# Patient Record
Sex: FEMALE | Race: WHITE | NOT HISPANIC OR LATINO | Employment: OTHER | URBAN - METROPOLITAN AREA
[De-identification: names, ages, dates, MRNs, and addresses within clinical notes are randomized per-mention and may not be internally consistent; named-entity substitution may affect disease eponyms.]

---

## 2017-01-02 DIAGNOSIS — Z51.81 MEDICATION MONITORING ENCOUNTER: ICD-10-CM

## 2017-01-02 DIAGNOSIS — N30.00 ACUTE CYSTITIS WITHOUT HEMATURIA: ICD-10-CM

## 2017-01-02 LAB
ALBUMIN UR-MCNC: NEGATIVE MG/DL
ALT SERPL W P-5'-P-CCNC: 27 U/L (ref 0–50)
APPEARANCE UR: CLEAR
AST SERPL W P-5'-P-CCNC: 39 U/L (ref 0–45)
BACTERIA #/AREA URNS HPF: ABNORMAL /HPF
BASOPHILS # BLD AUTO: 0 10E9/L (ref 0–0.2)
BASOPHILS NFR BLD AUTO: 0.8 %
BILIRUB UR QL STRIP: NEGATIVE
COLOR UR AUTO: YELLOW
DIFFERENTIAL METHOD BLD: NORMAL
EOSINOPHIL # BLD AUTO: 0.1 10E9/L (ref 0–0.7)
EOSINOPHIL NFR BLD AUTO: 2.5 %
ERYTHROCYTE [DISTWIDTH] IN BLOOD BY AUTOMATED COUNT: 12.6 % (ref 10–15)
GLUCOSE UR STRIP-MCNC: NEGATIVE MG/DL
HCT VFR BLD AUTO: 37.4 % (ref 35–47)
HGB BLD-MCNC: 11.8 G/DL (ref 11.7–15.7)
HGB UR QL STRIP: NEGATIVE
KETONES UR STRIP-MCNC: NEGATIVE MG/DL
LEUKOCYTE ESTERASE UR QL STRIP: ABNORMAL
LYMPHOCYTES # BLD AUTO: 1.7 10E9/L (ref 0.8–5.3)
LYMPHOCYTES NFR BLD AUTO: 32.1 %
MCH RBC QN AUTO: 28.4 PG (ref 26.5–33)
MCHC RBC AUTO-ENTMCNC: 31.6 G/DL (ref 31.5–36.5)
MCV RBC AUTO: 90 FL (ref 78–100)
MONOCYTES # BLD AUTO: 0.6 10E9/L (ref 0–1.3)
MONOCYTES NFR BLD AUTO: 10.9 %
NEUTROPHILS # BLD AUTO: 2.9 10E9/L (ref 1.6–8.3)
NEUTROPHILS NFR BLD AUTO: 53.7 %
NITRATE UR QL: NEGATIVE
NON-SQ EPI CELLS #/AREA URNS LPF: ABNORMAL /LPF
PH UR STRIP: 8 PH (ref 5–7)
PLATELET # BLD AUTO: 322 10E9/L (ref 150–450)
RBC # BLD AUTO: 4.16 10E12/L (ref 3.8–5.2)
RBC #/AREA URNS AUTO: ABNORMAL /HPF (ref 0–2)
SP GR UR STRIP: 1.01 (ref 1–1.03)
URN SPEC COLLECT METH UR: ABNORMAL
UROBILINOGEN UR STRIP-ACNC: 0.2 EU/DL (ref 0.2–1)
WBC # BLD AUTO: 5.3 10E9/L (ref 4–11)
WBC #/AREA URNS AUTO: ABNORMAL /HPF (ref 0–2)

## 2017-01-02 PROCEDURE — 81001 URINALYSIS AUTO W/SCOPE: CPT | Performed by: FAMILY MEDICINE

## 2017-01-02 PROCEDURE — 84460 ALANINE AMINO (ALT) (SGPT): CPT | Performed by: DERMATOLOGY

## 2017-01-02 PROCEDURE — 84450 TRANSFERASE (AST) (SGOT): CPT | Performed by: DERMATOLOGY

## 2017-01-02 PROCEDURE — 87086 URINE CULTURE/COLONY COUNT: CPT | Performed by: FAMILY MEDICINE

## 2017-01-02 PROCEDURE — 36415 COLL VENOUS BLD VENIPUNCTURE: CPT | Performed by: DERMATOLOGY

## 2017-01-02 PROCEDURE — 85025 COMPLETE CBC W/AUTO DIFF WBC: CPT | Performed by: DERMATOLOGY

## 2017-01-02 NOTE — PROGRESS NOTES
Quick Note:    Your final test results are pending. Please check your chart again within 3 to 5 days. You will receive further instruction when your full test result panel is complete.      ______

## 2017-01-02 NOTE — Clinical Note
Patient:  Ludy Mckenzie  :   1940  MRN:     [id number]        Ms.Inez JESSICA Mckenzie  [address] [place] [address]        January 3, 2017    Dear ,      We are writing to inform you of your test results that show normal labs.     Thank you for taking the time to be seen in our dermatology clinic. If you have further questions or concerns, please contact the clinic(see phone number listed below).       Sincerely,     Whitley Manrique MD      Department of Dermatology   [facility]: Phone: [phone], Fax:[phone]   [facility]: Phone: [phone], Fax: [phone]     Resulted Orders   CBC with platelets differential   Result Value Ref Range    WBC 5.3 4.0 - 11.0 10e9/L    RBC Count 4.16 3.8 - 5.2 10e12/L    Hemoglobin 11.8 11.7 - 15.7 g/dL    Hematocrit 37.4 35.0 - 47.0 %    MCV 90 78 - 100 fl    MCH 28.4 26.5 - 33.0 pg    MCHC 31.6 31.5 - 36.5 g/dL    RDW 12.6 10.0 - 15.0 %    Platelet Count 322 150 - 450 10e9/L    Diff Method Automated Method     % Neutrophils 53.7 %    % Lymphocytes 32.1 %    % Monocytes 10.9 %    % Eosinophils 2.5 %    % Basophils 0.8 %    Absolute Neutrophil 2.9 1.6 - 8.3 10e9/L    Absolute Lymphocytes 1.7 0.8 - 5.3 10e9/L    Absolute Monocytes 0.6 0.0 - 1.3 10e9/L    Absolute Eosinophils 0.1 0.0 - 0.7 10e9/L    Absolute Basophils 0.0 0.0 - 0.2 10e9/L   ALT   Result Value Ref Range    ALT 27 0 - 50 U/L   AST   Result Value Ref Range    AST 39 0 - 45 U/L

## 2017-01-03 DIAGNOSIS — L12.0 BULLOUS PEMPHIGOID (H): Primary | ICD-10-CM

## 2017-01-03 DIAGNOSIS — H40.9 GLAUCOMA: Primary | ICD-10-CM

## 2017-01-03 LAB
BACTERIA SPEC CULT: NO GROWTH
MICRO REPORT STATUS: NORMAL
SPECIMEN SOURCE: NORMAL

## 2017-01-03 RX ORDER — MYCOPHENOLATE MOFETIL 500 MG/1
TABLET ORAL
Qty: 300 TABLET | Refills: 0 | Status: SHIPPED | OUTPATIENT
Start: 2017-01-03 | End: 2017-02-17

## 2017-01-03 RX ORDER — LATANOPROST 50 UG/ML
1 SOLUTION/ DROPS OPHTHALMIC AT BEDTIME
Qty: 3 BOTTLE | Refills: 3 | Status: SHIPPED | OUTPATIENT
Start: 2017-01-03 | End: 2017-11-08

## 2017-01-14 ENCOUNTER — TELEPHONE (OUTPATIENT)
Dept: DERMATOLOGY | Facility: CLINIC | Age: 77
End: 2017-01-14

## 2017-01-14 NOTE — TELEPHONE ENCOUNTER
[facility] Prior Authorization Team   Phone: [phone]  Fax: [phone]    PA Initiation    Medication: MYCOPHENOLATE 500MG  Insurance Company: [employer]  Fax Number:      Phone Number: [phone]  Pharmacy Filling the Rx: [id number] - [address]  Filling Pharmacy Phone: [phone]  Filling Pharmacy Fax: [phone]  Start Date: 1/14/2017

## 2017-01-17 NOTE — TELEPHONE ENCOUNTER
Patient has 2 days left of medication.  Can you please contact the insurance company again?  Nell Crawford RN

## 2017-01-17 NOTE — TELEPHONE ENCOUNTER
Prior Authorization Approval    Authorization Effective Date: 12/15/2016  Authorization Expiration Date: 1/14/2020  Medication: MYCOPHENOLATE 500MG - Approved  Approved Dose/Quantity:   Reference #:     Insurance Company:    Expected CoPay: $76.38     CoPay Card Available:      Foundation Assistance Needed:    Which Pharmacy is filling the prescription (Not needed for infusion/clinic administered): [id number] - [facility], [place] - [address] [facility].  Pharmacy Notified:  Yes  Patient Notified:   Yes    The patient currently has a deductible that needs to be met. Once this has been reached, her co-pay will be $12.00. She has been updated on this.

## 2017-02-17 ENCOUNTER — OFFICE VISIT (OUTPATIENT)
Dept: DERMATOLOGY | Facility: CLINIC | Age: 77
End: 2017-02-17
Payer: COMMERCIAL

## 2017-02-17 DIAGNOSIS — L82.1 SEBORRHEIC KERATOSIS: ICD-10-CM

## 2017-02-17 DIAGNOSIS — Z51.81 MEDICATION MONITORING ENCOUNTER: Primary | ICD-10-CM

## 2017-02-17 DIAGNOSIS — L12.0 BULLOUS PEMPHIGOID (H): ICD-10-CM

## 2017-02-17 DIAGNOSIS — T14.8XXA EXCORIATION: ICD-10-CM

## 2017-02-17 PROCEDURE — 99213 OFFICE O/P EST LOW 20 MIN: CPT | Performed by: DERMATOLOGY

## 2017-02-17 RX ORDER — MYCOPHENOLATE MOFETIL 500 MG/1
TABLET ORAL
Qty: 300 TABLET | Refills: 0 | Status: SHIPPED | OUTPATIENT
Start: 2017-02-17 | End: 2017-03-30

## 2017-02-17 NOTE — PROGRESS NOTES
[facility] Dermatology Note      Dermatology Problem List:  1. Bullous pemphigoid, began 2013 initially followed by Dr. Roberts  -Current Tx: Cellcept (initiated 10/23/2013 1500 BID 2014, decreased to 1000 BID 11/10/2016)  -Previous Tx: prednisone initiated 9/25/2013  -s/p dapsone initiated 10/2013 with improvement  -s/p prednisolone initiated 10/16/2013, re-initiated 2/10/2014  -s/p clobetasol cream and gel for mouth11/6/2013  -s/p mycophenolate 1500 BID 9/2014  -pemphigoid panel with negative BP antibodies 11/2015 initially positive 6/2015  -Negative DIF 9/2013  -s/p biopsy 9/2013 with negative DIF  -s/p biopsy 6/2013 with spongiotic dermatitis with EOS on right upper thigh and right mid thigh  2. Oral candida  -s/p nystatin   -s/p fluconazole    Encounter Date: Feb 17, 2017    CC:  Chief Complaint   Patient presents with     RECHECK     3 month follow up - Skin Check - Bullous Pemphigoid       History of Present Illness:  Ms. Ludy Mckenzie is a 76 year old female who presents as a follow-up for bullous pemphigoid and lesion on the right elbow. The patient was last seen 11/10/2016 when Cellcept was decreased to 1000mg BID and erythematous papule on the right elbow was identified. Today, the patient reports a bothersome lesion on the right upper arm. Also notes red lines on the back. She is taking 5 pills of Cellcept daily. If she takes less than that she starts to become pruritic. No recent illnesses. Has had flu shot.  The patient reports no other lesions of concern.     Past Medical History:   Patient Active Problem List   Diagnosis     Contact dermatitis and other eczema, due to unspecified cause     Colon polyp     Balding     Alcoholism (H)     Hyperlipidemia LDL goal <100     Cerebral infarction (H)     Anxiety     Thyroid nodule     Seasonal allergic rhinitis     Major depressive disorder, recurrent episode, mild (H)     Health Care Home     Bullous pemphigoid     Hypopotassemia      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)     Glaucoma     Peripheral edema     Chronic obstructive pulmonary disease, unspecified COPD type (H)     Chronic kidney disease (CKD) stage G1/A2, glomerular filtration rate (GFR) equal to or greater than 90 mL/min/1.73 square meter and albuminuria creatinine ratio between  mg/g     Loss of balance     Diabetic neuropathy with neurologic complication (H)     Pseudophakia of both eyes     Past Medical History   Diagnosis Date     Acute ischemic left TREMAYNE stroke (H) 2009     Astigmatism, unspecified      Bullous pemphigoid      COPD (chronic obstructive pulmonary disease) (H)      CVA (cerebral infarction) 8/09     Depressive disorder      Diabetes (H)      Family history of diabetes mellitus      Goiter 3/12/2007     HTN (hypertension) 8/25/2009     Hypocalcemia      Hypokalemia      Hyponatremia      Mixed hyperlipidemia      Personal history of other diseases of circulatory system      Pneumonia 11/16/2010     Primary open-angle glaucoma(365.11)      Thyroid nodule 3/23/2012     Unspecified cerebral artery occlusion with cerebral infarction      Unspecified cerebral artery occlusion with cerebral infarction 1998     Past Surgical History   Procedure Laterality Date     Bunionectomy  1960     JACQUELINE     Cataract iol, rt/lt  2010     right     Colonoscopy  2/22/2013     Procedure: COLONOSCOPY;  Colonoscopy ;  Surgeon: Hugo Minor MD;  Location:  GI     Biopsy         Social History:  The patient lives in [facility]    Family History:  Grandma had unknown skin cancer.    Medications:  Current Outpatient Prescriptions   Medication Sig Dispense Refill     latanoprost (XALATAN) 0.005 % ophthalmic solution Place 1 drop into both eyes At Bedtime 3 Bottle 3     mycophenolate (CELLCEPT - GENERIC EQUIVALENT) 500 MG tablet Take 3 tabs in the am and 2 tabs in the evening 300 tablet 0     fluticasone-salmeterol (ADVAIR) 500-50 MCG/DOSE  diskus inhaler Inhale 1 puff into the lungs 2 times daily 1 Inhaler 1     sertraline (ZOLOFT) 100 MG tablet Take 1.5 tablets (150 mg) by mouth daily 135 tablet 1     ipratropium - albuterol 0.5 mg/2.5 mg/3 mL (DUONEB) 0.5-2.5 (3) MG/3ML nebulization Take 1 vial (3 mLs) by nebulization every 6 hours as needed for shortness of breath / dyspnea 1 Box 3     losartan (COZAAR) 25 MG tablet Take 0.5 tablets (12.5 mg) by mouth daily 45 tablet 1     clobetasol (TEMOVATE) 0.05 % cream Apply topically 2 times daily To active blisters until resolved. 120 g 5     albuterol (ALBUTEROL) 108 (90 BASE) MCG/ACT inhaler Inhale 2 puffs into the lungs every 4 hours as needed for shortness of breath / dyspnea 1 Inhaler 1     potassium chloride SA (POTASSIUM CHLORIDE) 20 MEQ tablet Take 1 tablet (20 mEq) by mouth daily 90 tablet 4     tiotropium (SPIRIVA HANDIHALER) 18 MCG inhalation capsule Inhale 1 capsule (18 mcg) into the lungs daily 90 capsule 2     calcium 600 MG tablet Take 1 tablet by mouth 2 times daily 180 tablet 3     multivitamin, therapeutic with minerals (THERA-VIT-M) TABS Take 1 tablet by mouth daily       vitamin  B complex with vitamin C (VITAMIN  B COMPLEX) TABS Take 1 tablet by mouth daily       ACE NOT PRESCRIBED, INTENTIONAL, 1 each continuous prn. ACE Inhibitor not prescribed due to Symptomatic hypotension not due to excessive diuresis 0 each 0     aspirin 81 MG tablet Take 1 tablet by mouth daily.       cholecalciferol (VITAMIN D3) 1000 UNIT capsule Take 1 capsule by mouth daily.         Allergies   Allergen Reactions     Amoxicillin GI Disturbance     Augmentin Nausea and Vomiting     Iodine Itching and Swelling     Pt is ok with ct contrast dye (isovue 370)     Mercury Unknown     Penicillins Unknown       Review of Systems:  -Const: The patient is generally feeling well today.   -Skin: As above in HPI. No additional skin concerns.    Physical exam:  Vitals: There were no vitals taken for this visit.  GEN: This is  a well developed, well-nourished female in no acute distress, in a pleasant mood.    SKIN: Sun-exposed skin, which includes the head/face, neck, both arms, digits, and/or nails was examined. Including exam of the legs and buttocks.   -Lichenified papule on the right posterior arm at site of prior scar   -Linear excoriations on the back.   -There is a waxy stuck on tan to brown papule on the right elbow.  -Numerous hypopigmented scars on the upper back.   -No other lesions of concern on areas examined.     Impression/Plan:  1. Bullous pemphigoid, began 2013 initially followed by Dr. Roberts. Stable on Cellcept, with hypopigmented scarring.   Continue Cellcept to 1000 mg in the morning and 1000 mg at night. Plan to obtain CBC with diff, ALT/AST in 2 months.   Waist up skin exam completed 11/10/2016   2. Seborrheic keratosis, right elbow    Discussed benign nature, no further intervention required at this time.   3. Linear excoriation, back. 2/2 trauma    Discussed lesions are due to trauma/scratching and will resolve with time.  4. Prurigo nodule, right posterior arm    Instructed patient to avoid scratching/picking area. Discussed the lesion should improve with time.    Use vaseline    Recheck at follow up     Follow-up in 3 months, earlier for new or changing lesions.     Staff Involved:  Scribe/Staff    Scribe Disclosure:   I, Jackelyn Peres, am serving as a scribe to document services personally performed by Dr. Whitley Manrique, based on data collection and the provider's statements to me.     Provider Disclosure:   I agree with above History, Review of Systems, Physical exam and Plan. I have reviewed the content of the documentation and have edited it as needed. I have personally performed the services documented here and the documentation accurately represents those services and the decisions I have made.     Whitley Manrique MD    Department of Dermatology  [facility]  [facility]: Phone: [phone], Fax:[phone]  [facility]: Phone: [phone], Fax: [phone]

## 2017-02-17 NOTE — MR AVS SNAPSHOT
After Visit Summary   2/17/2017    Ludy Mckenzie    MRN: [id number]           Patient Information     Date Of Birth          1940        Visit Information        Provider Department      2/17/2017 11:00 AM Whitley Manrique MD [facility]        Today's Diagnoses     Medication monitoring encounter    -  1    Bullous pemphigoid        Seborrheic keratosis        Excoriation           Follow-ups after your visit        Your next 10 appointments already scheduled     Apr 11, 2017 10:00 AM CDT   New Visit with Arjun Valenzuela MD   [facility]    [address] [place] [id number]   [phone]            May 23, 2017 12:15 PM CDT   Return Visit with Whitley Manrique MD   [facility]    [address] [place] [address] [phone]              Future tests that were ordered for you today     Open Future Orders        Priority Expected Expires Ordered    CBC with platelets differential Routine 4/17/2017 5/17/2017 2/17/2017    ALT Routine 4/17/2017 5/17/2017 2/17/2017    AST Routine 4/17/2017 5/17/2017 2/17/2017            Who to contact     If you have questions or need follow up information about today's clinic visit or your schedule please contact [facility] directly at [phone].  Normal or non-critical lab and imaging results will be communicated to you by MyChart, letter or phone within 4 business days after the clinic has received the results. If you do not hear from us within 7 days, please contact the clinic through MyChart or phone. If you have a critical or abnormal lab result, we will notify you by phone as soon as possible.  Submit refill requests through [employer] or call your pharmacy and they will forward the refill request to us. Please allow 3 business days for your refill to be completed.          Additional Information About Your Visit         boldUnderline. llchart Information     [employer] gives you secure access to your electronic health record. If you see a primary care provider, you can also send messages to your care team and make appointments. If you have questions, please call your primary care clinic.  If you do not have a primary care provider, please call [phone] and they will assist you.      [employer] is an electronic gateway that provides easy, online access to your medical records. With [employer], you can request a clinic appointment, read your test results, renew a prescription or communicate with your care team.     To access your existing account, please contact your [facility] or call [phone] for assistance.        Care EveryWhere ID     This is your Care EveryWhere ID. This could be used by other organizations to access your [place] medical records  [id number]         Blood Pressure from Last 3 Encounters:   12/22/16 118/64   09/19/16 110/58   09/13/16 108/61    Weight from Last 3 Encounters:   12/22/16 135 lb (61.2 kg)   09/19/16 133 lb 6.4 oz (60.5 kg)   09/13/16 135 lb (61.2 kg)                 Where to get your medicines      Call your pharmacy to confirm that your medication is ready for pickup. It may take up to 24 hours for them to receive the prescription. If the prescription is not ready within 3 business days, please contact your clinic or your provider.     We will let you know when these medications are ready. If you don't hear back within 3 business days, please contact us.     mycophenolate 500 MG tablet          Primary Care Provider Office Phone # Fax #    Ce Cano [phone]       [facility] [address] [facility] [address]        Thank you!     Thank you for choosing [facility]  for your care. Our goal is always to provide you with excellent care. Hearing back from our patients is one way we can continue to improve our  services. Please take a few minutes to complete the written survey that you may receive in the mail after your visit with us. Thank you!             Your Updated Medication List - Protect others around you: Learn how to safely use, store and throw away your medicines at www.disposemymeds.org.          This list is accurate as of: 2/17/17 11:15 AM.  Always use your most recent med list.                   Brand Name Dispense Instructions for use    ACE NOT PRESCRIBED (INTENTIONAL)     0 each    1 each continuous prn. ACE Inhibitor not prescribed due to Symptomatic hypotension not due to excessive diuresis       albuterol 108 (90 BASE) MCG/ACT Inhaler    albuterol    1 Inhaler    Inhale 2 puffs into the lungs every 4 hours as needed for shortness of breath / dyspnea       aspirin 81 MG tablet      Take 1 tablet by mouth daily.       calcium 600 MG tablet     180 tablet    Take 1 tablet by mouth 2 times daily       cholecalciferol 1000 UNITS capsule    vitamin  -D     Take 1 capsule by mouth daily.       clobetasol 0.05 % cream    TEMOVATE    120 g    Apply topically 2 times daily To active blisters until resolved.       fluticasone-salmeterol 500-50 MCG/DOSE diskus inhaler    ADVAIR    1 Inhaler    Inhale 1 puff into the lungs 2 times daily       ipratropium - albuterol 0.5 mg/2.5 mg/3 mL 0.5-2.5 (3) MG/3ML neb solution    DUONEB    1 Box    Take 1 vial (3 mLs) by nebulization every 6 hours as needed for shortness of breath / dyspnea       latanoprost 0.005 % ophthalmic solution    XALATAN    3 Bottle    Place 1 drop into both eyes At Bedtime       losartan 25 MG tablet    COZAAR    45 tablet    Take 0.5 tablets (12.5 mg) by mouth daily       multivitamin, therapeutic with minerals Tabs tablet      Take 1 tablet by mouth daily       mycophenolate 500 MG tablet    CELLCEPT - GENERIC EQUIVALENT    300 tablet    Take 3 tabs in the am and 2 tabs in the evening       potassium chloride SA 20 MEQ CR tablet    potassium  chloride    90 tablet    Take 1 tablet (20 mEq) by mouth daily       sertraline 100 MG tablet    ZOLOFT    135 tablet    Take 1.5 tablets (150 mg) by mouth daily       tiotropium 18 MCG capsule    SPIRIVA HANDIHALER    90 capsule    Inhale 1 capsule (18 mcg) into the lungs daily       vitamin B complex with vitamin C Tabs tablet      Take 1 tablet by mouth daily

## 2017-02-17 NOTE — NURSING NOTE
Dermatology Rooming Note    Ludy Mckenzie's goals for this visit include:   Chief Complaint   Patient presents with     RECHECK     3 month follow up - Skin Check - Bullous Pemphigoid       Is a scribe okay for this visit: YES    Are records needed for this visit(If yes, obtain release of information): NO     Vitals: There were no vitals taken for this visit.    Referring Provider:  No referring provider defined for this encounter.    Sarahi Ames, CMA

## 2017-02-18 DIAGNOSIS — R80.9 MICROALBUMINURIA: ICD-10-CM

## 2017-02-20 NOTE — TELEPHONE ENCOUNTER
losartan (COZAAR) 25 MG tablet      Last Written Prescription Date: 7/5/2016  Last Fill Quantity: 45, # refills: 1  Last Office Visit with G, P or [facility] prescribing provider: 2/17/2017       Potassium   Date Value Ref Range Status   09/19/2016 4.2 3.4 - 5.3 mmol/L Final     Creatinine   Date Value Ref Range Status   09/19/2016 0.53 0.52 - 1.04 mg/dL Final     BP Readings from Last 3 Encounters:   12/22/16 118/64   09/19/16 110/58   09/13/16 108/61

## 2017-02-22 RX ORDER — LOSARTAN POTASSIUM 25 MG/1
TABLET ORAL
Qty: 45 TABLET | Refills: 1 | Status: SHIPPED | OUTPATIENT
Start: 2017-02-22 | End: 2018-01-14

## 2017-02-22 NOTE — TELEPHONE ENCOUNTER
Prescription approved per [facility] Refill Protocol.    Signed Prescriptions:                        Disp   Refills    losartan (COZAAR) 25 MG tablet             45 tab*1        Sig: TAKE 0.5 TABLETS (12.5 MG) BY MOUTH DAILY  Authorizing Provider: LLOYD WATERS  Ordering User: GREGORY VIZCARRA, RN, BSN

## 2017-03-13 DIAGNOSIS — J43.9 PULMONARY EMPHYSEMA, UNSPECIFIED EMPHYSEMA TYPE (H): ICD-10-CM

## 2017-03-14 NOTE — TELEPHONE ENCOUNTER
tiotropium (SPIRIVA HANDIHALER) 18 MCG inhalation capsule       Last Written Prescription Date: 2/22/16  Last Fill Quantity: 90, # refills: 2    Last Office Visit with LEIDY, MARTY or [facility] prescribing provider:  12/22/16   Future Office Visit:    Next 5 appointments (look out 90 days)     May 23, 2017 12:15 PM CDT   Return Visit with Whitley Manrique MD   [facility] ([facility])    [address] [place] [address] [phone]                   Date of Last Asthma Action Plan Letter:   There are no preventive care reminders to display for this patient.   Asthma Control Test:   ACT Total Scores 4/16/2013   ACT TOTAL SCORE 9   ASTHMA ER VISITS 1 = One   ASTHMA HOSPITALIZATIONS 0 = None       Date of Last Spirometry Test:   No results found for this or any previous visit.

## 2017-03-15 RX ORDER — TIOTROPIUM BROMIDE 18 UG/1
CAPSULE ORAL; RESPIRATORY (INHALATION)
Qty: 90 CAPSULE | Refills: 1 | Status: SHIPPED | OUTPATIENT
Start: 2017-03-15 | End: 2017-06-02

## 2017-03-26 DIAGNOSIS — J44.9 CHRONIC OBSTRUCTIVE PULMONARY DISEASE, UNSPECIFIED COPD TYPE (H): ICD-10-CM

## 2017-03-27 ENCOUNTER — TELEPHONE (OUTPATIENT)
Dept: DERMATOLOGY | Facility: CLINIC | Age: 77
End: 2017-03-27

## 2017-03-27 NOTE — TELEPHONE ENCOUNTER
[facility] Call Center    Phone Message    Name of Caller: Ludy    Phone Number: Home number on file [phone] (home)    Best time to return call: Any    May a detailed message be left on voicemail: yes    Relation to patient: Self    Reason for Call: Medication Question or concern regarding medication   Prescription Clarification: mycophenolate (CELLCEPT - GENERIC EQUIVALENT) 500 MG tablet [[id number]] (Order [id number])     Name of Medication: mycophenolate (CELLCEPT - GENERIC EQUIVALENT) 500 MG tablet [[id number]] (Order [id number])     Prescribing Provider: Dr. Manrique   Pharmacy: [facility]   What on the order needs clarification? Patient is confused and states that every time she calls the pharmacy they tell her she needs to call her Doctor and ask to have a new RX sent over to the pharmacy. She will be out of the medication by the end of the week.  Please advise.           Action Taken: Message routed to:  Adult Clinics: Dermatology p [id number]

## 2017-03-27 NOTE — TELEPHONE ENCOUNTER
Patient was given 300 tabs, which equals to 60 days worth of medication 3 in am 2 in pm, will route to dr. Manrique, appt 5/23 for 3 month follow up.

## 2017-03-27 NOTE — TELEPHONE ENCOUNTER
albuterol (ALBUTEROL) 108 (90 BASE) MCG/ACT inhaler       Last Written Prescription Date: 3/1/16  Last Fill Quantity: 1 inhaler, # refills: 1    Last Office Visit with LEIDY, MARTY or [facility] prescribing provider:  12/22/16   Future Office Visit:    Next 5 appointments (look out 90 days)     May 23, 2017 12:15 PM CDT   Return Visit with Whitley Manrique MD   [facility]    [address] [place] [address] [phone]                   Date of Last Asthma Action Plan Letter:   There are no preventive care reminders to display for this patient.   Asthma Control Test:   ACT Total Scores 4/16/2013   ACT TOTAL SCORE 9   ASTHMA ER VISITS 1 = One   ASTHMA HOSPITALIZATIONS 0 = None       Date of Last Spirometry Test:   No results found for this or any previous visit.

## 2017-03-27 NOTE — TELEPHONE ENCOUNTER
This CMA left message detailed message for pt to call clinic back @ [phone], and to get labs asap to get medication refill.     Amorrevgeny Herron CMA

## 2017-03-28 DIAGNOSIS — Z51.81 MEDICATION MONITORING ENCOUNTER: ICD-10-CM

## 2017-03-28 LAB
ALT SERPL W P-5'-P-CCNC: 35 U/L (ref 0–50)
AST SERPL W P-5'-P-CCNC: 92 U/L (ref 0–45)
BASOPHILS # BLD AUTO: 0 10E9/L (ref 0–0.2)
BASOPHILS NFR BLD AUTO: 0.4 %
DIFFERENTIAL METHOD BLD: NORMAL
EOSINOPHIL # BLD AUTO: 0.2 10E9/L (ref 0–0.7)
EOSINOPHIL NFR BLD AUTO: 3.2 %
ERYTHROCYTE [DISTWIDTH] IN BLOOD BY AUTOMATED COUNT: 12.9 % (ref 10–15)
HCT VFR BLD AUTO: 36.7 % (ref 35–47)
HGB BLD-MCNC: 12 G/DL (ref 11.7–15.7)
LYMPHOCYTES # BLD AUTO: 1.6 10E9/L (ref 0.8–5.3)
LYMPHOCYTES NFR BLD AUTO: 29.7 %
MCH RBC QN AUTO: 29.3 PG (ref 26.5–33)
MCHC RBC AUTO-ENTMCNC: 32.7 G/DL (ref 31.5–36.5)
MCV RBC AUTO: 90 FL (ref 78–100)
MONOCYTES # BLD AUTO: 0.6 10E9/L (ref 0–1.3)
MONOCYTES NFR BLD AUTO: 11.2 %
NEUTROPHILS # BLD AUTO: 3 10E9/L (ref 1.6–8.3)
NEUTROPHILS NFR BLD AUTO: 55.5 %
PLATELET # BLD AUTO: 301 10E9/L (ref 150–450)
RBC # BLD AUTO: 4.1 10E12/L (ref 3.8–5.2)
WBC # BLD AUTO: 5.4 10E9/L (ref 4–11)

## 2017-03-28 PROCEDURE — 36415 COLL VENOUS BLD VENIPUNCTURE: CPT | Performed by: DERMATOLOGY

## 2017-03-28 PROCEDURE — 84460 ALANINE AMINO (ALT) (SGPT): CPT | Performed by: DERMATOLOGY

## 2017-03-28 PROCEDURE — 84450 TRANSFERASE (AST) (SGOT): CPT | Performed by: DERMATOLOGY

## 2017-03-28 PROCEDURE — 85025 COMPLETE CBC W/AUTO DIFF WBC: CPT | Performed by: DERMATOLOGY

## 2017-03-29 DIAGNOSIS — L12.0 BULLOUS PEMPHIGOID (H): ICD-10-CM

## 2017-03-29 RX ORDER — ALBUTEROL SULFATE 90 UG/1
AEROSOL, METERED RESPIRATORY (INHALATION)
Qty: 8.5 INHALER | Refills: 1 | Status: SHIPPED | OUTPATIENT
Start: 2017-03-29 | End: 2018-07-30

## 2017-03-29 NOTE — TELEPHONE ENCOUNTER
Prescription approved per [facility] Refill Protocol.  Dmitriy Kaplan RN

## 2017-03-29 NOTE — TELEPHONE ENCOUNTER
Pt called, no answer.  Left message for pt to call the [facility] clinic back at [phone]....Jacquie Marie RN

## 2017-03-29 NOTE — TELEPHONE ENCOUNTER
[facility] Call Center    Phone Message    Name of Caller: Ludy    Phone Number: Home number on file [phone] (home)    Best time to return call: Any    May a detailed message be left on voicemail: yes    Relation to patient: Self    Reason for Call: Medication Refill Request    Has the patient contacted the pharmacy for the refill? Yes   Name of medication being requested: mycophenolate (CELLCEPT - GENERIC EQUIVALENT) 500 MG tablet [[id number]] (Order [id number])     Provider who prescribed the medication: Dr. Manrique  Pharmacy: [facility]  Date medication is needed: ASAP        Action Taken: Message routed to:  Adult Clinics: Dermatology p [id number]

## 2017-03-29 NOTE — TELEPHONE ENCOUNTER
Patient was told to get labs, which she got yesterday, Dr. fitzpatrick will review labs and then send medication/     Amorrae GOMEZ Herron

## 2017-03-29 NOTE — TELEPHONE ENCOUNTER
Reason for Call:  Other prescription    Detailed comments: [facility] pharmacy checking on the status of the refill request.     Phone Number Patient can be reached at: Other phone number:  Number above    Best Time: any    Can we leave a detailed message on this number? YES    Call taken on 3/29/2017 at 9:29 AM by Keli Bone

## 2017-03-30 RX ORDER — MULTIPLE VITAMINS W/ MINERALS TAB 9MG-400MCG
1 TAB ORAL DAILY
Qty: 30 EACH | Status: CANCELLED | OUTPATIENT
Start: 2017-03-30

## 2017-03-30 RX ORDER — MYCOPHENOLATE MOFETIL 500 MG/1
TABLET ORAL
Qty: 300 TABLET | Refills: 0 | Status: SHIPPED | OUTPATIENT
Start: 2017-03-30 | End: 2017-04-12

## 2017-03-30 NOTE — TELEPHONE ENCOUNTER
"  Pt called and states that she was on amoxicillin for one day for a cat bite and is not currently on any abx. RN asked pt if the cat bite site looked okay or if she was feeling ill. Pt states that the site looks fine and that she \"feels fine\". Medication ordered and sent to preferred pharmacy. Future lab orders placed. RN offered to schedule lab appt here but pt declined and states that she would like to go to the [facility] to have the labs done. Pt notified that future labs are placed and pt advised to call back with any future questions or concerns...Jacquie Marie RN        Notes Recorded by Whitley Manrique MD on 3/30/2017 at 12:25 PM  Nursing, okay to refill drug but please, reorder alt/ast check for ten days. We will see if it resolves and make sure it is not trending up.  ------    Notes Recorded by Nell Crawford RN on 3/30/2017 at 9:37 AM  Patient states that she has not been sick, doesn't drink alcohol and hasn't start any new meds.  She has taken Tylenol recently, but couldn't recall if it was around the time she had her labs drawn.  Nell Crawford RN    ------    Notes Recorded by Whitley Manrique MD on 3/29/2017 at 6:17 PM    Call patient. Liver tests are high. Is she using tylenol? Alcohol or has she been sick? Any new meds at home?  "

## 2017-03-30 NOTE — TELEPHONE ENCOUNTER
[facility] Call Center    Phone Message    Name of Caller: Ludy    Phone Number: Home number on file [phone] (home) or Cell number on file:    Telephone Information:   Mobile [phone]       Best time to return call: Any    May a detailed message be left on voicemail: yes    Relation to patient: Self    Reason for Call: Other: Patient is calling requesting to have Dr. Manrique's team call her back. Patient states she was prescribed amoxicillin on 03-26-17  and it made her sick and only took it that day. Patient is wondering if that is the reason some of her labs were elevated. Please advise.     Action Taken: Message routed to:  Adult Clinics: Dermatology p [phone]

## 2017-03-31 NOTE — TELEPHONE ENCOUNTER
Writer saw that prescription was sent to [facility] in [place] on 3/30/17. Called and spoke with patient to confirm that labs were done and that the prescription was sent to pharmacy. Patient said she will be picking up the prescription for mycophenolate (cellcept) today.    Emerald Conway LPN

## 2017-04-11 ENCOUNTER — OFFICE VISIT (OUTPATIENT)
Dept: OPHTHALMOLOGY | Facility: CLINIC | Age: 77
End: 2017-04-11
Payer: COMMERCIAL

## 2017-04-11 DIAGNOSIS — Z01.01 ENCOUNTER FOR EXAMINATION OF EYES AND VISION WITH ABNORMAL FINDINGS: Primary | ICD-10-CM

## 2017-04-11 DIAGNOSIS — L12.0 BULLOUS PEMPHIGOID (H): ICD-10-CM

## 2017-04-11 DIAGNOSIS — E11.69 TYPE 2 DIABETES MELLITUS WITH OTHER SPECIFIED COMPLICATION (H): ICD-10-CM

## 2017-04-11 DIAGNOSIS — H52.4 PRESBYOPIA: ICD-10-CM

## 2017-04-11 DIAGNOSIS — H52.13 MYOPIA, BILATERAL: ICD-10-CM

## 2017-04-11 DIAGNOSIS — Z96.1 PSEUDOPHAKIA OF BOTH EYES: ICD-10-CM

## 2017-04-11 DIAGNOSIS — H35.363 DRUSEN OF MACULA OF BOTH EYES: ICD-10-CM

## 2017-04-11 DIAGNOSIS — H43.813 POSTERIOR VITREOUS DETACHMENT OF BOTH EYES: ICD-10-CM

## 2017-04-11 DIAGNOSIS — H52.203 ASTIGMATISM, BILATERAL: ICD-10-CM

## 2017-04-11 DIAGNOSIS — H40.1130 PRIMARY OPEN ANGLE GLAUCOMA OF BOTH EYES, UNSPECIFIED GLAUCOMA STAGE: ICD-10-CM

## 2017-04-11 LAB
ALT SERPL W P-5'-P-CCNC: 23 U/L (ref 0–50)
AST SERPL W P-5'-P-CCNC: 31 U/L (ref 0–45)

## 2017-04-11 PROCEDURE — 84450 TRANSFERASE (AST) (SGOT): CPT | Performed by: DERMATOLOGY

## 2017-04-11 PROCEDURE — 36415 COLL VENOUS BLD VENIPUNCTURE: CPT | Performed by: DERMATOLOGY

## 2017-04-11 PROCEDURE — 92014 COMPRE OPH EXAM EST PT 1/>: CPT | Performed by: STUDENT IN AN ORGANIZED HEALTH CARE EDUCATION/TRAINING PROGRAM

## 2017-04-11 PROCEDURE — 84460 ALANINE AMINO (ALT) (SGPT): CPT | Performed by: DERMATOLOGY

## 2017-04-11 PROCEDURE — 92015 DETERMINE REFRACTIVE STATE: CPT | Performed by: STUDENT IN AN ORGANIZED HEALTH CARE EDUCATION/TRAINING PROGRAM

## 2017-04-11 ASSESSMENT — SLIT LAMP EXAM - LIDS
COMMENTS: 1+ MEIBOMIAN GLAND DYSFUNCTION
COMMENTS: 1+ MEIBOMIAN GLAND DYSFUNCTION

## 2017-04-11 ASSESSMENT — TONOMETRY
OD_IOP_MMHG: 11
IOP_METHOD: APPLANATION
OS_IOP_MMHG: 13

## 2017-04-11 ASSESSMENT — VISUAL ACUITY
OD_SC: 20/40
METHOD: SNELLEN - LINEAR
OD_SC+: -1
OS_SC: 20/25

## 2017-04-11 ASSESSMENT — REFRACTION_MANIFEST
OD_ADD: +3.00
OS_AXIS: 180
OD_CYLINDER: +1.25
OD_SPHERE: -1.00
OS_SPHERE: -0.50
OS_CYLINDER: +0.50
OD_AXIS: 180
OS_ADD: +3.00

## 2017-04-11 ASSESSMENT — CUP TO DISC RATIO
OS_RATIO: 0.5
OD_RATIO: 0.45

## 2017-04-11 ASSESSMENT — CONF VISUAL FIELD
OS_NORMAL: 1
OD_NORMAL: 1

## 2017-04-11 ASSESSMENT — EXTERNAL EXAM - LEFT EYE: OS_EXAM: NORMAL

## 2017-04-11 ASSESSMENT — EXTERNAL EXAM - RIGHT EYE: OD_EXAM: NORMAL

## 2017-04-11 NOTE — MR AVS SNAPSHOT
After Visit Summary   4/11/2017    Ludy Mckenzie    MRN: [id number]           Patient Information     Date Of Birth          1940        Visit Information        Provider Department      4/11/2017 10:00 AM Arjun Valenzuela MD [facility]        Today's Diagnoses     Encounter for examination of eyes and vision with abnormal findings    -  1    Presbyopia        Myopia, bilateral        Astigmatism, bilateral        Type 2 diabetes mellitus with other specified complication (H)        Primary open angle glaucoma of both eyes, unspecified glaucoma stage        Pseudophakia with Yag Caps ou         Posterior vitreous detachment of both eyes        Drusen of macula of both eyes          Care Instructions    Continue Latanoprost at bedtime both eyes   Glasses prescription given - optional   Could try +2.75 for reading and +1.25 for computer work.    Arjun Valenzuela MD  [phone]    Diabetes weakens the blood vessels all over the body, including the eyes. Damage to the blood vessels in the eyes can cause swelling or bleeding into part of the eye (called the retina). This is called diabetic retinopathy (KEVEN-tin--[facility]-thee). If not treated, this disease can cause vision loss or blindness.   Symptoms may include blurred or distorted vision, but many people have no symptoms. It's important to see your eye doctor regularly to check for problems.   Early treatment and good control can help protect your vision. Here are the things you can do to help prevent vision loss:      1. Keep your blood sugar levels under tight control.      2. Bring high blood pressure under control.      3. No smoking.      4. Have yearly dilated eye exams.          Follow-ups after your visit        Follow-up notes from your care team     Return in about 6 months (around 10/11/2017) for IOP check, HVF, OCT optic nerve.      Your next 10 appointments already scheduled     May 23, 2017 12:15 PM CDT   Return  Visit with Whitley Manrique MD   [facility]    [address] [address] [place] [address] [phone]              Who to contact     If you have questions or need follow up information about today's clinic visit or your schedule please contact [facility] directly at [phone].  Normal or non-critical lab and imaging results will be communicated to you by MyChart, letter or phone within 4 business days after the clinic has received the results. If you do not hear from us within 7 days, please contact the clinic through Berghart or phone. If you have a critical or abnormal lab result, we will notify you by phone as soon as possible.  Submit refill requests through Medikidz or call your pharmacy and they will forward the refill request to us. Please allow 3 business days for your refill to be completed.          Additional Information About Your Visit        Berghart Information     Medikidz gives you secure access to your electronic health record. If you see a primary care provider, you can also send messages to your care team and make appointments. If you have questions, please call your primary care clinic.  If you do not have a primary care provider, please call [phone] and they will assist you.        Care EveryWhere ID     This is your Care EveryWhere ID. This could be used by other organizations to access your [place] medical records  [id number]         Blood Pressure from Last 3 Encounters:   12/22/16 118/64   09/19/16 110/58   09/13/16 108/61    Weight from Last 3 Encounters:   12/22/16 61.2 kg (135 lb)   09/19/16 60.5 kg (133 lb 6.4 oz)   09/13/16 61.2 kg (135 lb)              We Performed the Following     EYE EXAM (SIMPLE-NONBILLABLE)     REFRACTIVE STATUS        Primary Care Provider Office Phone # Fax #    Ce Cano [phone]       [address] [address]         Thank you!     Thank you for choosing [facility]  for your care. Our goal is always to provide you with excellent care. Hearing back from our patients is one way we can continue to improve our services. Please take a few minutes to complete the written survey that you may receive in the mail after your visit with us. Thank you!             Your Updated Medication List - Protect others around you: Learn how to safely use, store and throw away your medicines at www.disposemymeds.org.          This list is accurate as of: 4/11/17 10:49 AM.  Always use your most recent med list.                   Brand Name Dispense Instructions for use    ACE NOT PRESCRIBED (INTENTIONAL)     0 each    1 each continuous prn. ACE Inhibitor not prescribed due to Symptomatic hypotension not due to excessive diuresis       albuterol 108 (90 BASE) MCG/ACT Inhaler   Generic drug:  albuterol     8.5 Inhaler    INHALE TWO PUFFS BY MOUTH EVERY FOUR HOURS AS NEEDED SHORTNESS OF BREATH       aspirin 81 MG tablet      Take 1 tablet by mouth daily.       calcium 600 MG tablet     180 tablet    Take 1 tablet by mouth 2 times daily       cholecalciferol 1000 UNITS capsule    vitamin  -D     Take 1 capsule by mouth daily.       clobetasol 0.05 % cream    TEMOVATE    120 g    Apply topically 2 times daily To active blisters until resolved.       fluticasone-salmeterol 500-50 MCG/DOSE diskus inhaler    ADVAIR    1 Inhaler    Inhale 1 puff into the lungs 2 times daily       ipratropium - albuterol 0.5 mg/2.5 mg/3 mL 0.5-2.5 (3) MG/3ML neb solution    DUONEB    1 Box    Take 1 vial (3 mLs) by nebulization every 6 hours as needed for shortness of breath / dyspnea       latanoprost 0.005 % ophthalmic solution    XALATAN    3 Bottle    Place 1 drop into both eyes At Bedtime       losartan 25 MG tablet    COZAAR    45 tablet    TAKE 0.5 TABLETS (12.5 MG) BY MOUTH DAILY       multivitamin, therapeutic with minerals Tabs tablet      Take 1 tablet  by mouth daily       mycophenolate 500 MG tablet    CELLCEPT - GENERIC EQUIVALENT    300 tablet    Take 3 tabs in the am and 2 tabs in the evening       potassium chloride SA 20 MEQ CR tablet    potassium chloride    90 tablet    Take 1 tablet (20 mEq) by mouth daily       sertraline 100 MG tablet    ZOLOFT    135 tablet    Take 1.5 tablets (150 mg) by mouth daily       SPIRIVA HANDIHALER 18 MCG capsule   Generic drug:  tiotropium     90 capsule    INHALE 1 CAPSULE INTO THE LUNGS DAILY.       vitamin B complex with vitamin C Tabs tablet      Take 1 tablet by mouth daily

## 2017-04-11 NOTE — PROGRESS NOTES
" Current Eye Medications:  Latanoprost both eyes every evening.  Last drops:  9pm.       Subjective:  Comprehensive Eye Exam.  Patient is here for a Diabetic Eye Exam.  Last A1C was:  6.3 last September.  She is wondering what power of reading glasses she should be wearing, and if they would be appropriate.   Distance vision is good, without correction, but she complains of a \"filmy appearance\" to her vision.  She has had computer glasses in the past, but they are destroyed.       Objective:  See Ophthalmology Exam.      Assessment:  Ludy Mckenzie is a 76 year old female who presents with:      Type 2 diabetes mellitus with other specified complication (H) Negative diabetic retinopathy     Primary open angle glaucoma of both eyes, unspecified glaucoma stage Intraocular pressure ok on latanoprost     Pseudophakia with Yag Caps ou       Posterior vitreous detachment of both eyes      Drusen of macula of both eyes        Plan:  Continue Latanoprost at bedtime both eyes   Glasses prescription given - optional   Could try +2.75 for reading and +1.25 for computer work.    Arjun Valenzuela MD  [phone]                 "

## 2017-04-11 NOTE — PATIENT INSTRUCTIONS
Continue Latanoprost at bedtime both eyes   Glasses prescription given - optional   Could try +2.75 for reading and +1.25 for computer work.    Arjun Valenzuela MD  [phone]    Diabetes weakens the blood vessels all over the body, including the eyes. Damage to the blood vessels in the eyes can cause swelling or bleeding into part of the eye (called the retina). This is called diabetic retinopathy (KEVEN-tin--[facility]-thee). If not treated, this disease can cause vision loss or blindness.   Symptoms may include blurred or distorted vision, but many people have no symptoms. It's important to see your eye doctor regularly to check for problems.   Early treatment and good control can help protect your vision. Here are the things you can do to help prevent vision loss:      1. Keep your blood sugar levels under tight control.      2. Bring high blood pressure under control.      3. No smoking.      4. Have yearly dilated eye exams.

## 2017-04-12 DIAGNOSIS — L12.0 BULLOUS PEMPHIGOID (H): ICD-10-CM

## 2017-04-12 RX ORDER — MYCOPHENOLATE MOFETIL 500 MG/1
TABLET ORAL
Qty: 300 TABLET | Refills: 1 | Status: SHIPPED | OUTPATIENT
Start: 2017-04-12 | End: 2017-05-23

## 2017-05-09 ENCOUNTER — OFFICE VISIT (OUTPATIENT)
Dept: FAMILY MEDICINE | Facility: CLINIC | Age: 77
End: 2017-05-09
Payer: COMMERCIAL

## 2017-05-09 VITALS
HEIGHT: 67 IN | TEMPERATURE: 97.4 F | HEART RATE: 80 BPM | OXYGEN SATURATION: 97 % | DIASTOLIC BLOOD PRESSURE: 64 MMHG | SYSTOLIC BLOOD PRESSURE: 110 MMHG | WEIGHT: 134 LBS | BODY MASS INDEX: 21.03 KG/M2

## 2017-05-09 DIAGNOSIS — Z13.89 SCREENING FOR DIABETIC PERIPHERAL NEUROPATHY: Primary | ICD-10-CM

## 2017-05-09 DIAGNOSIS — M62.830 LUMBAR PARASPINAL MUSCLE SPASM: ICD-10-CM

## 2017-05-09 PROCEDURE — 99207 C FOOT EXAM  NO CHARGE: CPT | Performed by: INTERNAL MEDICINE

## 2017-05-09 PROCEDURE — 99213 OFFICE O/P EST LOW 20 MIN: CPT | Performed by: INTERNAL MEDICINE

## 2017-05-09 RX ORDER — CYCLOBENZAPRINE HCL 5 MG
5 TABLET ORAL 3 TIMES DAILY PRN
Qty: 20 TABLET | Refills: 0 | Status: SHIPPED | OUTPATIENT
Start: 2017-05-09 | End: 2017-06-02

## 2017-05-09 ASSESSMENT — PAIN SCALES - GENERAL: PAINLEVEL: MILD PAIN (2)

## 2017-05-09 NOTE — PATIENT INSTRUCTIONS
- Follow up if you feel like you are not getting better.    [facility]    If you have any questions regarding to your visit please contact your care team:     Team Pink:   Clinic Hours Telephone Number   Internal Medicine:  Dr. Ce Stafford, NP       7am-7pm  Monday - Thursday   7am-5pm  Fridays  [phone]  (Appointment scheduling available 24/7)    Questions about your visit?  Team Line  [phone]   Urgent Care - [place] - 11am-9pm Monday-Friday Saturday-Sunday- 9am-5pm   [place] - 5pm-9pm Monday-Friday Saturday-Sunday- 9am-5pm  [phone] - [place]   [phone] - [place]       What options do I have for visits at the clinic other than the traditional office visit?  To expand how we care for you, many of our providers are utilizing electronic visits (e-visits) and telephone visits, when medically appropriate, for interactions with their patients rather than a visit in the clinic.   We also offer nurse visits for many medical concerns. Just like any other service, we will bill your insurance company for this type of visit based on time spent on the phone with your provider. Not all insurance companies cover these visits. Please check with your medical insurance if this type of visit is covered. You will be responsible for any charges that are not paid by your insurance.      E-visits via ProcessUnity:  generally incur a $35.00 fee.  Telephone visits:  Time spent on the phone: *charged based on time that is spent on the phone in increments of 10 minutes. Estimated cost:   5-10 mins $30.00   11-20 mins. $59.00   21-30 mins. $85.00   Use Medesent (secure email communication and access to your chart) to send your primary care provider a message or make an appointment. Ask someone on your Team how to sign up for ProcessUnity.    For a Price Quote for your services, please call our Consumer Price Line at [phone].    As always,  Thank you for trusting us with your health care needs!    Discharged By:  KAITLYN MARCOS

## 2017-05-09 NOTE — MR AVS SNAPSHOT
After Visit Summary   5/9/2017    Ludy Mckenzie    MRN: [id number]           Patient Information     Date Of Birth          1940        Visit Information        Provider Department      5/9/2017 10:50 AM Dereje Gardiner MD [facility]        Today's Diagnoses     Screening for diabetic peripheral neuropathy    -  1    Lumbar paraspinal muscle spasm          Care Instructions    - Follow up if you feel like you are not getting better.    [facility]    If you have any questions regarding to your visit please contact your care team:     Team Pink:   Clinic Hours Telephone Number   Internal Medicine:  Dr. Ce Stafford NP       7am-7pm  Monday - Thursday   7am-5pm  Fridays  [phone]  (Appointment scheduling available 24/7)    Questions about your visit?  Team Line  [phone]   Urgent Care - [place] - 11am-9pm Monday-Friday Saturday-Sunday- 9am-5pm   [place] - 5pm-9pm Monday-Friday Saturday-Sunday- 9am-5pm  [phone] - [place]   [phone] - [place]       What options do I have for visits at the clinic other than the traditional office visit?  To expand how we care for you, many of our providers are utilizing electronic visits (e-visits) and telephone visits, when medically appropriate, for interactions with their patients rather than a visit in the clinic.   We also offer nurse visits for many medical concerns. Just like any other service, we will bill your insurance company for this type of visit based on time spent on the phone with your provider. Not all insurance companies cover these visits. Please check with your medical insurance if this type of visit is covered. You will be responsible for any charges that are not paid by your insurance.      E-visits via Boll & Branch:  generally incur a $35.00 fee.  Telephone visits:  Time spent on the phone: *charged based on time that is spent on  the phone in increments of 10 minutes. Estimated cost:   5-10 mins $30.00   11-20 mins. $59.00   21-30 mins. $85.00   Use Jareehart (secure email communication and access to your chart) to send your primary care provider a message or make an appointment. Ask someone on your Team how to sign up for [facility].    For a Price Quote for your services, please call our Entertainment Magpie Line at [phone].    As always, Thank you for trusting us with your health care needs!    Discharged By:  KAITLYN MARCOS          Follow-ups after your visit        Your next 10 appointments already scheduled     May 23, 2017 12:15 PM CDT   [facility] with [facility]ley [facility] MD   [facility]    [address] [place] [address] [phone]            Jun 02, 2017  9:00 AM CDT   Office Visit with Ce Cano MD   [facility] ([address] [address] [phone]           Bring a current list of meds and any records pertaining to this visit.  For Physicals, please bring immunization records and any forms needing to be filled out.  Please arrive 10 minutes early to complete paperwork.              Who to contact     If you have questions or need follow up information about today's clinic visit or your schedule please contact [facility] directly at [phone].  Normal or non-critical lab and imaging results will be communicated to you by MyChart, letter or phone within 4 business days after the clinic has received the results. If you do not hear from us within 7 days, please contact the clinic through Jareehart or phone. If you have a critical or abnormal lab result, we will notify you by phone as soon as possible.  Submit refill requests through [facility] or call your pharmacy and they will forward the refill request to us. Please allow 3 business days for your refill to be completed.          Additional  "Information About Your Visit        Truvisohart Information     Granicus gives you secure access to your electronic health record. If you see a primary care provider, you can also send messages to your care team and make appointments. If you have questions, please call your primary care clinic.  If you do not have a primary care provider, please call [phone] and they will assist you.        Care EveryWhere ID     This is your Care EveryWhere ID. This could be used by other organizations to access your [place] medical records  [id number]        Your Vitals Were     Pulse Temperature Height Pulse Oximetry BMI (Body Mass Index)       80 97.4  F (36.3  C) (Oral) 5' 6.5\" (1.689 m) 97% 21.3 kg/m2        Blood Pressure from Last 3 Encounters:   05/09/17 110/64   12/22/16 118/64   09/19/16 110/58    Weight from Last 3 Encounters:   05/09/17 134 lb (60.8 kg)   12/22/16 135 lb (61.2 kg)   09/19/16 133 lb 6.4 oz (60.5 kg)              We Performed the Following     FOOT EXAM  NO CHARGE [83143.114]          Today's Medication Changes          These changes are accurate as of: 5/9/17 11:27 AM.  If you have any questions, ask your nurse or doctor.               Start taking these medicines.        Dose/Directions    cyclobenzaprine 5 MG tablet   Commonly known as:  FLEXERIL   Used for:  Lumbar paraspinal muscle spasm   Started by:  Dereje Gardiner MD        Dose:  5 mg   Take 1 tablet (5 mg) by mouth 3 times daily as needed for muscle spasms   Quantity:  20 tablet   Refills:  0            Where to get your medicines      Call your pharmacy to confirm that your medication is ready for pickup. It may take up to 24 hours for them to receive the prescription. If the prescription is not ready within 3 business days, please contact your clinic or your provider.     We will let you know when these medications are ready. If you don't hear back within 3 business days, please contact us.     cyclobenzaprine 5 MG tablet                " Primary Care Provider Office Phone # Fax #    Ce Cano [phone]       [address]  [place] [address]        Thank you!     Thank you for choosing [facility]  for your care. Our goal is always to provide you with excellent care. Hearing back from our patients is one way we can continue to improve our services. Please take a few minutes to complete the written survey that you may receive in the mail after your visit with us. Thank you!             Your Updated Medication List - Protect others around you: Learn how to safely use, store and throw away your medicines at www.disposemymeds.org.          This list is accurate as of: 5/9/17 11:27 AM.  Always use your most recent med list.                   Brand Name Dispense Instructions for use    ACE NOT PRESCRIBED (INTENTIONAL)     0 each    1 each continuous prn. ACE Inhibitor not prescribed due to Symptomatic hypotension not due to excessive diuresis       albuterol 108 (90 BASE) MCG/ACT Inhaler   Generic drug:  albuterol     8.5 Inhaler    INHALE TWO PUFFS BY MOUTH EVERY FOUR HOURS AS NEEDED SHORTNESS OF BREATH       aspirin 81 MG tablet      Take 1 tablet by mouth daily.       calcium 600 MG tablet     180 tablet    Take 1 tablet by mouth 2 times daily       cholecalciferol 1000 UNITS capsule    vitamin  -D     Take 1 capsule by mouth daily.       clobetasol 0.05 % cream    TEMOVATE    120 g    Apply topically 2 times daily To active blisters until resolved.       cyclobenzaprine 5 MG tablet    FLEXERIL    20 tablet    Take 1 tablet (5 mg) by mouth 3 times daily as needed for muscle spasms       fluticasone-salmeterol 500-50 MCG/DOSE diskus inhaler    ADVAIR    1 Inhaler    Inhale 1 puff into the lungs 2 times daily       ipratropium - albuterol 0.5 mg/2.5 mg/3 mL 0.5-2.5 (3) MG/3ML neb solution    DUONEB    1 Box    Take 1 vial (3 mLs) by nebulization every 6 hours as needed for shortness of  breath / dyspnea       latanoprost 0.005 % ophthalmic solution    XALATAN    3 Bottle    Place 1 drop into both eyes At Bedtime       losartan 25 MG tablet    COZAAR    45 tablet    TAKE 0.5 TABLETS (12.5 MG) BY MOUTH DAILY       multivitamin, therapeutic with minerals Tabs tablet      Take 1 tablet by mouth daily       mycophenolate 500 MG tablet    CELLCEPT - GENERIC EQUIVALENT    300 tablet    Take 3 tabs in the am and 2 tabs in the evening       potassium chloride SA 20 MEQ CR tablet    potassium chloride    90 tablet    Take 1 tablet (20 mEq) by mouth daily       sertraline 100 MG tablet    ZOLOFT    135 tablet    Take 1.5 tablets (150 mg) by mouth daily       SPIRIVA HANDIHALER 18 MCG capsule   Generic drug:  tiotropium     90 capsule    INHALE 1 CAPSULE INTO THE LUNGS DAILY.       vitamin B complex with vitamin C Tabs tablet      Take 1 tablet by mouth daily

## 2017-05-09 NOTE — PROGRESS NOTES
SUBJECTIVE:                                                    Ludy Mckenzie is a 76 year old female who presents to clinic today for the following health issues:       Lumbar paraspinal muscle spasm  Screening for diabetic peripheral neuropathy     Concern - Sciatic Right sided pain     Onset: About 1 week ago after attending an exercise class    Description:   Pain in right hip and radiates down leg    Intensity: mild, moderate    Progression of Symptoms:  intermittent    Accompanying Signs & Symptoms:  Radiation of pain down leg       Previous history of similar problem:   none    Precipitating factors:   Worsened by: Standing, walking    Alleviating factors:  Improved by: rest       Therapies Tried and outcome: Ibuprofen    -- Patient states she has leg pain when standing or moving. She notes she has had sciatica before but only a few times. She describes it as radiating down her right leg to her knee. Symptoms does not wake her up in the night. Patient notes she has had to stop and rest because pain is too severe. She began taking Ibuprofen a few days ago. She believes the exercise class she attended caused her symptoms. Patient states she has decreased sensation in toes bilaterally and tingling in her right leg. Patient denies back pain.    Problem list and histories reviewed & adjusted, as indicated.  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)     Glaucoma     Peripheral edema     Chronic obstructive pulmonary disease, unspecified COPD type  (H)     Chronic kidney disease (CKD) stage G1/A2, glomerular filtration rate (GFR) equal to or greater than 90 mL/min/1.73 square meter and albuminuria creatinine ratio between  mg/g     Loss of balance     Diabetic neuropathy with neurologic complication (H)     Pseudophakia of both eyes     Past Surgical History:   Procedure Laterality Date     BIOPSY       BUNIONECTOMY  1960    JACQUELINE     CATARACT IOL, RT/LT  2010    right     COLONOSCOPY  2/22/2013    Procedure: COLONOSCOPY;  Colonoscopy ;  Surgeon: Hugo Minor MD;  Location:  GI       Social History   Substance Use Topics     Smoking status: Former Smoker     Packs/day: 1.50     Years: 50.00     Types: Cigarettes     Quit date: 7/22/2009     Smokeless tobacco: Never Used     Alcohol use 2.4 oz/week     4 Standard drinks or equivalent per week      Comment: beer 1-2 2 times a week     Family History   Problem Relation Age of Onset     CANCER Maternal Grandfather      bone     DIABETES Mother 50     dx age 50, hip fracture     C.A.D. Father      pacemaker         Current Outpatient Prescriptions   Medication Sig Dispense Refill     cyclobenzaprine (FLEXERIL) 5 MG tablet Take 1 tablet (5 mg) by mouth 3 times daily as needed for muscle spasms 20 tablet 0     mycophenolate (CELLCEPT - GENERIC EQUIVALENT) 500 MG tablet Take 3 tabs in the am and 2 tabs in the evening 300 tablet 1     ALBUTEROL 108 (90 BASE) MCG/ACT inhaler INHALE TWO PUFFS BY MOUTH EVERY FOUR HOURS AS NEEDED SHORTNESS OF BREATH 8.5 Inhaler 1     SPIRIVA HANDIHALER 18 MCG capsule INHALE 1 CAPSULE INTO THE LUNGS DAILY. 90 capsule 1     losartan (COZAAR) 25 MG tablet TAKE 0.5 TABLETS (12.5 MG) BY MOUTH DAILY 45 tablet 1     latanoprost (XALATAN) 0.005 % ophthalmic solution Place 1 drop into both eyes At Bedtime 3 Bottle 3     fluticasone-salmeterol (ADVAIR) 500-50 MCG/DOSE diskus inhaler Inhale 1 puff into the lungs 2 times daily 1 Inhaler 1     sertraline (ZOLOFT) 100 MG tablet Take 1.5  "tablets (150 mg) by mouth daily 135 tablet 1     ipratropium - albuterol 0.5 mg/2.5 mg/3 mL (DUONEB) 0.5-2.5 (3) MG/3ML nebulization Take 1 vial (3 mLs) by nebulization every 6 hours as needed for shortness of breath / dyspnea 1 Box 3     clobetasol (TEMOVATE) 0.05 % cream Apply topically 2 times daily To active blisters until resolved. 120 g 5     potassium chloride SA (POTASSIUM CHLORIDE) 20 MEQ tablet Take 1 tablet (20 mEq) by mouth daily 90 tablet 4     calcium 600 MG tablet Take 1 tablet by mouth 2 times daily 180 tablet 3     multivitamin, therapeutic with minerals (THERA-VIT-M) TABS Take 1 tablet by mouth daily       vitamin  B complex with vitamin C (VITAMIN  B COMPLEX) TABS Take 1 tablet by mouth daily       ACE NOT PRESCRIBED, INTENTIONAL, 1 each continuous prn. ACE Inhibitor not prescribed due to Symptomatic hypotension not due to excessive diuresis 0 each 0     aspirin 81 MG tablet Take 1 tablet by mouth daily.       cholecalciferol (VITAMIN D3) 1000 UNIT capsule Take 1 capsule by mouth daily.       Labs reviewed in EPIC    Reviewed and updated as needed this visit by clinical staff  Tobacco  Allergies  Meds  Med Hx  Surg Hx  Fam Hx  Soc Hx      Reviewed and updated as needed this visit by Provider         ROS:  Constitutional, HEENT, cardiovascular, pulmonary, GI, , musculoskeletal, neuro, skin, endocrine and psych systems are negative, except as otherwise noted.    This document serves as a record of the services and decisions personally performed and made by Dereje Gardiner MD. It was created on their behalf by Ross Lincoln, a trained medical scribe. The creation of this document is based the provider's statements to the medical scribe.  Ross Lincoln May 9, 2017 11:09 AM    OBJECTIVE:                                                    /64  Pulse 80  Temp 97.4  F (36.3  C) (Oral)  Ht 1.689 m (5' 6.5\")  Wt 60.8 kg (134 lb)  SpO2 97%  BMI 21.3 kg/m2  Body mass index is 21.3 " kg/(m^2).  GENERAL: healthy, alert and no distress  EYES: Eyes grossly normal to inspection, PERRL and conjunctivae and sclerae normal  MS: no gross musculoskeletal defects noted, no edema, negative straight leg raising, tenderness in right sided paraspinal muscles with straight leg raising, no tenderness over trochanteric bursa, normal right hip ROM  SKIN: no suspicious lesions or rashes to visible skin  NEURO: mentation intact and speech normal  PSYCH: mentation appears normal, affect normal/bright     ASSESSMENT/PLAN:                                                      (Z13.89) Screening for diabetic peripheral neuropathy  (primary encounter diagnosis)  Comment: slightly decreased subjective sensation but normal anatomic exam  Plan: FOOT EXAM  NO CHARGE [53808.114]            (M62.830) Lumbar paraspinal muscle spasm  Comment: there are no features of her pain to suggest actual sciatica and I think this is tenderness to palpation with the lumbar paraspinous muscles and most consistent with muscle spasms  Plan: cyclobenzaprine (FLEXERIL) 5 MG tablet        And supportive measures reviewed , follow up with primary care physician if still troubled down the road past 1-2 weeks      Patient Instructions     - Follow up if you feel like you are not getting better.    [facility]    If you have any questions regarding to your visit please contact your care team:     Team Pink:   Clinic Hours Telephone Number   Internal Medicine:  Dr. Ce Stafford, NP       7am-7pm  Monday - Thursday   7am-5pm  Fridays  [phone]  (Appointment scheduling available 24/7)    Questions about your visit?  Team Line  [phone]   Urgent [facility] - [place] [facility] [place] [facility] - 11am-9pm Monday-Friday Saturday-Sunday- 9am-5pm   [place] - 5pm-9pm Monday-Friday Saturday-Sunday- 9am-5pm  [phone] - [place]  [phone] - [place]       What options do I have for visits at  the clinic other than the traditional office visit?  To expand how we care for you, many of our providers are utilizing electronic visits (e-visits) and telephone visits, when medically appropriate, for interactions with their patients rather than a visit in the clinic.   We also offer nurse visits for many medical concerns. Just like any other service, we will bill your insurance company for this type of visit based on time spent on the phone with your provider. Not all insurance companies cover these visits. Please check with your medical insurance if this type of visit is covered. You will be responsible for any charges that are not paid by your insurance.      E-visits via V[employer]t:  generally incur a $35.00 fee.  Telephone visits:  Time spent on the phone: *charged based on time that is spent on the phone in increments of 10 minutes. Estimated cost:   5-10 mins $30.00   11-20 mins. $59.00   21-30 mins. $85.00   Use Vobilet (secure email communication and access to your chart) to send your primary care provider a message or make an appointment. Ask someone on your Team how to sign up for [employer].    For a Price Quote for your services, please call our Consumer Price Line at [phone].    As always, Thank you for trusting us with your health care needs!    Discharged By:  KAITLYN MARCOS      The information in this document, created by the medical scribe for me, accurately reflects the services I personally performed and the decisions made by me. I have reviewed and approved this document for accuracy.   MD Dereje Mulligan MD  [facility]

## 2017-05-09 NOTE — NURSING NOTE
"Chief Complaint   Patient presents with     Pain     Right Sciatic pain for about 1 week.  Started after going to an exercise/Therapy class       Initial /64  Pulse 80  Temp 97.4  F (36.3  C) (Oral)  Ht 5' 6.5\" (1.689 m)  Wt 134 lb (60.8 kg)  SpO2 97%  BMI 21.3 kg/m2 Estimated body mass index is 21.3 kg/(m^2) as calculated from the following:    Height as of this encounter: 5' 6.5\" (1.689 m).    Weight as of this encounter: 134 lb (60.8 kg).  Medication Reconciliation: complete   Tawnya Boyd MA    "

## 2017-05-23 ENCOUNTER — OFFICE VISIT (OUTPATIENT)
Dept: DERMATOLOGY | Facility: CLINIC | Age: 77
End: 2017-05-23
Payer: COMMERCIAL

## 2017-05-23 DIAGNOSIS — L12.0 BULLOUS PEMPHIGOID (H): ICD-10-CM

## 2017-05-23 DIAGNOSIS — Z51.81 MEDICATION MONITORING ENCOUNTER: Primary | ICD-10-CM

## 2017-05-23 PROCEDURE — 99213 OFFICE O/P EST LOW 20 MIN: CPT | Performed by: DERMATOLOGY

## 2017-05-23 RX ORDER — MYCOPHENOLATE MOFETIL 500 MG/1
TABLET ORAL
Qty: 300 TABLET | Refills: 1 | Status: SHIPPED | OUTPATIENT
Start: 2017-05-23 | End: 2017-07-24

## 2017-05-23 RX ORDER — MYCOPHENOLATE MOFETIL 500 MG/1
TABLET ORAL
Qty: 300 TABLET | Refills: 1 | Status: SHIPPED | OUTPATIENT
Start: 2017-05-23 | End: 2017-06-02

## 2017-05-23 ASSESSMENT — PAIN SCALES - GENERAL: PAINLEVEL: NO PAIN (0)

## 2017-05-23 NOTE — NURSING NOTE
Dermatology Rooming Note    Ludy Mckenzie's goals for this visit include:   Chief Complaint   Patient presents with     Derm Problem     Bullous pemphigoid follow up.  Patient states she is doing well and has no concerns today.       Is a scribe okay for this visit:YES    Are records needed for this visit(If yes, obtain release of information): No     Vitals: There were no vitals taken for this visit.    Referring Provider:  No referring provider defined for this encounter.    Nell Crawford RN

## 2017-05-23 NOTE — PROGRESS NOTES
[facility] Dermatology Note      Dermatology Problem List:  1. Bullous pemphigoid, began 2013 initially followed by Dr. Roberts  -Current Tx: Cellcept (initiated 10/23/2013 1500 BID 2014, decreased to 1000 BID 11/10/2016)  -Previous Tx: prednisone initiated 9/25/2013  -s/p dapsone initiated 10/2013 with improvement  -s/p prednisolone initiated 10/16/2013, re-initiated 2/10/2014  -s/p clobetasol cream and gel for mouth11/6/2013  -s/p mycophenolate 1500 BID 9/2014  -pemphigoid panel with negative BP antibodies 11/2015 initially positive 6/2015  -Negative DIF 9/2013  -s/p biopsy 9/2013 with negative DIF  -s/p biopsy 6/2013 with spongiotic dermatitis with EOS on right upper thigh and right mid thigh  2. Oral candida  -s/p nystatin   -s/p fluconazole    Encounter Date: May 23, 2017    CC:  Chief Complaint   Patient presents with     Derm Problem     Bullous pemphigoid follow up.  Patient states she is doing well and has no concerns today.       History of Present Illness:  Ms. Ludy Mckenzie is a 76 year old female who presents as a follow-up for bullous pemphigoid. She was last seen 02/17/2017 when she was continued on Cellcept 1g in the morning and 1g at night. She has had her flu vaccine this year. She has not been ill since her last visit with Dermatology. No oral sores. No difficulty eating.     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)     Glaucoma     Peripheral edema     Chronic obstructive pulmonary disease, unspecified COPD type (H)      Chronic kidney disease (CKD) stage G1/A2, glomerular filtration rate (GFR) equal to or greater than 90 mL/min/1.73 square meter and albuminuria creatinine ratio between  mg/g     Loss of balance     Diabetic neuropathy with neurologic complication (H)     Pseudophakia of both eyes     Past Medical History:   Diagnosis Date     Acute ischemic left TREMAYNE stroke (H) 2009     Astigmatism, unspecified      Bullous pemphigoid      COPD (chronic obstructive pulmonary disease) (H)      CVA (cerebral infarction) 8/09     Depressive disorder      Diabetes (H)      Family history of diabetes mellitus      Goiter 3/12/2007     HTN (hypertension) 8/25/2009     Hypocalcemia      Hypokalemia      Hyponatremia      Mixed hyperlipidemia      Personal history of other diseases of circulatory system      Pneumonia 11/16/2010     Primary open-angle glaucoma(365.11)      Thyroid nodule 3/23/2012     Unspecified cerebral artery occlusion with cerebral infarction      Unspecified cerebral artery occlusion with cerebral infarction 1998     Past Surgical History:   Procedure Laterality Date     BIOPSY       BUNIONECTOMY  1960    JACQUELINE     CATARACT IOL, RT/LT  2010    right     COLONOSCOPY  2/22/2013    Procedure: COLONOSCOPY;  Colonoscopy ;  Surgeon: Hugo Minor MD;  Location:  GI       Social History:  The patient lives in [facility]    Family History:  Grandma had unknown skin cancer.    Medications:  Current Outpatient Prescriptions   Medication Sig Dispense Refill     Cyanocobalamin (VITAMIN B 12 PO) Take by mouth daily       cyclobenzaprine (FLEXERIL) 5 MG tablet Take 1 tablet (5 mg) by mouth 3 times daily as needed for muscle spasms 20 tablet 0     mycophenolate (CELLCEPT - GENERIC EQUIVALENT) 500 MG tablet Take 3 tabs in the am and 2 tabs in the evening 300 tablet 1     ALBUTEROL 108 (90 BASE) MCG/ACT inhaler INHALE TWO PUFFS BY MOUTH EVERY FOUR HOURS AS NEEDED SHORTNESS OF BREATH 8.5 Inhaler 1     SPIRIVA HANDIHALER  18 MCG capsule INHALE 1 CAPSULE INTO THE LUNGS DAILY. 90 capsule 1     losartan (COZAAR) 25 MG tablet TAKE 0.5 TABLETS (12.5 MG) BY MOUTH DAILY 45 tablet 1     latanoprost (XALATAN) 0.005 % ophthalmic solution Place 1 drop into both eyes At Bedtime 3 Bottle 3     fluticasone-salmeterol (ADVAIR) 500-50 MCG/DOSE diskus inhaler Inhale 1 puff into the lungs 2 times daily 1 Inhaler 1     sertraline (ZOLOFT) 100 MG tablet Take 1.5 tablets (150 mg) by mouth daily 135 tablet 1     calcium 600 MG tablet Take 1 tablet by mouth 2 times daily 180 tablet 3     multivitamin, therapeutic with minerals (THERA-VIT-M) TABS Take 1 tablet by mouth daily       vitamin  B complex with vitamin C (VITAMIN  B COMPLEX) TABS Take 1 tablet by mouth daily       aspirin 81 MG tablet Take 1 tablet by mouth daily.       cholecalciferol (VITAMIN D3) 1000 UNIT capsule Take 1 capsule by mouth daily.       ipratropium - albuterol 0.5 mg/2.5 mg/3 mL (DUONEB) 0.5-2.5 (3) MG/3ML nebulization Take 1 vial (3 mLs) by nebulization every 6 hours as needed for shortness of breath / dyspnea (Patient not taking: Reported on 5/23/2017) 1 Box 3     clobetasol (TEMOVATE) 0.05 % cream Apply topically 2 times daily To active blisters until resolved. (Patient not taking: Reported on 5/23/2017) 120 g 5     potassium chloride SA (POTASSIUM CHLORIDE) 20 MEQ tablet Take 1 tablet (20 mEq) by mouth daily (Patient not taking: Reported on 5/23/2017) 90 tablet 4     ACE NOT PRESCRIBED, INTENTIONAL, 1 each continuous prn. ACE Inhibitor not prescribed due to Symptomatic hypotension not due to excessive diuresis 0 each 0       Allergies   Allergen Reactions     Amoxicillin GI Disturbance     Augmentin Nausea and Vomiting     Iodine Itching and Swelling     Pt is ok with ct contrast dye (isovue 370)     Mercury Unknown     Penicillins Unknown       Review of Systems:  -Const: The patient is generally feeling well today. She has not been ill or had recent hospia  -Skin: As above in  HPI. No additional skin concerns.      Physical exam:  Vitals: There were no vitals taken for this visit.  GEN: This is a well developed, well-nourished female in no acute distress, in a pleasant mood.    SKIN: Acne exam, which includes the face, neck, upper central chest, and upper central back was performed.  -Right posterior arm: resolved.  -Oral exam is normal  -No remarkable skin findings to examined skin.  -No other lesions of concern on areas examined.     Pertinent labs:    Component      Latest Ref Rng & Units 3/28/2017 4/11/2017   WBC      4.0 - 11.0 10e9/L 5.4    RBC Count      3.8 - 5.2 10e12/L 4.10    Hemoglobin      11.7 - 15.7 g/dL 12.0    Hematocrit      35.0 - 47.0 % 36.7    MCV      78 - 100 fl 90    MCH      26.5 - 33.0 pg 29.3    MCHC      31.5 - 36.5 g/dL 32.7    RDW      10.0 - 15.0 % 12.9    Platelet Count      150 - 450 10e9/L 301    Diff Method       Automated Method    % Neutrophils      % 55.5    % Lymphocytes      % 29.7    % Monocytes      % 11.2    % Eosinophils      % 3.2    % Basophils      % 0.4    Absolute Neutrophil      1.6 - 8.3 10e9/L 3.0    Absolute Lymphocytes      0.8 - 5.3 10e9/L 1.6    Absolute Monocytes      0.0 - 1.3 10e9/L 0.6    Absolute Eosinophils      0.0 - 0.7 10e9/L 0.2    Absolute Basophils      0.0 - 0.2 10e9/L 0.0    ALT      0 - 50 U/L 35 23   AST      0 - 45 U/L 92 (H) 31       Impression/Plan:  1. Bullous pemphigoid, began 2013 initially followed by Dr. Roberts. Stable on Cellcept, with hypopigmented scarring. Labs last drawn in April.  Continue Cellcept to 1000 mg in the morning and 1000 mg at night.  Draw for CBC with diff and AST/ALT in July.   Continue Q3mo labs. Skin exams may be postponed for every six months (patient requested).  OK to refill Cellcept 1000 mg in the morning and 1000 mg in the evening by phone if labs are within normal limits.Her future orders are in  Immunocompromised status discussed. Monitor for measles.       Follow-up in 6 months  in clinic. Draw labs in 3 months.    Staff Involved:  Scribe/Staff    Scribe Disclosure:   I, Price Cruz, am serving as a scribe to document services personally performed by Dr. Whitley Manrique, based on data collection and the provider's statements to me.     Provider Disclosure:   The documentation recorded by the scribe accurately reflects the services I personally performed and the decisions made by me.    Whitley Manrique MD    Department of Dermatology  [facility]: Phone: [phone], Fax:[phone]  [facility]: Phone: [phone], Fax: [phone]

## 2017-05-23 NOTE — LETTER
5/23/2017       RE: Ludy Mckenzie  [address]  [facility] [address]     Dear Colleague,    Thank you for referring your patient, Ludy Mckenzie, to the [facility]. Please see a copy of my visit note below.    [facility] Dermatology Note      Dermatology Problem List:  1. Bullous pemphigoid, began 2013 initially followed by Dr. Roberts  -Current Tx: Cellcept (initiated 10/23/2013 1500 BID 2014, decreased to 1000 BID 11/10/2016)  -Previous Tx: prednisone initiated 9/25/2013  -s/p dapsone initiated 10/2013 with improvement  -s/p prednisolone initiated 10/16/2013, re-initiated 2/10/2014  -s/p clobetasol cream and gel for mouth11/6/2013  -s/p mycophenolate 1500 BID 9/2014  -pemphigoid panel with negative BP antibodies 11/2015 initially positive 6/2015  -Negative DIF 9/2013  -s/p biopsy 9/2013 with negative DIF  -s/p biopsy 6/2013 with spongiotic dermatitis with EOS on right upper thigh and right mid thigh  2. Oral candida  -s/p nystatin   -s/p fluconazole    Encounter Date: May 23, 2017    CC:  Chief Complaint   Patient presents with     Derm Problem     Bullous pemphigoid follow up.  Patient states she is doing well and has no concerns today.       History of Present Illness:  Ms. Ludy Mckenzie is a 76 year old female who presents as a follow-up for bullous pemphigoid. She was last seen 02/17/2017 when she was continued on Cellcept 1g in the morning and 1g at night. She has had her flu vaccine this year. She has not been ill since her last visit with Dermatology. No oral sores. No difficulty eating.     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)     Glaucoma     Peripheral edema     Chronic obstructive pulmonary disease, unspecified COPD type (H)     Chronic kidney disease (CKD) stage G1/A2, glomerular filtration rate (GFR) equal to or greater than 90 mL/min/1.73 square meter and albuminuria creatinine ratio between  mg/g     Loss of balance     Diabetic neuropathy with neurologic complication (H)     Pseudophakia of both eyes     Past Medical History:   Diagnosis Date     Acute ischemic left TREMAYNE stroke (H) 2009     Astigmatism, unspecified      Bullous pemphigoid      COPD (chronic obstructive pulmonary disease) (H)      CVA (cerebral infarction) 8/09     Depressive disorder      Diabetes (H)      Family history of diabetes mellitus      Goiter 3/12/2007     HTN (hypertension) 8/25/2009     Hypocalcemia      Hypokalemia      Hyponatremia      Mixed hyperlipidemia      Personal history of other diseases of circulatory system      Pneumonia 11/16/2010     Primary open-angle glaucoma(365.11)      Thyroid nodule 3/23/2012     Unspecified cerebral artery occlusion with cerebral infarction      Unspecified cerebral artery occlusion with cerebral infarction 1998     Past Surgical History:   Procedure Laterality Date     BIOPSY       BUNIONECTOMY  1960    JACQUELINE     CATARACT IOL, RT/LT  2010    right     COLONOSCOPY  2/22/2013    Procedure: COLONOSCOPY;  Colonoscopy ;  Surgeon: Hugo Minor MD;  Location:  GI       Social History:  The patient lives in [facility]    Family History:  Grandma had unknown skin cancer.    Medications:  Current Outpatient Prescriptions   Medication Sig Dispense Refill     Cyanocobalamin (VITAMIN B 12 PO) Take by mouth daily       cyclobenzaprine (FLEXERIL) 5 MG tablet Take 1 tablet (5 mg) by mouth 3 times daily as needed for muscle  spasms 20 tablet 0     mycophenolate (CELLCEPT - GENERIC EQUIVALENT) 500 MG tablet Take 3 tabs in the am and 2 tabs in the evening 300 tablet 1     ALBUTEROL 108 (90 BASE) MCG/ACT inhaler INHALE TWO PUFFS BY MOUTH EVERY FOUR HOURS AS NEEDED SHORTNESS OF BREATH 8.5 Inhaler 1     SPIRIVA HANDIHALER 18 MCG capsule INHALE 1 CAPSULE INTO THE LUNGS DAILY. 90 capsule 1     losartan (COZAAR) 25 MG tablet TAKE 0.5 TABLETS (12.5 MG) BY MOUTH DAILY 45 tablet 1     latanoprost (XALATAN) 0.005 % ophthalmic solution Place 1 drop into both eyes At Bedtime 3 Bottle 3     fluticasone-salmeterol (ADVAIR) 500-50 MCG/DOSE diskus inhaler Inhale 1 puff into the lungs 2 times daily 1 Inhaler 1     sertraline (ZOLOFT) 100 MG tablet Take 1.5 tablets (150 mg) by mouth daily 135 tablet 1     calcium 600 MG tablet Take 1 tablet by mouth 2 times daily 180 tablet 3     multivitamin, therapeutic with minerals (THERA-VIT-M) TABS Take 1 tablet by mouth daily       vitamin  B complex with vitamin C (VITAMIN  B COMPLEX) TABS Take 1 tablet by mouth daily       aspirin 81 MG tablet Take 1 tablet by mouth daily.       cholecalciferol (VITAMIN D3) 1000 UNIT capsule Take 1 capsule by mouth daily.       ipratropium - albuterol 0.5 mg/2.5 mg/3 mL (DUONEB) 0.5-2.5 (3) MG/3ML nebulization Take 1 vial (3 mLs) by nebulization every 6 hours as needed for shortness of breath / dyspnea (Patient not taking: Reported on 5/23/2017) 1 Box 3     clobetasol (TEMOVATE) 0.05 % cream Apply topically 2 times daily To active blisters until resolved. (Patient not taking: Reported on 5/23/2017) 120 g 5     potassium chloride SA (POTASSIUM CHLORIDE) 20 MEQ tablet Take 1 tablet (20 mEq) by mouth daily (Patient not taking: Reported on 5/23/2017) 90 tablet 4     ACE NOT PRESCRIBED, INTENTIONAL, 1 each continuous prn. ACE Inhibitor not prescribed due to Symptomatic hypotension not due to excessive diuresis 0 each 0       Allergies   Allergen Reactions     Amoxicillin GI Disturbance      Augmentin Nausea and Vomiting     Iodine Itching and Swelling     Pt is ok with ct contrast dye (isovue 370)     Mercury Unknown     Penicillins Unknown       Review of Systems:  -Const: The patient is generally feeling well today. She has not been ill or had recent hospia  -Skin: As above in HPI. No additional skin concerns.      Physical exam:  Vitals: There were no vitals taken for this visit.  GEN: This is a well developed, well-nourished female in no acute distress, in a pleasant mood.    SKIN: Acne exam, which includes the face, neck, upper central chest, and upper central back was performed.  -Right posterior arm: resolved.  -Oral exam is normal  -No remarkable skin findings to examined skin.  -No other lesions of concern on areas examined.     Pertinent labs:    Component      Latest Ref Rng & Units 3/28/2017 4/11/2017   WBC      4.0 - 11.0 10e9/L 5.4    RBC Count      3.8 - 5.2 10e12/L 4.10    Hemoglobin      11.7 - 15.7 g/dL 12.0    Hematocrit      35.0 - 47.0 % 36.7    MCV      78 - 100 fl 90    MCH      26.5 - 33.0 pg 29.3    MCHC      31.5 - 36.5 g/dL 32.7    RDW      10.0 - 15.0 % 12.9    Platelet Count      150 - 450 10e9/L 301    Diff Method       Automated Method    % Neutrophils      % 55.5    % Lymphocytes      % 29.7    % Monocytes      % 11.2    % Eosinophils      % 3.2    % Basophils      % 0.4    Absolute Neutrophil      1.6 - 8.3 10e9/L 3.0    Absolute Lymphocytes      0.8 - 5.3 10e9/L 1.6    Absolute Monocytes      0.0 - 1.3 10e9/L 0.6    Absolute Eosinophils      0.0 - 0.7 10e9/L 0.2    Absolute Basophils      0.0 - 0.2 10e9/L 0.0    ALT      0 - 50 U/L 35 23   AST      0 - 45 U/L 92 (H) 31       Impression/Plan:  1. Bullous pemphigoid, began 2013 initially followed by Dr. Roberts. Stable on Cellcept, with hypopigmented scarring. Labs last drawn in April.  Continue Cellcept to 1000 mg in the morning and 1000 mg at night.  Draw for CBC with diff and AST/ALT in July.   Continue Q3mo  labs. Skin exams may be postponed for every six months (patient requested).  OK to refill Cellcept 1000 mg in the morning and 1000 mg in the evening by phone if labs are within normal limits.Her future orders are in  Immunocompromised status discussed. Monitor for measles.       Follow-up in 6 months in clinic. Draw labs in 3 months.    Staff Involved:  Scribe/Staff    Scribe Disclosure:   I, Price Cruz, am serving as a scribe to document services personally performed by Dr. Whitley Manrique, based on data collection and the provider's statements to me.     Provider Disclosure:   The documentation recorded by the scribe accurately reflects the services I personally performed and the decisions made by me.    Whitley Manrique MD    Department of Dermatology  [facility]: Phone: [phone], Fax:[phone]  [facility]: Phone: [phone], Fax: [phone]        Again, thank you for allowing me to participate in the care of your patient.      Sincerely,    Whitley Manrique MD

## 2017-06-02 ENCOUNTER — OFFICE VISIT (OUTPATIENT)
Dept: FAMILY MEDICINE | Facility: CLINIC | Age: 77
End: 2017-06-02
Payer: COMMERCIAL

## 2017-06-02 VITALS
WEIGHT: 133 LBS | HEART RATE: 78 BPM | TEMPERATURE: 96.9 F | OXYGEN SATURATION: 97 % | DIASTOLIC BLOOD PRESSURE: 56 MMHG | BODY MASS INDEX: 21.15 KG/M2 | SYSTOLIC BLOOD PRESSURE: 106 MMHG

## 2017-06-02 DIAGNOSIS — J43.1 PANLOBULAR EMPHYSEMA (H): ICD-10-CM

## 2017-06-02 DIAGNOSIS — H40.1130 PRIMARY OPEN ANGLE GLAUCOMA OF BOTH EYES, UNSPECIFIED GLAUCOMA STAGE: ICD-10-CM

## 2017-06-02 DIAGNOSIS — E04.1 THYROID NODULE: ICD-10-CM

## 2017-06-02 DIAGNOSIS — E11.49 DIABETIC NEUROPATHY WITH NEUROLOGIC COMPLICATION (H): ICD-10-CM

## 2017-06-02 DIAGNOSIS — E11.49 TYPE 2 DIABETES MELLITUS WITH OTHER DIABETIC NEUROLOGICAL COMPLICATION (H): Primary | ICD-10-CM

## 2017-06-02 DIAGNOSIS — N39.41 URGE INCONTINENCE OF URINE: ICD-10-CM

## 2017-06-02 DIAGNOSIS — M54.31 RIGHT SIDED SCIATICA: ICD-10-CM

## 2017-06-02 DIAGNOSIS — H61.23 BILATERAL IMPACTED CERUMEN: ICD-10-CM

## 2017-06-02 DIAGNOSIS — M81.0 AGE-RELATED OSTEOPOROSIS WITHOUT CURRENT PATHOLOGICAL FRACTURE: ICD-10-CM

## 2017-06-02 DIAGNOSIS — E11.40 DIABETIC NEUROPATHY WITH NEUROLOGIC COMPLICATION (H): ICD-10-CM

## 2017-06-02 DIAGNOSIS — E78.2 MIXED HYPERLIPIDEMIA: ICD-10-CM

## 2017-06-02 DIAGNOSIS — I63.9 CEREBRAL INFARCTION, UNSPECIFIED MECHANISM (H): ICD-10-CM

## 2017-06-02 DIAGNOSIS — R26.89 LOSS OF BALANCE: ICD-10-CM

## 2017-06-02 DIAGNOSIS — N18.1 CHRONIC KIDNEY DISEASE (CKD) STAGE G1/A2, GLOMERULAR FILTRATION RATE (GFR) EQUAL TO OR GREATER THAN 90 ML/MIN/1.73 SQUARE METER AND ALBUMINURIA CREATININE RATIO BETWEEN 30-299 MG/G: ICD-10-CM

## 2017-06-02 DIAGNOSIS — E87.6 HYPOPOTASSEMIA: ICD-10-CM

## 2017-06-02 DIAGNOSIS — D84.9 IMMUNOSUPPRESSION (H): ICD-10-CM

## 2017-06-02 DIAGNOSIS — F33.0 MAJOR DEPRESSIVE DISORDER, RECURRENT EPISODE, MILD (H): ICD-10-CM

## 2017-06-02 DIAGNOSIS — L12.0 BULLOUS PEMPHIGOID (H): ICD-10-CM

## 2017-06-02 DIAGNOSIS — R91.8 PULMONARY NODULES: ICD-10-CM

## 2017-06-02 DIAGNOSIS — R25.2 CRAMP OF LIMB: ICD-10-CM

## 2017-06-02 PROBLEM — J43.9 PULMONARY EMPHYSEMA, UNSPECIFIED EMPHYSEMA TYPE (H): Status: ACTIVE | Noted: 2017-06-02

## 2017-06-02 LAB
ALBUMIN SERPL-MCNC: 4 G/DL (ref 3.4–5)
ALP SERPL-CCNC: 75 U/L (ref 40–150)
ALT SERPL W P-5'-P-CCNC: 28 U/L (ref 0–50)
ANION GAP SERPL CALCULATED.3IONS-SCNC: 10 MMOL/L (ref 3–14)
AST SERPL W P-5'-P-CCNC: 41 U/L (ref 0–45)
BILIRUB SERPL-MCNC: 0.5 MG/DL (ref 0.2–1.3)
BUN SERPL-MCNC: 11 MG/DL (ref 7–30)
CALCIUM SERPL-MCNC: 8.9 MG/DL (ref 8.5–10.1)
CHLORIDE SERPL-SCNC: 104 MMOL/L (ref 94–109)
CHOLEST SERPL-MCNC: 273 MG/DL
CO2 SERPL-SCNC: 23 MMOL/L (ref 20–32)
CREAT SERPL-MCNC: 0.57 MG/DL (ref 0.52–1.04)
CREAT UR-MCNC: 90 MG/DL
DEPRECATED CALCIDIOL+CALCIFEROL SERPL-MC: 46 UG/L (ref 20–75)
FEF 25/75: NORMAL
FEV-1: NORMAL
FEV1/FVC: NORMAL
FVC: NORMAL
GFR SERPL CREATININE-BSD FRML MDRD: ABNORMAL ML/MIN/1.7M2
GLUCOSE SERPL-MCNC: 132 MG/DL (ref 70–99)
HBA1C MFR BLD: 6.4 % (ref 4.3–6)
HDLC SERPL-MCNC: 40 MG/DL
LDLC SERPL CALC-MCNC: 195 MG/DL
MAGNESIUM SERPL-MCNC: 2.4 MG/DL (ref 1.6–2.3)
MICROALBUMIN UR-MCNC: 58 MG/L
MICROALBUMIN/CREAT UR: 64.4 MG/G CR (ref 0–25)
NONHDLC SERPL-MCNC: 233 MG/DL
POTASSIUM SERPL-SCNC: 4.2 MMOL/L (ref 3.4–5.3)
PROT SERPL-MCNC: 7.3 G/DL (ref 6.8–8.8)
SODIUM SERPL-SCNC: 137 MMOL/L (ref 133–144)
TRIGL SERPL-MCNC: 191 MG/DL
TSH SERPL DL<=0.005 MIU/L-ACNC: 3.52 MU/L (ref 0.4–4)

## 2017-06-02 PROCEDURE — 82043 UR ALBUMIN QUANTITATIVE: CPT | Performed by: INTERNAL MEDICINE

## 2017-06-02 PROCEDURE — 80061 LIPID PANEL: CPT | Performed by: INTERNAL MEDICINE

## 2017-06-02 PROCEDURE — 36415 COLL VENOUS BLD VENIPUNCTURE: CPT | Performed by: INTERNAL MEDICINE

## 2017-06-02 PROCEDURE — 99214 OFFICE O/P EST MOD 30 MIN: CPT | Mod: 25 | Performed by: INTERNAL MEDICINE

## 2017-06-02 PROCEDURE — 83036 HEMOGLOBIN GLYCOSYLATED A1C: CPT | Performed by: INTERNAL MEDICINE

## 2017-06-02 PROCEDURE — 84443 ASSAY THYROID STIM HORMONE: CPT | Performed by: INTERNAL MEDICINE

## 2017-06-02 PROCEDURE — 80053 COMPREHEN METABOLIC PANEL: CPT | Performed by: INTERNAL MEDICINE

## 2017-06-02 PROCEDURE — 82306 VITAMIN D 25 HYDROXY: CPT | Performed by: INTERNAL MEDICINE

## 2017-06-02 PROCEDURE — 94010 BREATHING CAPACITY TEST: CPT | Performed by: INTERNAL MEDICINE

## 2017-06-02 PROCEDURE — 83735 ASSAY OF MAGNESIUM: CPT | Performed by: INTERNAL MEDICINE

## 2017-06-02 RX ORDER — TIOTROPIUM BROMIDE 18 UG/1
CAPSULE ORAL; RESPIRATORY (INHALATION)
Qty: 90 CAPSULE | Refills: 11 | Status: SHIPPED | OUTPATIENT
Start: 2017-06-02 | End: 2017-10-24

## 2017-06-02 RX ORDER — POTASSIUM CHLORIDE 1500 MG/1
20 TABLET, EXTENDED RELEASE ORAL DAILY
Qty: 90 TABLET | Refills: 4 | Status: SHIPPED | OUTPATIENT
Start: 2017-06-02 | End: 2018-07-30

## 2017-06-02 RX ORDER — SERTRALINE HYDROCHLORIDE 100 MG/1
150 TABLET, FILM COATED ORAL DAILY
Qty: 135 TABLET | Refills: 3 | Status: SHIPPED | OUTPATIENT
Start: 2017-06-02 | End: 2018-07-30

## 2017-06-02 ASSESSMENT — ANXIETY QUESTIONNAIRES
6. BECOMING EASILY ANNOYED OR IRRITABLE: NOT AT ALL
3. WORRYING TOO MUCH ABOUT DIFFERENT THINGS: NOT AT ALL
GAD7 TOTAL SCORE: 0
IF YOU CHECKED OFF ANY PROBLEMS ON THIS QUESTIONNAIRE, HOW DIFFICULT HAVE THESE PROBLEMS MADE IT FOR YOU TO DO YOUR WORK, TAKE CARE OF THINGS AT HOME, OR GET ALONG WITH OTHER PEOPLE: NOT DIFFICULT AT ALL
5. BEING SO RESTLESS THAT IT IS HARD TO SIT STILL: NOT AT ALL
1. FEELING NERVOUS, ANXIOUS, OR ON EDGE: NOT AT ALL
7. FEELING AFRAID AS IF SOMETHING AWFUL MIGHT HAPPEN: NOT AT ALL
2. NOT BEING ABLE TO STOP OR CONTROL WORRYING: NOT AT ALL

## 2017-06-02 ASSESSMENT — PAIN SCALES - GENERAL: PAINLEVEL: NO PAIN (0)

## 2017-06-02 ASSESSMENT — PATIENT HEALTH QUESTIONNAIRE - PHQ9: 5. POOR APPETITE OR OVEREATING: NOT AT ALL

## 2017-06-02 NOTE — PROGRESS NOTES
Your lung function has not declined since your last test.  This is good news.  You do still have moderate emphysema, however.   Dr. Cano

## 2017-06-02 NOTE — NURSING NOTE
"Chief Complaint   Patient presents with     Diabetes     follow-up. Requesting cholesterol to be checked. Patient is fasting.       Initial /56 (BP Location: Left arm, Cuff Size: Adult Regular)  Pulse 78  Temp 96.9  F (36.1  C) (Oral)  Wt 133 lb (60.3 kg)  SpO2 97%  Breastfeeding? No  BMI 21.15 kg/m2 Estimated body mass index is 21.15 kg/(m^2) as calculated from the following:    Height as of 5/9/17: 5' 6.5\" (1.689 m).    Weight as of this encounter: 133 lb (60.3 kg).  Medication Reconciliation: complete       Rukhsana Cobian CMA      "

## 2017-06-02 NOTE — PATIENT INSTRUCTIONS
- Start wearing shoes around your house.   - Avoid products containing caffeine.   - Schedule thyroid ultrasound. Please call [facility] with any questions or concerns at [phone].   - Schedule chest CT scan.   - Consider physical therapy for sciatica.  - follow up in 6 months.      [facility]    If you have any questions regarding to your visit please contact your care team:     Team Pink:   Clinic Hours Telephone Number   Internal Medicine:  Dr. Ce Stafford NP       7am-7pm  Monday - Thursday   7am-5pm  Fridays  [phone]  (Appointment scheduling available 24/7)    Questions about your visit?  Team Line  [phone]   Urgent Care - [place] [facility] [place] [facility] - 11am-9pm Monday-Friday Saturday-Sunday- 9am-5pm   [place] - 5pm-9pm Monday-Friday Saturday-Sunday- 9am-5pm  [phone] - [place]   [phone] - [place]       What options do I have for visits at the clinic other than the traditional office visit?  To expand how we care for you, many of our providers are utilizing electronic visits (e-visits) and telephone visits, when medically appropriate, for interactions with their patients rather than a visit in the clinic.   We also offer nurse visits for many medical concerns. Just like any other service, we will bill your insurance company for this type of visit based on time spent on the phone with your provider. Not all insurance companies cover these visits. Please check with your medical insurance if this type of visit is covered. You will be responsible for any charges that are not paid by your insurance.      E-visits via go2 media:  generally incur a $35.00 fee.  Telephone visits:  Time spent on the phone: *charged based on time that is spent on the phone in increments of 10 minutes. Estimated cost:   5-10 mins $30.00   11-20 mins. $59.00   21-30 mins. $85.00   Use go2 media (secure email communication and access to your chart)  to send your primary care provider a message or make an appointment. Ask someone on your Team how to sign up for [employer].    For a Price Quote for your services, please call our Consumer Price Line at [phone].    As always, Thank you for trusting us with your health care needs!    Rukhsana Cobian CMA

## 2017-06-02 NOTE — LETTER
[facility]  [address] [place] [address]    June 5, 2017    Ludy Mckenzie  [address]  [facility] [place] [address]          Dear Ludy,  Your lung function has not declined since your last test.  This is good news.  You do still have moderate emphysema, however.  Enclosed is a copy of your results.         If you have any questions or concerns, please call myself or my nurse at [phone].      Sincerely,        Ce Cano MD/pb

## 2017-06-02 NOTE — PROGRESS NOTES
INTERNAL MEDICINE   SUBJECTIVE:                                                    Ludy Mckenzie is a 76 year old female who presents to clinic today for the following health issues:      Diabetes Follow-up      Patient is checking blood sugars: not at all    Diabetic concerns: None     Symptoms of hypoglycemia (low blood sugar): none, has had some of these symptoms but does not relate them to low blood sugars     Paresthesias (numbness or burning in feet) or sores: No, but gets bad cramping     Date of last diabetic eye exam: x2 months       Amount of exercise or physical activity: 2-3 days/week for an average of 30-45 minutes    Problems taking medications regularly: No    Medication side effects: Cellcept - jitters, uncoordinated    Diet: regular (no restrictions)  Her Hemoglobin A1C today in clinic is 6.4, which is slightly elevated from her last level of 6.3 taken on 9/19/2016.   Wt Readings from Last 5 Encounters:   06/02/17 60.3 kg (133 lb)   05/09/17 60.8 kg (134 lb)   12/22/16 61.2 kg (135 lb)   09/19/16 60.5 kg (133 lb 6.4 oz)   09/13/16 61.2 kg (135 lb)     Cramping - She has been getting Keith Horses at night in her ankles when she goes to bed. She explains that she finds it easier to walk barefoot given her balance issues. She also met with Dr. Gardiner for pain from sciatica on the right. At the time of this visit on 5/9/2017, she was started on Flexeril 5 MG. This has not helped with her symptoms. The sciatica does not radiate past her right knee. She denies any back pain.     Urinary Frequency - She also discussed urinary frequency with Dr. Gardiner. She reports that she will need to go to the bathroom, and will sometimes leak or eliminate while she is getting to the bathroom. When she does go to the bathroom, only a small amount will be eliminated.     Additional Notes - Her Cologuard results were normal. There is a physical therapist at her living facility that works with residents regularly. She  confirms that the Advair has been working well for her. She would like a meter that can check her breathing so that she can be aware of how well she is breathing when she is having SOB. She has been taking potassium a couple times a week, as there are some days that she might forget to take it. She estimates that she takes it less than 5 times a week.       Chief Complaint   Patient presents with     Diabetes     follow-up. Requesting cholesterol to be checked. Patient is fasting.       Problem list and histories reviewed & adjusted, as indicated.  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)     Glaucoma     Peripheral edema     Chronic obstructive pulmonary disease, unspecified COPD type (H)     Chronic kidney disease (CKD) stage G1/A2, glomerular filtration rate (GFR) equal to or greater than 90 mL/min/1.73 square meter and albuminuria creatinine ratio between  mg/g     Loss of balance     Diabetic neuropathy with neurologic complication (H)     Pseudophakia of both eyes     Past Surgical History:   Procedure Laterality Date     BIOPSY       BUNIONECTOMY  1960    JACQUELINE     CATARACT IOL, RT/LT  2010    right     COLONOSCOPY  2/22/2013    Procedure: COLONOSCOPY;  Colonoscopy ;  Surgeon: Hugo Minor MD;  Location:  GI       Social History   Substance Use Topics     Smoking status: Former Smoker     Packs/day: 1.50     Years: 50.00     Types: Cigarettes     Quit date: 7/22/2009     Smokeless tobacco: Never Used     Alcohol use 2.4 oz/week     4  Standard drinks or equivalent per week      Comment: beer 1-2 2 times a week     Family History   Problem Relation Age of Onset     CANCER Maternal Grandfather      bone     DIABETES Mother 50     dx age 50, hip fracture     C.A.D. Father      pacemaker         Labs reviewed in EPIC    Reviewed and updated as needed this visit by clinical staff  Tobacco  Allergies       Reviewed and updated as needed this visit by Provider       ROS:  C: NEGATIVE for fever, chills, change in weight  R: NEGATIVE for significant cough or SOB  GI: NEGATIVE for nausea, abdominal pain, heartburn, or change in bowel habits  : NEGATIVE for dysuria, or hematuria. POSITIVE for frequency.   M: NEGATIVE for significant arthralgias or myalgia. POSITIVE for nighttime muscle cramps.    N: NEGATIVE for weakness, dizziness or paresthesias  All other systems reviewed and were negative.      This document serves as a record of the services and decisions personally performed and made by Ce Cano MD. It was created on his/her behalf by Lakeshia Cruz, a trained medical scribe. The creation of this document is based the provider's statements to the medical scribe.    Scribe Lakeshia Cruz 9:08 AM, June 2, 2017    OBJECTIVE:                                                    /56 (BP Location: Left arm, Cuff Size: Adult Regular)  Pulse 78  Temp 96.9  F (36.1  C) (Oral)  Wt 60.3 kg (133 lb)  SpO2 97%  Breastfeeding? No  BMI 21.15 kg/m2  Body mass index is 21.15 kg/(m^2).  GENERAL: healthy, alert and no distress  HENT: ear canals and TM's normal, nose and mouth without ulcers or lesions. Cerumen impaction bilaterally.   NECK: no adenopathy, no asymmetry, masses, or scars and thyroid normal to palpation  RESP: lungs clear to auscultation - no rales, rhonchi or wheezes.  Poor air movement.   CV: regular rate and rhythm, normal S1 S2, no S3 or S4, no murmur, click or rub, no peripheral edema and peripheral pulses strong  ABDOMEN: soft,  nontender, no hepatosplenomegaly, no masses and bowel sounds normal  MS: no gross musculoskeletal defects noted, no edema  PSYCH: mentation appears normal, affect normal/bright    Diagnostic Test Results:  Results for orders placed or performed in visit on 06/02/17 (from the past 24 hour(s))   Hemoglobin A1c   Result Value Ref Range    Hemoglobin A1C 6.4 (H) 4.3 - 6.0 %        ASSESSMENT/PLAN:                                                    I spent 31 minutes of time with the patient and >50% of it was in education and counseling regarding preventive healthcare.     1. 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 Cramp of limb   She is not interested in physical therapy referral at this time, preferring to meet with PT at her residence. I don't think group setting physical therapy will work for her however.    - Magnesium  - Comprehensive metabolic panel    3. Right sided sciatica   See above    4. Urge incontinence of urine   Patient is not interested in medications.     5. Chronic obstructive pulmonary disease, unspecified COPD type (H)   The current medical regimen is effective; continue present plan and medications  Spirometry was done and reviewed by myself.  No change in FEV1  - fluticasone-salmeterol (ADVAIR) 500-50 MCG/DOSE diskus inhaler; Inhale 1 puff into the lungs 2 times daily  Dispense: 1 Inhaler; Refill: 11  - Spirometry, Breathing Capacity: Normal Order, Clinic Performed    6. Hypopotassemia   The current medical regimen is effective; continue present plan and medications    - Comprehensive metabolic panel  - potassium chloride SA (POTASSIUM CHLORIDE) 20 MEQ CR tablet; Take 1 tablet (20 mEq) by mouth daily  Dispense: 90 tablet; Refill: 4    7. Pulmonary emphysema, unspecified emphysema type (H)   The current  medical regimen is effective; continue present plan and medications    - tiotropium (SPIRIVA HANDIHALER) 18 MCG capsule; INHALE 1 CAPSULE INTO THE LUNGS DAILY.  Dispense: 90 capsule; Refill: 11    8. Major depressive disorder, recurrent episode, mild   The current medical regimen is effective; continue present plan and medications    - sertraline (ZOLOFT) 100 MG tablet; Take 1.5 tablets (150 mg) by mouth daily  Dispense: 135 tablet; Refill: 3    9. Diabetic neuropathy with neurologic complication (H)  The current medical regimen is effective;  continue present plan and medications.     10. Chronic kidney disease (CKD) stage G1/A2, glomerular filtration rate (GFR) equal to or greater than 90 mL/min/1.73 square meter and albuminuria creatinine ratio between  mg/g  The current medical regimen is effective;  continue present plan and medications.     11. Cerebral infarction, unspecified mechanism (H)  Risk factor reduction completed     12. Immunosuppression (H)  Cellcept.      13. Bullous pemphigoid  Per derm     14. Primary open angle glaucoma of both eyes, unspecified glaucoma stage  Follows with ophtho    15. Loss of balance  No falls     16. Thyroid nodule   No growth after 4 years. Checking one last time via US.   - TSH with free T4 reflex  - US Thyroid; Future    17. Pulmonary nodules    - CT Chest w/o Contrast; Future    18. Bilateral impacted cerumen   Performed today in clinic   - REMOVE IMPACTED CERUMEN    19. Age-related osteoporosis without current pathological fracture   She does not want to have any treatment, so we will not check a bone density.   - Vitamin D Deficiency      Patient Instructions   - Start wearing shoes around your house.   - Avoid products containing caffeine.   - Schedule thyroid ultrasound. Please call [facility] with any questions or concerns at [phone].   - Schedule chest CT scan.   - Consider physical therapy for sciatica.  - follow up in 6 months.      The  information in this document, created by the medical scribe for me, accurately reflects the services I personally performed and the decisions made by me. I have reviewed and approved this document for accuracy prior to leaving the patient care area.  Ce Cano MD  9:09 AM, 06/02/17    Ce Cano MD  [facility]    Start: 9:20 AM   End: 9:51 AM

## 2017-06-02 NOTE — MR AVS SNAPSHOT
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)    -  1    Cramp of limb        Right sided sciatica        Urge incontinence of urine        Chronic obstructive pulmonary disease, unspecified COPD type (H)        Hypopotassemia        Pulmonary emphysema, unspecified emphysema type (H)        Major depressive disorder, recurrent episode, mild        Diabetic neuropathy with neurologic complication (H)        Chronic kidney disease (CKD) stage G1/A2, glomerular filtration rate (GFR) equal to or greater than 90 mL/min/1.73 square meter and albuminuria creatinine ratio between  mg/g        Cerebral infarction, unspecified mechanism (H)        Immunosuppression (H)        Bullous pemphigoid        Primary open angle glaucoma of both eyes, unspecified glaucoma stage        Loss of balance        Thyroid nodule        Pulmonary nodules        Bilateral impacted cerumen        Age-related osteoporosis without current pathological fracture          Care Instructions    - Start wearing shoes around your house.   - Avoid products containing caffeine.   - Schedule thyroid ultrasound. Please call [facility] with any questions or concerns at [phone].   - Schedule chest CT scan.   - Consider physical therapy for sciatica.  - follow up in 6 months.      [facility]    If you have any questions regarding to your visit please contact your care team:     Team Pink:   Clinic Hours Telephone Number   Internal Medicine:  Dr. Ce Stafford NP       7am-7pm  Monday - Thursday   7am-5pm  Fridays  [phone]  (Appointment scheduling available  24/7)    Questions about your visit?  Team Line  [phone]   Urgent Care - [place] and [place] - 11am-9pm Monday-Friday Saturday-Sunday- 9am-5pm   [place] - 5pm-9pm Monday-Friday Saturday-Sunday- 9am-5pm  [phone] - [place]   [phone] - [place]       What options do I have for visits at the clinic other than the traditional office visit?  To expand how we care for you, many of our providers are utilizing electronic visits (e-visits) and telephone visits, when medically appropriate, for interactions with their patients rather than a visit in the clinic.   We also offer nurse visits for many medical concerns. Just like any other service, we will bill your insurance company for this type of visit based on time spent on the phone with your provider. Not all insurance companies cover these visits. Please check with your medical insurance if this type of visit is covered. You will be responsible for any charges that are not paid by your insurance.      E-visits via Instagarage:  generally incur a $35.00 fee.  Telephone visits:  Time spent on the phone: *charged based on time that is spent on the phone in increments of 10 minutes. Estimated cost:   5-10 mins $30.00   11-20 mins. $59.00   21-30 mins. $85.00   Use ShopPadt (secure email communication and access to your chart) to send your primary care provider a message or make an appointment. Ask someone on your Team how to sign up for Instagarage.    For a Price Quote for your services, please call our Consumer Price Line at [phone].    As always, Thank you for trusting us with your health care needs!    Rukhsana Cobian, CMA            Follow-ups after your visit        Future tests that were ordered for you today     Open Future Orders        Priority Expected Expires Ordered    US Thyroid Routine  6/2/2018 6/2/2017    CT Chest w/o Contrast Routine  6/2/2018 6/2/2017            Who to contact     If you have questions or need follow up  information about today's clinic visit or your schedule please contact [facility] directly at [phone].  Normal or non-critical lab and imaging results will be communicated to you by Host Committeehart, letter or phone within 4 business days after the clinic has received the results. If you do not hear from us within 7 days, please contact the clinic through Host Committeehart or phone. If you have a critical or abnormal lab result, we will notify you by phone as soon as possible.  Submit refill requests through Codemedia or call your pharmacy and they will forward the refill request to us. Please allow 3 business days for your refill to be completed.          Additional Information About Your Visit        Host CommitteeharOne [place] Information     Codemedia gives you secure access to your electronic health record. If you see a primary care provider, you can also send messages to your care team and make appointments. If you have questions, please call your primary care clinic.  If you do not have a primary care provider, please call [phone] and they will assist you.        Care EveryWhere ID     This is your Care EveryWhere ID. This could be used by other organizations to access your [facility] medical records  [id number]        Your Vitals Were     Pulse Temperature Pulse Oximetry Breastfeeding? BMI (Body Mass Index)       78 96.9  F (36.1  C) (Oral) 97% No 21.15 kg/m2        Blood Pressure from Last 3 Encounters:   06/02/17 106/56   05/09/17 110/64   12/22/16 118/64    Weight from Last 3 Encounters:   06/02/17 133 lb (60.3 kg)   05/09/17 134 lb (60.8 kg)   12/22/16 135 lb (61.2 kg)              We Performed the Following     Albumin Random Urine Quantitative     Comprehensive metabolic panel     Hemoglobin A1c     Lipid panel reflex to direct LDL     Magnesium     REMOVE IMPACTED CERUMEN     Spirometry, Breathing Capacity: Normal Order, Clinic Performed     TSH with free T4 reflex     Vitamin D Deficiency          Today's Medication  Changes          These changes are accurate as of: 6/2/17  9:53 AM.  If you have any questions, ask your nurse or doctor.               These medicines have changed or have updated prescriptions.        Dose/Directions    tiotropium 18 MCG capsule   Commonly known as:  SPIRIVA HANDIHALER   This may have changed:  See the new instructions.   Used for:  Pulmonary emphysema, unspecified emphysema type (H)   Changed by:  Ce Cano MD        INHALE 1 CAPSULE INTO THE LUNGS DAILY.   Quantity:  90 capsule   Refills:  11         Stop taking these medicines if you haven't already. Please contact your care team if you have questions.     ACE NOT PRESCRIBED (INTENTIONAL)   Stopped by:  Ce Cano MD                Where to get your medicines      These medications were sent to [id number] - [place], [place] - [address]  [address] [address]     Phone:  [phone]     fluticasone-salmeterol 500-50 MCG/DOSE diskus inhaler    potassium chloride SA 20 MEQ CR tablet    sertraline 100 MG tablet    tiotropium 18 MCG capsule                Primary Care Provider Office Phone # Fax #    Ce Cano [phone]       [address] [address]        Thank you!     Thank you for choosing [facility]  for your care. Our goal is always to provide you with excellent care. Hearing back from our patients is one way we can continue to improve our services. Please take a few minutes to complete the written survey that you may receive in the mail after your visit with us. Thank you!             Your Updated Medication List - Protect others around you: Learn how to safely use, store and throw away your medicines at www.disposemymeds.org.          This list is accurate as of: 6/2/17  9:53 AM.  Always use your most recent med list.                   Brand Name Dispense Instructions for use    albuterol 108 (90  BASE) MCG/ACT Inhaler   Generic drug:  albuterol     8.5 Inhaler    INHALE TWO PUFFS BY MOUTH EVERY FOUR HOURS AS NEEDED SHORTNESS OF BREATH       aspirin 81 MG tablet      Take 1 tablet by mouth daily.       calcium 600 MG tablet     180 tablet    Take 1 tablet by mouth 2 times daily       cholecalciferol 1000 UNITS capsule    vitamin  -D     Take 1 capsule by mouth daily.       clobetasol 0.05 % cream    TEMOVATE    120 g    Apply topically 2 times daily To active blisters until resolved.       fluticasone-salmeterol 500-50 MCG/DOSE diskus inhaler    ADVAIR    1 Inhaler    Inhale 1 puff into the lungs 2 times daily       ipratropium - albuterol 0.5 mg/2.5 mg/3 mL 0.5-2.5 (3) MG/3ML neb solution    DUONEB    1 Box    Take 1 vial (3 mLs) by nebulization every 6 hours as needed for shortness of breath / dyspnea       latanoprost 0.005 % ophthalmic solution    XALATAN    3 Bottle    Place 1 drop into both eyes At Bedtime       losartan 25 MG tablet    COZAAR    45 tablet    TAKE 0.5 TABLETS (12.5 MG) BY MOUTH DAILY       multivitamin, therapeutic with minerals Tabs tablet      Take 1 tablet by mouth daily       mycophenolate 500 MG tablet    CELLCEPT - GENERIC EQUIVALENT    300 tablet    Take 3 tabs in the am and 2 tabs in the evening       potassium chloride SA 20 MEQ CR tablet    potassium chloride    90 tablet    Take 1 tablet (20 mEq) by mouth daily       sertraline 100 MG tablet    ZOLOFT    135 tablet    Take 1.5 tablets (150 mg) by mouth daily       tiotropium 18 MCG capsule    SPIRIVA HANDIHALER    90 capsule    INHALE 1 CAPSULE INTO THE LUNGS DAILY.       VITAMIN B 12 PO      Take by mouth daily       vitamin B complex with vitamin C Tabs tablet      Take 1 tablet by mouth daily

## 2017-06-03 ASSESSMENT — PATIENT HEALTH QUESTIONNAIRE - PHQ9: SUM OF ALL RESPONSES TO PHQ QUESTIONS 1-9: 0

## 2017-06-03 ASSESSMENT — ANXIETY QUESTIONNAIRES: GAD7 TOTAL SCORE: 0

## 2017-06-05 RX ORDER — ROSUVASTATIN CALCIUM 5 MG/1
5 TABLET, COATED ORAL DAILY
Qty: 90 TABLET | Refills: 1 | Status: SHIPPED | OUTPATIENT
Start: 2017-06-05 | End: 2017-12-11

## 2017-06-05 NOTE — PROGRESS NOTES
Protein is in the urine again which is not uncommon for you.  It is mild and doesn't need treated at this time.  Normal thyroid. Normal electrolytes. Normal kidney function. Normal liver blood test.     Cholesterol is elevated and medication is indicated.  On her lab from 9/19/16, I had recommended treatment but it isn't clear what the patient had decided.  Team RN to help find documentation from this lab result.  If not available, I would like her to start rosuvastatin 5 mg daily.  3 month supply with 1 refill. Fasting cholesterol, AST, ALT in 6-8 weeks at the lab.

## 2017-06-27 ENCOUNTER — RADIANT APPOINTMENT (OUTPATIENT)
Dept: ULTRASOUND IMAGING | Facility: CLINIC | Age: 77
End: 2017-06-27
Attending: INTERNAL MEDICINE
Payer: COMMERCIAL

## 2017-06-27 ENCOUNTER — RADIANT APPOINTMENT (OUTPATIENT)
Dept: CT IMAGING | Facility: CLINIC | Age: 77
End: 2017-06-27
Attending: INTERNAL MEDICINE
Payer: COMMERCIAL

## 2017-06-27 DIAGNOSIS — E04.1 THYROID NODULE: ICD-10-CM

## 2017-06-27 DIAGNOSIS — R91.8 PULMONARY NODULES: ICD-10-CM

## 2017-06-27 PROCEDURE — 71250 CT THORAX DX C-: CPT | Mod: TC

## 2017-06-27 PROCEDURE — 76536 US EXAM OF HEAD AND NECK: CPT

## 2017-06-27 NOTE — PROGRESS NOTES
Please use the radiologist recommendations and lung nodule protocol to follow up on the lung nodule finding in this imaging study.     The patient has emphysema.     I understand that if a patient is low risk and follow up is not recommended, the patient will receive communication via letter/MyChart.     Thank you!  Ce Cano

## 2017-07-03 ENCOUNTER — TELEPHONE (OUTPATIENT)
Dept: FAMILY MEDICINE | Facility: CLINIC | Age: 77
End: 2017-07-03

## 2017-07-03 DIAGNOSIS — J44.9 CHRONIC OBSTRUCTIVE PULMONARY DISEASE, UNSPECIFIED COPD TYPE (H): Primary | ICD-10-CM

## 2017-07-03 NOTE — TELEPHONE ENCOUNTER
Per Dr. Jerome gray to print prescription and mail to patient. Notified Ludy of prescription and she would like it mailed to her - home address verified. Also advised that sending prescriptions to [facility] to fill is illegal. Patient verbalized understanding and has no further questions or concerns.      Mary Ozuna RN - BC

## 2017-07-03 NOTE — TELEPHONE ENCOUNTER
Reason for Call:  Other prescription    Detailed comments:  Patient calling. She needs a new rx to mail to joão for her advair 250/50. Please mail to her.     Phone Number Patient can be reached at: Home number on file [phone] (home)    Best Time:  Any     Can we leave a detailed message on this number? YES    Call taken on 7/3/2017 at 11:38 AM by Keli Bone

## 2017-07-03 NOTE — TELEPHONE ENCOUNTER
fluticasone-salmeterol (ADVAIR) 250-50 MCG/DOSE diskus inhaler       Last Written Prescription Date: ? Discontinued?  Last Fill Quantity: ?, # refills: ?    Last Office Visit with G, P or [facility] prescribing provider:  6-2-17  Future Office Visit:       Date of Last Asthma Action Plan Letter:   There are no preventive care reminders to display for this patient.   Asthma Control Test:   ACT Total Scores 4/16/2013   ACT TOTAL SCORE 9   ASTHMA ER VISITS 1 = One   ASTHMA HOSPITALIZATIONS 0 = None       Date of Last Spirometry Test:   No results found for this or any previous visit.

## 2017-07-10 ENCOUNTER — OFFICE VISIT (OUTPATIENT)
Dept: FAMILY MEDICINE | Facility: CLINIC | Age: 77
End: 2017-07-10
Payer: COMMERCIAL

## 2017-07-10 VITALS
SYSTOLIC BLOOD PRESSURE: 120 MMHG | DIASTOLIC BLOOD PRESSURE: 62 MMHG | HEART RATE: 95 BPM | OXYGEN SATURATION: 95 % | WEIGHT: 129 LBS | TEMPERATURE: 97 F | BODY MASS INDEX: 20.51 KG/M2

## 2017-07-10 DIAGNOSIS — J44.9 CHRONIC OBSTRUCTIVE PULMONARY DISEASE, UNSPECIFIED COPD TYPE (H): ICD-10-CM

## 2017-07-10 DIAGNOSIS — J18.9 PNEUMONIA OF LEFT UPPER LOBE DUE TO INFECTIOUS ORGANISM: Primary | ICD-10-CM

## 2017-07-10 DIAGNOSIS — R11.2 NAUSEA AND VOMITING, INTRACTABILITY OF VOMITING NOT SPECIFIED, UNSPECIFIED VOMITING TYPE: ICD-10-CM

## 2017-07-10 DIAGNOSIS — E87.6 HYPOKALEMIA: ICD-10-CM

## 2017-07-10 PROCEDURE — 99214 OFFICE O/P EST MOD 30 MIN: CPT | Performed by: INTERNAL MEDICINE

## 2017-07-10 PROCEDURE — 80048 BASIC METABOLIC PNL TOTAL CA: CPT | Performed by: INTERNAL MEDICINE

## 2017-07-10 PROCEDURE — 36415 COLL VENOUS BLD VENIPUNCTURE: CPT | Performed by: INTERNAL MEDICINE

## 2017-07-10 RX ORDER — DOXYCYCLINE HYCLATE 100 MG
TABLET ORAL
Refills: 0 | COMMUNITY
Start: 2017-07-07 | End: 2017-08-30

## 2017-07-10 RX ORDER — AZITHROMYCIN 250 MG/1
TABLET, FILM COATED ORAL
Qty: 6 TABLET | Refills: 0 | Status: SHIPPED | OUTPATIENT
Start: 2017-07-10 | End: 2017-07-14

## 2017-07-10 RX ORDER — ONDANSETRON 4 MG/1
4-8 TABLET, FILM COATED ORAL EVERY 8 HOURS PRN
Qty: 30 TABLET | Refills: 1 | Status: SHIPPED | OUTPATIENT
Start: 2017-07-10 | End: 2018-07-16

## 2017-07-10 NOTE — NURSING NOTE
"Chief Complaint   Patient presents with     Hospital F/U       Initial /62  Pulse 95  Temp 97  F (36.1  C) (Oral)  Wt 129 lb (58.5 kg)  SpO2 95%  BMI 20.51 kg/m2 Estimated body mass index is 20.51 kg/(m^2) as calculated from the following:    Height as of 5/9/17: 5' 6.5\" (1.689 m).    Weight as of this encounter: 129 lb (58.5 kg).  Medication Reconciliation: complete     Amirah Suarez MA       "

## 2017-07-10 NOTE — PATIENT INSTRUCTIONS
Start azithromycin two tablets today, then one tablet daily for the next four days.    You can take Zofran as needed for nausea.     I will check your sodium and potassium levels today.    Follow up with me on Friday at 10:45 AM.     Call [facility] (or go online) and ask them if there are any inhalers cheaper than Advair. Let me know, and if there's an equivalent I will send the prescription.     [facility]    If you have any questions regarding to your visit please contact your care team:     Team Pink:   Clinic Hours Telephone Number   Internal Medicine:  Dr. Ce Stafford, NP       7am-7pm  Monday - Thursday   7am-5pm  Fridays  [phone]  (Appointment scheduling available 24/7)    Questions about your visit?  Team Line  [phone]   Urgent Care - [place] and [address] [facility] - 11am-9pm Monday-Friday Saturday-Sunday- 9am-5pm   [place] - 5pm-9pm Monday-Friday Saturday-Sunday- 9am-5pm  [phone] - [place]   [phone] - [place]       What options do I have for visits at the clinic other than the traditional office visit?  To expand how we care for you, many of our providers are utilizing electronic visits (e-visits) and telephone visits, when medically appropriate, for interactions with their patients rather than a visit in the clinic.   We also offer nurse visits for many medical concerns. Just like any other service, we will bill your insurance company for this type of visit based on time spent on the phone with your provider. Not all insurance companies cover these visits. Please check with your medical insurance if this type of visit is covered. You will be responsible for any charges that are not paid by your insurance.      E-visits via YOUnite:  generally incur a $35.00 fee.  Telephone visits:  Time spent on the phone: *charged based on time that is spent on the phone in increments of 10 minutes. Estimated cost:   5-10 mins $30.00    11-20 mins. $59.00   21-30 mins. $85.00   Use Kixerhart (secure email communication and access to your chart) to send your primary care provider a message or make an appointment. Ask someone on your Team how to sign up for Pets are family toot.    For a Price Quote for your services, please call our Consumer Price Line at [phone].    As always, Thank you for trusting us with your health care needs!    Discharge by KAITLYN PERRY

## 2017-07-10 NOTE — PROGRESS NOTES
"INTERNAL MEDICINE  SUBJECTIVE:                                                    Ludy Mckenzie is a 76 year old female who presents to clinic today with her sister for the following health issues:    ED/ Followup:    Facility:  [facility]  Date of visit: 7/6/17  Reason for visit: chest pain- pneumonia   Current Status: Still has a little bit of chest pain but better than it was       ED chest xray on 7/6/17:   \"FINDINGS: 2 views the chest reveal patchy increased density in the left upper lung. There is also subtle volume loss and while some of this increased density may reflect chronic scarring, a superimposed infiltrate or even mass lesion are certainly possible. There is mild linear scarring in the right upper lung. No pleural effusions are appreciated. Heart size is normal. The upper lobe pulmonary veins are within normal limits. No rib fractures are seen.   IMPRESSION:  1. Left upper lobe infiltrate with associated volume loss suggesting acute pneumonia or atypical infection in an upper lobe bulla (aspergillosis). Further evaluation recommended with CT.\"      Patient's potassium level got really low, she thought she had the flu, and stayed in bed for 3 days. She'd get up and go back to bed again, unable to do anything. She went to  and was sent to [facility]. Patient was given liquid potassium in the ED. She was started on doxycycline and has only been able to keep down six tablets since starting it, throwing up the rest. She was eating so she could take the doxycycline but then would just throw up. Patient still has decreased appetite.  Patient wasn't coughing, denies new shortness of breath. Last time she had pneumonia she didn't know, just went in because her sodium was low. She has some pain at her left breast. She woke up this morning drenched from night sweats. Patient feels weak.    Denies alcohol us at the time of lab draw.   Patient had chest CT done 6/27/17 with no evidence of pneumonia. "   Advair is very expensive for her and she's curious if there are other options.      Wt Readings from Last 5 Encounters:   07/10/17 58.5 kg (129 lb)   06/02/17 60.3 kg (133 lb)   05/09/17 60.8 kg (134 lb)   12/22/16 61.2 kg (135 lb)   09/19/16 60.5 kg (133 lb 6.4 oz)     Problem list and histories reviewed & adjusted, as indicated.  Additional history: as documented    Labs reviewed in EPIC  Reviewed and updated as needed this visit by clinical staff  Reviewed and updated as needed this visit by Provider    ROS:  C: NEGATIVE for fever, chills, change in weight  E/M: NEGATIVE for ear, mouth and throat problems  R: NEGATIVE for significant cough or SOB  CV: NEGATIVE for chest pain, palpitations or peripheral edema  GI: NEGATIVE for nausea, abominal pain, heartburn, or change in bowel habits  N: NEGATIVE for weakness, dizziness or paresthesias  P: NEGATIVE for changes in mood or affect    This document serves as a record of the services and decisions personally performed and made by Ce Cano MD. It was created on his/her behalf by Divya Trujillo, trained medical scribe. The creation of this document is based the provider's statements to the medical scribes.    Scribmariel Trujillo 5:07 PM, July 10, 2017  OBJECTIVE:   /62  Pulse 95  Temp 97  F (36.1  C) (Oral)  Wt 58.5 kg (129 lb)  SpO2 95%  BMI 20.51 kg/m2  Body mass index is 20.51 kg/(m^2).  GENERAL:alert and no distress, appears ill  NECK: no adenopathy, no asymmetry, masses, or scars and thyroid normal to palpation  RESP: lungs clear to auscultation - no rales, rhonchi or wheezes  CV: regular rate and rhythm, normal S1 S2, no S3 or S4, no murmur, click or rub, no peripheral edema and peripheral pulses strong  ABDOMEN: soft, nontender, no hepatosplenomegaly, no masses and bowel sounds normal  NEURO: Normal strength and tone, mentation intact and speech normal  PSYCH: mentation appears normal, affect normal/bright    Diagnostic Test Results:  No  results found for this or any previous visit (from the past 24 hour(s)).   ASSESSMENT/PLAN:   1. Pneumonia of left upper lobe due to infectious organism (H)  Stable. Pt did not tolerate doxycyline causing vomiting even when taken with food. She will start azithromycin. Prescribed Zofran as needed for nausea. Will check sodium and potassium levels today. Follow up with me Friday in clinic.  Recent CT less than 2 weeks ago was relatively stable - new nodule but no pneumonia.  May need to repeat chest xray at follow up.   - azithromycin (ZITHROMAX) 250 MG tablet; Two tablets first day, then one tablet daily for four days.  Dispense: 6 tablet; Refill: 0    2. Hypokalemia  Stable.  - Basic metabolic panel    3. Nausea and vomiting, intractability of vomiting not specified, unspecified vomiting type  See above.  - ondansetron (ZOFRAN) 4 MG tablet; Take 1-2 tablets (4-8 mg) by mouth every 8 hours as needed for nausea  Dispense: 30 tablet; Refill: 1    COPD - having difficulty affording inhalers.  Per patient instructions.       Patient Instructions   Start azithromycin two tablets today, then one tablet daily for the next four days.    You can take Zofran as needed for nausea.     I will check your sodium and potassium levels today.    Follow up with me on Friday at 10:45 AM.     Call [facility] (or go online) and ask them if there are any inhalers cheaper than Advair. Let me know, and if there's an equivalent I will send the prescription.       I spent 22 minutes of time with the patient and >50% of it was in education and counseling regarding pneumonia follow up.    The information in this document, created by the medical scribe for me, accurately reflects the services I personally performed and the decisions made by me. I have reviewed and approved this document for accuracy prior to leaving the patient care area.  Ce Cano MD  5:07 PM, 07/10/17    eC Cano MD  [facility]    Start 5:07 PM  End 5:29  PM

## 2017-07-10 NOTE — MR AVS SNAPSHOT
After Visit Summary   7/10/2017    Ludy Mckenzie    MRN: [id number]           Patient Information     Date Of Birth          1940        Visit Information        Provider Department      7/10/2017 5:00 PM Ce Cano MD [facility]        Today's Diagnoses     Pneumonia of left upper lobe due to infectious organism (H)    -  1    Hypokalemia        Nausea and vomiting, intractability of vomiting not specified, unspecified vomiting type          Care Instructions    Start azithromycin two tablets today, then one tablet daily for the next four days.    You can take Zofran as needed for nausea.     I will check your sodium and potassium levels today.    Follow up with me on Friday at 10:45 AM.     Call [facility] (or go online) and ask them if there are any inhalers cheaper than Advair. Let me know, and if there's an equivalent I will send the prescription.     [facility]    If you have any questions regarding to your visit please contact your care team:     Team Pink:   Clinic Hours Telephone Number   Internal Medicine:  Dr. Ce Stafford NP       7am-7pm  Monday - Thursday   7am-5pm  Fridays  [phone]  (Appointment scheduling available 24/7)    Questions about your visit?  Team Line  [phone]   Urgent Care - [place] - 11am-9pm Monday-Friday Saturday-Sunday- 9am-5pm   [place] - 5pm-9pm Monday-Friday Saturday-Sunday- 9am-5pm  [phone] - [place]   [phone] - [place]       What options do I have for visits at the clinic other than the traditional office visit?  To expand how we care for you, many of our providers are utilizing electronic visits (e-visits) and telephone visits, when medically appropriate, for interactions with their patients rather than a visit in the clinic.   We also offer nurse visits for many medical concerns. Just like any other service, we will bill your  insurance company for this type of visit based on time spent on the phone with your provider. Not all insurance companies cover these visits. Please check with your medical insurance if this type of visit is covered. You will be responsible for any charges that are not paid by your insurance.      E-visits via ooma:  generally incur a $35.00 fee.  Telephone visits:  Time spent on the phone: *charged based on time that is spent on the phone in increments of 10 minutes. Estimated cost:   5-10 mins $30.00   11-20 mins. $59.00   21-30 mins. $85.00   Use ooma (secure email communication and access to your chart) to send your primary care provider a message or make an appointment. Ask someone on your Team how to sign up for ooma.    For a Price Quote for your services, please call our [employer] Price Line at [phone].    As always, Thank you for trusting us with your health care needs!    Discharge by KAITLYN PERRY             Follow-ups after your visit        Your next 10 appointments already scheduled     Jul 21, 2017  9:00 AM CDT   LAB with [facility]   [facility] ([place] [facility])    [address] [place] [address]  [place] [address] [phone]           Patient must bring picture ID.  Patient should be prepared to give a urine specimen  Please do not eat 10-12 hours before your appointment if you are coming in fasting for labs on lipids, cholesterol, or glucose (sugar).  Pregnant women should follow their Care Team instructions. Water with medications is okay. Do not drink coffee or other fluids.   If you have concerns about taking  your medications, please ask at office or if scheduling via ooma, send a message by clicking on Secure Messaging, Message Your Care Team.              Who to contact     If you have questions or need follow up information about today's clinic visit or your schedule please contact [facility] directly at [phone].  Normal or non-critical lab and  imaging results will be communicated to you by CARDFREEhart, letter or phone within 4 business days after the clinic has received the results. If you do not hear from us within 7 days, please contact the clinic through Santech or phone. If you have a critical or abnormal lab result, we will notify you by phone as soon as possible.  Submit refill requests through Santech or call your pharmacy and they will forward the refill request to us. Please allow 3 business days for your refill to be completed.          Additional Information About Your Visit        Santech Information     Santech gives you secure access to your electronic health record. If you see a primary care provider, you can also send messages to your care team and make appointments. If you have questions, please call your primary care clinic.  If you do not have a primary care provider, please call [phone] and they will assist you.        Care EveryWhere ID     This is your Care EveryWhere ID. This could be used by other organizations to access your [facility] medical records  [id number]        Your Vitals Were     Pulse Temperature Pulse Oximetry BMI (Body Mass Index)          95 97  F (36.1  C) (Oral) 95% 20.51 kg/m2         Blood Pressure from Last 3 Encounters:   07/10/17 120/62   06/02/17 106/56   05/09/17 110/64    Weight from Last 3 Encounters:   07/10/17 129 lb (58.5 kg)   06/02/17 133 lb (60.3 kg)   05/09/17 134 lb (60.8 kg)              We Performed the Following     Basic metabolic panel          Today's Medication Changes          These changes are accurate as of: 7/10/17  5:30 PM.  If you have any questions, ask your nurse or doctor.               Start taking these medicines.        Dose/Directions    azithromycin 250 MG tablet   Commonly known as:  ZITHROMAX   Used for:  Pneumonia of left upper lobe due to infectious organism (H)   Started by:  Ce Cano MD        Two tablets first day, then one tablet daily for four days.    Quantity:  6 tablet   Refills:  0       ondansetron 4 MG tablet   Commonly known as:  ZOFRAN   Used for:  Nausea and vomiting, intractability of vomiting not specified, unspecified vomiting type   Started by:  Ce Cano MD        Dose:  4-8 mg   Take 1-2 tablets (4-8 mg) by mouth every 8 hours as needed for nausea   Quantity:  30 tablet   Refills:  1            Where to get your medicines      These medications were sent to [place] [address] [facility] [place] [facility], [place] - [address]  [address] [place] [facility] [place] [address]     Phone:  [phone]     azithromycin 250 MG tablet    ondansetron 4 MG tablet                Primary Care Provider Office Phone # Fax #    Ce Cano [phone]       [address] [address]        Equal Access to Services     [facility]: Hadii aad ku hadasho Soomaali, waaxda luqadaha, qaybta kaalmada adeegyada, waxay idiin hayaan fer kharash keira . So [facility] [phone].    ATENCIÓN: Si habla español, tiene a aponte disposición servicios gratuitos de asistencia lingüística. Llame al [phone].    We comply with applicable federal civil rights laws and [place] laws. We do not discriminate on the basis of race, color, national origin, age, disability sex, sexual orientation or gender identity.            Thank you!     Thank you for choosing [facility]  for your care. Our goal is always to provide you with excellent care. Hearing back from our patients is one way we can continue to improve our services. Please take a few minutes to complete the written survey that you may receive in the mail after your visit with us. Thank you!             Your Updated Medication List - Protect others around you: Learn how to safely use, store and throw away your medicines at www.disposemymeds.org.          This list is accurate as of: 7/10/17  5:30 PM.  Always use your most recent med list.                    Brand Name Dispense Instructions for use Diagnosis    albuterol 108 (90 BASE) MCG/ACT Inhaler   Generic drug:  albuterol     8.5 Inhaler    INHALE TWO PUFFS BY MOUTH EVERY FOUR HOURS AS NEEDED SHORTNESS OF BREATH    Chronic obstructive pulmonary disease, unspecified COPD type (H)       aspirin 81 MG tablet      Take 1 tablet by mouth daily.        azithromycin 250 MG tablet    ZITHROMAX    6 tablet    Two tablets first day, then one tablet daily for four days.    Pneumonia of left upper lobe due to infectious organism (H)       calcium 600 MG tablet     180 tablet    Take 1 tablet by mouth 2 times daily        cholecalciferol 1000 UNITS capsule    vitamin  -D     Take 1 capsule by mouth daily.        clobetasol 0.05 % cream    TEMOVATE    120 g    Apply topically 2 times daily To active blisters until resolved.    Bullous pemphigoid, Encounter for long-term (current) use of high-risk medication       doxycycline 100 MG tablet    VIBRA-TABS     TAKE 2 TABLETS BY MOUTH TWICE DAILY ON DAYS 1-3, THEN 2 TABLETS ONCE DAILY ON DAYS 4-10        fluticasone-salmeterol 500-50 MCG/DOSE diskus inhaler    ADVAIR    3 Inhaler    Inhale 1 puff into the lungs 2 times daily    Chronic obstructive pulmonary disease, unspecified COPD type (H)       ipratropium - albuterol 0.5 mg/2.5 mg/3 mL 0.5-2.5 (3) MG/3ML neb solution    DUONEB    1 Box    Take 1 vial (3 mLs) by nebulization every 6 hours as needed for shortness of breath / dyspnea    Panlobular emphysema (H)       latanoprost 0.005 % ophthalmic solution    XALATAN    3 Bottle    Place 1 drop into both eyes At Bedtime    Glaucoma       losartan 25 MG tablet    COZAAR    45 tablet    TAKE 0.5 TABLETS (12.5 MG) BY MOUTH DAILY    Microalbuminuria       multivitamin, therapeutic with minerals Tabs tablet      Take 1 tablet by mouth daily        mycophenolate 500 MG tablet    CELLCEPT - GENERIC EQUIVALENT    300 tablet    Take 3 tabs in the am and 2 tabs in the evening     Bullous pemphigoid       ondansetron 4 MG tablet    ZOFRAN    30 tablet    Take 1-2 tablets (4-8 mg) by mouth every 8 hours as needed for nausea    Nausea and vomiting, intractability of vomiting not specified, unspecified vomiting type       potassium chloride SA 20 MEQ CR tablet    potassium chloride    90 tablet    Take 1 tablet (20 mEq) by mouth daily    Hypopotassemia       rosuvastatin 5 MG tablet    CRESTOR    90 tablet    Take 1 tablet (5 mg) by mouth daily    Mixed hyperlipidemia       sertraline 100 MG tablet    ZOLOFT    135 tablet    Take 1.5 tablets (150 mg) by mouth daily    Major depressive disorder, recurrent episode, mild (H)       tiotropium 18 MCG capsule    SPIRIVA HANDIHALER    90 capsule    INHALE 1 CAPSULE INTO THE LUNGS DAILY.    Panlobular emphysema (H)       VITAMIN B 12 PO      Take by mouth daily        vitamin B complex with vitamin C Tabs tablet      Take 1 tablet by mouth daily

## 2017-07-11 LAB
ANION GAP SERPL CALCULATED.3IONS-SCNC: 9 MMOL/L (ref 3–14)
BUN SERPL-MCNC: 14 MG/DL (ref 7–30)
CALCIUM SERPL-MCNC: 8.7 MG/DL (ref 8.5–10.1)
CHLORIDE SERPL-SCNC: 101 MMOL/L (ref 94–109)
CO2 SERPL-SCNC: 24 MMOL/L (ref 20–32)
CREAT SERPL-MCNC: 0.5 MG/DL (ref 0.52–1.04)
GFR SERPL CREATININE-BSD FRML MDRD: ABNORMAL ML/MIN/1.7M2
GLUCOSE SERPL-MCNC: 116 MG/DL (ref 70–99)
POTASSIUM SERPL-SCNC: 2.8 MMOL/L (ref 3.4–5.3)
SODIUM SERPL-SCNC: 134 MMOL/L (ref 133–144)

## 2017-07-13 DIAGNOSIS — E87.6 HYPOKALEMIA: ICD-10-CM

## 2017-07-13 DIAGNOSIS — E78.2 MIXED HYPERLIPIDEMIA: ICD-10-CM

## 2017-07-13 LAB
ALT SERPL W P-5'-P-CCNC: 37 U/L (ref 0–50)
AST SERPL W P-5'-P-CCNC: 43 U/L (ref 0–45)
CHOLEST SERPL-MCNC: 90 MG/DL
CORTIS SERPL-MCNC: 19.6 UG/DL (ref 4–22)
HDLC SERPL-MCNC: 26 MG/DL
LDLC SERPL CALC-MCNC: 47 MG/DL
MAGNESIUM SERPL-MCNC: 1.9 MG/DL (ref 1.6–2.3)
NONHDLC SERPL-MCNC: 64 MG/DL
POTASSIUM SERPL-SCNC: 4.2 MMOL/L (ref 3.4–5.3)
TRIGL SERPL-MCNC: 87 MG/DL

## 2017-07-13 PROCEDURE — 83735 ASSAY OF MAGNESIUM: CPT | Performed by: INTERNAL MEDICINE

## 2017-07-13 PROCEDURE — 84460 ALANINE AMINO (ALT) (SGPT): CPT | Performed by: INTERNAL MEDICINE

## 2017-07-13 PROCEDURE — 84132 ASSAY OF SERUM POTASSIUM: CPT | Performed by: INTERNAL MEDICINE

## 2017-07-13 PROCEDURE — 82533 TOTAL CORTISOL: CPT | Performed by: INTERNAL MEDICINE

## 2017-07-13 PROCEDURE — 84450 TRANSFERASE (AST) (SGOT): CPT | Performed by: INTERNAL MEDICINE

## 2017-07-13 PROCEDURE — 36415 COLL VENOUS BLD VENIPUNCTURE: CPT | Performed by: INTERNAL MEDICINE

## 2017-07-13 PROCEDURE — 80061 LIPID PANEL: CPT | Performed by: INTERNAL MEDICINE

## 2017-07-14 ENCOUNTER — OFFICE VISIT (OUTPATIENT)
Dept: FAMILY MEDICINE | Facility: CLINIC | Age: 77
End: 2017-07-14
Payer: COMMERCIAL

## 2017-07-14 VITALS
WEIGHT: 127.2 LBS | BODY MASS INDEX: 20.22 KG/M2 | SYSTOLIC BLOOD PRESSURE: 98 MMHG | OXYGEN SATURATION: 94 % | HEART RATE: 97 BPM | DIASTOLIC BLOOD PRESSURE: 50 MMHG | TEMPERATURE: 97.8 F

## 2017-07-14 DIAGNOSIS — J18.9 PNEUMONIA OF LEFT UPPER LOBE DUE TO INFECTIOUS ORGANISM: ICD-10-CM

## 2017-07-14 DIAGNOSIS — E87.6 HYPOKALEMIA: Primary | ICD-10-CM

## 2017-07-14 DIAGNOSIS — J44.9 CHRONIC OBSTRUCTIVE PULMONARY DISEASE, UNSPECIFIED COPD TYPE (H): ICD-10-CM

## 2017-07-14 PROCEDURE — 99213 OFFICE O/P EST LOW 20 MIN: CPT | Performed by: INTERNAL MEDICINE

## 2017-07-14 ASSESSMENT — PAIN SCALES - GENERAL: PAINLEVEL: NO PAIN (0)

## 2017-07-14 NOTE — PROGRESS NOTES
Good cholesterol. Normal liver blood test. Normal magnesium. Normal potassium. Normal cortisol level.

## 2017-07-14 NOTE — NURSING NOTE
"Chief Complaint   Patient presents with     RECHECK     from 7/10 for pneumonia       Initial BP 98/50 (BP Location: Left arm, Cuff Size: Adult Regular)  Pulse 97  Temp 97.8  F (36.6  C) (Oral)  Wt 127 lb 3.2 oz (57.7 kg)  SpO2 94%  Breastfeeding? No  BMI 20.22 kg/m2 Estimated body mass index is 20.22 kg/(m^2) as calculated from the following:    Height as of 5/9/17: 5' 6.5\" (1.689 m).    Weight as of this encounter: 127 lb 3.2 oz (57.7 kg).  Medication Reconciliation: complete       Rukhsana Cobian CMA      "

## 2017-07-14 NOTE — PROGRESS NOTES
INTERNAL MEDICINE   SUBJECTIVE:                                                    Ludy Mckenzie is a 76 year old female who presents to clinic today for the following health issues:  Follow Up from 7/10/17       Patient Instructions From Visit on 7/10/17   Start azithromycin two tablets today, then one tablet daily for the next four days.  You can take Zofran as needed for nausea.   I will check your sodium and potassium levels today.  Follow up with me on Friday at 10:45 AM.   Call [facility] (or go online) and ask them if there are any inhalers cheaper than Advair. Let me know, and if there's an equivalent I will send the prescription.     Potassium - She had a low potassium in the hospital and was supposed to be taking it, but wasn't, I called her and informed her that she needed to resume taking it. Her potassium level has returned to normal. She denies having had any alcohol. Her balance has been unchanged. She confirms that when she is at home, she has things to hold onto for support and is very careful.  She has resumed taking her daily potassium supplements.     Pneumonia F/U - She has been having intermittent SOB since last visit. She has not been coughing. When she uses her inhaler, she will cough right away afterwards, not getting the full dose of the medication. This is with Advair, Spiriva, and her rescue Albuterol inhaler.     Problem list and histories reviewed & adjusted, as indicated.  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)     Glaucoma     Chronic obstructive pulmonary disease, unspecified COPD type (H)      Chronic kidney disease (CKD) stage G1/A2, glomerular filtration rate (GFR) equal to or greater than 90 mL/min/1.73 square meter and albuminuria creatinine ratio between  mg/g     Loss of balance     Diabetic neuropathy with neurologic complication (H)     Pseudophakia of both eyes     Urge incontinence of urine     Immunosuppression (H)     Cramp of limb     Right sided sciatica     Pulmonary emphysema, unspecified emphysema type (H)     Age-related osteoporosis without current pathological fracture     Pulmonary nodules     Past Surgical History:   Procedure Laterality Date     BIOPSY       BUNIONECTOMY  1960    JACQUELINE     CATARACT IOL, RT/LT  2010    right     COLONOSCOPY  2/22/2013    Procedure: COLONOSCOPY;  Colonoscopy ;  Surgeon: Hugo Minor MD;  Location:  GI       Social History   Substance Use Topics     Smoking status: Former Smoker     Packs/day: 1.50     Years: 50.00     Types: Cigarettes     Quit date: 7/22/2009     Smokeless tobacco: Never Used     Alcohol use 2.4 oz/week     4 Standard drinks or equivalent per week      Comment: beer 1-2 2 times a week     Family History   Problem Relation Age of Onset     CANCER Maternal Grandfather      bone     DIABETES Mother 50     dx age 50, hip fracture     C.A.D. Father      pacemaker         Labs reviewed in EPIC    Reviewed and updated as needed this visit by clinical staff  Tobacco  Allergies  Meds  Med Hx  Surg Hx  Fam Hx  Soc Hx      Reviewed and updated as needed this visit by Provider       ROS:  C: NEGATIVE for fever, chills, change in weight  R: NEGATIVE for significant cough. POSITIVE for intermittent SOB.   GI: NEGATIVE for nausea, abdominal pain, heartburn, or change in bowel habits  N: NEGATIVE for weakness, dizziness or paresthesias  All other systems reviewed and were negative.     This document serves as a record of the services and decisions personally performed and made by Ce Cano MD. It was created on his/her behalf by  Lakeshia Cruz, a trained medical scribe. The creation of this document is based the provider's statements to the medical scribe.    Kasey Sandersissa Anthony 11:01 AM, July 14, 2017    OBJECTIVE:   BP 98/50 (BP Location: Left arm, Cuff Size: Adult Regular)  Pulse 97  Temp 97.8  F (36.6  C) (Oral)  Wt 57.7 kg (127 lb 3.2 oz)  SpO2 94%  Breastfeeding? No  BMI 20.22 kg/m2  Body mass index is 20.22 kg/(m^2).  GENERAL: healthy, alert and no distress  RESP: lungs clear to auscultation - no rales, rhonchi or wheezes, poor air movement throughout  CV: regular rate and rhythm, normal S1 S2, no S3 or S4, no murmur, click or rub, no peripheral edema and peripheral pulses strong  MS: no gross musculoskeletal defects noted, no edema  PSYCH: mentation appears normal, affect normal/bright    Diagnostic Test Results:  No results found for this or any previous visit (from the past 24 hour(s)).  Results for orders placed or performed in visit on 07/13/17   Lipid panel reflex to direct LDL   Result Value Ref Range    Cholesterol 90 <200 mg/dL    Triglycerides 87 <150 mg/dL    HDL Cholesterol 26 (L) >49 mg/dL    LDL Cholesterol Calculated 47 <100 mg/dL    Non HDL Cholesterol 64 <130 mg/dL   AST   Result Value Ref Range    AST 43 0 - 45 U/L   **ALT FUTURE anytime   Result Value Ref Range    ALT 37 0 - 50 U/L   Cortisol   Result Value Ref Range    Cortisol Serum 19.6 4 - 22 ug/dL   Magnesium   Result Value Ref Range    Magnesium 1.9 1.6 - 2.3 mg/dL   Potassium   Result Value Ref Range    Potassium 4.2 3.4 - 5.3 mmol/L       ASSESSMENT/PLAN:   I spent 12 minutes of time with the patient and >50% of it was in education and counseling regarding potassium and preventive healthcare    1. Hypokalemia  Resolved now that she is taking her supplements   - **Potassium FUTURE anytime; Future    2. Pneumonia of left upper lobe due to infectious organism (H)   Follow up chest XR in 5 weeks. Patient will notify me if breathing gets worse. She  is improved and completed antibiotic course.      3. Chronic obstructive pulmonary disease, unspecified COPD type (H)   See above. Some coughing after inhalers so could consider changing to a different med type.          Patient Instructions   - Follow up chest xray in 5 weeks.  You don't need an appointment for this.  - Get your potassium drawn when you get Dr. Manrique's labs drawn.  - Notify me if your breathing gets worse.   - Call to find out if other inhalers are covered (other than spiriva, advair).  Maybe a different type of inhaler would make you cough less.      The information in this document, created by the medical scribe for me, accurately reflects the services I personally performed and the decisions made by me. I have reviewed and approved this document for accuracy prior to leaving the patient care area.  Ce Cano MD  11:02 AM, 07/14/17    Ce Cano MD  [facility]     Start: 10:59 AM  End: 11:11 AM

## 2017-07-14 NOTE — MR AVS SNAPSHOT
After Visit Summary   7/14/2017    Ludy Mckenzie    MRN: [id number]           Patient Information     Date Of Birth          1940        Visit Information        Provider Department      7/14/2017 10:45 AM Ce Cano MD [facility]        Today's Diagnoses     Hypokalemia    -  1    Pneumonia of left upper lobe due to infectious organism (H)        Chronic obstructive pulmonary disease, unspecified COPD type (H)          Care Instructions    - Follow up chest xray in 5 weeks.  You don't need an appointment for this.  - Get your potassium drawn when you get Dr. Manrique's labs drawn.  - Notify me if your breathing gets worse.   - Call to find out if other inhalers are covered (other than spiriva, advair).  Maybe a different type of inhaler would make you cough less.      [facility]    If you have any questions regarding to your visit please contact your care team:     Team Pink:   Clinic Hours Telephone Number   Internal Medicine:  Dr. Ce Stafford NP       7am-7pm  Monday - Thursday   7am-5pm  Fridays  [phone]  (Appointment scheduling available 24/7)    Questions about your visit?  Team Line  [phone]   Urgent Care - [place] - 11am-9pm Monday-Friday Saturday-Sunday- 9am-5pm   [place] - 5pm-9pm Monday-Friday Saturday-Sunday- 9am-5pm  [phone] - [place]   [phone] - [place]       What options do I have for visits at the clinic other than the traditional office visit?  To expand how we care for you, many of our providers are utilizing electronic visits (e-visits) and telephone visits, when medically appropriate, for interactions with their patients rather than a visit in the clinic.   We also offer nurse visits for many medical concerns. Just like any other service, we will bill your insurance company for this type of visit based on time spent on the phone with your provider.  Not all insurance companies cover these visits. Please check with your medical insurance if this type of visit is covered. You will be responsible for any charges that are not paid by your insurance.      E-visits via [employer]:  generally incur a $35.00 fee.  Telephone visits:  Time spent on the phone: *charged based on time that is spent on the phone in increments of 10 minutes. Estimated cost:   5-10 mins $30.00   11-20 mins. $59.00   21-30 mins. $85.00   Use Lyst (secure email communication and access to your chart) to send your primary care provider a message or make an appointment. Ask someone on your Team how to sign up for Lyst.    For a Price Quote for your services, please call our [facility] Price Line at [phone].    As always, Thank you for trusting us with your health care needs!    Rukhsana Cobian CMA            Follow-ups after your visit        Your next 10 appointments already scheduled     Jul 21, 2017  9:00 AM CDT   LAB with [facility]   [facility] ([address] [address]   [phone]           Patient must bring picture ID.  Patient should be prepared to give a urine specimen  Please do not eat 10-12 hours before your appointment if you are coming in fasting for labs on lipids, cholesterol, or glucose (sugar).  Pregnant women should follow their Care Team instructions. Water with medications is okay. Do not drink coffee or other fluids.   If you have concerns about taking  your medications, please ask at office or if scheduling via Lyst, send a message by clicking on Secure Messaging, Message Your Care Team.              Future tests that were ordered for you today     Open Future Orders        Priority Expected Expires Ordered    **Potassium FUTURE anytime Routine 7/14/2017 7/14/2018 7/14/2017            Who to contact     If you have questions or need follow up information about today's clinic visit or your schedule please  contact [facility] directly at [phone].  Normal or non-critical lab and imaging results will be communicated to you by MyChart, letter or phone within 4 business days after the clinic has received the results. If you do not hear from us within 7 days, please contact the clinic through [email or web address] or phone. If you have a critical or abnormal lab result, we will notify you by phone as soon as possible.  Submit refill requests through [facility] or call your pharmacy and they will forward the refill request to us. Please allow 3 business days for your refill to be completed.          Additional Information About Your Visit        [email or web address] Information     [facility] gives you secure access to your electronic health record. If you see a primary care provider, you can also send messages to your care team and make appointments. If you have questions, please call your primary care clinic.  If you do not have a primary care provider, please call [phone] and they will assist you.        Care EveryWhere ID     This is your Care EveryWhere ID. This could be used by other organizations to access your [place] medical records  [id number]        Your Vitals Were     Pulse Temperature Pulse Oximetry Breastfeeding? BMI (Body Mass Index)       97 97.8  F (36.6  C) (Oral) 94% No 20.22 kg/m2        Blood Pressure from Last 3 Encounters:   07/14/17 98/50   07/10/17 120/62   06/02/17 106/56    Weight from Last 3 Encounters:   07/14/17 127 lb 3.2 oz (57.7 kg)   07/10/17 129 lb (58.5 kg)   06/02/17 133 lb (60.3 kg)                 Today's Medication Changes          These changes are accurate as of: 7/14/17 11:12 AM.  If you have any questions, ask your nurse or doctor.               Stop taking these medicines if you haven't already. Please contact your care team if you have questions.     azithromycin 250 MG tablet   Commonly known as:  ZITHROMAX   Stopped by:  Ce Cano MD                    Primary Care Provider  Office Phone # Fax #    Ce Belinda Cano [phone]       [address] [address]        Equal Access to Services     IMELDA CASTILLO : Hadii ebonie ku hadkekeo Sojeremieali, waaxda luqadaha, qaybta kaalmada adeegyada, mateo dotyn fer trammell laGeorgekang kate. So [facility] [phone].    ATENCIÓN: Si habla español, tiene a aponte disposición servicios gratuitos de asistencia lingüística. Llame al [phone].    We comply with applicable federal civil rights laws and [place] laws. We do not discriminate on the basis of race, color, national origin, age, disability sex, sexual orientation or gender identity.            Thank you!     Thank you for choosing [facility]  for your care. Our goal is always to provide you with excellent care. Hearing back from our patients is one way we can continue to improve our services. Please take a few minutes to complete the written survey that you may receive in the mail after your visit with us. Thank you!             Your Updated Medication List - Protect others around you: Learn how to safely use, store and throw away your medicines at www.disposemymeds.org.          This list is accurate as of: 7/14/17 11:12 AM.  Always use your most recent med list.                   Brand Name Dispense Instructions for use Diagnosis    albuterol 108 (90 BASE) MCG/ACT Inhaler   Generic drug:  albuterol     8.5 Inhaler    INHALE TWO PUFFS BY MOUTH EVERY FOUR HOURS AS NEEDED SHORTNESS OF BREATH    Chronic obstructive pulmonary disease, unspecified COPD type (H)       aspirin 81 MG tablet      Take 1 tablet by mouth daily.        calcium 600 MG tablet     180 tablet    Take 1 tablet by mouth 2 times daily        cholecalciferol 1000 UNITS capsule    vitamin  -D     Take 1 capsule by mouth daily.        clobetasol 0.05 % cream    TEMOVATE    120 g    Apply topically 2 times daily To active blisters until resolved.    Bullous pemphigoid,  Encounter for long-term (current) use of high-risk medication       doxycycline 100 MG tablet    VIBRA-TABS     TAKE 2 TABLETS BY MOUTH TWICE DAILY ON DAYS 1-3, THEN 2 TABLETS ONCE DAILY ON DAYS 4-10        fluticasone-salmeterol 500-50 MCG/DOSE diskus inhaler    ADVAIR    3 Inhaler    Inhale 1 puff into the lungs 2 times daily    Chronic obstructive pulmonary disease, unspecified COPD type (H)       ipratropium - albuterol 0.5 mg/2.5 mg/3 mL 0.5-2.5 (3) MG/3ML neb solution    DUONEB    1 Box    Take 1 vial (3 mLs) by nebulization every 6 hours as needed for shortness of breath / dyspnea    Panlobular emphysema (H)       latanoprost 0.005 % ophthalmic solution    XALATAN    3 Bottle    Place 1 drop into both eyes At Bedtime    Glaucoma       losartan 25 MG tablet    COZAAR    45 tablet    TAKE 0.5 TABLETS (12.5 MG) BY MOUTH DAILY    Microalbuminuria       multivitamin, therapeutic with minerals Tabs tablet      Take 1 tablet by mouth daily        mycophenolate 500 MG tablet    CELLCEPT - GENERIC EQUIVALENT    300 tablet    Take 3 tabs in the am and 2 tabs in the evening    Bullous pemphigoid       ondansetron 4 MG tablet    ZOFRAN    30 tablet    Take 1-2 tablets (4-8 mg) by mouth every 8 hours as needed for nausea    Nausea and vomiting, intractability of vomiting not specified, unspecified vomiting type       potassium chloride SA 20 MEQ CR tablet    potassium chloride    90 tablet    Take 1 tablet (20 mEq) by mouth daily    Hypopotassemia       rosuvastatin 5 MG tablet    CRESTOR    90 tablet    Take 1 tablet (5 mg) by mouth daily    Mixed hyperlipidemia       sertraline 100 MG tablet    ZOLOFT    135 tablet    Take 1.5 tablets (150 mg) by mouth daily    Major depressive disorder, recurrent episode, mild (H)       tiotropium 18 MCG capsule    SPIRIVA HANDIHALER    90 capsule    INHALE 1 CAPSULE INTO THE LUNGS DAILY.    Panlobular emphysema (H)       VITAMIN B 12 PO      Take by mouth daily        vitamin B  complex with vitamin C Tabs tablet      Take 1 tablet by mouth daily

## 2017-07-17 ENCOUNTER — TELEPHONE (OUTPATIENT)
Dept: FAMILY MEDICINE | Facility: CLINIC | Age: 77
End: 2017-07-17

## 2017-07-17 NOTE — TELEPHONE ENCOUNTER
[facility] Incidental Finding result notification:     Exam date: 06/27/2017  Exam: LDCT scan   Radiologist recommendations: Recommendations for one or multiple incidental lung nodules < 6mm:   High risk patients- Optional follow-up CT at 12 months; if unchanged, no further follow-up.  Ordering Provider: Ce Samano Magaly did not receive the remaining radiology results from her provider.   RN communicated the lung nodule finding to the patient (Yes/No):  Yes - discussed the 6 mm has been stable for 2 years per result, and that there was 4 mm nodule that will be what they want to follow up in 12 mos for.   The patient had the following questions: The pt received a letter and had questions with regards to what the letter meant.    Miscellaneous information:  Discussed with pt that her follow up is in about 12 months as recommended and that she will get a call from [facility] regarding this follow up and to discuss scheduling as the we get closer to the 12 month due date.     Christie Jara RN  [facility] RN  Lung Nodule and ED Lab Result F/u RN  Ph# [phone]

## 2017-07-21 DIAGNOSIS — Z51.81 MEDICATION MONITORING ENCOUNTER: ICD-10-CM

## 2017-07-21 DIAGNOSIS — E87.6 HYPOKALEMIA: ICD-10-CM

## 2017-07-21 LAB
ALT SERPL W P-5'-P-CCNC: 24 U/L (ref 0–50)
AST SERPL W P-5'-P-CCNC: 36 U/L (ref 0–45)
BASOPHILS # BLD AUTO: 0.1 10E9/L (ref 0–0.2)
BASOPHILS NFR BLD AUTO: 1.5 %
DIFFERENTIAL METHOD BLD: ABNORMAL
EOSINOPHIL # BLD AUTO: 0.3 10E9/L (ref 0–0.7)
EOSINOPHIL NFR BLD AUTO: 5.6 %
ERYTHROCYTE [DISTWIDTH] IN BLOOD BY AUTOMATED COUNT: 12.9 % (ref 10–15)
HCT VFR BLD AUTO: 34.9 % (ref 35–47)
HGB BLD-MCNC: 11.2 G/DL (ref 11.7–15.7)
LYMPHOCYTES # BLD AUTO: 1.9 10E9/L (ref 0.8–5.3)
LYMPHOCYTES NFR BLD AUTO: 35.6 %
MCH RBC QN AUTO: 28.5 PG (ref 26.5–33)
MCHC RBC AUTO-ENTMCNC: 32.1 G/DL (ref 31.5–36.5)
MCV RBC AUTO: 89 FL (ref 78–100)
MONOCYTES # BLD AUTO: 0.5 10E9/L (ref 0–1.3)
MONOCYTES NFR BLD AUTO: 9.7 %
NEUTROPHILS # BLD AUTO: 2.6 10E9/L (ref 1.6–8.3)
NEUTROPHILS NFR BLD AUTO: 47.6 %
PLATELET # BLD AUTO: 504 10E9/L (ref 150–450)
POTASSIUM SERPL-SCNC: 4 MMOL/L (ref 3.4–5.3)
RBC # BLD AUTO: 3.93 10E12/L (ref 3.8–5.2)
WBC # BLD AUTO: 5.4 10E9/L (ref 4–11)

## 2017-07-21 PROCEDURE — 85025 COMPLETE CBC W/AUTO DIFF WBC: CPT | Performed by: DERMATOLOGY

## 2017-07-21 PROCEDURE — 84132 ASSAY OF SERUM POTASSIUM: CPT | Performed by: INTERNAL MEDICINE

## 2017-07-21 PROCEDURE — 84460 ALANINE AMINO (ALT) (SGPT): CPT | Performed by: DERMATOLOGY

## 2017-07-21 PROCEDURE — 36415 COLL VENOUS BLD VENIPUNCTURE: CPT | Performed by: DERMATOLOGY

## 2017-07-21 PROCEDURE — 84450 TRANSFERASE (AST) (SGOT): CPT | Performed by: DERMATOLOGY

## 2017-07-21 NOTE — PROGRESS NOTES
Please inform patient of lab results:    1) AST and ALT: liver labs normal.    2) blood count: wnl. Drop is blood count is insignificant.    Ok to refill Cellcept. Please also reorder cbc with diff, ast, and alt in 4 month and appt with Dr. Manrique or any available staff in 4 months.    Austin Garsia MD, MS    Department of Dermatology  [facility] [place] [facility] [place]: Phone: [phone], Fax:[phone]  [facility]: Phone: [phone], Fax: [phone]

## 2017-07-24 DIAGNOSIS — L12.0 BULLOUS PEMPHIGOID (H): Primary | ICD-10-CM

## 2017-07-24 RX ORDER — MYCOPHENOLATE MOFETIL 500 MG/1
TABLET ORAL
Qty: 300 TABLET | Refills: 1 | Status: SHIPPED | OUTPATIENT
Start: 2017-07-24 | End: 2017-08-22

## 2017-08-09 ENCOUNTER — RADIANT APPOINTMENT (OUTPATIENT)
Dept: GENERAL RADIOLOGY | Facility: CLINIC | Age: 77
End: 2017-08-09
Attending: INTERNAL MEDICINE
Payer: COMMERCIAL

## 2017-08-09 DIAGNOSIS — L12.0 BULLOUS PEMPHIGOID (H): ICD-10-CM

## 2017-08-09 DIAGNOSIS — J44.9 CHRONIC OBSTRUCTIVE PULMONARY DISEASE, UNSPECIFIED COPD TYPE (H): ICD-10-CM

## 2017-08-09 DIAGNOSIS — R91.8 LEFT PULMONARY INFILTRATE ON CXR: Primary | ICD-10-CM

## 2017-08-09 DIAGNOSIS — J44.9 CHRONIC OBSTRUCTIVE PULMONARY DISEASE, UNSPECIFIED COPD TYPE (H): Primary | ICD-10-CM

## 2017-08-09 LAB
ALT SERPL W P-5'-P-CCNC: 24 U/L (ref 0–50)
AST SERPL W P-5'-P-CCNC: 33 U/L (ref 0–45)
BASOPHILS # BLD AUTO: 0 10E9/L (ref 0–0.2)
BASOPHILS NFR BLD AUTO: 0.6 %
DIFFERENTIAL METHOD BLD: NORMAL
EOSINOPHIL # BLD AUTO: 0.3 10E9/L (ref 0–0.7)
EOSINOPHIL NFR BLD AUTO: 4.7 %
ERYTHROCYTE [DISTWIDTH] IN BLOOD BY AUTOMATED COUNT: 13.6 % (ref 10–15)
HCT VFR BLD AUTO: 37.5 % (ref 35–47)
HGB BLD-MCNC: 12.1 G/DL (ref 11.7–15.7)
LYMPHOCYTES # BLD AUTO: 2.1 10E9/L (ref 0.8–5.3)
LYMPHOCYTES NFR BLD AUTO: 32 %
MCH RBC QN AUTO: 28.9 PG (ref 26.5–33)
MCHC RBC AUTO-ENTMCNC: 32.3 G/DL (ref 31.5–36.5)
MCV RBC AUTO: 90 FL (ref 78–100)
MONOCYTES # BLD AUTO: 0.7 10E9/L (ref 0–1.3)
MONOCYTES NFR BLD AUTO: 10.1 %
NEUTROPHILS # BLD AUTO: 3.4 10E9/L (ref 1.6–8.3)
NEUTROPHILS NFR BLD AUTO: 52.6 %
PLATELET # BLD AUTO: 237 10E9/L (ref 150–450)
RBC # BLD AUTO: 4.18 10E12/L (ref 3.8–5.2)
WBC # BLD AUTO: 6.5 10E9/L (ref 4–11)

## 2017-08-09 PROCEDURE — 71020 XR CHEST 2 VW: CPT

## 2017-08-09 PROCEDURE — 85025 COMPLETE CBC W/AUTO DIFF WBC: CPT | Performed by: DERMATOLOGY

## 2017-08-09 PROCEDURE — 84460 ALANINE AMINO (ALT) (SGPT): CPT | Performed by: DERMATOLOGY

## 2017-08-09 PROCEDURE — 36415 COLL VENOUS BLD VENIPUNCTURE: CPT | Performed by: DERMATOLOGY

## 2017-08-09 PROCEDURE — 84450 TRANSFERASE (AST) (SGOT): CPT | Performed by: DERMATOLOGY

## 2017-08-09 NOTE — PROGRESS NOTES
The area in the left upper lobe seems to be worsening.  I would like for her to follow up with the lung nodule clinic.  Indication - left upper lobe infiltrate.

## 2017-08-10 NOTE — PROGRESS NOTES
Patient had labs drawn but did not draw BP antigen lab.    Reported that CBC and ALT/AST wnl.    Called patient and apologized for the mix up.    Will draw BP labs. Pt aware and will get that done.    She has no oral lesions or skin lesions. She's been off cellcept for weeks now. She has clobetasol but she hasn't had to use it.    Austin Garsia MD, MS    Department of Dermatology  [facility]: Phone: [phone], Fax:[phone]  [facility]: Phone: [phone], Fax: [phone]

## 2017-08-14 ENCOUNTER — PRE VISIT (OUTPATIENT)
Dept: PULMONOLOGY | Facility: CLINIC | Age: 77
End: 2017-08-14

## 2017-08-14 NOTE — TELEPHONE ENCOUNTER
1.  Date/reason for appt: 17 - left upper lobe infiltrate  2.  Referring provider: Dr Ce Cano    3.  Call to patient (Yes / No - short description): No - scheduled with Tanesha from clinic  4.  Previous care at / records requested from: [facility] - records in [facility]  5.  Recent Imagin17 chest XR, 17 CT chest - in Epic

## 2017-08-17 DIAGNOSIS — L12.0 BULLOUS PEMPHIGOID (H): ICD-10-CM

## 2017-08-17 PROCEDURE — 36415 COLL VENOUS BLD VENIPUNCTURE: CPT | Performed by: DERMATOLOGY

## 2017-08-17 PROCEDURE — 99000 SPECIMEN HANDLING OFFICE-LAB: CPT | Performed by: DERMATOLOGY

## 2017-08-17 PROCEDURE — 83516 IMMUNOASSAY NONANTIBODY: CPT | Mod: 59 | Performed by: DERMATOLOGY

## 2017-08-22 ENCOUNTER — TELEPHONE (OUTPATIENT)
Dept: DERMATOLOGY | Facility: CLINIC | Age: 77
End: 2017-08-22

## 2017-08-22 DIAGNOSIS — Z53.9 ERRONEOUS ENCOUNTER--DISREGARD: Primary | ICD-10-CM

## 2017-08-22 LAB
BMZ BP230 AB SERPL-ACNC: NORMAL
EER BULLOUS PEMPHIGOID ANTIGEN: NORMAL

## 2017-08-30 ENCOUNTER — OFFICE VISIT (OUTPATIENT)
Dept: PULMONOLOGY | Facility: CLINIC | Age: 77
End: 2017-08-30
Attending: INTERNAL MEDICINE
Payer: COMMERCIAL

## 2017-08-30 VITALS
RESPIRATION RATE: 18 BRPM | HEART RATE: 77 BPM | BODY MASS INDEX: 20.68 KG/M2 | DIASTOLIC BLOOD PRESSURE: 68 MMHG | SYSTOLIC BLOOD PRESSURE: 115 MMHG | WEIGHT: 130.07 LBS | OXYGEN SATURATION: 95 % | TEMPERATURE: 97.2 F

## 2017-08-30 DIAGNOSIS — R91.8 PULMONARY NODULES: Primary | ICD-10-CM

## 2017-08-30 PROCEDURE — 99213 OFFICE O/P EST LOW 20 MIN: CPT | Mod: ZF

## 2017-08-30 ASSESSMENT — PAIN SCALES - GENERAL: PAINLEVEL: NO PAIN (0)
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
## 2017-08-30 NOTE — NURSING NOTE
"Oncology Rooming Note    August 30, 2017 3:36 PM   Ludy Mckenzie is a 76 year old female who presents for:    Chief Complaint   Patient presents with     Oncology Clinic Visit     Left Pulmonary Infilutrate, scan results      Initial Vitals: /68  Pulse 77  Temp 97.2  F (36.2  C) (Oral)  Resp 18  Wt 59 kg (130 lb 1.1 oz)  SpO2 95%  BMI 20.68 kg/m2 Estimated body mass index is 20.68 kg/(m^2) as calculated from the following:    Height as of 5/9/17: 1.689 m (5' 6.5\").    Weight as of this encounter: 59 kg (130 lb 1.1 oz). Body surface area is 1.66 meters squared.  No Pain (0) Comment: Data Unavailable   No LMP recorded. Patient is postmenopausal.  Allergies reviewed: Yes  Medications reviewed: Yes    Medications: Medication refills not needed today.  Pharmacy name entered into [email or web address]:    [facility]  WRITTEN PRESCRIPTION REQUESTED  [facility] [address] [place] [facility] [address] [place] [address].    Clinical concerns: results  Dincer was NOT notified.    8 minutes for nursing intake (face to face time)     Candice Khan MA              "

## 2017-08-30 NOTE — MR AVS SNAPSHOT
After Visit Summary   8/30/2017    Ludy Mckenzie    MRN: [id number]           Patient Information     Date Of Birth          1940        Visit Information        Provider Department      8/30/2017 3:40 PM Cecilio Alexander MD [facility]        Today's Diagnoses     Pulmonary nodules    -  1       Follow-ups after your visit        Your next 10 appointments already scheduled     Nov 30, 2017  8:50 AM CST   LAB with LAB FIRST FLOOR [facility] ([facility])    [address] [place] [id number] [phone]           Patient must bring picture ID. Patient should be prepared to give a urine specimen  Please do not eat 10-12 hours before your appointment if you are coming in fasting for labs on lipids, cholesterol, or glucose (sugar). Pregnant women should follow their Care Team instructions. Water with medications is okay. Do not drink coffee or other fluids. If you have concerns about taking  your medications, please ask at office or if scheduling via [employer], send a message by clicking on Secure Messaging, Message Your Care Team.            Nov 30, 2017  9:15 AM CST   Return Visit with Whitley Manrique MD   [facility] ([facility])    [address] [address] [place] [id number] [phone]              Who to contact     If you have questions or need follow up information about today's clinic visit or your schedule please contact [facility] directly at [phone].  Normal or non-critical lab and imaging results will be communicated to you by MyChart, letter or phone within 4 business days after the clinic has received the results. If you do not hear from us within 7 days, please contact the clinic through MyChart or phone. If you have a critical or abnormal lab result, we will notify you by phone as soon as possible.  Submit refill requests through [employer] or  call your pharmacy and they will forward the refill request to us. Please allow 3 business days for your refill to be completed.          Additional Information About Your Visit        [employer] Information     [employer] gives you secure access to your electronic health record. If you see a primary care provider, you can also send messages to your care team and make appointments. If you have questions, please call your primary care clinic.  If you do not have a primary care provider, please call [phone] and they will assist you.        Care EveryWhere ID     This is your Care EveryWhere ID. This could be used by other organizations to access your [place] medical records  [id number]        Your Vitals Were     Pulse Temperature Respirations Pulse Oximetry BMI (Body Mass Index)       77 97.2  F (36.2  C) (Oral) 18 95% 20.68 kg/m2        Blood Pressure from Last 3 Encounters:   08/30/17 115/68   07/14/17 98/50   07/10/17 120/62    Weight from Last 3 Encounters:   08/30/17 59 kg (130 lb 1.1 oz)   07/14/17 57.7 kg (127 lb 3.2 oz)   07/10/17 58.5 kg (129 lb)              Today, you had the following     No orders found for display         Today's Medication Changes          These changes are accurate as of: 8/30/17 11:59 PM.  If you have any questions, ask your nurse or doctor.               Stop taking these medicines if you haven't already. Please contact your care team if you have questions.     clobetasol 0.05 % cream   Commonly known as:  TEMOVATE   Stopped by:  Cecilio Alexander MD           doxycycline 100 MG tablet   Commonly known as:  VIBRA-TABS   Stopped by:  Cecilio Alexander MD                    Primary Care Provider Office Phone # Fax #    Ce Belinda Cano [phone] [address] [facility]  [place] [address]        Equal Access to Services     IMELDA CASTILLO AH: Sharron whitakero Soomaali, waaxda luqadaha, qaybta kaalmada gael, mateo crockett  ah. So [facility] [phone].    ATENCIÓN: Si gail conklin, tiene a aponte disposición servicios gratuitos de asistencia lingüística. Melissa godwin [phone].    We comply with applicable federal civil rights laws and [place] laws. We do not discriminate on the basis of race, color, national origin, age, disability sex, sexual orientation or gender identity.            Thank you!     Thank you for choosing [facility]  for your care. Our goal is always to provide you with excellent care. Hearing back from our patients is one way we can continue to improve our services. Please take a few minutes to complete the written survey that you may receive in the mail after your visit with us. Thank you!             Your Updated Medication List - Protect others around you: Learn how to safely use, store and throw away your medicines at www.disposemymeds.org.          This list is accurate as of: 8/30/17 11:59 PM.  Always use your most recent med list.                   Brand Name Dispense Instructions for use Diagnosis    aspirin 81 MG tablet      Take 1 tablet by mouth daily.        calcium 600 MG tablet     180 tablet    Take 1 tablet by mouth 2 times daily        cholecalciferol 1000 UNITS capsule    vitamin  -D     Take 1 capsule by mouth daily.        fluticasone-salmeterol 500-50 MCG/DOSE diskus inhaler    ADVAIR    3 Inhaler    Inhale 1 puff into the lungs 2 times daily    Chronic obstructive pulmonary disease, unspecified COPD type (H)       ipratropium - albuterol 0.5 mg/2.5 mg/3 mL 0.5-2.5 (3) MG/3ML neb solution    DUONEB    1 Box    Take 1 vial (3 mLs) by nebulization every 6 hours as needed for shortness of breath / dyspnea    Panlobular emphysema (H)       latanoprost 0.005 % ophthalmic solution    XALATAN    3 Bottle    Place 1 drop into both eyes At Bedtime    Glaucoma       losartan 25 MG tablet    COZAAR    45 tablet    TAKE 0.5 TABLETS (12.5 MG) BY MOUTH DAILY    Microalbuminuria        multivitamin, therapeutic with minerals Tabs tablet      Take 1 tablet by mouth daily        ondansetron 4 MG tablet    ZOFRAN    30 tablet    Take 1-2 tablets (4-8 mg) by mouth every 8 hours as needed for nausea    Nausea and vomiting, intractability of vomiting not specified, unspecified vomiting type       potassium chloride SA 20 MEQ CR tablet    potassium chloride    90 tablet    Take 1 tablet (20 mEq) by mouth daily    Hypopotassemia       PROAIR  (90 BASE) MCG/ACT Inhaler   Generic drug:  albuterol     8.5 Inhaler    INHALE TWO PUFFS BY MOUTH EVERY FOUR HOURS AS NEEDED SHORTNESS OF BREATH    Chronic obstructive pulmonary disease, unspecified COPD type (H)       rosuvastatin 5 MG tablet    CRESTOR    90 tablet    Take 1 tablet (5 mg) by mouth daily    Mixed hyperlipidemia       sertraline 100 MG tablet    ZOLOFT    135 tablet    Take 1.5 tablets (150 mg) by mouth daily    Major depressive disorder, recurrent episode, mild (H)       tiotropium 18 MCG capsule    SPIRIVA HANDIHALER    90 capsule    INHALE 1 CAPSULE INTO THE LUNGS DAILY.    Panlobular emphysema (H)       VITAMIN B 12 PO      Take by mouth daily        vitamin B complex with vitamin C Tabs tablet      Take 1 tablet by mouth daily
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
## 2017-08-31 ENCOUNTER — MYC MEDICAL ADVICE (OUTPATIENT)
Dept: INTERNAL MEDICINE | Facility: CLINIC | Age: 77
End: 2017-08-31

## 2017-08-31 NOTE — TELEPHONE ENCOUNTER
Per patient, when she was told to f/u with Lung Nodule Clinic/Pulmonary to address results and discuss POC, she was concerned that she had lung cancer.  But after her appointment with Dr. Alexander, she was just told to have a recheck in 6 months.  Explained to patient that a Dx of cancer was NOT made. Dr. Cano felt the area noted on her lung in the Xray got worse and just needed help from pulmonary to review the results further and recommend any f/u.  Patient verbalized understanding.  She had no further questions    Dmitriy Kaplan RN

## 2017-10-04 ENCOUNTER — ALLIED HEALTH/NURSE VISIT (OUTPATIENT)
Dept: NURSING | Facility: CLINIC | Age: 77
End: 2017-10-04
Payer: COMMERCIAL

## 2017-10-04 ENCOUNTER — RADIANT APPOINTMENT (OUTPATIENT)
Dept: MAMMOGRAPHY | Facility: CLINIC | Age: 77
End: 2017-10-04
Payer: COMMERCIAL

## 2017-10-04 DIAGNOSIS — Z12.31 VISIT FOR SCREENING MAMMOGRAM: ICD-10-CM

## 2017-10-04 DIAGNOSIS — Z23 NEED FOR PROPHYLACTIC VACCINATION AND INOCULATION AGAINST INFLUENZA: Primary | ICD-10-CM

## 2017-10-04 PROCEDURE — 90662 IIV NO PRSV INCREASED AG IM: CPT

## 2017-10-04 PROCEDURE — 99207 ZZC NO CHARGE NURSE ONLY: CPT

## 2017-10-04 PROCEDURE — G0008 ADMIN INFLUENZA VIRUS VAC: HCPCS

## 2017-10-04 PROCEDURE — G0202 SCR MAMMO BI INCL CAD: HCPCS | Mod: TC

## 2017-10-04 NOTE — NURSING NOTE
Prior to injection verified patient identity using patient's name and date of birth.  Rhoda IVEY CMA ([facility])

## 2017-10-04 NOTE — MR AVS SNAPSHOT
After Visit Summary   10/4/2017    Ludy Mckenzie    MRN: [id number]           Patient Information     Date Of Birth          1940        Visit Information        Provider Department      10/4/2017 1:45 PM [facility]        Today's Diagnoses     Need for prophylactic vaccination and inoculation against influenza    -  1       Follow-ups after your visit        Your next 10 appointments already scheduled     Nov 30, 2017  8:50 AM CST   LAB with LAB FIRST FLOOR [facility] ([facility])    [address] [place] [address] [phone]           Patient must bring picture ID. Patient should be prepared to give a urine specimen  Please do not eat 10-12 hours before your appointment if you are coming in fasting for labs on lipids, cholesterol, or glucose (sugar). Pregnant women should follow their Care Team instructions. Water with medications is okay. Do not drink coffee or other fluids. If you have concerns about taking  your medications, please ask at office or if scheduling via [employer], send a message by clicking on Secure Messaging, Message Your Care Team.            Nov 30, 2017  9:15 AM CST   Return Visit with Whitley Manrique MD   [facility])    [address] [place] [address] [phone]              Who to contact     If you have questions or need follow up information about today's clinic visit or your schedule please contact [facility] directly at [phone].  Normal or non-critical lab and imaging results will be communicated to you by MyChart, letter or phone within 4 business days after the clinic has received the results. If you do not hear from us within 7 days, please contact the clinic through MyChart or phone. If you have a critical or abnormal lab result, we will notify you by phone as soon as possible.  Submit refill  requests through [employer] or call your pharmacy and they will forward the refill request to us. Please allow 3 business days for your refill to be completed.          Additional Information About Your Visit        [employer] Information     [employer] gives you secure access to your electronic health record. If you see a primary care provider, you can also send messages to your care team and make appointments. If you have questions, please call your primary care clinic.  If you do not have a primary care provider, please call [phone] and they will assist you.        Care EveryWhere ID     This is your Care EveryWhere ID. This could be used by other organizations to access your [place] medical records  [id number]         Blood Pressure from Last 3 Encounters:   08/30/17 115/68   07/14/17 98/50   07/10/17 120/62    Weight from Last 3 Encounters:   08/30/17 130 lb 1.1 oz (59 kg)   07/14/17 127 lb 3.2 oz (57.7 kg)   07/10/17 129 lb (58.5 kg)              We Performed the Following     ADMIN INFLUENZA (For MEDICARE Patients ONLY) []     FLU VACCINE, INCREASED ANTIGEN, PRESV FREE, AGE 65+ [41125]        Primary Care Provider Office Phone # Fax #    Ce Cano [phone] [address] [facility] [address]        Equal Access to Services     [facility] Center[facility] : Hadii aad ku hadasho Soomaali, waaxda luqadaha, qaybta kaalmada adeegyada, mateo kate. So [facility] [phone].    ATENCIÓN: Si habla español, tiene a aponte disposición servicios gratuitos de asistencia lingüística. Llame al [phone].    We comply with applicable federal civil rights laws and [place] laws. We do not discriminate on the basis of race, color, national origin, age, disability, sex, sexual orientation, or gender identity.            Thank you!     Thank you for choosing [facility]  for your care. Our goal is always to provide you with excellent care. Hearing back from our  patients is one way we can continue to improve our services. Please take a few minutes to complete the written survey that you may receive in the mail after your visit with us. Thank you!             Your Updated Medication List - Protect others around you: Learn how to safely use, store and throw away your medicines at www.disposemymeds.org.          This list is accurate as of: 10/4/17  2:01 PM.  Always use your most recent med list.                   Brand Name Dispense Instructions for use Diagnosis    aspirin 81 MG tablet      Take 1 tablet by mouth daily.        calcium 600 MG tablet     180 tablet    Take 1 tablet by mouth 2 times daily        cholecalciferol 1000 UNITS capsule    vitamin  -D     Take 1 capsule by mouth daily.        fluticasone-salmeterol 500-50 MCG/DOSE diskus inhaler    ADVAIR    3 Inhaler    Inhale 1 puff into the lungs 2 times daily    Chronic obstructive pulmonary disease, unspecified COPD type (H)       ipratropium - albuterol 0.5 mg/2.5 mg/3 mL 0.5-2.5 (3) MG/3ML neb solution    DUONEB    1 Box    Take 1 vial (3 mLs) by nebulization every 6 hours as needed for shortness of breath / dyspnea    Panlobular emphysema (H)       latanoprost 0.005 % ophthalmic solution    XALATAN    3 Bottle    Place 1 drop into both eyes At Bedtime    Glaucoma       losartan 25 MG tablet    COZAAR    45 tablet    TAKE 0.5 TABLETS (12.5 MG) BY MOUTH DAILY    Microalbuminuria       multivitamin, therapeutic with minerals Tabs tablet      Take 1 tablet by mouth daily        ondansetron 4 MG tablet    ZOFRAN    30 tablet    Take 1-2 tablets (4-8 mg) by mouth every 8 hours as needed for nausea    Nausea and vomiting, intractability of vomiting not specified, unspecified vomiting type       potassium chloride SA 20 MEQ CR tablet    KLOR-CON    90 tablet    Take 1 tablet (20 mEq) by mouth daily    Hypopotassemia       PROAIR  (90 BASE) MCG/ACT Inhaler   Generic drug:  albuterol     8.5 Inhaler    INHALE TWO  PUFFS BY MOUTH EVERY FOUR HOURS AS NEEDED SHORTNESS OF BREATH    Chronic obstructive pulmonary disease, unspecified COPD type (H)       rosuvastatin 5 MG tablet    CRESTOR    90 tablet    Take 1 tablet (5 mg) by mouth daily    Mixed hyperlipidemia       sertraline 100 MG tablet    ZOLOFT    135 tablet    Take 1.5 tablets (150 mg) by mouth daily    Major depressive disorder, recurrent episode, mild (H)       tiotropium 18 MCG capsule    SPIRIVA HANDIHALER    90 capsule    INHALE 1 CAPSULE INTO THE LUNGS DAILY.    Panlobular emphysema (H)       VITAMIN B 12 PO      Take by mouth daily        vitamin B complex with vitamin C Tabs tablet      Take 1 tablet by mouth daily

## 2017-10-24 ENCOUNTER — MYC MEDICAL ADVICE (OUTPATIENT)
Dept: INTERNAL MEDICINE | Facility: CLINIC | Age: 77
End: 2017-10-24

## 2017-10-24 DIAGNOSIS — J43.1 PANLOBULAR EMPHYSEMA (H): ICD-10-CM

## 2017-10-25 NOTE — TELEPHONE ENCOUNTER
Spoke to pharmacy. They stated she picked up Rx 5 days ago and there is still one refill left. Spoke to patient and informed her refill still avaible. Due to cost, she wants to switch to [facility]. Please send new Rx to mail order.  Rhoda IVEY CMA ([facility])

## 2017-10-27 RX ORDER — TIOTROPIUM BROMIDE 18 UG/1
CAPSULE ORAL; RESPIRATORY (INHALATION)
Qty: 90 CAPSULE | Refills: 0 | Status: SHIPPED | OUTPATIENT
Start: 2017-10-27 | End: 2018-03-20

## 2017-11-08 DIAGNOSIS — H40.1190 PRIMARY OPEN ANGLE GLAUCOMA: Primary | ICD-10-CM

## 2017-11-08 RX ORDER — LATANOPROST 50 UG/ML
1 SOLUTION/ DROPS OPHTHALMIC AT BEDTIME
Qty: 3 BOTTLE | Refills: 4 | Status: SHIPPED | OUTPATIENT
Start: 2017-11-08 | End: 2018-05-02

## 2017-11-28 ENCOUNTER — DOCUMENTATION ONLY (OUTPATIENT)
Dept: LAB | Facility: CLINIC | Age: 77
End: 2017-11-28

## 2017-11-28 DIAGNOSIS — L12.0 BULLOUS PEMPHIGOID (H): Primary | ICD-10-CM

## 2017-11-30 ENCOUNTER — OFFICE VISIT (OUTPATIENT)
Dept: DERMATOLOGY | Facility: CLINIC | Age: 77
End: 2017-11-30
Payer: COMMERCIAL

## 2017-11-30 DIAGNOSIS — L12.0 BULLOUS PEMPHIGOID (H): Primary | ICD-10-CM

## 2017-11-30 DIAGNOSIS — L72.9 CYST OF SKIN: ICD-10-CM

## 2017-11-30 DIAGNOSIS — L12.0 BULLOUS PEMPHIGOID (H): ICD-10-CM

## 2017-11-30 LAB
ALT SERPL W P-5'-P-CCNC: 26 U/L (ref 0–50)
AST SERPL W P-5'-P-CCNC: 36 U/L (ref 0–45)
BASOPHILS # BLD AUTO: 0.1 10E9/L (ref 0–0.2)
BASOPHILS NFR BLD AUTO: 0.9 %
DIFFERENTIAL METHOD BLD: NORMAL
EOSINOPHIL # BLD AUTO: 0.2 10E9/L (ref 0–0.7)
EOSINOPHIL NFR BLD AUTO: 3.8 %
ERYTHROCYTE [DISTWIDTH] IN BLOOD BY AUTOMATED COUNT: 13.2 % (ref 10–15)
HCT VFR BLD AUTO: 39.5 % (ref 35–47)
HGB BLD-MCNC: 12.9 G/DL (ref 11.7–15.7)
IMM GRANULOCYTES # BLD: 0 10E9/L (ref 0–0.4)
IMM GRANULOCYTES NFR BLD: 0.2 %
LYMPHOCYTES # BLD AUTO: 2.4 10E9/L (ref 0.8–5.3)
LYMPHOCYTES NFR BLD AUTO: 41.5 %
MCH RBC QN AUTO: 28.9 PG (ref 26.5–33)
MCHC RBC AUTO-ENTMCNC: 32.7 G/DL (ref 31.5–36.5)
MCV RBC AUTO: 89 FL (ref 78–100)
MONOCYTES # BLD AUTO: 0.5 10E9/L (ref 0–1.3)
MONOCYTES NFR BLD AUTO: 8.8 %
NEUTROPHILS # BLD AUTO: 2.6 10E9/L (ref 1.6–8.3)
NEUTROPHILS NFR BLD AUTO: 44.8 %
PLATELET # BLD AUTO: 229 10E9/L (ref 150–450)
RBC # BLD AUTO: 4.46 10E12/L (ref 3.8–5.2)
WBC # BLD AUTO: 5.8 10E9/L (ref 4–11)

## 2017-11-30 PROCEDURE — 99213 OFFICE O/P EST LOW 20 MIN: CPT | Performed by: DERMATOLOGY

## 2017-11-30 PROCEDURE — 85025 COMPLETE CBC W/AUTO DIFF WBC: CPT | Performed by: DERMATOLOGY

## 2017-11-30 PROCEDURE — 36415 COLL VENOUS BLD VENIPUNCTURE: CPT | Performed by: DERMATOLOGY

## 2017-11-30 PROCEDURE — 84460 ALANINE AMINO (ALT) (SGPT): CPT | Performed by: DERMATOLOGY

## 2017-11-30 PROCEDURE — 84450 TRANSFERASE (AST) (SGOT): CPT | Performed by: DERMATOLOGY

## 2017-11-30 NOTE — PROGRESS NOTES
[facility] Dermatology Note      Dermatology Problem List:  1. Bullous pemphigoid, began 2013 initially followed by Dr. Roberts.   -Bullous Pemphigoid AB negative in test 8/17/17.   -Previous Tx: Cellcept (initiated 10/23/2013) prednisone initiated 9/25/2013, dapsone initiated 10/2013 with improvement prednisolone initiated 10/16/2013, re-initiated 2/10/2014,  clobetasol cream and gel for mouth11/6/2013, mycophenolate 1500 BID 9/2014  -pemphigoid panel with negative BP antibodies 11/2015 initially positive 6/2015  -Negative DIF 9/2013  -s/p biopsy 9/2013 with negative DIF  -s/p biopsy 6/2013 with spongiotic dermatitis with EOS on right upper thigh and right mid thigh  2. Oral candida  -Previous Tx: nystatin, fluconazole    Encounter Date: Nov 30, 2017    CC:  Chief Complaint   Patient presents with     RECHECK     bullous pemphigoid follow up - patient hasnt taken her cellcept since July and had only had one small breakout       History of Present Illness:  Ms. Ludy Mckenzie is a 77 year old female who presents as a follow-up for bullous pemphigoid. She was last seen 02/17/2017 when she was continued on Cellcept 1g in the morning and 1g at night. She has had her flu vaccine this year. She has not been ill since her last visit with Dermatology. No oral sores. No difficulty eating.     Past Medical History:   Patient Active Problem List   Diagnosis     Colon polyp     Alcohol abuse, in remission     Hyperlipidemia LDL goal <100     Cerebral infarction (H)     Thyroid nodule     Seasonal allergic rhinitis     Major depressive disorder, recurrent episode, mild (H)     Health Care Home     Bullous pemphigoid     Hypopotassemia     Type 2 diabetes mellitus with other diabetic neurological complication     Glaucoma     Chronic obstructive pulmonary disease, unspecified COPD type (H)     Chronic kidney disease (CKD) stage G1/A2, glomerular filtration rate (GFR) equal to or greater than 90 mL/min/1.73  square meter and albuminuria creatinine ratio between  mg/g     Loss of balance     Diabetic neuropathy with neurologic complication (H)     Pseudophakia of both eyes     Urge incontinence of urine     Immunosuppression (H)     Cramp of limb     Right sided sciatica     Pulmonary emphysema, unspecified emphysema type (H)     Age-related osteoporosis without current pathological fracture     Pulmonary nodules     Past Medical History:   Diagnosis Date     Acute ischemic left TREMAYNE stroke (H) 2009     Astigmatism, unspecified      Bullous pemphigoid      COPD (chronic obstructive pulmonary disease) (H)      CVA (cerebral infarction) 8/09     Depressive disorder      Diabetes (H)      Family history of diabetes mellitus      Goiter 3/12/2007     HTN (hypertension) 8/25/2009     Hypocalcemia      Hypokalemia      Hyponatremia      Mixed hyperlipidemia      Personal history of other diseases of circulatory system      Pneumonia 11/16/2010     Prim open angle glaucoma      Thyroid nodule 3/23/2012     Unspecified cerebral artery occlusion with cerebral infarction      Unspecified cerebral artery occlusion with cerebral infarction 1998     Past Surgical History:   Procedure Laterality Date     BIOPSY       BUNIONECTOMY  1960    JACQUELINE     CATARACT IOL, RT/LT  2010    right     COLONOSCOPY  2/22/2013    Procedure: COLONOSCOPY;  Colonoscopy ;  Surgeon: Hugo Minor MD;  Location:  GI       Social History:  The patient lives in [facility]    Family History:  Grandma had unknown skin cancer.    Medications:  Current Outpatient Prescriptions   Medication Sig Dispense Refill     latanoprost (XALATAN) 0.005 % ophthalmic solution Place 1 drop into both eyes At Bedtime 3 Bottle 4     tiotropium (SPIRIVA HANDIHALER) 18 MCG capsule INHALE 1 CAPSULE INTO THE LUNGS DAILY. 90 capsule 0     ondansetron (ZOFRAN) 4 MG tablet Take 1-2 tablets (4-8 mg) by mouth every 8 hours as needed for nausea 30 tablet 1      fluticasone-salmeterol (ADVAIR) 500-50 MCG/DOSE diskus inhaler Inhale 1 puff into the lungs 2 times daily 3 Inhaler 2     rosuvastatin (CRESTOR) 5 MG tablet Take 1 tablet (5 mg) by mouth daily 90 tablet 1     potassium chloride SA (POTASSIUM CHLORIDE) 20 MEQ CR tablet Take 1 tablet (20 mEq) by mouth daily 90 tablet 4     sertraline (ZOLOFT) 100 MG tablet Take 1.5 tablets (150 mg) by mouth daily 135 tablet 3     Cyanocobalamin (VITAMIN B 12 PO) Take by mouth daily       ALBUTEROL 108 (90 BASE) MCG/ACT inhaler INHALE TWO PUFFS BY MOUTH EVERY FOUR HOURS AS NEEDED SHORTNESS OF BREATH 8.5 Inhaler 1     losartan (COZAAR) 25 MG tablet TAKE 0.5 TABLETS (12.5 MG) BY MOUTH DAILY 45 tablet 1     ipratropium - albuterol 0.5 mg/2.5 mg/3 mL (DUONEB) 0.5-2.5 (3) MG/3ML nebulization Take 1 vial (3 mLs) by nebulization every 6 hours as needed for shortness of breath / dyspnea 1 Box 3     calcium 600 MG tablet Take 1 tablet by mouth 2 times daily 180 tablet 3     multivitamin, therapeutic with minerals (THERA-VIT-M) TABS Take 1 tablet by mouth daily       vitamin  B complex with vitamin C (VITAMIN  B COMPLEX) TABS Take 1 tablet by mouth daily       aspirin 81 MG tablet Take 1 tablet by mouth daily.       cholecalciferol (VITAMIN D3) 1000 UNIT capsule Take 1 capsule by mouth daily.         Allergies   Allergen Reactions     Amoxicillin GI Disturbance     Augmentin Nausea and Vomiting     Iodine Itching and Swelling     Pt is ok with ct contrast dye (isovue 370)     Mercury Unknown     Penicillins Unknown       Review of Systems:  -Const: The patient is generally feeling well today. She has not been ill or had recent hospitalization  -Skin: As above in HPI. No additional skin concerns.      Physical exam:  Vitals: There were no vitals taken for this visit.  GEN: This is a well developed, well-nourished female in no acute distress, in a pleasant mood.    SKIN: Focused examination of the face, back, abdomen was performed.  -Hypopigmented  scars on the back   -Oral exam is normal  -Exam of the abdomen is normal   - There is a raised dome shaped nodule with a central punctum located on post auricular region.   -No other lesions of concern on areas examined.     Component      Latest Ref Rng & Units 11/30/2017   WBC      4.0 - 11.0 10e9/L 5.8   RBC Count      3.8 - 5.2 10e12/L 4.46   Hemoglobin      11.7 - 15.7 g/dL 12.9   Hematocrit      35.0 - 47.0 % 39.5   MCV      78 - 100 fl 89   MCH      26.5 - 33.0 pg 28.9   MCHC      31.5 - 36.5 g/dL 32.7   RDW      10.0 - 15.0 % 13.2   Platelet Count      150 - 450 10e9/L 229   Diff Method       Automated Method   % Neutrophils      % 44.8   % Lymphocytes      % 41.5   % Monocytes      % 8.8   % Eosinophils      % 3.8   % Basophils      % 0.9   % Immature Granulocytes      % 0.2   Absolute Neutrophil      1.6 - 8.3 10e9/L 2.6   Absolute Lymphocytes      0.8 - 5.3 10e9/L 2.4   Absolute Monocytes      0.0 - 1.3 10e9/L 0.5   Absolute Eosinophils      0.0 - 0.7 10e9/L 0.2   Absolute Basophils      0.0 - 0.2 10e9/L 0.1   Abs Immature Granulocytes      0 - 0.4 10e9/L 0.0   ALT      0 - 50 U/L 26   AST      0 - 45 U/L 36       Impression/Plan:  1. Bullous pemphigoid, began 2013 initially followed by Dr. Roberts. Stable with hypopigmented scarring. Bullous Pemphigoid Antigen IgG negative in test 8/17/17   Hold Cellcept to 1000 mg in the morning and 1000 mg at night.  Declines total skin exam   Draw for CBC with diff and AST/ALT in 11/28/17    2. Cyst, left postauricular, will monitor    Follow-up in 1 year, earlier for new or changing lesions.     Staff Involved:  Scribe/Staff    Scribe Disclosure:   Susu SMITH, am serving as a scribe to document services personally performed by Dr. Whitley Manrique, based on data collection and the provider's statements to me.     Provider Disclosure:   The documentation recorded by the scribe accurately reflects the services I personally performed and the decisions made by  me.    Whitley Manrique MD    Department of Dermatology  [facility]: Phone: [phone], Fax:[phone]  [facility]: Phone: [phone], Fax: [phone]

## 2017-11-30 NOTE — LETTER
11/30/2017       RE: Ludy Mckenzie  [address]  [place] [address]     Dear Colleague,    Thank you for referring your patient, Ludy Mckenzie, to the [facility] at [facility]. Please see a copy of my visit note below.    [facility] Dermatology Note      Dermatology Problem List:  1. Bullous pemphigoid, began 2013 initially followed by Dr. Roberts.   -Bullous Pemphigoid AB negative in test 8/17/17.   -Previous Tx: Cellcept (initiated 10/23/2013) prednisone initiated 9/25/2013, dapsone initiated 10/2013 with improvement prednisolone initiated 10/16/2013, re-initiated 2/10/2014,  clobetasol cream and gel for mouth11/6/2013, mycophenolate 1500 BID 9/2014  -pemphigoid panel with negative BP antibodies 11/2015 initially positive 6/2015  -Negative DIF 9/2013  -s/p biopsy 9/2013 with negative DIF  -s/p biopsy 6/2013 with spongiotic dermatitis with EOS on right upper thigh and right mid thigh  2. Oral candida  -Previous Tx: nystatin, fluconazole    Encounter Date: Nov 30, 2017    CC:  Chief Complaint   Patient presents with     RECHECK     bullous pemphigoid follow up - patient hasnt taken her cellcept since July and had only had one small breakout       History of Present Illness:  Ms. Ludy Mckenzie is a 77 year old female who presents as a follow-up for bullous pemphigoid. She was last seen 02/17/2017 when she was continued on Cellcept 1g in the morning and 1g at night. She has had her flu vaccine this year. She has not been ill since her last visit with Dermatology. No oral sores. No difficulty eating.     Past Medical History:   Patient Active Problem List   Diagnosis     Colon polyp     Alcohol abuse, in remission     Hyperlipidemia LDL goal <100     Cerebral infarction (H)     Thyroid nodule     Seasonal allergic rhinitis     Major depressive disorder, recurrent episode, mild (H)     Health Care Home     Bullous pemphigoid      Hypopotassemia     Type 2 diabetes mellitus with other diabetic neurological complication     Glaucoma     Chronic obstructive pulmonary disease, unspecified COPD type (H)     Chronic kidney disease (CKD) stage G1/A2, glomerular filtration rate (GFR) equal to or greater than 90 mL/min/1.73 square meter and albuminuria creatinine ratio between  mg/g     Loss of balance     Diabetic neuropathy with neurologic complication (H)     Pseudophakia of both eyes     Urge incontinence of urine     Immunosuppression (H)     Cramp of limb     Right sided sciatica     Pulmonary emphysema, unspecified emphysema type (H)     Age-related osteoporosis without current pathological fracture     Pulmonary nodules     Past Medical History:   Diagnosis Date     Acute ischemic left TREMAYNE stroke (H) 2009     Astigmatism, unspecified      Bullous pemphigoid      COPD (chronic obstructive pulmonary disease) (H)      CVA (cerebral infarction) 8/09     Depressive disorder      Diabetes (H)      Family history of diabetes mellitus      Goiter 3/12/2007     HTN (hypertension) 8/25/2009     Hypocalcemia      Hypokalemia      Hyponatremia      Mixed hyperlipidemia      Personal history of other diseases of circulatory system      Pneumonia 11/16/2010     Prim open angle glaucoma      Thyroid nodule 3/23/2012     Unspecified cerebral artery occlusion with cerebral infarction      Unspecified cerebral artery occlusion with cerebral infarction 1998     Past Surgical History:   Procedure Laterality Date     BIOPSY       BUNIONECTOMY  1960    JACQUELINE     CATARACT IOL, RT/LT  2010    right     COLONOSCOPY  2/22/2013    Procedure: COLONOSCOPY;  Colonoscopy ;  Surgeon: Hugo Minor MD;  Location:  GI       Social History:  The patient lives in [facility]    Family History:  Grandma had unknown skin cancer.    Medications:  Current Outpatient Prescriptions   Medication Sig Dispense Refill     latanoprost (XALATAN) 0.005 % ophthalmic  solution Place 1 drop into both eyes At Bedtime 3 Bottle 4     tiotropium (SPIRIVA HANDIHALER) 18 MCG capsule INHALE 1 CAPSULE INTO THE LUNGS DAILY. 90 capsule 0     ondansetron (ZOFRAN) 4 MG tablet Take 1-2 tablets (4-8 mg) by mouth every 8 hours as needed for nausea 30 tablet 1     fluticasone-salmeterol (ADVAIR) 500-50 MCG/DOSE diskus inhaler Inhale 1 puff into the lungs 2 times daily 3 Inhaler 2     rosuvastatin (CRESTOR) 5 MG tablet Take 1 tablet (5 mg) by mouth daily 90 tablet 1     potassium chloride SA (POTASSIUM CHLORIDE) 20 MEQ CR tablet Take 1 tablet (20 mEq) by mouth daily 90 tablet 4     sertraline (ZOLOFT) 100 MG tablet Take 1.5 tablets (150 mg) by mouth daily 135 tablet 3     Cyanocobalamin (VITAMIN B 12 PO) Take by mouth daily       ALBUTEROL 108 (90 BASE) MCG/ACT inhaler INHALE TWO PUFFS BY MOUTH EVERY FOUR HOURS AS NEEDED SHORTNESS OF BREATH 8.5 Inhaler 1     losartan (COZAAR) 25 MG tablet TAKE 0.5 TABLETS (12.5 MG) BY MOUTH DAILY 45 tablet 1     ipratropium - albuterol 0.5 mg/2.5 mg/3 mL (DUONEB) 0.5-2.5 (3) MG/3ML nebulization Take 1 vial (3 mLs) by nebulization every 6 hours as needed for shortness of breath / dyspnea 1 Box 3     calcium 600 MG tablet Take 1 tablet by mouth 2 times daily 180 tablet 3     multivitamin, therapeutic with minerals (THERA-VIT-M) TABS Take 1 tablet by mouth daily       vitamin  B complex with vitamin C (VITAMIN  B COMPLEX) TABS Take 1 tablet by mouth daily       aspirin 81 MG tablet Take 1 tablet by mouth daily.       cholecalciferol (VITAMIN D3) 1000 UNIT capsule Take 1 capsule by mouth daily.         Allergies   Allergen Reactions     Amoxicillin GI Disturbance     Augmentin Nausea and Vomiting     Iodine Itching and Swelling     Pt is ok with ct contrast dye (isovue 370)     Mercury Unknown     Penicillins Unknown       Review of Systems:  -Const: The patient is generally feeling well today. She has not been ill or had recent hospitalization  -Skin: As above in  HPI. No additional skin concerns.      Physical exam:  Vitals: There were no vitals taken for this visit.  GEN: This is a well developed, well-nourished female in no acute distress, in a pleasant mood.    SKIN: Focused examination of the face, back, abdomen was performed.  -Hypopigmented scars on the back   -Oral exam is normal  -Exam of the abdomen is normal   - There is a raised dome shaped nodule with a central punctum located on post auricular region.   -No other lesions of concern on areas examined.     Component      Latest Ref Rng & Units 11/30/2017   WBC      4.0 - 11.0 10e9/L 5.8   RBC Count      3.8 - 5.2 10e12/L 4.46   Hemoglobin      11.7 - 15.7 g/dL 12.9   Hematocrit      35.0 - 47.0 % 39.5   MCV      78 - 100 fl 89   MCH      26.5 - 33.0 pg 28.9   MCHC      31.5 - 36.5 g/dL 32.7   RDW      10.0 - 15.0 % 13.2   Platelet Count      150 - 450 10e9/L 229   Diff Method       Automated Method   % Neutrophils      % 44.8   % Lymphocytes      % 41.5   % Monocytes      % 8.8   % Eosinophils      % 3.8   % Basophils      % 0.9   % Immature Granulocytes      % 0.2   Absolute Neutrophil      1.6 - 8.3 10e9/L 2.6   Absolute Lymphocytes      0.8 - 5.3 10e9/L 2.4   Absolute Monocytes      0.0 - 1.3 10e9/L 0.5   Absolute Eosinophils      0.0 - 0.7 10e9/L 0.2   Absolute Basophils      0.0 - 0.2 10e9/L 0.1   Abs Immature Granulocytes      0 - 0.4 10e9/L 0.0   ALT      0 - 50 U/L 26   AST      0 - 45 U/L 36       Impression/Plan:  1. Bullous pemphigoid, began 2013 initially followed by Dr. Roberts. Stable with hypopigmented scarring. Bullous Pemphigoid Antigen IgG negative in test 8/17/17   Hold Cellcept to 1000 mg in the morning and 1000 mg at night.  Declines total skin exam   Draw for CBC with diff and AST/ALT in 11/28/17    2. Cyst, left postauricular, will monitor    Follow-up in 1 year, earlier for new or changing lesions.     Staff Involved:  Scribe/Staff    Scribe Disclosure:   I, Susu Vela, am serving as  a scribe to document services personally performed by Dr. Whitley Manrique, based on data collection and the provider's statements to me.     Provider Disclosure:   The documentation recorded by the scribe accurately reflects the services I personally performed and the decisions made by me.    Whitley Manrique MD    Department of Dermatology  [facility]: Phone: [phone], Fax:[phone]  [facility]: Phone: [phone], Fax: [phone]

## 2017-11-30 NOTE — NURSING NOTE
Dermatology Rooming Note    Ludy Mckenzie's goals for this visit include:   Chief Complaint   Patient presents with     RECHECK     bullous pemphigoid follow up - patient hasnt taken her cellcept since July and had only had one small breakout       Is a scribe okay for this visit: YES    Are records needed for this visit(If yes, obtain release of information): NO     Vitals: There were no vitals taken for this visit.    Referring Provider:  No referring provider defined for this encounter.    Sarahi Ames, CMA

## 2017-11-30 NOTE — MR AVS SNAPSHOT
After Visit Summary   11/30/2017    Ludy Mckenzie    MRN: [id number]           Patient Information     Date Of Birth          1940        Visit Information        Provider Department      11/30/2017 9:15 AM Whitley Manrique MD [facility]        Today's Diagnoses     Bullous pemphigoid    -  1    Cyst of skin           Follow-ups after your visit        Follow-up notes from your care team     Return in about 1 year (around 11/30/2018) for bullous pemphigoid follow up.      Who to contact     If you have questions or need follow up information about today's clinic visit or your schedule please contact [facility] directly at [phone].  Normal or non-critical lab and imaging results will be communicated to you by [employer], letter or phone within 4 business days after the clinic has received the results. If you do not hear from us within 7 days, please contact the clinic through [employer] or phone. If you have a critical or abnormal lab result, we will notify you by phone as soon as possible.  Submit refill requests through [employer] or call your pharmacy and they will forward the refill request to us. Please allow 3 business days for your refill to be completed.          Additional Information About Your Visit        MyChart Information     [employer] gives you secure access to your electronic health record. If you see a primary care provider, you can also send messages to your care team and make appointments. If you have questions, please call your primary care clinic.  If you do not have a primary care provider, please call [phone] and they will assist you.      [employer] is an electronic gateway that provides easy, online access to your medical records. With [employer], you can request a clinic appointment, read your test results, renew a prescription or communicate with your care team.     To access your existing account, please contact your [facility] or call [phone] for assistance.        Care EveryWhere ID     This is your Care EveryWhere ID. This could be used by other organizations to access your [place] medical records  [id number]         Blood Pressure from Last 3 Encounters:   08/30/17 115/68   07/14/17 98/50   07/10/17 120/62    Weight from Last 3 Encounters:   08/30/17 59 kg (130 lb 1.1 oz)   07/14/17 57.7 kg (127 lb 3.2 oz)   07/10/17 58.5 kg (129 lb)              Today, you had the following     No orders found for display       Primary Care Provider Office Phone # Fax #    Ce Cano [phone] [address] [facility]  [place] [address]        Equal Access to Services     [facility]: Hadii ebonie espinoza hadasho Soomaali, waaxda luqadaha, qaybta kaalmada adeegyada, mateo crockett . So [facility] [phone].    ATENCIÓN: Si habla español, tiene a aponte disposición servicios gratuitos de asistencia lingüística. [facility] [phone].    We comply with applicable federal civil rights laws and [place] laws. We do not discriminate on the basis of race, color, national origin, age, disability, sex, sexual orientation, or gender identity.            Thank you!     Thank you for choosing [facility]  for your care. Our goal is always to provide you with excellent care. Hearing back from our patients is one way we can continue to improve our services. Please take a few minutes to complete the written survey that you may receive in the mail after your visit with us. Thank you!             Your Updated Medication List - Protect others around you: Learn how to safely use, store and throw away your medicines at www.disposemymeds.org.          This list is accurate as of: 11/30/17 10:26 AM.  Always use your most recent med list.                   Brand Name Dispense Instructions for use Diagnosis    aspirin 81 MG tablet      Take 1 tablet by mouth daily.        calcium 600 MG  tablet     180 tablet    Take 1 tablet by mouth 2 times daily        cholecalciferol 1000 UNITS capsule    vitamin  -D     Take 1 capsule by mouth daily.        fluticasone-salmeterol 500-50 MCG/DOSE diskus inhaler    ADVAIR    3 Inhaler    Inhale 1 puff into the lungs 2 times daily    Chronic obstructive pulmonary disease, unspecified COPD type (H)       ipratropium - albuterol 0.5 mg/2.5 mg/3 mL 0.5-2.5 (3) MG/3ML neb solution    DUONEB    1 Box    Take 1 vial (3 mLs) by nebulization every 6 hours as needed for shortness of breath / dyspnea    Panlobular emphysema (H)       latanoprost 0.005 % ophthalmic solution    XALATAN    3 Bottle    Place 1 drop into both eyes At Bedtime    Primary open angle glaucoma       losartan 25 MG tablet    COZAAR    45 tablet    TAKE 0.5 TABLETS (12.5 MG) BY MOUTH DAILY    Microalbuminuria       multivitamin, therapeutic with minerals Tabs tablet      Take 1 tablet by mouth daily        ondansetron 4 MG tablet    ZOFRAN    30 tablet    Take 1-2 tablets (4-8 mg) by mouth every 8 hours as needed for nausea    Nausea and vomiting, intractability of vomiting not specified, unspecified vomiting type       potassium chloride SA 20 MEQ CR tablet    KLOR-CON    90 tablet    Take 1 tablet (20 mEq) by mouth daily    Hypopotassemia       PROAIR  (90 BASE) MCG/ACT Inhaler   Generic drug:  albuterol     8.5 Inhaler    INHALE TWO PUFFS BY MOUTH EVERY FOUR HOURS AS NEEDED SHORTNESS OF BREATH    Chronic obstructive pulmonary disease, unspecified COPD type (H)       rosuvastatin 5 MG tablet    CRESTOR    90 tablet    Take 1 tablet (5 mg) by mouth daily    Mixed hyperlipidemia       sertraline 100 MG tablet    ZOLOFT    135 tablet    Take 1.5 tablets (150 mg) by mouth daily    Major depressive disorder, recurrent episode, mild (H)       tiotropium 18 MCG capsule    SPIRIVA HANDIHALER    90 capsule    INHALE 1 CAPSULE INTO THE LUNGS DAILY.    Panlobular emphysema (H)       VITAMIN B 12 PO       Take by mouth daily        vitamin B complex with vitamin C Tabs tablet      Take 1 tablet by mouth daily

## 2018-02-14 ENCOUNTER — TELEPHONE (OUTPATIENT)
Dept: INTERNAL MEDICINE | Facility: CLINIC | Age: 78
End: 2018-02-14

## 2018-02-14 NOTE — TELEPHONE ENCOUNTER
Per Dr. Cano: needs to stay on it.    Patient updated.  Advised her to contact the pharmacy and let them know that she accidentally threw away her pills and need an early refill.  Pharmacy may need to contact insurance to request early refill.    Patient verbalized understanding.  Dmitriy Kaplan RN

## 2018-02-14 NOTE — TELEPHONE ENCOUNTER
Reason for Call:  Other call back    Detailed comments: Patient lost her losartan (COZAAR) 25 MG tablet and is wondering if she Is ok to go without it or if a prescription could be called in. Please advise    Phone Number Patient can be reached at: Home number on file [phone] (home)    Best Time: Any    Can we leave a detailed message on this number? YES    Call taken on 2/14/2018 at 11:52 AM by Nicole Chavez

## 2018-03-20 ENCOUNTER — OFFICE VISIT (OUTPATIENT)
Dept: INTERNAL MEDICINE | Facility: CLINIC | Age: 78
End: 2018-03-20
Payer: COMMERCIAL

## 2018-03-20 ENCOUNTER — RADIANT APPOINTMENT (OUTPATIENT)
Dept: GENERAL RADIOLOGY | Facility: CLINIC | Age: 78
End: 2018-03-20
Attending: INTERNAL MEDICINE
Payer: COMMERCIAL

## 2018-03-20 VITALS
SYSTOLIC BLOOD PRESSURE: 130 MMHG | DIASTOLIC BLOOD PRESSURE: 58 MMHG | BODY MASS INDEX: 22.58 KG/M2 | OXYGEN SATURATION: 95 % | RESPIRATION RATE: 16 BRPM | WEIGHT: 142 LBS | HEART RATE: 89 BPM | TEMPERATURE: 97 F

## 2018-03-20 DIAGNOSIS — F10.11 ALCOHOL ABUSE, IN REMISSION: ICD-10-CM

## 2018-03-20 DIAGNOSIS — R80.9 TYPE 2 DIABETES MELLITUS WITH MICROALBUMINURIA, WITHOUT LONG-TERM CURRENT USE OF INSULIN (H): Primary | ICD-10-CM

## 2018-03-20 DIAGNOSIS — R91.8 PULMONARY NODULES: ICD-10-CM

## 2018-03-20 DIAGNOSIS — L12.0 BULLOUS PEMPHIGOID (H): ICD-10-CM

## 2018-03-20 DIAGNOSIS — R06.02 SOB (SHORTNESS OF BREATH): ICD-10-CM

## 2018-03-20 DIAGNOSIS — R07.81 RIB PAIN ON RIGHT SIDE: ICD-10-CM

## 2018-03-20 DIAGNOSIS — J43.1 PANLOBULAR EMPHYSEMA (H): ICD-10-CM

## 2018-03-20 DIAGNOSIS — Z12.11 SCREEN FOR COLON CANCER: ICD-10-CM

## 2018-03-20 DIAGNOSIS — H91.90 HEARING LOSS, UNSPECIFIED HEARING LOSS TYPE, UNSPECIFIED LATERALITY: ICD-10-CM

## 2018-03-20 DIAGNOSIS — E11.29 TYPE 2 DIABETES MELLITUS WITH MICROALBUMINURIA, WITHOUT LONG-TERM CURRENT USE OF INSULIN (H): Primary | ICD-10-CM

## 2018-03-20 DIAGNOSIS — N18.1 CHRONIC KIDNEY DISEASE (CKD) STAGE G1/A2, GLOMERULAR FILTRATION RATE (GFR) EQUAL TO OR GREATER THAN 90 ML/MIN/1.73 SQUARE METER AND ALBUMINURIA CREATININE RATIO BETWEEN 30-299 MG/G: ICD-10-CM

## 2018-03-20 DIAGNOSIS — Z91.81 AT RISK FOR FALLING: ICD-10-CM

## 2018-03-20 DIAGNOSIS — F33.0 MAJOR DEPRESSIVE DISORDER, RECURRENT EPISODE, MILD (H): ICD-10-CM

## 2018-03-20 PROBLEM — D84.9 IMMUNOSUPPRESSION (H): Status: RESOLVED | Noted: 2017-06-02 | Resolved: 2018-03-20

## 2018-03-20 PROCEDURE — 71101 X-RAY EXAM UNILAT RIBS/CHEST: CPT | Mod: RT

## 2018-03-20 PROCEDURE — 93000 ELECTROCARDIOGRAM COMPLETE: CPT | Performed by: INTERNAL MEDICINE

## 2018-03-20 PROCEDURE — 99214 OFFICE O/P EST MOD 30 MIN: CPT | Performed by: INTERNAL MEDICINE

## 2018-03-20 RX ORDER — TIOTROPIUM BROMIDE 18 UG/1
CAPSULE ORAL; RESPIRATORY (INHALATION)
Qty: 90 CAPSULE | Refills: 3 | Status: SHIPPED | OUTPATIENT
Start: 2018-03-20 | End: 2018-03-28

## 2018-03-20 NOTE — PATIENT INSTRUCTIONS
Put 4-5 drops of mineral oil or wax softener in your ear.    Schedule a hearing test.    Schedule a lung CT and fasting lab draw.        [facility]    If you have any questions regarding to your visit please contact your care team:     Team Pink:   Clinic Hours Telephone Number   Internal Medicine:  Dr. Ce Stafford, NP       7am-7pm  Monday - Thursday   7am-5pm  Fridays  [phone]  (Appointment scheduling available 24/7)    Questions about your visit?  Team Line  [phone]   Urgent Care - [place] - 11am-9pm Monday-Friday Saturday-Sunday- 9am-5pm   [place] - 5pm-9pm Monday-Friday Saturday-Sunday- 9am-5pm  [phone] - [facility]  [phone] - [place]       What options do I have for visits at the clinic other than the traditional office visit?  To expand how we care for you, many of our providers are utilizing electronic visits (e-visits) and telephone visits, when medically appropriate, for interactions with their patients rather than a visit in the clinic.   We also offer nurse visits for many medical concerns. Just like any other service, we will bill your insurance company for this type of visit based on time spent on the phone with your provider. Not all insurance companies cover these visits. Please check with your medical insurance if this type of visit is covered. You will be responsible for any charges that are not paid by your insurance.      E-visits via [employer] [employer]:  generally incur a $35.00 fee.  Telephone visits:  Time spent on the phone: *charged based on time that is spent on the phone in increments of 10 minutes. Estimated cost:   5-10 mins $30.00   11-20 mins. $59.00   21-30 mins. $85.00   Use [employer] (secure email communication and access to your chart) to send your primary care provider a message or make an appointment. Ask someone on your Team how to sign up for [facility].    For a Price Quote for your  services, please call our Consumer Price Line at [phone].    As always, Thank you for trusting us with your health care needs!    Virginia MARIE CMA ([facility])

## 2018-03-20 NOTE — PROGRESS NOTES
INTERNAL MEDICINE  SUBJECTIVE:   Ludy Mckenzie is a 77 year old female who presents to clinic today for the following health issues:    Patient presents to clinic today for rib pain and lung nodules.    Rib pain - One week ago, she started experiencing right rib pain. The pain comes and goes but is worse at night. Wonders if it might be due to gas because she is belching more. Her bowel movements are normal. Denies dysuria and hematuria.     Lungs - Wants to know if her lung is still collapsed. Dr. Alexander recommended getting another lung CT. In the last month, she is getting more shortness of breath when she walks or moves around. Does not get shortness of breath laying down at night. She thinks her emphysema has been relatively good. Takes albuterol as needed but not often. Denies cough, fevers, or chills.     Hearing - Feels like she is losing her hearing and wants to see if she has wax in her ears.     Additional notes:  Denies alcohol use and cravings.   Discontinued CellCept  Patient plans to donate body to [facility].  Wants to stay on current dose of Zoloft.       Problem list and histories reviewed & adjusted, as indicated.  Additional history: as documented    Labs reviewed in EPIC    Reviewed and updated as needed this visit by clinical staff  Tobacco  Allergies  Meds  Med Hx  Surg Hx  Fam Hx  Soc Hx      Reviewed and updated as needed this visit by Provider         ROS:  CONSTITUTIONAL: NEGATIVE for fever, chills, change in weight  ENT/MOUTH: NEGATIVE for mouth and throat problems, POSITIVE for hearing loss  RESP: NEGATIVE for significant cough, POSITIVE for SOB  GI: NEGATIVE for nausea, heartburn, or change in bowel habits, POSITIVE for abdominal pain  : NEGATIVE for frequency, dysuria, or hematuria  NEURO: NEGATIVE for weakness, dizziness or paresthesias  PSYCHIATRIC: NEGATIVE for changes in mood or affect    This document serves as a record of the services and decisions personally performed and  made by Ce Cano MD. It was created on his/her behalf by Elsie Chowdary, trained medical scribe. The creation of this document is based the provider's statements to the medical scribes.    Kasey Chowdary 11:29 AM, March 20, 2018    OBJECTIVE:     /58  Pulse 89  Temp 97  F (36.1  C) (Oral)  Resp 16  Wt 64.4 kg (142 lb)  SpO2 95%  BMI 22.58 kg/m2  Body mass index is 22.58 kg/(m^2).  GENERAL: healthy, alert and no distress  HENT: ear canals and TM's normal  NECK: no adenopathy, no asymmetry, masses, or scars and thyroid normal to palpation  RESP: lungs clear to auscultation - no rales, rhonchi or wheezes  CV: regular rate and rhythm, normal S1 S2, no S3 or S4, no murmur, click or rub, no peripheral edema and peripheral pulses strong  ABDOMEN: pain to palpation over the right lower rib cage, soft, no hepatosplenomegaly, no masses and bowel sounds normal  NEURO: Normal strength and tone, mentation intact and speech normal  PSYCH: mentation appears normal, affect normal/bright    Diagnostic Test Results:  Results for orders placed or performed in visit on 03/20/18   XR Ribs & Chest Right G/E 3 Views    Narrative    XR RIBS & CHEST RT 3VW 3/20/2018 12:30 PM    HISTORY: Right-sided rib pain.    COMPARISON: 8/9/2017    FINDINGS: No airspace consolidation, pleural effusion or pneumothorax.  Normal heart size. Additional views of the right-sided ribs show no  evidence of fracture or other acute osseous abnormality.      Impression    IMPRESSION: No specific finding to explain pain.    KEN SEGURA MD       ASSESSMENT/PLAN:         3. Panlobular emphysema (H)  Unsure why she is having short of breath.  Could be from worsening emphysema.  She is on max therapy but could try different agents in the same classes  - tiotropium (SPIRIVA HANDIHALER) 18 MCG capsule; INHALE 1 CAPSULE INTO THE LUNGS DAILY.  Dispense: 90 capsule; Refill: 3    4. Major depressive disorder, recurrent episode, mild (H)  Controlled. Patient  continues on Zoloft 150 mg.     5. Alcohol abuse, in remission  Patient denies any alcohol use or cravings.    6. Chronic kidney disease (CKD) stage G1/A2, glomerular filtration rate (GFR) equal to or greater than 90 mL/min/1.73 square meter and albuminuria creatinine ratio between  mg/g    - **Basic metabolic panel FUTURE anytime; Future    7. Bullous pemphigoid  Controlled. She has discontinued Cellcept.    8. Rib pain on right side  Unclear etiology.  If CT lung and xray rib negative, consider ultrasound of abdomen   - XR Ribs & Chest Right G/E 3 Views    9. Type 2 diabetes mellitus with microalbuminuria, without long-term current use of insulin (H)    - HEMOGLOBIN A1C; Future  - Lipid panel reflex to direct LDL Fasting; Future  - **Basic metabolic panel FUTURE anytime; Future  - **ALT FUTURE anytime; Future  - **TSH with free T4 reflex FUTURE anytime; Future    10. Pulmonary nodules    - CT Chest w/o Contrast; Future    11. SOB (shortness of breath)  EKG was reviewed by myself. No acute changes.  Will proceed with chest CT.  - EKG 12-lead complete w/read - Clinics  - CBC with platelets; Future    12. Hearing loss, unspecified hearing loss type, unspecified laterality    - AUDIOLOGY ADULT REFERRAL    Patient Instructions   Put 4-5 drops of mineral oil or wax softener in your ear.    Schedule a hearing test.    Schedule a lung CT and fasting lab draw.        I spent 23 minutes of time with the patient and >50% of it was in education and counseling regarding rib pain and shortness of breath.    The information in this document, created by the medical scribe, Elsie Chowdary, for me, accurately reflects the services I personally performed and the decisions made by me. I have reviewed and approved this document for accuracy prior to leaving the patient care area.    Ce Cano MD  [facility]    Start 11:29 AM  End 11:52 AM

## 2018-03-20 NOTE — MR AVS SNAPSHOT
After Visit Summary   3/20/2018    Ludy Mckenzie    MRN: [id number]           Patient Information     Date Of Birth          1940        Visit Information        Provider Department      3/20/2018 11:30 AM Ce Cano MD [facility]        Today's Diagnoses     Type 2 diabetes mellitus with microalbuminuria, without long-term current use of insulin (H)    -  1    Screen for colon cancer        At risk for falling        Panlobular emphysema (H)        Major depressive disorder, recurrent episode, mild (H)        Alcohol abuse, in remission        Chronic kidney disease (CKD) stage G1/A2, glomerular filtration rate (GFR) equal to or greater than 90 mL/min/1.73 square meter and albuminuria creatinine ratio between  mg/g        Bullous pemphigoid        Rib pain on right side        Pulmonary nodules        SOB (shortness of breath)        Hearing loss, unspecified hearing loss type, unspecified laterality          Care Instructions    Put 4-5 drops of mineral oil or wax softener in your ear.    Schedule a hearing test.    Schedule a lung CT and fasting lab draw.        [facility]    If you have any questions regarding to your visit please contact your care team:     Team Pink:   Clinic Hours Telephone Number   Internal Medicine:  Dr. Ce Stafford NP       7am-7pm  Monday - Thursday   7am-5pm  Fridays  [phone]  (Appointment scheduling available 24/7)    Questions about your visit?  Team Line  [phone]   Urgent Care - [place] - 11am-9pm Monday-Friday Saturday-Sunday- 9am-5pm   [place] - 5pm-9pm Monday-Friday Saturday-Sunday- 9am-5pm  [phone] - [place]   [phone] - [place]       What options do I have for visits at the clinic other than the traditional office visit?  To expand how we care for you, many of our providers are utilizing electronic visits (e-visits) and  telephone visits, when medically appropriate, for interactions with their patients rather than a visit in the clinic.   We also offer nurse visits for many medical concerns. Just like any other service, we will bill your insurance company for this type of visit based on time spent on the phone with your provider. Not all insurance companies cover these visits. Please check with your medical insurance if this type of visit is covered. You will be responsible for any charges that are not paid by your insurance.      E-visits via [email or web address]:  generally incur a $35.00 fee.  Telephone visits:  Time spent on the phone: *charged based on time that is spent on the phone in increments of 10 minutes. Estimated cost:   5-10 mins $30.00   11-20 mins. $59.00   21-30 mins. $85.00   Use [employer] (secure email communication and access to your chart) to send your primary care provider a message or make an appointment. Ask someone on your Team how to sign up for [employer].    For a Price Quote for your services, please call our Inside Warehouse Line at [phone].    As always, Thank you for trusting us with your health care needs!    Virginia MARIE CMA ([facility])            Follow-ups after your visit        Additional Services     AUDIOLOGY ADULT REFERRAL       Your provider has referred you to: [facility]: [facility] [place] [phone]   [email or web address]    Treatment:  Evaluation & Treatment  Specialty Testing:  Audiogram w/Tymps and Reflexes    Please be aware that coverage of these services is subject to the terms and limitations of your health insurance plan.  Call member services at your health plan with any benefit or coverage questions.      Please bring the following to your appointment:  >>   Any x-rays, CTs or MRIs which have been performed.  Contact the facility where they were done to arrange for  prior to your scheduled appointment.   >>   List of current medications  >>   This referral  request   >>   Any documents/labs given to you for this referral                  Future tests that were ordered for you today     Open Future Orders        Priority Expected Expires Ordered    HEMOGLOBIN A1C Routine  3/20/2019 3/20/2018    CT Chest w/o Contrast Routine  3/20/2019 3/20/2018    Lipid panel reflex to direct LDL Fasting Routine 3/20/2018 3/20/2019 3/20/2018    **Basic metabolic panel FUTURE anytime Routine 3/20/2018 3/20/2019 3/20/2018    **ALT FUTURE anytime Routine 3/20/2018 3/20/2019 3/20/2018    **TSH with free T4 reflex FUTURE anytime Routine 3/20/2018 3/20/2019 3/20/2018    CBC with platelets Routine  3/20/2019 3/20/2018            Who to contact     If you have questions or need follow up information about today's clinic visit or your schedule please contact [facility] directly at [phone].  Normal or non-critical lab and imaging results will be communicated to you by [employer], letter or phone within 4 business days after the clinic has received the results. If you do not hear from us within 7 days, please contact the clinic through [employer] or phone. If you have a critical or abnormal lab result, we will notify you by phone as soon as possible.  Submit refill requests through [employer] or call your pharmacy and they will forward the refill request to us. Please allow 3 business days for your refill to be completed.          Additional Information About Your Visit        [employer] Information     [employer] gives you secure access to your electronic health record. If you see a primary care provider, you can also send messages to your care team and make appointments. If you have questions, please call your primary care clinic.  If you do not have a primary care provider, please call [phone] and they will assist you.        Care EveryWhere ID     This is your Care EveryWhere ID. This could be used by other organizations to access your [place] medical records  [id number]         Your Vitals Were     Pulse Temperature Respirations Pulse Oximetry BMI (Body Mass Index)       89 97  F (36.1  C) (Oral) 16 95% 22.58 kg/m2        Blood Pressure from Last 3 Encounters:   03/20/18 130/58   08/30/17 115/68   07/14/17 98/50    Weight from Last 3 Encounters:   03/20/18 142 lb (64.4 kg)   08/30/17 130 lb 1.1 oz (59 kg)   07/14/17 127 lb 3.2 oz (57.7 kg)              We Performed the Following     AUDIOLOGY ADULT REFERRAL     EKG 12-lead complete w/read - Clinics     XR Ribs & Chest Right G/E 3 Views          Where to get your medicines      These medications were sent to [place] [id number] - [place], [place] - [address]  [address] [place] [address]     Phone:  [phone]     tiotropium 18 MCG capsule          Primary Care Provider Office Phone # Fax #    Ce Cano [phone]       [address] [facility] [address]        Equal Access to Services     [facility] AH: Hadii aad ku hadasho Soomaali, waaxda luqadaha, qaybta kaalmada adeegyada, mateo kate. So [facility] [phone].    ATENCIÓN: Si habla español, tiene a aponte disposición servicios gratuitos de asistencia lingüística. Llame al [phone].    We comply with applicable federal civil rights laws and [place] laws. We do not discriminate on the basis of race, color, national origin, age, disability, sex, sexual orientation, or gender identity.            Thank you!     Thank you for choosing [facility]  for your care. Our goal is always to provide you with excellent care. Hearing back from our patients is one way we can continue to improve our services. Please take a few minutes to complete the written survey that you may receive in the mail after your visit with us. Thank you!             Your Updated Medication List - Protect others around you: Learn how to safely use, store and throw away your medicines at www.disposemymeds.org.           This list is accurate as of 3/20/18 12:00 PM.  Always use your most recent med list.                   Brand Name Dispense Instructions for use Diagnosis    aspirin 81 MG tablet      Take 1 tablet by mouth daily.        calcium 600 MG tablet     180 tablet    Take 1 tablet by mouth 2 times daily        cholecalciferol 1000 UNITS capsule    vitamin  -D     Take 1 capsule by mouth daily.        fluticasone-salmeterol 500-50 MCG/DOSE diskus inhaler    ADVAIR    3 Inhaler    Inhale 1 puff into the lungs 2 times daily    Chronic obstructive pulmonary disease, unspecified COPD type (H)       ipratropium - albuterol 0.5 mg/2.5 mg/3 mL 0.5-2.5 (3) MG/3ML neb solution    DUONEB    1 Box    Take 1 vial (3 mLs) by nebulization every 6 hours as needed for shortness of breath / dyspnea    Panlobular emphysema (H)       latanoprost 0.005 % ophthalmic solution    XALATAN    3 Bottle    Place 1 drop into both eyes At Bedtime    Primary open angle glaucoma       losartan 25 MG tablet    COZAAR    45 tablet    TAKE 0.5 TABLETS (12.5 MG) BY MOUTH DAILY    Microalbuminuria       multivitamin, therapeutic with minerals Tabs tablet      Take 1 tablet by mouth daily        ondansetron 4 MG tablet    ZOFRAN    30 tablet    Take 1-2 tablets (4-8 mg) by mouth every 8 hours as needed for nausea    Nausea and vomiting, intractability of vomiting not specified, unspecified vomiting type       potassium chloride SA 20 MEQ CR tablet    KLOR-CON    90 tablet    Take 1 tablet (20 mEq) by mouth daily    Hypopotassemia       PROAIR  (90 BASE) MCG/ACT Inhaler   Generic drug:  albuterol     8.5 Inhaler    INHALE TWO PUFFS BY MOUTH EVERY FOUR HOURS AS NEEDED SHORTNESS OF BREATH    Chronic obstructive pulmonary disease, unspecified COPD type (H)       rosuvastatin 5 MG tablet    CRESTOR    90 tablet    TAKE 1 TABLET (5 MG) BY MOUTH DAILY    Mixed hyperlipidemia       sertraline 100 MG tablet    ZOLOFT    135 tablet    Take 1.5 tablets (150 mg)  by mouth daily    Major depressive disorder, recurrent episode, mild (H)       tiotropium 18 MCG capsule    SPIRIVA HANDIHALER    90 capsule    INHALE 1 CAPSULE INTO THE LUNGS DAILY.    Panlobular emphysema (H)       VITAMIN B 12 PO      Take by mouth daily        vitamin B complex with vitamin C Tabs tablet      Take 1 tablet by mouth daily

## 2018-03-20 NOTE — LETTER
My Depression Action Plan  Name: Ludy Mckenzie   Date of Birth 1940  Date: 3/20/2018    My doctor: Ce Cano   My clinic: [address]  [place] [address]  [phone]          GREEN    ZONE   Good Control    What it looks like:     Things are going generally well. You have normal up s and down s. You may even feel depressed from time to time, but bad moods usually last less than a day.   What you need to do:  1. Continue to care for yourself (see self care plan)  2. Check your depression survival kit and update it as needed  3. Follow your physician s recommendations including any medication.  4. Do not stop taking medication unless you consult with your physician first.           YELLOW         ZONE Getting Worse    What it looks like:     Depression is starting to interfere with your life.     It may be hard to get out of bed; you may be starting to isolate yourself from others.    Symptoms of depression are starting to last most all day and this has happened for several days.     You may have suicidal thoughts but they are not constant.   What you need to do:     1. Call your care team, your response to treatment will improve if you keep your care team informed of your progress. Yellow periods are signs an adjustment may need to be made.     2. Continue your self-care, even if you have to fake it!    3. Talk to someone in your support network    4. Open up your depression survival kit           RED    ZONE Medical Alert - Get Help    What it looks like:     Depression is seriously interfering with your life.     You may experience these or other symptoms: You can t get out of bed most days, can t work or engage in other necessary activities, you have trouble taking care of basic hygiene, or basic responsibilities, thoughts of suicide or death that will not go away, self-injurious behavior.     What you need to do:  1. Call your care team and  request a same-day appointment. If they are not available (weekends or after hours) call your local crisis line, emergency room or 911.            Depression Self Care Plan / Survival Kit    Self-Care for Depression  Here s the deal. Your body and mind are really not as separate as most people think.  What you do and think affects how you feel and how you feel influences what you do and think. This means if you do things that people who feel good do, it will help you feel better.  Sometimes this is all it takes.  There is also a place for medication and therapy depending on how severe your depression is, so be sure to consult with your medical provider and/ or Behavioral Health Consultant if your symptoms are worsening or not improving.     In order to better manage my stress, I will:    Exercise  Get some form of exercise, every day. This will help reduce pain and release endorphins, the  feel good  chemicals in your brain. This is almost as good as taking antidepressants!  This is not the same as joining a gym and then never going! (they count on that by the way ) It can be as simple as just going for a walk or doing some gardening, anything that will get you moving.      Hygiene   Maintain good hygiene (Get out of bed in the morning, Make your bed, Brush your teeth, Take a shower, and Get dressed like you were going to work, even if you are unemployed).  If your clothes don't fit try to get ones that do.    Diet  I will strive to eat foods that are good for me, drink plenty of water, and avoid excessive sugar, caffeine, alcohol, and other mood-altering substances.  Some foods that are helpful in depression are: complex carbohydrates, B vitamins, flaxseed, fish or fish oil, fresh fruits and vegetables.    Psychotherapy  I agree to participate in Individual Therapy (if recommended).    Medication  If prescribed medications, I agree to take them.  Missing doses can result in serious side effects.  I understand that  drinking alcohol, or other illicit drug use, may cause potential side effects.  I will not stop my medication abruptly without first discussing it with my provider.    Staying Connected With Others  I will stay in touch with my friends, family members, and my primary care provider/team.    Use your imagination  Be creative.  We all have a creative side; it doesn t matter if it s oil painting, sand castles, or mud pies! This will also kick up the endorphins.    Witness Beauty  (AKA stop and smell the roses) Take a look outside, even in mid-winter. Notice colors, textures. Watch the squirrels and birds.     Service to others  Be of service to others.  There is always someone else in need.  By helping others we can  get out of ourselves  and remember the really important things.  This also provides opportunities for practicing all the other parts of the program.    Humor  Laugh and be silly!  Adjust your TV habits for less news and crime-drama and more comedy.    Control your stress  Try breathing deep, massage therapy, biofeedback, and meditation. Find time to relax each day.     My support system    Clinic Contact:  Phone number:    Contact 1:  Phone number:    Contact 2:  Phone number:    [place]/:  Phone number:    Therapist:  Phone number:    Local crisis center:    Phone number:    Other community support:  Phone number:

## 2018-03-20 NOTE — LETTER
My COPD Action Plan     Name: Ludy Mckenzie    YOB: 1940   Date: 3/20/2018    My doctor: Ce Cano MD   My clinic: [address] [address] [phone]  My Controller Medicine: Serevent/Fluticasone (Advair)   Dose: twice daily      My Rescue Medicine: Albuterol nebulizer solution    Dose: every 4 hours as needed      My Flare Up Medicine: Prednisone   Dose: as direction     My COPD Severity: Moderate = FeV1 < 79% -50%      Use of Oxygen: Not Prescribed     Make sure you've had your pneumonia   vaccines.          GREEN ZONE       Doing well today      Usual level of activity and exercise    Usual amount of cough and mucus    No shortness of breath    Usual level of health (thinking clearly, sleeping well, feel like eating) Actions:      Take daily medicines    Use oxygen as prescribed    Follow regular exercise and diet plan    Avoid cigarette smoke and other irritants that harm the lungs           YELLOW ZONE          Having a bad day or flare up      Short of breath more than usual    A lot more sputum (mucus) than usual    Sputum looks yellow, green, tan, brown or bloody    More coughing or wheezing    Fever or chills    Less energy; trouble completing activities    Trouble thinking or focusing    Using quick relief inhaler or nebulizer more often    Poor sleep; symptoms wake me up    Do not feel like eating Actions:      Get plenty of rest    Take daily medicines    Use quick relief inhaler every 4 hours    If you use oxygen, call you doctor to see if you should adjust your oxygen    Do breathing exercises or other things to help you relax    Let a loved one, friend or neighbor know you are feeling worse    Call your care team if you have 2 or more symptoms.  Start taking steroids or antibiotics if directed by your care team           RED ZONE       Need medical care now      Severe shortness of breath (feel you can't breathe)    Fever,  chills    Not enough breath to do any activity    Trouble coughing up mucus, walking or talking    Blood in mucus    Frequent coughing   Rescue medicines are not working    Not able to sleep because of breathing    Feel confused or drowsy    Chest pain    Actions:      Call your health care team.  If you cannot reach your care team, call 911 or go to the emergency room.        Annual Reminders:  Meet with Care Team, Flu Shot every Fall  Pharmacy:    [facility]  WRITTEN PRESCRIPTION REQUESTED  [facility] [id number] [facility] [employer] - [place] [facility], [place] - [address]  [facility]

## 2018-03-20 NOTE — Clinical Note
She had a cologuard in 2016 but it isn't satisfying health maintenance.  Can you fix that?  Thanks! Dr. Cano

## 2018-03-21 ASSESSMENT — PATIENT HEALTH QUESTIONNAIRE - PHQ9: SUM OF ALL RESPONSES TO PHQ QUESTIONS 1-9: 2

## 2018-03-26 ENCOUNTER — TELEPHONE (OUTPATIENT)
Dept: DERMATOLOGY | Facility: CLINIC | Age: 78
End: 2018-03-26

## 2018-03-26 DIAGNOSIS — Z51.81 MEDICATION MONITORING ENCOUNTER: Primary | ICD-10-CM

## 2018-03-27 DIAGNOSIS — E11.29 TYPE 2 DIABETES MELLITUS WITH MICROALBUMINURIA, WITHOUT LONG-TERM CURRENT USE OF INSULIN (H): ICD-10-CM

## 2018-03-27 DIAGNOSIS — Z51.81 MEDICATION MONITORING ENCOUNTER: ICD-10-CM

## 2018-03-27 DIAGNOSIS — R80.9 TYPE 2 DIABETES MELLITUS WITH MICROALBUMINURIA, WITHOUT LONG-TERM CURRENT USE OF INSULIN (H): ICD-10-CM

## 2018-03-27 DIAGNOSIS — N18.1 CHRONIC KIDNEY DISEASE (CKD) STAGE G1/A2, GLOMERULAR FILTRATION RATE (GFR) EQUAL TO OR GREATER THAN 90 ML/MIN/1.73 SQUARE METER AND ALBUMINURIA CREATININE RATIO BETWEEN 30-299 MG/G: ICD-10-CM

## 2018-03-27 DIAGNOSIS — R06.02 SOB (SHORTNESS OF BREATH): ICD-10-CM

## 2018-03-27 LAB
ALT SERPL W P-5'-P-CCNC: 31 U/L (ref 0–50)
ANION GAP SERPL CALCULATED.3IONS-SCNC: 11 MMOL/L (ref 3–14)
AST SERPL W P-5'-P-CCNC: 49 U/L (ref 0–45)
BASOPHILS # BLD AUTO: 0 10E9/L (ref 0–0.2)
BASOPHILS NFR BLD AUTO: 0.3 %
BUN SERPL-MCNC: 16 MG/DL (ref 7–30)
CALCIUM SERPL-MCNC: 8.8 MG/DL (ref 8.5–10.1)
CHLORIDE SERPL-SCNC: 106 MMOL/L (ref 94–109)
CO2 SERPL-SCNC: 21 MMOL/L (ref 20–32)
CREAT SERPL-MCNC: 0.53 MG/DL (ref 0.52–1.04)
DIFFERENTIAL METHOD BLD: NORMAL
EOSINOPHIL # BLD AUTO: 0.2 10E9/L (ref 0–0.7)
EOSINOPHIL NFR BLD AUTO: 2.1 %
ERYTHROCYTE [DISTWIDTH] IN BLOOD BY AUTOMATED COUNT: 12.7 % (ref 10–15)
GFR SERPL CREATININE-BSD FRML MDRD: >90 ML/MIN/1.7M2
GLUCOSE SERPL-MCNC: 160 MG/DL (ref 70–99)
HBA1C MFR BLD: 7.2 % (ref 4.3–6)
HCT VFR BLD AUTO: 36.1 % (ref 35–47)
HGB BLD-MCNC: 12 G/DL (ref 11.7–15.7)
LYMPHOCYTES # BLD AUTO: 2.4 10E9/L (ref 0.8–5.3)
LYMPHOCYTES NFR BLD AUTO: 33.7 %
MCH RBC QN AUTO: 30.2 PG (ref 26.5–33)
MCHC RBC AUTO-ENTMCNC: 33.2 G/DL (ref 31.5–36.5)
MCV RBC AUTO: 91 FL (ref 78–100)
MONOCYTES # BLD AUTO: 0.8 10E9/L (ref 0–1.3)
MONOCYTES NFR BLD AUTO: 11.5 %
NEUTROPHILS # BLD AUTO: 3.8 10E9/L (ref 1.6–8.3)
NEUTROPHILS NFR BLD AUTO: 52.4 %
PLATELET # BLD AUTO: 253 10E9/L (ref 150–450)
POTASSIUM SERPL-SCNC: 4.1 MMOL/L (ref 3.4–5.3)
RBC # BLD AUTO: 3.98 10E12/L (ref 3.8–5.2)
SODIUM SERPL-SCNC: 138 MMOL/L (ref 133–144)
TSH SERPL DL<=0.005 MIU/L-ACNC: 3.44 MU/L (ref 0.4–4)
WBC # BLD AUTO: 7.2 10E9/L (ref 4–11)

## 2018-03-27 PROCEDURE — 84460 ALANINE AMINO (ALT) (SGPT): CPT | Performed by: DERMATOLOGY

## 2018-03-27 PROCEDURE — 85025 COMPLETE CBC W/AUTO DIFF WBC: CPT | Performed by: DERMATOLOGY

## 2018-03-27 PROCEDURE — 84450 TRANSFERASE (AST) (SGOT): CPT | Performed by: DERMATOLOGY

## 2018-03-27 PROCEDURE — 36415 COLL VENOUS BLD VENIPUNCTURE: CPT | Performed by: DERMATOLOGY

## 2018-03-27 PROCEDURE — 80048 BASIC METABOLIC PNL TOTAL CA: CPT | Performed by: DERMATOLOGY

## 2018-03-27 PROCEDURE — 84443 ASSAY THYROID STIM HORMONE: CPT | Performed by: DERMATOLOGY

## 2018-03-27 PROCEDURE — 83036 HEMOGLOBIN GLYCOSYLATED A1C: CPT | Performed by: DERMATOLOGY

## 2018-03-27 NOTE — PROGRESS NOTES
Ludy Cadena was in today for her lab draw, however she was not fasting so the Lipid Panel is in a future status and she will return when fasting  Thank you   Lab staff

## 2018-03-28 ENCOUNTER — RADIANT APPOINTMENT (OUTPATIENT)
Dept: CT IMAGING | Facility: CLINIC | Age: 78
End: 2018-03-28
Attending: INTERNAL MEDICINE
Payer: COMMERCIAL

## 2018-03-28 DIAGNOSIS — R91.8 PULMONARY NODULES: ICD-10-CM

## 2018-03-28 DIAGNOSIS — L12.0 BULLOUS PEMPHIGOID (H): ICD-10-CM

## 2018-03-28 DIAGNOSIS — Z76.89 ENCOUNTER PRIOR TO INITIATION OF MEDICATION: Primary | ICD-10-CM

## 2018-03-28 PROCEDURE — 71250 CT THORAX DX C-: CPT | Mod: TC

## 2018-03-28 RX ORDER — MYCOPHENOLATE MOFETIL 500 MG/1
TABLET ORAL
Qty: 300 TABLET | Refills: 0 | Status: SHIPPED | OUTPATIENT
Start: 2018-03-28 | End: 2019-09-13

## 2018-03-28 NOTE — PROGRESS NOTES
Dear Ludy,    Your recent test results are attached.      Stable lung nodule and chest CT.      If you have any questions please feel free to contact [phone] or myself via [employer]ulum[employer].    Sincerely,  Sarahi Stafford, CNP

## 2018-03-30 DIAGNOSIS — L12.0 BULLOUS PEMPHIGOID (H): Primary | ICD-10-CM

## 2018-03-30 RX ORDER — CLOBETASOL PROPIONATE 0.05 G/100ML
SHAMPOO TOPICAL
Qty: 118 ML | Refills: 0 | Status: SHIPPED | OUTPATIENT
Start: 2018-03-30 | End: 2018-10-17

## 2018-04-02 NOTE — PROGRESS NOTES
Although her A1c is still ok, it is increasing.  I would like a message with her blood sugar readings in a week and make a follow up appointment with me in 3 months for another A1c.  Dr. Cano

## 2018-04-03 ENCOUNTER — TELEPHONE (OUTPATIENT)
Dept: FAMILY MEDICINE | Facility: CLINIC | Age: 78
End: 2018-04-03

## 2018-04-03 NOTE — TELEPHONE ENCOUNTER
Below note read to patient.   Patient verbalized understanding and asked to have a f/u appt with Dr. Cano for side pain. Appointment scheduled for 4/9.  No further questions.    Notes Recorded by Sarahi Stafford APRN CNP on 3/28/2018 at 4:15 PM  Dear Ludy,    Your recent test results are attached.      Stable lung nodule and chest CT.      If you have any questions please feel free to contact [phone] or myself via [employer]st[employer].    Sincerely,  Sarahi Stafford, CAROL Bee RN

## 2018-04-23 NOTE — PROGRESS NOTES
SUBJECTIVE:   Ludy Mckenzie is a 77 year old female who presents to clinic today for the following health issues:      Musculoskeletal problem/pain      Duration: Seen for same pain on 3/20/18  Pain has not improved    Description  Location: Left sided pain    Intensity:  Has occasional Sharp pain but other wise it is a dull ache/burning pain    Accompanying signs and symptoms: radiation of pain to lower into abdomin    History  Previous similar problem: YES  Previous evaluation:  CT    Precipitating or alleviating factors:  Trauma or overuse: no   Aggravating factors include: None    Therapies tried and outcome: nothing    Patient denies upper back pain, nausea, vomiting, dysuria, hematuria, skin rash, weight loss, diarrhea, constipation, melena, hematochezia.  She denies injury, but does note she has been picking up a 10 month old great nephew frequently.  Pain is not worsened by food.   She has had normal Chest CT, Right rib x-ray, CBC in the last month.    Patient notes a chronic right nasal mass and feel that is has been growing.  She feels that it is plugging her nostril and making it difficult to breath.      Problem list and histories reviewed & adjusted, as indicated.  Additional history: as documented    Patient Active Problem List   Diagnosis     Colon polyp     Alcohol abuse, in remission     Hyperlipidemia LDL goal <100     Cerebral infarction (H)     Thyroid nodule     Seasonal allergic rhinitis     Major depressive disorder, recurrent episode, mild (H)     Health Care Home     Bullous pemphigoid     Hypopotassemia     Type 2 diabetes mellitus with other diabetic neurological complication     Glaucoma     Chronic obstructive pulmonary disease, unspecified COPD type (H)     Chronic kidney disease (CKD) stage G1/A2, glomerular filtration rate (GFR) equal to or greater than 90 mL/min/1.73 square meter and albuminuria creatinine ratio between  mg/g     Loss of balance     Diabetic neuropathy with  neurologic complication (H)     Pseudophakia of both eyes     Urge incontinence of urine     Cramp of limb     Right sided sciatica     Pulmonary emphysema, unspecified emphysema type (H)     Age-related osteoporosis without current pathological fracture     Pulmonary nodules     Past Surgical History:   Procedure Laterality Date     BIOPSY       BUNIONECTOMY  1960    JACQUELINE     CATARACT IOL, RT/LT  2010    right     COLONOSCOPY  2/22/2013    Procedure: COLONOSCOPY;  Colonoscopy ;  Surgeon: Hugo Minor MD;  Location:  GI       Social History   Substance Use Topics     Smoking status: Former Smoker     Packs/day: 1.50     Years: 50.00     Types: Cigarettes     Quit date: 7/22/2009     Smokeless tobacco: Never Used     Alcohol use 2.4 oz/week     4 Standard drinks or equivalent per week      Comment: beer 1-2 2 times a week     Family History   Problem Relation Age of Onset     CANCER Maternal Grandfather      bone     DIABETES Mother 50     dx age 50, hip fracture     C.A.D. Father      pacemaker         Current Outpatient Prescriptions   Medication Sig Dispense Refill     ALBUTEROL 108 (90 BASE) MCG/ACT inhaler INHALE TWO PUFFS BY MOUTH EVERY FOUR HOURS AS NEEDED SHORTNESS OF BREATH 8.5 Inhaler 1     aspirin 81 MG tablet Take 1 tablet by mouth daily.       calcium 600 MG tablet Take 1 tablet by mouth 2 times daily 180 tablet 3     cholecalciferol (VITAMIN D3) 1000 UNIT capsule Take 1 capsule by mouth daily.       clobetasol propionate (CLOBEX) 0.05 % SHAM Use once daily for 2 weeks 118 mL 0     Cyanocobalamin (VITAMIN B 12 PO) Take by mouth daily       fluticasone-salmeterol (ADVAIR) 500-50 MCG/DOSE diskus inhaler Inhale 1 puff into the lungs 2 times daily 3 Inhaler 2     ipratropium - albuterol 0.5 mg/2.5 mg/3 mL (DUONEB) 0.5-2.5 (3) MG/3ML nebulization Take 1 vial (3 mLs) by nebulization every 6 hours as needed for shortness of breath / dyspnea 1 Box 3     latanoprost (XALATAN) 0.005 % ophthalmic solution  Place 1 drop into both eyes At Bedtime 3 Bottle 4     losartan (COZAAR) 25 MG tablet TAKE 0.5 TABLETS (12.5 MG) BY MOUTH DAILY 45 tablet 1     multivitamin, therapeutic with minerals (THERA-VIT-M) TABS Take 1 tablet by mouth daily       mycophenolate (GENERIC EQUIVALENT) 500 MG tablet Take 2 tabs in the am and 2 tabs in the evening (Patient not taking: Reported on 4/25/2018) 300 tablet 0     ondansetron (ZOFRAN) 4 MG tablet Take 1-2 tablets (4-8 mg) by mouth every 8 hours as needed for nausea 30 tablet 1     potassium chloride SA (POTASSIUM CHLORIDE) 20 MEQ CR tablet Take 1 tablet (20 mEq) by mouth daily 90 tablet 4     rosuvastatin (CRESTOR) 5 MG tablet TAKE 1 TABLET (5 MG) BY MOUTH DAILY 90 tablet 1     sertraline (ZOLOFT) 100 MG tablet Take 1.5 tablets (150 mg) by mouth daily 135 tablet 3     tiotropium (SPIRIVA HANDIHALER) 18 MCG capsule INHALE 1 CAPSULE INTO THE LUNGS DAILY. 90 capsule 3     vitamin  B complex with vitamin C (VITAMIN  B COMPLEX) TABS Take 1 tablet by mouth daily       Allergies   Allergen Reactions     Amoxicillin GI Disturbance     Augmentin Nausea and Vomiting     Iodine Itching and Swelling     Pt is ok with ct contrast dye (isovue 370)     Mercury Unknown     Penicillins Unknown       Reviewed and updated as needed this visit by clinical staff       Reviewed and updated as needed this visit by Provider         ROS:  Constitutional, HEENT, cardiovascular, pulmonary, gi and gu systems are negative, except as otherwise noted.    OBJECTIVE:     /60  Pulse 78  Temp 97.2  F (36.2  C) (Oral)  Resp 18  Wt 138 lb (62.6 kg)  SpO2 97%  BMI 21.94 kg/m2  Body mass index is 21.94 kg/(m^2).  GENERAL: healthy, alert and no distress  HENT: normal cephalic/atraumatic, ear canals and TM's normal, oropharynx clear, oral mucous membranes moist and small raised lesion noted to right nasal septum  NECK: no adenopathy, no asymmetry, masses, or scars and thyroid normal to palpation  RESP: lungs clear to  auscultation - no rales, rhonchi or wheezes  CV: regular rate and rhythm, normal S1 S2, no S3 or S4, no murmur, click or rub, no peripheral edema and peripheral pulses strong  ABDOMEN: tenderness RUQ, no organomegaly or masses, bowel sounds normal and no palpable or pulsatile masses  MS: no gross musculoskeletal defects noted, no edema    Diagnostic Test Results:  pending    ASSESSMENT/PLAN:     1. RUQ abdominal pain  Possibly musculoskeletal in nature.  Will rule out liver abnormality, UTI with labs as below.  Patient to avoid heavy lifting, pushing, or pulling to see if pain improves.  - UA reflex to Microscopic and Culture  - Hepatic panel  - US Abdomen Limited; Future  - Urine Microscopic    2. Lesion of nasal septum    - OTOLARYNGOLOGY REFERRAL    FUTURE APPOINTMENTS:       - Follow-up for annual visit or as needed    OCTAVIO Bradford CNP  [facility]

## 2018-04-25 ENCOUNTER — OFFICE VISIT (OUTPATIENT)
Dept: FAMILY MEDICINE | Facility: CLINIC | Age: 78
End: 2018-04-25
Payer: COMMERCIAL

## 2018-04-25 VITALS
BODY MASS INDEX: 21.94 KG/M2 | TEMPERATURE: 97.2 F | RESPIRATION RATE: 18 BRPM | SYSTOLIC BLOOD PRESSURE: 110 MMHG | OXYGEN SATURATION: 97 % | DIASTOLIC BLOOD PRESSURE: 60 MMHG | HEART RATE: 78 BPM | WEIGHT: 138 LBS

## 2018-04-25 DIAGNOSIS — J34.89 LESION OF NASAL SEPTUM: ICD-10-CM

## 2018-04-25 DIAGNOSIS — R10.11 RUQ ABDOMINAL PAIN: Primary | ICD-10-CM

## 2018-04-25 LAB
ALBUMIN SERPL-MCNC: 3.9 G/DL (ref 3.4–5)
ALBUMIN UR-MCNC: ABNORMAL MG/DL
ALP SERPL-CCNC: 65 U/L (ref 40–150)
ALT SERPL W P-5'-P-CCNC: 28 U/L (ref 0–50)
APPEARANCE UR: CLEAR
AST SERPL W P-5'-P-CCNC: 44 U/L (ref 0–45)
BILIRUB DIRECT SERPL-MCNC: <0.1 MG/DL (ref 0–0.2)
BILIRUB SERPL-MCNC: 0.5 MG/DL (ref 0.2–1.3)
BILIRUB UR QL STRIP: NEGATIVE
COLOR UR AUTO: YELLOW
GLUCOSE UR STRIP-MCNC: NEGATIVE MG/DL
HGB UR QL STRIP: ABNORMAL
KETONES UR STRIP-MCNC: NEGATIVE MG/DL
LEUKOCYTE ESTERASE UR QL STRIP: ABNORMAL
MUCOUS THREADS #/AREA URNS LPF: PRESENT /LPF
NITRATE UR QL: NEGATIVE
NON-SQ EPI CELLS #/AREA URNS LPF: ABNORMAL /LPF
PH UR STRIP: 6 PH (ref 5–7)
PROT SERPL-MCNC: 7.4 G/DL (ref 6.8–8.8)
RBC #/AREA URNS AUTO: ABNORMAL /HPF
SOURCE: ABNORMAL
SP GR UR STRIP: 1.02 (ref 1–1.03)
UROBILINOGEN UR STRIP-ACNC: 0.2 EU/DL (ref 0.2–1)
WBC #/AREA URNS AUTO: ABNORMAL /HPF

## 2018-04-25 PROCEDURE — 36415 COLL VENOUS BLD VENIPUNCTURE: CPT | Performed by: NURSE PRACTITIONER

## 2018-04-25 PROCEDURE — 99213 OFFICE O/P EST LOW 20 MIN: CPT | Performed by: NURSE PRACTITIONER

## 2018-04-25 PROCEDURE — 81001 URINALYSIS AUTO W/SCOPE: CPT | Performed by: NURSE PRACTITIONER

## 2018-04-25 PROCEDURE — 80076 HEPATIC FUNCTION PANEL: CPT | Performed by: NURSE PRACTITIONER

## 2018-04-25 ASSESSMENT — PAIN SCALES - GENERAL: PAINLEVEL: MILD PAIN (3)

## 2018-04-25 NOTE — PATIENT INSTRUCTIONS
Try flonase nasal spray over the counter to see if this helps with congestion.    [facility]    If you have any questions regarding to your visit please contact your care team:     Team Pink:   Clinic Hours Telephone Number   Internal Medicine:  Dr. Ce Stafford NP       7am-7pm  Monday - Thursday   7am-5pm  Fridays  [phone]  (Appointment scheduling available 24/7)    Questions about your recent visit?  Team Line  [phone]   Urgent Care - [place] and [facility] - 11am-9pm Monday-Friday Saturday-Sunday- 9am-5pm   [place] - 5pm-9pm Monday-Friday Saturday-Sunday- 9am-5pm  [phone] - [place]  [phone] - [place]       What options do I have for a visit other than an office visit? We offer electronic visits (e-visits) and telephone visits, when medically appropriate.  Please check with your medical insurance to see if these types of visits are covered, as you will be responsible for any charges that are not paid by your insurance.      You can use [employer] (secure electronic communication) to access to your chart, send your primary care provider a message, or make an appointment. Ask a team member how to get started.     For a price quote for your services, please call our Consumer Price Line at [phone] or our Imaging Cost estimation line at [phone] (for imaging tests).  Discharged By:  KAITLYN MARCOS

## 2018-04-25 NOTE — MR AVS SNAPSHOT
After Visit Summary   4/25/2018    Ludy Mckenzie    MRN: [id number]           Patient Information     Date Of Birth          1940        Visit Information        Provider Department      4/25/2018 11:40 AM Sarahi Stafford APRN [facility]        Today's Diagnoses     RUQ abdominal pain    -  1    Lesion of nasal septum          Care Instructions    Try flonase nasal spray over the counter to see if this helps with congestion.    [facility]    If you have any questions regarding to your visit please contact your care team:     Team Pink:   Clinic Hours Telephone Number   Internal Medicine:  Dr. Ce Stafford, NP       7am-7pm  Monday - Thursday   7am-5pm  Fridays  [phone]  (Appointment scheduling available 24/7)    Questions about your recent visit?  Team Line  [phone]   Urgent Care - [place] and [facility] - 11am-9pm Monday-Friday Saturday-Sunday- 9am-5pm   [place] - 5pm-9pm Monday-Friday Saturday-Sunday- 9am-5pm  [phone] - [place]  [phone] - [place]       What options do I have for a visit other than an office visit? We offer electronic visits (e-visits) and telephone visits, when medically appropriate.  Please check with your medical insurance to see if these types of visits are covered, as you will be responsible for any charges that are not paid by your insurance.      You can use [employer] (secure electronic communication) to access to your chart, send your primary care provider a message, or make an appointment. Ask a team member how to get started.     For a price quote for your services, please call our Consumer Price Line at [phone] or our Imaging Cost estimation line at [phone] (for imaging tests).  Discharged By:  KAITLYN MARCOS            Follow-ups after your visit        Additional Services     OTOLARYNGOLOGY REFERRAL       Your provider has referred you to:  [facility]: [facility] [phone]   [email or web address]    Please be aware that coverage of these services is subject to the terms and limitations of your health insurance plan.  Call member services at your health plan with any benefit or coverage questions.      Please bring the following with you to your appointment:    (1) Any X-Rays, CTs or MRIs which have been performed.  Contact the facility where they were done to arrange for  prior to your scheduled appointment.   (2) List of current medications  (3) This referral request   (4) Any documents/labs given to you for this referral                  Your next 10 appointments already scheduled     May 02, 2018  2:30 PM CDT   [facility]    [address] [place] [address]  [place] [id number] [phone]              Future tests that were ordered for you today     Open Future Orders        Priority Expected Expires Ordered    US Abdomen Limited Routine  4/25/2019 4/25/2018            Who to contact     If you have questions or need follow up information about today's clinic visit or your schedule please contact [facility] directly at [phone].  Normal or non-critical lab and imaging results will be communicated to you by MyChart, letter or phone within 4 business days after the clinic has received the results. If you do not hear from us within 7 days, please contact the clinic through MyChart or phone. If you have a critical or abnormal lab result, we will notify you by phone as soon as possible.  Submit refill requests through [employer] or call your pharmacy and they will forward the refill request to us. Please allow 3 business days for your refill to be completed.          Additional Information About Your Visit        Fangtek Information     [employer] gives you secure access to your electronic health record. If you see a  primary care provider, you can also send messages to your care team and make appointments. If you have questions, please call your primary care clinic.  If you do not have a primary care provider, please call [phone] and they will assist you.        Care EveryWhere ID     This is your Care EveryWhere ID. This could be used by other organizations to access your [facility] medical records  [id number]        Your Vitals Were     Pulse Temperature Respirations Pulse Oximetry BMI (Body Mass Index)       78 97.2  F (36.2  C) (Oral) 18 97% 21.94 kg/m2        Blood Pressure from Last 3 Encounters:   04/25/18 110/60   03/20/18 130/58   08/30/17 115/68    Weight from Last 3 Encounters:   04/25/18 138 lb (62.6 kg)   03/20/18 142 lb (64.4 kg)   08/30/17 130 lb 1.1 oz (59 kg)              We Performed the Following     Hepatic panel     OTOLARYNGOLOGY REFERRAL     UA reflex to Microscopic and Culture        Primary Care Provider Office Phone # Fax #    Ce Cano [phone] [address] [facility] [address]        Equal Access to Services     BEAU CASTILLO AH: Hadii aad ku hadasho Sojeremieali, waaxda luqadaha, qaybta kaalmada adeegyada, mateo kate. So [facility] [phone].    ATENCIÓN: Si habla español, tiene a aponte disposición servicios gratuitos de asistencia lingüística. [facility] [phone].    We comply with applicable federal civil rights laws and [place] laws. We do not discriminate on the basis of race, color, national origin, age, disability, sex, sexual orientation, or gender identity.            Thank you!     Thank you for choosing [facility]  for your care. Our goal is always to provide you with excellent care. Hearing back from our patients is one way we can continue to improve our services. Please take a few minutes to complete the written survey that you may receive in the mail after your visit with us. Thank you!             Your  Updated Medication List - Protect others around you: Learn how to safely use, store and throw away your medicines at www.disposemymeds.org.          This list is accurate as of 4/25/18 12:41 PM.  Always use your most recent med list.                   Brand Name Dispense Instructions for use Diagnosis    aspirin 81 MG tablet      Take 1 tablet by mouth daily.        calcium 600 MG tablet     180 tablet    Take 1 tablet by mouth 2 times daily        cholecalciferol 1000 units capsule    vitamin  -D     Take 1 capsule by mouth daily.        clobetasol propionate 0.05 % Sham    CLOBEX    118 mL    Use once daily for 2 weeks    Bullous pemphigoid       fluticasone-salmeterol 500-50 MCG/DOSE diskus inhaler    ADVAIR    3 Inhaler    Inhale 1 puff into the lungs 2 times daily    Chronic obstructive pulmonary disease, unspecified COPD type (H)       ipratropium - albuterol 0.5 mg/2.5 mg/3 mL 0.5-2.5 (3) MG/3ML neb solution    DUONEB    1 Box    Take 1 vial (3 mLs) by nebulization every 6 hours as needed for shortness of breath / dyspnea    Panlobular emphysema (H)       latanoprost 0.005 % ophthalmic solution    XALATAN    3 Bottle    Place 1 drop into both eyes At Bedtime    Primary open angle glaucoma       losartan 25 MG tablet    COZAAR    45 tablet    TAKE 0.5 TABLETS (12.5 MG) BY MOUTH DAILY    Microalbuminuria       multivitamin, therapeutic with minerals Tabs tablet      Take 1 tablet by mouth daily        mycophenolate 500 MG tablet    GENERIC EQUIVALENT    300 tablet    Take 2 tabs in the am and 2 tabs in the evening    Bullous pemphigoid       ondansetron 4 MG tablet    ZOFRAN    30 tablet    Take 1-2 tablets (4-8 mg) by mouth every 8 hours as needed for nausea    Nausea and vomiting, intractability of vomiting not specified, unspecified vomiting type       potassium chloride SA 20 MEQ CR tablet    KLOR-CON    90 tablet    Take 1 tablet (20 mEq) by mouth daily    Hypopotassemia       PROAIR  (90 Base)  MCG/ACT Inhaler   Generic drug:  albuterol     8.5 Inhaler    INHALE TWO PUFFS BY MOUTH EVERY FOUR HOURS AS NEEDED SHORTNESS OF BREATH    Chronic obstructive pulmonary disease, unspecified COPD type (H)       rosuvastatin 5 MG tablet    CRESTOR    90 tablet    TAKE 1 TABLET (5 MG) BY MOUTH DAILY    Mixed hyperlipidemia       sertraline 100 MG tablet    ZOLOFT    135 tablet    Take 1.5 tablets (150 mg) by mouth daily    Major depressive disorder, recurrent episode, mild (H)       tiotropium 18 MCG capsule    SPIRIVA HANDIHALER    90 capsule    INHALE 1 CAPSULE INTO THE LUNGS DAILY.    Panlobular emphysema (H)       VITAMIN B 12 PO      Take by mouth daily        vitamin B complex with vitamin C Tabs tablet      Take 1 tablet by mouth daily

## 2018-04-26 ENCOUNTER — TELEPHONE (OUTPATIENT)
Dept: INTERNAL MEDICINE | Facility: CLINIC | Age: 78
End: 2018-04-26

## 2018-04-26 DIAGNOSIS — R10.11 RUQ ABDOMINAL PAIN: Primary | ICD-10-CM

## 2018-04-26 DIAGNOSIS — R31.29 MICROSCOPIC HEMATURIA: ICD-10-CM

## 2018-04-26 NOTE — TELEPHONE ENCOUNTER
Notes Recorded by Sarahi Stafford APRN CNP on 4/26/2018 at 7:03 AM  Please call patient-    Her liver labs are normal.  Her urine shows a small amount of microscopic blood.  I would like her to also get a renal ultrasound complete with her abdominal ultrasound to evaluate for any signs of kidney stones.  Please contact radiology to see if it would be best to order these test separately or if and abdominal ultrasound complete will cover all of these areas.    Thanks,  Sarahi Staffrod CNP    Called imaging scheduling and was advised to call [facility] at [phone].  They are gone for day will try back another time.   Josefina Mckeon RN

## 2018-04-27 NOTE — TELEPHONE ENCOUNTER
Per Funmi Claudio with [facility], US abd complete will cover the kidneys    US abd complete ordered    Cancelled the US abd limited order    Patient updated with results and phone number to imaging given    Dmitriy Kaplan RN

## 2018-05-02 ENCOUNTER — RADIANT APPOINTMENT (OUTPATIENT)
Dept: ULTRASOUND IMAGING | Facility: CLINIC | Age: 78
End: 2018-05-02
Attending: NURSE PRACTITIONER
Payer: COMMERCIAL

## 2018-05-02 ENCOUNTER — OFFICE VISIT (OUTPATIENT)
Dept: OPHTHALMOLOGY | Facility: CLINIC | Age: 78
End: 2018-05-02
Payer: COMMERCIAL

## 2018-05-02 ENCOUNTER — TELEPHONE (OUTPATIENT)
Dept: INTERNAL MEDICINE | Facility: CLINIC | Age: 78
End: 2018-05-02

## 2018-05-02 DIAGNOSIS — Z96.1 PSEUDOPHAKIA OF BOTH EYES: ICD-10-CM

## 2018-05-02 DIAGNOSIS — H43.813 POSTERIOR VITREOUS DETACHMENT OF BOTH EYES: ICD-10-CM

## 2018-05-02 DIAGNOSIS — H35.363 DRUSEN OF MACULA OF BOTH EYES: ICD-10-CM

## 2018-05-02 DIAGNOSIS — R10.11 RUQ ABDOMINAL PAIN: ICD-10-CM

## 2018-05-02 DIAGNOSIS — Z01.01 ENCOUNTER FOR EXAMINATION OF EYES AND VISION WITH ABNORMAL FINDINGS: Primary | ICD-10-CM

## 2018-05-02 DIAGNOSIS — R31.29 MICROSCOPIC HEMATURIA: ICD-10-CM

## 2018-05-02 DIAGNOSIS — E11.69 TYPE 2 DIABETES MELLITUS WITH OTHER SPECIFIED COMPLICATION, WITHOUT LONG-TERM CURRENT USE OF INSULIN (H): ICD-10-CM

## 2018-05-02 DIAGNOSIS — H52.4 PRESBYOPIA: ICD-10-CM

## 2018-05-02 DIAGNOSIS — H40.1130 PRIMARY OPEN ANGLE GLAUCOMA OF BOTH EYES, UNSPECIFIED GLAUCOMA STAGE: ICD-10-CM

## 2018-05-02 DIAGNOSIS — R31.9 BLOOD IN URINE: Primary | ICD-10-CM

## 2018-05-02 PROCEDURE — 76700 US EXAM ABDOM COMPLETE: CPT

## 2018-05-02 PROCEDURE — 92015 DETERMINE REFRACTIVE STATE: CPT | Performed by: STUDENT IN AN ORGANIZED HEALTH CARE EDUCATION/TRAINING PROGRAM

## 2018-05-02 PROCEDURE — 92014 COMPRE OPH EXAM EST PT 1/>: CPT | Performed by: STUDENT IN AN ORGANIZED HEALTH CARE EDUCATION/TRAINING PROGRAM

## 2018-05-02 RX ORDER — LATANOPROST 50 UG/ML
1 SOLUTION/ DROPS OPHTHALMIC AT BEDTIME
Qty: 3 BOTTLE | Refills: 4 | Status: SHIPPED | OUTPATIENT
Start: 2018-05-02 | End: 2019-07-22

## 2018-05-02 ASSESSMENT — REFRACTION_MANIFEST
OD_CYLINDER: +2.25
OS_ADD: +2.75
OD_SPHERE: -1.25
OD_ADD: +2.75
OS_SPHERE: -0.50
OS_AXIS: 180
OS_CYLINDER: +1.25
OD_AXIS: 005

## 2018-05-02 ASSESSMENT — VISUAL ACUITY
CORRECTION_TYPE: GLASSES
METHOD: SNELLEN - LINEAR
OS_CC: 20/30
OD_CC: 20/40
OD_SC: 20/70
OD_SC+: +1
OD_PH_SC: 20/40+2
OS_SC: 20/30
OD_CC+: +1

## 2018-05-02 ASSESSMENT — REFRACTION_WEARINGRX
OS_CYLINDER: SPHERE
OD_SPHERE: -1.25
OD_SPHERE: +1.75
SPECS_TYPE: OTC READERS
SPECS_TYPE: SVL
OD_CYLINDER: SPHERE
OD_AXIS: 176
OS_SPHERE: +1.75
OS_AXIS: 178
OS_SPHERE: -0.50
OD_CYLINDER: +1.75
OS_CYLINDER: +1.25

## 2018-05-02 ASSESSMENT — CUP TO DISC RATIO
OD_RATIO: 0.45
OS_RATIO: 0.5

## 2018-05-02 ASSESSMENT — CONF VISUAL FIELD
OS_NORMAL: 1
OD_NORMAL: 1

## 2018-05-02 ASSESSMENT — TONOMETRY
OD_IOP_MMHG: 13
IOP_METHOD: APPLANATION
OS_IOP_MMHG: 15

## 2018-05-02 ASSESSMENT — EXTERNAL EXAM - RIGHT EYE: OD_EXAM: NORMAL

## 2018-05-02 ASSESSMENT — EXTERNAL EXAM - LEFT EYE: OS_EXAM: NORMAL

## 2018-05-02 ASSESSMENT — SLIT LAMP EXAM - LIDS
COMMENTS: 1+ MEIBOMIAN GLAND DYSFUNCTION
COMMENTS: 1+ MEIBOMIAN GLAND DYSFUNCTION

## 2018-05-02 NOTE — MR AVS SNAPSHOT
After Visit Summary   5/2/2018    Ludy Mckenzie    MRN: [id number]           Patient Information     Date Of Birth          1940        Visit Information        Provider Department      5/2/2018 2:30 PM Arjun Valenzuela MD [facility]        Today's Diagnoses     Encounter for examination of eyes and vision with abnormal findings    -  1    Presbyopia        Type 2 diabetes mellitus with other specified complication, without long-term current use of insulin (H)        Primary open angle glaucoma of both eyes, unspecified glaucoma stage        Pseudophakia with Yag Caps ou         Posterior vitreous detachment of both eyes        Drusen of macula of both eyes          Care Instructions    Continue same medications  Glasses prescription given - optional     Arjun Valenzuela MD  [phone]    Diabetes weakens the blood vessels all over the body, including the eyes. Damage to the blood vessels in the eyes can cause swelling or bleeding into part of the eye (called the retina). This is called diabetic retinopathy (KEVEN-tin--[facility]-thee). If not treated, this disease can cause vision loss or blindness.   Symptoms may include blurred or distorted vision, but many people have no symptoms. It's important to see your eye doctor regularly to check for problems.   Early treatment and good control can help protect your vision. Here are the things you can do to help prevent vision loss:      1. Keep your blood sugar levels under tight control.      2. Bring high blood pressure under control.      3. No smoking.      4. Have yearly dilated eye exams.            Follow-ups after your visit        Follow-up notes from your care team     Return in about 6 months (around 11/2/2018) for IOP check, HVF, OCT optic nerve.      Who to contact     If you have questions or need follow up information about today's clinic visit or your schedule please contact [facility] directly at  [phone].  Normal or non-critical lab and imaging results will be communicated to you by MyChart, letter or phone within 4 business days after the clinic has received the results. If you do not hear from us within 7 days, please contact the clinic through [employer] or phone. If you have a critical or abnormal lab result, we will notify you by phone as soon as possible.  Submit refill requests through [employer] or call your pharmacy and they will forward the refill request to us. Please allow 3 business days for your refill to be completed.          Additional Information About Your Visit        [employer] Information     [employer] gives you secure access to your electronic health record. If you see a primary care provider, you can also send messages to your care team and make appointments. If you have questions, please call your primary care clinic.  If you do not have a primary care provider, please call [phone] and they will assist you.        Care EveryWhere ID     This is your Care EveryWhere ID. This could be used by other organizations to access your [place] medical records  [id number]         Blood Pressure from Last 3 Encounters:   04/25/18 110/60   03/20/18 130/58   08/30/17 115/68    Weight from Last 3 Encounters:   04/25/18 62.6 kg (138 lb)   03/20/18 64.4 kg (142 lb)   08/30/17 59 kg (130 lb 1.1 oz)              We Performed the Following     EYE EXAM (SIMPLE-NONBILLABLE)     REFRACTIVE STATUS          Where to get your medicines      These medications were sent to [facility] [id number] [facility] - [place], [place] - [address]  [address] [place] [address]     Phone:  [phone]     latanoprost 0.005 % ophthalmic solution          Primary Care Provider Office Phone # Fax #    Ce [place]o [phone] [address] [facility] [place] [address]        Equal Access to Services     IMELDA CASTILLO AH: Hadii ebonie whitakero Soalonzo, waaxda bayadaignacia, qaybta  mateo huynh sorencasandra lindarogerio crockett ah. [facility] [phone].    ATENCIÓN: Si gail conklin, tiene a aponte disposición servicios gratuitos de asistencia lingüística. Melissa al [phone].    We comply with applicable federal civil rights laws and [place] laws. We do not discriminate on the basis of race, color, national origin, age, disability, sex, sexual orientation, or gender identity.            Thank you!     Thank you for choosing [facility]  for your care. Our goal is always to provide you with excellent care. Hearing back from our patients is one way we can continue to improve our services. Please take a few minutes to complete the written survey that you may receive in the mail after your visit with us. Thank you!             Your Updated Medication List - Protect others around you: Learn how to safely use, store and throw away your medicines at www.disposemymeds.org.          This list is accurate as of 5/2/18  3:18 PM.  Always use your most recent med list.                   Brand Name Dispense Instructions for use Diagnosis    aspirin 81 MG tablet      Take 1 tablet by mouth daily.        calcium 600 MG tablet     180 tablet    Take 1 tablet by mouth 2 times daily        cholecalciferol 1000 units capsule    vitamin  -D     Take 1 capsule by mouth daily.        clobetasol propionate 0.05 % Sham    CLOBEX    118 mL    Use once daily for 2 weeks    Bullous pemphigoid       fluticasone-salmeterol 500-50 MCG/DOSE diskus inhaler    ADVAIR    3 Inhaler    Inhale 1 puff into the lungs 2 times daily    Chronic obstructive pulmonary disease, unspecified COPD type (H)       ipratropium - albuterol 0.5 mg/2.5 mg/3 mL 0.5-2.5 (3) MG/3ML neb solution    DUONEB    1 Box    Take 1 vial (3 mLs) by nebulization every 6 hours as needed for shortness of breath / dyspnea    Panlobular emphysema (H)       latanoprost 0.005 % ophthalmic solution    XALATAN    3 Bottle    Place 1 drop into  both eyes At Bedtime    Primary open angle glaucoma of both eyes, unspecified glaucoma stage       losartan 25 MG tablet    COZAAR    45 tablet    TAKE 0.5 TABLETS (12.5 MG) BY MOUTH DAILY    Microalbuminuria       multivitamin, therapeutic with minerals Tabs tablet      Take 1 tablet by mouth daily        mycophenolate 500 MG tablet    GENERIC EQUIVALENT    300 tablet    Take 2 tabs in the am and 2 tabs in the evening    Bullous pemphigoid       ondansetron 4 MG tablet    ZOFRAN    30 tablet    Take 1-2 tablets (4-8 mg) by mouth every 8 hours as needed for nausea    Nausea and vomiting, intractability of vomiting not specified, unspecified vomiting type       potassium chloride SA 20 MEQ CR tablet    KLOR-CON    90 tablet    Take 1 tablet (20 mEq) by mouth daily    Hypopotassemia       PROAIR  (90 Base) MCG/ACT Inhaler   Generic drug:  albuterol     8.5 Inhaler    INHALE TWO PUFFS BY MOUTH EVERY FOUR HOURS AS NEEDED SHORTNESS OF BREATH    Chronic obstructive pulmonary disease, unspecified COPD type (H)       rosuvastatin 5 MG tablet    CRESTOR    90 tablet    TAKE 1 TABLET (5 MG) BY MOUTH DAILY    Mixed hyperlipidemia       sertraline 100 MG tablet    ZOLOFT    135 tablet    Take 1.5 tablets (150 mg) by mouth daily    Major depressive disorder, recurrent episode, mild (H)       tiotropium 18 MCG capsule    SPIRIVA HANDIHALER    90 capsule    INHALE 1 CAPSULE INTO THE LUNGS DAILY.    Panlobular emphysema (H)       VITAMIN B 12 PO      Take by mouth daily        vitamin B complex with vitamin C Tabs tablet      Take 1 tablet by mouth daily

## 2018-05-02 NOTE — TELEPHONE ENCOUNTER
US Abdomen Complete   Status:  Final result   Visible to patient:  Yes (MyChart)   Dx:  RUQ abdominal pain; Microscopic hemat... Order: [id number]       Notes Recorded by Margarita Bee RN on 5/2/2018 at 2:24 PM  Message left on  to return call to RN hotline at [phone].   NAHUM Aguayo Started  ------    Notes Recorded by Sarahi Stafford APRN CNP on 5/2/2018 at 12:58 PM  Please call patient-    Her ultrasound does not show any acute findings.  No kidney stones noted.  No cause for her abdominal pain noted.  Is she still having pain?  Or has it improved with avoiding heavy lifting?  I would like her to return for a ua with microscopic reflex to culture in 1 week to recheck the small amount of blood in her urine.    Thanks,  Sarahi Stafford CNP     Telephone encounter started under PCP in error instead of  Prescribing provider.      Margarita Bee RN

## 2018-05-02 NOTE — TELEPHONE ENCOUNTER
Patient left message on RN hotline returning call.  Patient does still have some abdominal pain, but it is better than before.  scheduled for lab only appointment on 5/10/18.  Lab order placed.   AUBREY Mckeon RN

## 2018-05-02 NOTE — PATIENT INSTRUCTIONS
Continue same medications  Glasses prescription given - optional     Arjun Valenzuela MD  [phone]    Diabetes weakens the blood vessels all over the body, including the eyes. Damage to the blood vessels in the eyes can cause swelling or bleeding into part of the eye (called the retina). This is called diabetic retinopathy (KEVEN-tin--puh-thee). If not treated, this disease can cause vision loss or blindness.   Symptoms may include blurred or distorted vision, but many people have no symptoms. It's important to see your eye doctor regularly to check for problems.   Early treatment and good control can help protect your vision. Here are the things you can do to help prevent vision loss:      1. Keep your blood sugar levels under tight control.      2. Bring high blood pressure under control.      3. No smoking.      4. Have yearly dilated eye exams.

## 2018-05-02 NOTE — LETTER
5/2/2018         RE: Ludy Mckenzie  [address]   [facility] [place] [address]        Dear Colleague,    Thank you for referring your patient, Ludy Mckenzie, to the [facility]. Her eye exam is stable.  Please see a copy of my visit note below.     Current Eye Medications: Xalatan at night both eyes, Multivitamin daily     Subjective: here for complete today. DM, last a1c was 7.2 on 3-27-18.  Mother had AMD and Glaucoma, was almost blind at death.  Also sister with glaucoma.  She drives without glasses, wears OTC readers most of the time.  Doesn't feel these are really doing her much good but really doesn't want glasses.    Lab Results   Component Value Date    A1C 7.2 03/27/2018    A1C 6.4 06/02/2017    A1C 6.3 09/19/2016    A1C 6.3 02/11/2016    A1C 6.2 09/04/2015     Assessment:  Ludy Mckenzie is a 77 year old female who presents with:     Type 2 diabetes mellitus with other specified complication, without long-term current use of insulin (H) Negative diabetic retinopathy      Primary open angle glaucoma of both eyes, unspecified glaucoma stage IOP 13/15 on latanoprost      Pseudophakia with Yag Caps ou       Posterior vitreous detachment of both eyes      Drusen of macula of both eyes      Plan:  Continue same medications  Glasses prescription given - optional     Arjun Valenzuela MD  [phone]              Again, thank you for allowing me to participate in the care of your patient.        Sincerely,        Arjun Valenzuela MD

## 2018-05-02 NOTE — PROGRESS NOTES
Current Eye Medications: Xalatan at night both eyes, Multivitamin daily     Subjective: here for complete today. DM, last a1c was 7.2 on 3-27-18.  Mother had AMD and Glaucoma, was almost blind at death.  Also sister with glaucoma.  She drives without glasses, wears OTC readers most of the time.  Doesn't feel these are really doing her much good but really doesn't want glasses.    Lab Results   Component Value Date    A1C 7.2 03/27/2018    A1C 6.4 06/02/2017    A1C 6.3 09/19/2016    A1C 6.3 02/11/2016    A1C 6.2 09/04/2015     Assessment:  Ludy Mckenzie is a 77 year old female who presents with:     Type 2 diabetes mellitus with other specified complication, without long-term current use of insulin (H) Negative diabetic retinopathy      Primary open angle glaucoma of both eyes, unspecified glaucoma stage IOP 13/15 on latanoprost      Pseudophakia with Yag Caps ou       Posterior vitreous detachment of both eyes      Drusen of macula of both eyes      Plan:  Continue same medications  Glasses prescription given - optional     Arjun Valenzuela MD  [phone]

## 2018-05-03 NOTE — TELEPHONE ENCOUNTER
Noted.  Let's have patient continue with avoiding any heavy lifting and if pain is not resolved over the next 1-2 weeks or if it worsens she should contact clinic.    Thanks,  Sarahi Stafford, CNP

## 2018-05-10 DIAGNOSIS — R31.9 BLOOD IN URINE: ICD-10-CM

## 2018-05-11 DIAGNOSIS — R31.9 BLOOD IN URINE: ICD-10-CM

## 2018-05-11 LAB
ALBUMIN UR-MCNC: NEGATIVE MG/DL
APPEARANCE UR: CLEAR
BACTERIA #/AREA URNS HPF: ABNORMAL /HPF
BILIRUB UR QL STRIP: NEGATIVE
COLOR UR AUTO: YELLOW
GLUCOSE UR STRIP-MCNC: NEGATIVE MG/DL
HGB UR QL STRIP: ABNORMAL
KETONES UR STRIP-MCNC: NEGATIVE MG/DL
LEUKOCYTE ESTERASE UR QL STRIP: NEGATIVE
MUCOUS THREADS #/AREA URNS LPF: PRESENT /LPF
NITRATE UR QL: NEGATIVE
NON-SQ EPI CELLS #/AREA URNS LPF: ABNORMAL /LPF
PH UR STRIP: 7 PH (ref 5–7)
RBC #/AREA URNS AUTO: ABNORMAL /HPF
SOURCE: ABNORMAL
SP GR UR STRIP: 1.01 (ref 1–1.03)
UROBILINOGEN UR STRIP-ACNC: 0.2 EU/DL (ref 0.2–1)
WBC #/AREA URNS AUTO: ABNORMAL /HPF

## 2018-05-11 PROCEDURE — 81001 URINALYSIS AUTO W/SCOPE: CPT | Performed by: NURSE PRACTITIONER

## 2018-05-11 NOTE — PROGRESS NOTES
Dear Ludy,    Your recent test results are attached.      Normal urine.  No microscopic blood present.    If you have any questions please feel free to contact [phone] or myself via [employer].    Sincerely,  Sarahi Stafford, CNP

## 2018-07-16 ENCOUNTER — OFFICE VISIT (OUTPATIENT)
Dept: FAMILY MEDICINE | Facility: CLINIC | Age: 78
End: 2018-07-16
Payer: COMMERCIAL

## 2018-07-16 VITALS
HEART RATE: 85 BPM | RESPIRATION RATE: 12 BRPM | SYSTOLIC BLOOD PRESSURE: 100 MMHG | HEIGHT: 66 IN | DIASTOLIC BLOOD PRESSURE: 40 MMHG | WEIGHT: 136 LBS | TEMPERATURE: 98.7 F | BODY MASS INDEX: 21.86 KG/M2 | OXYGEN SATURATION: 97 %

## 2018-07-16 DIAGNOSIS — E11.49 TYPE 2 DIABETES MELLITUS WITH OTHER NEUROLOGIC COMPLICATION, WITHOUT LONG-TERM CURRENT USE OF INSULIN (H): ICD-10-CM

## 2018-07-16 DIAGNOSIS — F10.11 ALCOHOL ABUSE, IN REMISSION: ICD-10-CM

## 2018-07-16 DIAGNOSIS — E78.5 HYPERLIPIDEMIA LDL GOAL <100: ICD-10-CM
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
## 2018-07-16 PROCEDURE — 99213 OFFICE O/P EST LOW 20 MIN: CPT | Performed by: FAMILY MEDICINE

## 2018-07-16 RX ORDER — DOXYCYCLINE 100 MG/1
100 CAPSULE ORAL 2 TIMES DAILY
Qty: 20 CAPSULE | Refills: 0 | Status: SHIPPED | OUTPATIENT
Start: 2018-07-16 | End: 2018-07-26

## 2018-07-16 RX ORDER — METRONIDAZOLE 500 MG/1
500 TABLET ORAL 3 TIMES DAILY
Qty: 30 TABLET | Refills: 0 | Status: SHIPPED | OUTPATIENT
Start: 2018-07-16 | End: 2018-07-26

## 2018-07-16 NOTE — PROGRESS NOTES
SUBJECTIVE:   Ludy Mckenzie is a 77 year old female who presents to clinic today for the following health issues:        Chief Complaint   Patient presents with     Cat Bite     top of right hand     2 days ago  Now has redness and swelling  No fever or chills  Pt is a known Diabetic   accuchecks are stable   She has a follow up appointment with PMD next week  Pt also due for Lipid check        Problem list and histories reviewed & adjusted, as indicated.  Additional history: as documented    Patient Active Problem List   Diagnosis     Colon polyp     Alcohol abuse, in remission     Hyperlipidemia LDL goal <100     Cerebral infarction (H)     Thyroid nodule     Seasonal allergic rhinitis     Major depressive disorder, recurrent episode, mild (H)     Health Care Home     Bullous pemphigoid     Hypopotassemia     Type 2 diabetes mellitus with other diabetic neurological complication     Glaucoma     Chronic obstructive pulmonary disease, unspecified COPD type (H)     Chronic kidney disease (CKD) stage G1/A2, glomerular filtration rate (GFR) equal to or greater than 90 mL/min/1.73 square meter and albuminuria creatinine ratio between  mg/g     Loss of balance     Diabetic neuropathy with neurologic complication (H)     Pseudophakia of both eyes     Urge incontinence of urine     Cramp of limb     Right sided sciatica     Pulmonary emphysema, unspecified emphysema type (H)     Age-related osteoporosis without current pathological fracture     Pulmonary nodules     Past Surgical History:   Procedure Laterality Date     BIOPSY       BUNIONECTOMY  1960    JACQUELINE     CATARACT IOL, RT/LT  2010    right     COLONOSCOPY  2/22/2013    Procedure: COLONOSCOPY;  Colonoscopy ;  Surgeon: Hugo Minor MD;  Location:  GI       Social History   Substance Use Topics     Smoking status: Former Smoker     Packs/day: 1.50     Years: 50.00     Types: Cigarettes     Quit date: 7/22/2009     Smokeless tobacco: Never Used      Alcohol use 2.4 oz/week     4 Standard drinks or equivalent per week      Comment: beer 1-2 2 times a week     Family History   Problem Relation Age of Onset     Cancer Maternal Grandfather      bone     Diabetes Mother 50     dx age 50, hip fracture     Glaucoma Mother      Macular Degeneration Mother      C.A.D. Father      pacemaker     Glaucoma Sister          Current Outpatient Prescriptions   Medication Sig Dispense Refill     ADVAIR DISKUS 500-50 MCG/DOSE diskus inhaler INHALE 1 PUFF INTO THE LUNGS 2 TIMES DAILY 3 Inhaler 1     ALBUTEROL 108 (90 BASE) MCG/ACT inhaler INHALE TWO PUFFS BY MOUTH EVERY FOUR HOURS AS NEEDED SHORTNESS OF BREATH 8.5 Inhaler 1     aspirin 81 MG tablet Take 1 tablet by mouth daily.       calcium 600 MG tablet Take 1 tablet by mouth 2 times daily 180 tablet 3     cholecalciferol (VITAMIN D3) 1000 UNIT capsule Take 1 capsule by mouth daily.       clobetasol propionate (CLOBEX) 0.05 % SHAM Use once daily for 2 weeks 118 mL 0     doxycycline (VIBRAMYCIN) 100 MG capsule Take 1 capsule (100 mg) by mouth 2 times daily for 10 days 20 capsule 0     ipratropium - albuterol 0.5 mg/2.5 mg/3 mL (DUONEB) 0.5-2.5 (3) MG/3ML nebulization Take 1 vial (3 mLs) by nebulization every 6 hours as needed for shortness of breath / dyspnea 1 Box 3     latanoprost (XALATAN) 0.005 % ophthalmic solution Place 1 drop into both eyes At Bedtime 3 Bottle 4     losartan (COZAAR) 25 MG tablet TAKE 0.5 TABLETS (12.5 MG) BY MOUTH DAILY 45 tablet 1     metroNIDAZOLE (FLAGYL) 500 MG tablet Take 1 tablet (500 mg) by mouth 3 times daily for 10 days 30 tablet 0     multivitamin, therapeutic with minerals (THERA-VIT-M) TABS Take 1 tablet by mouth daily       potassium chloride SA (POTASSIUM CHLORIDE) 20 MEQ CR tablet Take 1 tablet (20 mEq) by mouth daily 90 tablet 4     rosuvastatin (CRESTOR) 5 MG tablet TAKE 1 TABLET (5 MG) BY MOUTH DAILY 90 tablet 0     tiotropium (SPIRIVA HANDIHALER) 18 MCG capsule INHALE 1 CAPSULE INTO THE  LUNGS DAILY. 90 capsule 3     vitamin  B complex with vitamin C (VITAMIN  B COMPLEX) TABS Take 1 tablet by mouth daily       Cyanocobalamin (VITAMIN B 12 PO) Take by mouth daily       mycophenolate (GENERIC EQUIVALENT) 500 MG tablet Take 2 tabs in the am and 2 tabs in the evening (Patient not taking: Reported on 4/25/2018) 300 tablet 0     sertraline (ZOLOFT) 100 MG tablet Take 1.5 tablets (150 mg) by mouth daily 135 tablet 3     [DISCONTINUED] fluticasone-salmeterol (ADVAIR) 500-50 MCG/DOSE diskus inhaler Inhale 1 puff into the lungs 2 times daily 3 Inhaler 2     Allergies   Allergen Reactions     Amoxicillin GI Disturbance     Augmentin Nausea and Vomiting     Iodine Itching and Swelling     Pt is ok with ct contrast dye (isovue 370)     Mercury Unknown     Penicillins Unknown     Recent Labs   Lab Test  04/25/18   1250  03/27/18   1247  11/30/17   0838   07/21/17   0912   07/13/17   0751  07/10/17   1741  06/02/17   0853   09/19/16   1035   A1C   --   7.2*   --    --    --    --    --    --   6.4*   --   6.3*   LDL   --    --    --    --    --    --   47   --   195*   --   159*   HDL   --    --    --    --    --    --   26*   --   40*   --   41*   TRIG   --    --    --    --    --    --   87   --   191*   --   222*   ALT  28  31  26   < >   --    < >  37   --   28   < >  26   CR   --   0.53   --    --    --    --    --   0.50*  0.57   --   0.53   GFRESTIMATED   --   >90   --    --    --    --    --   >90  Non  GFR Calc    >90  Non  GFR Calc     --   >90  Non  GFR Calc     GFRESTBLACK   --   >90   --    --    --    --    --   >90   GFR Calc    >90   GFR Calc     --   >90   GFR Calc     POTASSIUM   --   4.1   --    --   4.0   --   4.2  2.8*  4.2   --   4.2   TSH   --   3.44   --    --    --    --    --    --   3.52   --   2.50    < > = values in this interval not displayed.      BP Readings from Last 3 Encounters:  "  07/16/18 100/40   04/25/18 110/60   03/20/18 130/58    Wt Readings from Last 3 Encounters:   07/16/18 136 lb (61.7 kg)   04/25/18 138 lb (62.6 kg)   03/20/18 142 lb (64.4 kg)                  Labs reviewed in EPIC    Reviewed and updated as needed this visit by clinical staff  Allergies  Meds       Reviewed and updated as needed this visit by Provider         ROS:  CONSTITUTIONAL: NEGATIVE for fever, chills, change in weight  ENT/MOUTH: NEGATIVE for ear, mouth and throat problems  RESP: NEGATIVE for significant cough or SOB  CV: NEGATIVE for chest pain, palpitations or peripheral edema  GI: NEGATIVE for nausea, abdominal pain, heartburn, or change in bowel habits  MUSCULOSKELETAL: as above    OBJECTIVE:     /40  Pulse 85  Temp 98.7  F (37.1  C) (Tympanic)  Resp 12  Ht 5' 6\" (1.676 m)  Wt 136 lb (61.7 kg)  SpO2 97%  Breastfeeding? No  BMI 21.95 kg/m2  Body mass index is 21.95 kg/(m^2).  GENERAL: healthy, alert and no distress  RESP: lungs clear to auscultation - no rales, rhonchi or wheezes  CV: regular rate and rhythm, normal S1 S2, no S3 or S4, no murmur, click or rub, no peripheral edema and peripheral pulses strong  ABDOMEN: soft, nontender, no hepatosplenomegaly, no masses and bowel sounds normal  Right hand erythema  No tenderness  Distal CMS is intact    Diagnostic Test Results:  Pending     ASSESSMENT/PLAN:         1. Cat bite, initial encounter  SEE [facility] care orders  The potential side effects of this medication have been discussed with the patient.  Call if any significant problems with these are experienced.  Follow up 1 week  -sooner if worse  - doxycycline (VIBRAMYCIN) 100 MG capsule; Take 1 capsule (100 mg) by mouth 2 times daily for 10 days  Dispense: 20 capsule; Refill: 0  - metroNIDAZOLE (FLAGYL) 500 MG tablet; Take 1 tablet (500 mg) by mouth 3 times daily for 10 days  Dispense: 30 tablet; Refill: 0  Discussed absolutely no alcohol with flagyl -pt says she quit 2 years ago  2. " Alcohol abuse, in remission  As above    3. Type 2 diabetes mellitus with other neurologic complication, without long-term current use of insulin (H)  Stable     4. Hyperlipidemia LDL goal <100  Discussed she is due for labs  - Lipid panel reflex to direct LDL Fasting; Future  Pt UTD with dT  Delisa Moffett MD  [facility]

## 2018-07-16 NOTE — MR AVS SNAPSHOT
After Visit Summary   7/16/2018    Ludy Mckenzie    MRN: [id number]           Patient Information     Date Of Birth          1940        Visit Information        Provider Department      7/16/2018 9:40 AM Delisa Moffett MD [facility]        Today's Diagnoses     Cat bite, initial encounter    -  1    Alcohol abuse, in remission        Hyperlipidemia LDL goal <100        Type 2 diabetes mellitus with other neurologic complication, without long-term current use of insulin (H)          Care Instructions    [facility]    If you have any questions regarding to your visit please contact your care team:       Team Red:   Clinic Hours Telephone Number   Dr. Beth Amanda, NP   7am-7pm  Monday - Thursday   7am-5pm  Fridays  [phone]  (Appointment scheduling available 24/7)    Questions about your recent visit?   Team Line  [phone]   Urgent Care - [place] and [facility] - 11am-9pm Monday-Friday Saturday-Sunday- 9am-5pm   [place] - 5pm-9pm Monday-Friday Saturday-Sunday- 9am-5pm  [phone] - [place]  [phone] - [place]       What options do I have for a visit other than an office visit? We offer electronic visits (e-visits) and telephone visits, when medically appropriate.  Please check with your medical insurance to see if these types of visits are covered, as you will be responsible for any charges that are not paid by your insurance.      You can use [email or web address] (secure electronic communication) to access to your chart, send your primary care provider a message, or make an appointment. Ask a team member how to get started.     For a price quote for your services, please call our Consumer Price Line at [phone] or our Imaging Cost estimation line at [phone] (for imaging tests).              Follow-ups after your visit        Your next 10 appointments already scheduled     Jul 30,  "2018  5:15 PM CDT   PHYSICAL with Ce Cano MD   [facility] [place] ([facility])    [address]  [place] [id number] [phone]              Future tests that were ordered for you today     Open Future Orders        Priority Expected Expires Ordered    Lipid panel reflex to direct LDL Fasting Routine 8/27/2018 7/16/2019 7/16/2018            Who to contact     If you have questions or need follow up information about today's clinic visit or your schedule please contact [facility] directly at [phone].  Normal or non-critical lab and imaging results will be communicated to you by [employer], letter or phone within 4 business days after the clinic has received the results. If you do not hear from us within 7 days, please contact the clinic through [employer] or phone. If you have a critical or abnormal lab result, we will notify you by phone as soon as possible.  Submit refill requests through backstitch or call your pharmacy and they will forward the refill request to us. Please allow 3 business days for your refill to be completed.          Additional Information About Your Visit        [employer] Information     backstitch gives you secure access to your electronic health record. If you see a primary care provider, you can also send messages to your care team and make appointments. If you have questions, please call your primary care clinic.  If you do not have a primary care provider, please call [phone] and they will assist you.        Care EveryWhere ID     This is your Care EveryWhere ID. This could be used by other organizations to access your [facility] medical records  [id number]        Your Vitals Were     Pulse Temperature Respirations Height Pulse Oximetry Breastfeeding?    85 98.7  F (37.1  C) (Tympanic) 12 5' 6\" (1.676 m) 97% No    BMI (Body Mass Index)                   21.95 kg/m2            Blood Pressure from Last 3 Encounters:   07/16/18 100/40 "   04/25/18 110/60   03/20/18 130/58    Weight from Last 3 Encounters:   07/16/18 136 lb (61.7 kg)   04/25/18 138 lb (62.6 kg)   03/20/18 142 lb (64.4 kg)                 Today's Medication Changes          These changes are accurate as of 7/16/18 10:30 AM.  If you have any questions, ask your nurse or doctor.               Start taking these medicines.        Dose/Directions    doxycycline 100 MG capsule   Commonly known as:  VIBRAMYCIN   Used for:  Cat bite, initial encounter   Started by:  Delisa Moffett MD        Dose:  100 mg   Take 1 capsule (100 mg) by mouth 2 times daily for 10 days   Quantity:  20 capsule   Refills:  0       metroNIDAZOLE 500 MG tablet   Commonly known as:  FLAGYL   Used for:  Cat bite, initial encounter   Started by:  Delisa Moffett MD        Dose:  500 mg   Take 1 tablet (500 mg) by mouth 3 times daily for 10 days   Quantity:  30 tablet   Refills:  0            Where to get your medicines      These medications were sent to [facility] [address] [place] [address]  [address] [place] [address] [address], [facility] [address]     Phone:  [phone]     doxycycline 100 MG capsule    metroNIDAZOLE 500 MG tablet                Primary Care Provider Office Phone # Fax #    Ce Belinda Cano [phone]       [address] [place] [address]        Equal Access to Services     [facility] AH: Hadii ebonie espinoza hadasho Soalonzo, waaxda luqadaha, qaybta kaalmada gael, mateo kate. So [facility] [phone].    ATENCIÓN: Si habla español, tiene a aponte disposición servicios gratuitos de asistencia lingüística. Melissa al [phone].    We comply with applicable federal civil rights laws and [place] laws. We do not discriminate on the basis of race, color, national origin, age, disability, sex, sexual orientation, or gender identity.            Thank you!     Thank you for choosing [facility]  for your care. Our goal is  always to provide you with excellent care. Hearing back from our patients is one way we can continue to improve our services. Please take a few minutes to complete the written survey that you may receive in the mail after your visit with us. Thank you!             Your Updated Medication List - Protect others around you: Learn how to safely use, store and throw away your medicines at www.disposemymeds.org.          This list is accurate as of 7/16/18 10:30 AM.  Always use your most recent med list.                   Brand Name Dispense Instructions for use Diagnosis    ADVAIR DISKUS 500-50 MCG/DOSE diskus inhaler   Generic drug:  fluticasone-salmeterol     3 Inhaler    INHALE 1 PUFF INTO THE LUNGS 2 TIMES DAILY    COPD (chronic obstructive pulmonary disease) (H)       aspirin 81 MG tablet      Take 1 tablet by mouth daily.        calcium 600 MG tablet     180 tablet    Take 1 tablet by mouth 2 times daily        cholecalciferol 1000 units capsule    vitamin  -D     Take 1 capsule by mouth daily.        clobetasol propionate 0.05 % Sham    CLOBEX    118 mL    Use once daily for 2 weeks    Bullous pemphigoid       doxycycline 100 MG capsule    VIBRAMYCIN    20 capsule    Take 1 capsule (100 mg) by mouth 2 times daily for 10 days    Cat bite, initial encounter       ipratropium - albuterol 0.5 mg/2.5 mg/3 mL 0.5-2.5 (3) MG/3ML neb solution    DUONEB    1 Box    Take 1 vial (3 mLs) by nebulization every 6 hours as needed for shortness of breath / dyspnea    Panlobular emphysema (H)       latanoprost 0.005 % ophthalmic solution    XALATAN    3 Bottle    Place 1 drop into both eyes At Bedtime    Primary open angle glaucoma of both eyes, unspecified glaucoma stage       losartan 25 MG tablet    COZAAR    45 tablet    TAKE 0.5 TABLETS (12.5 MG) BY MOUTH DAILY    Microalbuminuria       metroNIDAZOLE 500 MG tablet    FLAGYL    30 tablet    Take 1 tablet (500 mg) by mouth 3 times daily for 10 days    Cat bite, initial  encounter       multivitamin, therapeutic with minerals Tabs tablet      Take 1 tablet by mouth daily        mycophenolate 500 MG tablet    GENERIC EQUIVALENT    300 tablet    Take 2 tabs in the am and 2 tabs in the evening    Bullous pemphigoid       potassium chloride SA 20 MEQ CR tablet    KLOR-CON    90 tablet    Take 1 tablet (20 mEq) by mouth daily    Hypopotassemia       PROAIR  (90 Base) MCG/ACT Inhaler   Generic drug:  albuterol     8.5 Inhaler    INHALE TWO PUFFS BY MOUTH EVERY FOUR HOURS AS NEEDED SHORTNESS OF BREATH    Chronic obstructive pulmonary disease, unspecified COPD type (H)       rosuvastatin 5 MG tablet    CRESTOR    90 tablet    TAKE 1 TABLET (5 MG) BY MOUTH DAILY    Mixed hyperlipidemia       sertraline 100 MG tablet    ZOLOFT    135 tablet    Take 1.5 tablets (150 mg) by mouth daily    Major depressive disorder, recurrent episode, mild (H)       tiotropium 18 MCG capsule    SPIRIVA HANDIHALER    90 capsule    INHALE 1 CAPSULE INTO THE LUNGS DAILY.    Panlobular emphysema (H)       VITAMIN B 12 PO      Take by mouth daily        vitamin B complex with vitamin C Tabs tablet      Take 1 tablet by mouth daily

## 2018-07-16 NOTE — PATIENT INSTRUCTIONS
[facility]    If you have any questions regarding to your visit please contact your care team:       Team Red:   Clinic Hours Telephone Number   Dr. Beth Amanda, NP   7am-7pm  Monday - Thursday   7am-5pm  Fridays  [phone]  (Appointment scheduling available 24/7)    Questions about your recent visit?   Team Line  [phone]   Urgent Care - [place] and [facility] - 11am-9pm Monday-Friday Saturday-Sunday- 9am-5pm   [place] - 5pm-9pm Monday-Friday Saturday-Sunday- 9am-5pm  [phone] - [place]  [phone] - [place]       What options do I have for a visit other than an office visit? We offer electronic visits (e-visits) and telephone visits, when medically appropriate.  Please check with your medical insurance to see if these types of visits are covered, as you will be responsible for any charges that are not paid by your insurance.      You can use [employer] (secure electronic communication) to access to your chart, send your primary care provider a message, or make an appointment. Ask a team member how to get started.     For a price quote for your services, please call our Consumer Price Line at [phone] or our Imaging Cost estimation line at [phone] (for imaging tests).

## 2018-07-17 ENCOUNTER — TELEPHONE (OUTPATIENT)
Dept: FAMILY MEDICINE | Facility: CLINIC | Age: 78
End: 2018-07-17

## 2018-07-17 ASSESSMENT — PATIENT HEALTH QUESTIONNAIRE - PHQ9: SUM OF ALL RESPONSES TO PHQ QUESTIONS 1-9: 2

## 2018-07-17 NOTE — TELEPHONE ENCOUNTER
Spoke with pt. States last night she took the med with food and it didn't bother her. This am didn't eat anything and took her pills. Advised to try taking medication with food again tonight and continue to stay hydrated. Diarrhea is a side effect and is the body's way of getting rid of the infection. If she has an episode of vomiting again, she should call to let us know. Pt agrees with this plan.    Rhoda Bates RN  [facility]

## 2018-07-17 NOTE — TELEPHONE ENCOUNTER
Reason for call:  Med question  Patient called regarding (reason for call): prescription-doxycyline made her sick after taking it. She threw up 10 min after taking it today. She did not throw up last night but had diurea this am. She would like a call back to discuss further  Additional comments: Please call    Phone number to reach eyredeu-[phone]    Best Time: anytime    Can we leave a detailed message on this number?  YES

## 2018-07-27 ENCOUNTER — DOCUMENTATION ONLY (OUTPATIENT)
Dept: LAB | Facility: CLINIC | Age: 78
End: 2018-07-27

## 2018-07-27 ENCOUNTER — DOCUMENTATION ONLY (OUTPATIENT)
Dept: LAB | Facility: CLINIC | Age: 78
End: 2018-07-27
Payer: COMMERCIAL

## 2018-07-27 DIAGNOSIS — E78.5 HYPERLIPIDEMIA LDL GOAL <100: ICD-10-CM

## 2018-07-27 DIAGNOSIS — N18.1 CHRONIC KIDNEY DISEASE (CKD) STAGE G1/A2, GLOMERULAR FILTRATION RATE (GFR) EQUAL TO OR GREATER THAN 90 ML/MIN/1.73 SQUARE METER AND ALBUMINURIA CREATININE RATIO BETWEEN 30-299 MG/G: Primary | ICD-10-CM

## 2018-07-27 DIAGNOSIS — N18.2 TYPE 2 DIABETES MELLITUS WITH STAGE 2 CHRONIC KIDNEY DISEASE, WITHOUT LONG-TERM CURRENT USE OF INSULIN (H): ICD-10-CM

## 2018-07-27 DIAGNOSIS — E11.22 TYPE 2 DIABETES MELLITUS WITH STAGE 2 CHRONIC KIDNEY DISEASE, WITHOUT LONG-TERM CURRENT USE OF INSULIN (H): ICD-10-CM

## 2018-07-27 LAB
ANION GAP SERPL CALCULATED.3IONS-SCNC: 8 MMOL/L (ref 3–14)
BUN SERPL-MCNC: 18 MG/DL (ref 7–30)
CALCIUM SERPL-MCNC: 8.6 MG/DL (ref 8.5–10.1)
CHLORIDE SERPL-SCNC: 108 MMOL/L (ref 94–109)
CHOLEST SERPL-MCNC: 135 MG/DL
CO2 SERPL-SCNC: 25 MMOL/L (ref 20–32)
CREAT SERPL-MCNC: 0.56 MG/DL (ref 0.52–1.04)
GFR SERPL CREATININE-BSD FRML MDRD: >90 ML/MIN/1.7M2
GLUCOSE SERPL-MCNC: 143 MG/DL (ref 70–99)
HBA1C MFR BLD: 6.6 % (ref 0–5.6)
HDLC SERPL-MCNC: 35 MG/DL
LDLC SERPL CALC-MCNC: 69 MG/DL
NONHDLC SERPL-MCNC: 100 MG/DL
POTASSIUM SERPL-SCNC: 3.9 MMOL/L (ref 3.4–5.3)
SODIUM SERPL-SCNC: 141 MMOL/L (ref 133–144)
TRIGL SERPL-MCNC: 154 MG/DL

## 2018-07-27 PROCEDURE — 80048 BASIC METABOLIC PNL TOTAL CA: CPT | Performed by: INTERNAL MEDICINE

## 2018-07-27 PROCEDURE — 83036 HEMOGLOBIN GLYCOSYLATED A1C: CPT | Performed by: INTERNAL MEDICINE

## 2018-07-27 PROCEDURE — 80061 LIPID PANEL: CPT | Performed by: FAMILY MEDICINE

## 2018-07-27 PROCEDURE — 36415 COLL VENOUS BLD VENIPUNCTURE: CPT | Performed by: FAMILY MEDICINE

## 2018-07-27 NOTE — PROGRESS NOTES
Patient had a lab appointment today 7/27/18, she is requesting an A1c test. Lab collected extra blood samples. Please place order as future if testing is needed.  Thank you.    VICKY JUNIOR

## 2018-07-30 ENCOUNTER — OFFICE VISIT (OUTPATIENT)
Dept: INTERNAL MEDICINE | Facility: CLINIC | Age: 78
End: 2018-07-30
Payer: COMMERCIAL

## 2018-07-30 VITALS
BODY MASS INDEX: 21.78 KG/M2 | OXYGEN SATURATION: 96 % | SYSTOLIC BLOOD PRESSURE: 104 MMHG | WEIGHT: 135.5 LBS | DIASTOLIC BLOOD PRESSURE: 56 MMHG | RESPIRATION RATE: 11 BRPM | TEMPERATURE: 97.7 F | HEIGHT: 66 IN | HEART RATE: 78 BPM

## 2018-07-30 DIAGNOSIS — F33.0 MAJOR DEPRESSIVE DISORDER, RECURRENT EPISODE, MILD (H): ICD-10-CM

## 2018-07-30 DIAGNOSIS — N18.1 CHRONIC KIDNEY DISEASE (CKD) STAGE G1/A2, GLOMERULAR FILTRATION RATE (GFR) EQUAL TO OR GREATER THAN 90 ML/MIN/1.73 SQUARE METER AND ALBUMINURIA CREATININE RATIO BETWEEN 30-299 MG/G: ICD-10-CM

## 2018-07-30 DIAGNOSIS — J44.9 CHRONIC OBSTRUCTIVE PULMONARY DISEASE, UNSPECIFIED COPD TYPE (H): ICD-10-CM

## 2018-07-30 DIAGNOSIS — F10.21 ALCOHOLISM IN REMISSION (H): ICD-10-CM

## 2018-07-30 DIAGNOSIS — E11.49 DIABETIC NEUROPATHY WITH NEUROLOGIC COMPLICATION (H): ICD-10-CM

## 2018-07-30 DIAGNOSIS — R80.9 MICROALBUMINURIA: ICD-10-CM

## 2018-07-30 DIAGNOSIS — E87.6 HYPOPOTASSEMIA: ICD-10-CM

## 2018-07-30 DIAGNOSIS — E11.40 DIABETIC NEUROPATHY WITH NEUROLOGIC COMPLICATION (H): ICD-10-CM

## 2018-07-30 DIAGNOSIS — E78.5 HYPERLIPIDEMIA LDL GOAL <100: ICD-10-CM

## 2018-07-30 DIAGNOSIS — E11.69 TYPE 2 DIABETES MELLITUS WITH OTHER SPECIFIED COMPLICATION, WITHOUT LONG-TERM CURRENT USE OF INSULIN (H): ICD-10-CM

## 2018-07-30 DIAGNOSIS — E78.2 MIXED HYPERLIPIDEMIA: ICD-10-CM

## 2018-07-30 DIAGNOSIS — Z00.00 ROUTINE GENERAL MEDICAL EXAMINATION AT A HEALTH CARE FACILITY: Primary | ICD-10-CM

## 2018-07-30 PROCEDURE — 99207 C FOOT EXAM  NO CHARGE: CPT | Performed by: INTERNAL MEDICINE

## 2018-07-30 PROCEDURE — G0439 PPPS, SUBSEQ VISIT: HCPCS | Performed by: INTERNAL MEDICINE

## 2018-07-30 RX ORDER — POTASSIUM CHLORIDE 1500 MG/1
20 TABLET, EXTENDED RELEASE ORAL DAILY
Qty: 90 TABLET | Refills: 4 | Status: SHIPPED | OUTPATIENT
Start: 2018-07-30 | End: 2019-09-13

## 2018-07-30 RX ORDER — LOSARTAN POTASSIUM 25 MG/1
TABLET ORAL
Qty: 45 TABLET | Refills: 3 | Status: SHIPPED | OUTPATIENT
Start: 2018-07-30 | End: 2019-01-07

## 2018-07-30 RX ORDER — ROSUVASTATIN CALCIUM 5 MG/1
TABLET, COATED ORAL
Qty: 90 TABLET | Refills: 3 | Status: SHIPPED | OUTPATIENT
Start: 2018-07-30 | End: 2019-01-07

## 2018-07-30 RX ORDER — SERTRALINE HYDROCHLORIDE 100 MG/1
100 TABLET, FILM COATED ORAL DAILY
Qty: 90 TABLET | Refills: 3 | Status: SHIPPED | OUTPATIENT
Start: 2018-07-30 | End: 2019-01-07

## 2018-07-30 RX ORDER — ALBUTEROL SULFATE 90 UG/1
AEROSOL, METERED RESPIRATORY (INHALATION)
Qty: 8.5 INHALER | Refills: 3 | Status: SHIPPED | OUTPATIENT
Start: 2018-07-30 | End: 2019-09-13

## 2018-07-30 NOTE — PROGRESS NOTES
SUBJECTIVE:   CC: Ludy Mckenzie is an 77 year old woman who presents for preventive health visit.     Healthy Habits:    Do you get at least three servings of calcium containing foods daily (dairy, green leafy vegetables, etc.)?no, taking calcium and/or vitamin D supplement: yes     Amount of exercise or daily activities, outside of work: 7 day(s) per week, 10 minutes or more daily    Problems taking medications regularly No    Medication side effects: No    Have you had an eye exam in the past two years? yes    Do you see a dentist twice per year? yes    Do you have sleep apnea, excessive snoring or daytime drowsiness?no    Hairstylist noticed a rash on her head.        Today's PHQ-2 Score:   PHQ-2 ( 1999 [employer]) 7/16/2018 9/19/2016   Q1: Little interest or pleasure in doing things 0 0   Q2: Feeling down, depressed or hopeless 0 0   PHQ-2 Score 0 0       Abuse: Current or Past(Physical, Sexual or Emotional)- No  Do you feel safe in your environment - Yes    Social History   Substance Use Topics     Smoking status: Former Smoker     Packs/day: 1.50     Years: 50.00     Types: Cigarettes     Quit date: 7/22/2009     Smokeless tobacco: Never Used     Alcohol use 2.4 oz/week     4 Standard drinks or equivalent per week      Comment: beer 1-2 2 times a week     If you drink alcohol do you typically have >3 drinks per day or >7 drinks per week? No                     Reviewed orders with patient.  Reviewed health maintenance and updated orders accordingly - Yes  Labs reviewed in EPIC  BP Readings from Last 3 Encounters:   07/30/18 104/56   07/16/18 100/40   04/25/18 110/60    Wt Readings from Last 3 Encounters:   07/30/18 135 lb 8 oz (61.5 kg)   07/16/18 136 lb (61.7 kg)   04/25/18 138 lb (62.6 kg)                  Patient Active Problem List   Diagnosis     Colon polyp     Alcohol abuse, in remission     Hyperlipidemia LDL goal <100     Cerebral infarction (H)     Thyroid nodule     Seasonal allergic rhinitis      Major depressive disorder, recurrent episode, mild (H)     Health Care Home     Bullous pemphigoid     Hypopotassemia     Type 2 diabetes mellitus with other diabetic neurological complication     Glaucoma     Chronic obstructive pulmonary disease, unspecified COPD type (H)     Chronic kidney disease (CKD) stage G1/A2, glomerular filtration rate (GFR) equal to or greater than 90 mL/min/1.73 square meter and albuminuria creatinine ratio between  mg/g     Loss of balance     Diabetic neuropathy with neurologic complication (H)     Pseudophakia of both eyes     Urge incontinence of urine     Cramp of limb     Right sided sciatica     Pulmonary emphysema, unspecified emphysema type (H)     Age-related osteoporosis without current pathological fracture     Pulmonary nodules     Past Surgical History:   Procedure Laterality Date     BIOPSY       BUNIONECTOMY  1960    JACQUELINE     CATARACT IOL, RT/LT  2010    right     COLONOSCOPY  2/22/2013    Procedure: COLONOSCOPY;  Colonoscopy ;  Surgeon: Hugo Minor MD;  Location:  GI       Social History   Substance Use Topics     Smoking status: Former Smoker     Packs/day: 1.50     Years: 50.00     Types: Cigarettes     Quit date: 7/22/2009     Smokeless tobacco: Never Used     Alcohol use 2.4 oz/week     4 Standard drinks or equivalent per week      Comment: beer 1-2 2 times a week     Family History   Problem Relation Age of Onset     Cancer Maternal Grandfather      bone     Diabetes Mother 50     dx age 50, hip fracture     Glaucoma Mother      Macular Degeneration Mother      C.A.D. Father      pacemaker     Glaucoma Sister          Current Outpatient Prescriptions   Medication Sig Dispense Refill     ADVAIR DISKUS 500-50 MCG/DOSE diskus inhaler INHALE 1 PUFF INTO THE LUNGS 2 TIMES DAILY 3 Inhaler 1     ALBUTEROL 108 (90 BASE) MCG/ACT inhaler INHALE TWO PUFFS BY MOUTH EVERY FOUR HOURS AS NEEDED SHORTNESS OF BREATH 8.5 Inhaler 1     aspirin 81 MG tablet Take 1 tablet  by mouth daily.       calcium 600 MG tablet Take 1 tablet by mouth 2 times daily 180 tablet 3     cholecalciferol (VITAMIN D3) 1000 UNIT capsule Take 1 capsule by mouth daily.       ipratropium - albuterol 0.5 mg/2.5 mg/3 mL (DUONEB) 0.5-2.5 (3) MG/3ML nebulization Take 1 vial (3 mLs) by nebulization every 6 hours as needed for shortness of breath / dyspnea 1 Box 3     latanoprost (XALATAN) 0.005 % ophthalmic solution Place 1 drop into both eyes At Bedtime 3 Bottle 4     losartan (COZAAR) 25 MG tablet TAKE 0.5 TABLETS (12.5 MG) BY MOUTH DAILY 45 tablet 1     multivitamin, therapeutic with minerals (THERA-VIT-M) TABS Take 1 tablet by mouth daily       potassium chloride SA (POTASSIUM CHLORIDE) 20 MEQ CR tablet Take 1 tablet (20 mEq) by mouth daily 90 tablet 4     rosuvastatin (CRESTOR) 5 MG tablet TAKE 1 TABLET (5 MG) BY MOUTH DAILY 90 tablet 0     sertraline (ZOLOFT) 100 MG tablet Take 1.5 tablets (150 mg) by mouth daily 135 tablet 3     tiotropium (SPIRIVA HANDIHALER) 18 MCG capsule INHALE 1 CAPSULE INTO THE LUNGS DAILY. 90 capsule 3     vitamin  B complex with vitamin C (VITAMIN  B COMPLEX) TABS Take 1 tablet by mouth daily       clobetasol propionate (CLOBEX) 0.05 % SHAM Use once daily for 2 weeks (Patient not taking: Reported on 7/30/2018) 118 mL 0     Cyanocobalamin (VITAMIN B 12 PO) Take by mouth daily       mycophenolate (GENERIC EQUIVALENT) 500 MG tablet Take 2 tabs in the am and 2 tabs in the evening (Patient not taking: Reported on 4/25/2018) 300 tablet 0     Allergies   Allergen Reactions     Amoxicillin GI Disturbance     Augmentin Nausea and Vomiting     Iodine Itching and Swelling     Pt is ok with ct contrast dye (isovue 370)     Mercury Unknown     Penicillins Unknown     Recent Labs   Lab Test  07/27/18   1055  04/25/18   1250  03/27/18   1247  11/30/17   0838   07/13/17   0751   06/02/17   0853   A1C  6.6*   --   7.2*   --    --    --    --   6.4*   LDL  69   --    --    --    --   47   --   195*    HDL  35*   --    --    --    --   26*   --   40*   TRIG  154*   --    --    --    --   87   --   191*   ALT   --   28  31  26   < >  37   --   28   CR  0.56   --   0.53   --    --    --    < >  0.57   GFRESTIMATED  >90   --   >90   --    --    --    < >  >90  Non  GFR Calc     GFRESTBLACK  >90   --   >90   --    --    --    < >  >90   GFR Calc     POTASSIUM  3.9   --   4.1   --    < >  4.2   < >  4.2   TSH   --    --   3.44   --    --    --    --   3.52    < > = values in this interval not displayed.        Patient over age 50, mutual decision to screen reflected in health maintenance.    Pertinent mammograms are reviewed under the imaging tab.  History of abnormal Pap smear: NO - age 65 - see link Cervical Cytology Screening Guidelines  PAP / HPV 3/12/2007   PAP NIL     Reviewed and updated as needed this visit by clinical staff  Tobacco  Allergies  Meds  Med Hx  Surg Hx  Fam Hx  Soc Hx      cologuard was done last year.   Reviewed and updated as needed this visit by Provider        Past Medical History:   Diagnosis Date     Acute ischemic left TREMAYNE stroke (H) 2009     Astigmatism, unspecified      Bullous pemphigoid      COPD (chronic obstructive pulmonary disease) (H)      CVA (cerebral infarction) 8/09     Depressive disorder      Diabetes (H)      Family history of diabetes mellitus      Glaucoma (increased eye pressure)      Goiter 3/12/2007     HTN (hypertension) 8/25/2009     Hypocalcemia      Hypokalemia      Hyponatremia      Mixed hyperlipidemia      Personal history of other diseases of circulatory system      Pneumonia 11/16/2010     Primary open-angle glaucoma(365.11)      Thyroid nodule 3/23/2012     Unspecified cerebral artery occlusion with cerebral infarction      Unspecified cerebral artery occlusion with cerebral infarction 1998      Past Surgical History:   Procedure Laterality Date     BIOPSY       BUNIONECTOMY  1960    JACQUELINE     CATARACT IOL, RT/LT  2010     "right     COLONOSCOPY  2/22/2013    Procedure: COLONOSCOPY;  Colonoscopy ;  Surgeon: Hugo Minor MD;  Location: [facility]       ROS:   ROS: 10 point ROS neg other than the symptoms noted above in the HPI.     OBJECTIVE:   /56  Pulse 78  Temp 97.7  F (36.5  C) (Oral)  Resp 11  Ht 5' 6\" (1.676 m)  Wt 135 lb 8 oz (61.5 kg)  SpO2 96%  BMI 21.87 kg/m2  EXAM:  GENERAL APPEARANCE: healthy, alert and no distress  EYES: Eyes grossly normal to inspection, PERRL and conjunctivae and sclerae normal  HENT: ear canals and TM's normal and nose and mouth without ulcers or lesions  NECK: no adenopathy, no asymmetry, masses, or scars and thyroid normal to palpation  RESP: lungs clear to auscultation - no rales, rhonchi or wheezes  BREAST: normal without masses, tenderness or nipple discharge and no palpable axillary masses or adenopathy  CV: regular rates and rhythm and normal S1 S2, no S3 or S4  LYMPHATICS: normal ant/post cervical and supraclavicular nodes  ABDOMEN: soft, nontender, without hepatosplenomegaly or masses and bowel sounds normal   (female): normal vulva  MS: extremities normal- no gross deformities noted  SKIN: no suspicious lesions or rashes  NEURO: Normal strength and tone, mentation intact and speech normal  PSYCH: mentation appears normal and affect normal/bright  No edema   1+ posterior tibial pulses bilateral          Diagnostic Test Results:  Results for orders placed or performed in visit on 07/27/18   Hemoglobin A1c   Result Value Ref Range    Hemoglobin A1C 6.6 (H) 0 - 5.6 %   Basic metabolic panel   Result Value Ref Range    Sodium 141 133 - 144 mmol/L    Potassium 3.9 3.4 - 5.3 mmol/L    Chloride 108 94 - 109 mmol/L    Carbon Dioxide 25 20 - 32 mmol/L    Anion Gap 8 3 - 14 mmol/L    Glucose 143 (H) 70 - 99 mg/dL    Urea Nitrogen 18 7 - 30 mg/dL    Creatinine 0.56 0.52 - 1.04 mg/dL    GFR Estimate >90 >60 mL/min/1.7m2    GFR Estimate If Black >90 >60 mL/min/1.7m2    Calcium 8.6 8.5 - 10.1 " mg/dL       ASSESSMENT/PLAN:   1. 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)  Doing well off etoh    2. Chronic obstructive pulmonary disease, unspecified COPD type (H)    - albuterol (PROAIR HFA) 108 (90 Base) MCG/ACT Inhaler; INHALE TWO PUFFS BY MOUTH EVERY FOUR HOURS AS NEEDED SHORTNESS OF BREATH  Dispense: 8.5 Inhaler; Refill: 3    3. Major depressive disorder, recurrent episode, mild    - sertraline (ZOLOFT) 100 MG tablet; Take 1 tablet (100 mg) by mouth daily  Dispense: 90 tablet; Refill: 3    4. Mixed hyperlipidemia    - rosuvastatin (CRESTOR) 5 MG tablet; TAKE 1 TABLET (5 MG) BY MOUTH DAILY  Dispense: 90 tablet; Refill: 3    5. Microalbuminuria    - Albumin Random Urine Quantitative with Creat Ratio; Future  - losartan (COZAAR) 25 MG tablet; TAKE 0.5 TABLETS (12.5 MG) BY MOUTH DAILY  Dispense: 45 tablet; Refill: 3    6. Type 2 diabetes mellitus with other specified complication, without long-term current use of insulin (H)    - Albumin Random Urine Quantitative with Creat Ratio; Future  - **A1C FUTURE anytime; Future  - **Basic metabolic panel FUTURE anytime; Future    7. Hyperlipidemia LDL goal <100      8. Hypopotassemia    - potassium chloride SA (KLOR-CON) 20 MEQ CR tablet; Take 1 tablet (20 mEq) by mouth daily  Dispense: 90 tablet; Refill: 4    9. Chronic kidney disease (CKD) stage G1/A2, glomerular filtration rate (GFR) equal to or greater than 90 mL/min/1.73 square meter and albuminuria creatinine ratio between  mg/g      10. Diabetic neuropathy with neurologic complication (H)  Stable     11. Screening for diabetic peripheral neuropathy    - FOOT EXAM  NO CHARGE [75591.114]    12. Routine general medical examination at a health care facility        COUNSELING:   Reviewed preventive health counseling, as reflected in patient instructions       Regular exercise       Healthy diet/nutrition    BP Readings from Last 1 Encounters:   07/30/18 104/56     Estimated body mass index is 21.87 kg/(m^2) as calculated from  "the following:    Height as of this encounter: 5' 6\" (1.676 m).    Weight as of this encounter: 135 lb 8 oz (61.5 kg).           reports that she quit smoking about 9 years ago. Her smoking use included Cigarettes. She has a 75.00 pack-year smoking history. She has never used smokeless tobacco.      Counseling Resources:  ATP IV Guidelines  Pooled Cohorts Equation Calculator  Breast Cancer Risk Calculator  FRAX Risk Assessment  ICSI Preventive Guidelines  Dietary Guidelines for Americans, 2010  [employer]'s MyPlate  ASA Prophylaxis  Lung CA Screening    Ce Cano MD  [facility] [place]    Patient Instructions     Decrease your Sertraline to 100 mg daily.  If doing well in 1 month, we can drop the dose again.  Just MyChart me and I can direct you further.  Return your urine sample.  Shingles vaccine.    Preventive Health Recommendations  Female Ages 65 +    Yearly exam:     See your health care provider every year in order to  o Review health changes.   o Discuss preventive care.    o Review your medicines if your doctor has prescribed any.      You no longer need a yearly Pap test unless you've had an abnormal Pap test in the past 10 years. If you have vaginal symptoms, such as bleeding or discharge, be sure to talk with your provider about a Pap test.      Every 1 to 2 years, have a mammogram.  If you are over 69, talk with your health care provider about whether or not you want to continue having screening mammograms.      Every 10 years, have a colonoscopy. Or, have a yearly FIT test (stool test). These exams will check for colon cancer.       Have a cholesterol test every 5 years, or more often if your doctor advises it.       Have a diabetes test (fasting glucose) every three years. If you are at risk for diabetes, you should have this test more often.       At age 65, have a bone density scan (DEXA) to check for osteoporosis (brittle bone disease).    Shots:    Get a flu shot each year.    Get a tetanus " shot every 10 years.    Talk to your doctor about your pneumonia vaccines. There are now two you should receive - Pneumovax (PPSV 23) and Prevnar (PCV 13).    Talk to your pharmacist about the shingles vaccine.    Talk to your doctor about the hepatitis B vaccine.    Nutrition:     Eat at least 5 servings of fruits and vegetables each day.      Eat whole-grain bread, whole-wheat pasta and brown rice instead of white grains and rice.      Get adequate about Calcium and Vitamin D.     Lifestyle    Exercise at least 150 minutes a week (30 minutes a day, 5 days a week). This will help you control your weight and prevent disease.      Limit alcohol to one drink per day.      No smoking.       Wear sunscreen to prevent skin cancer.       See your dentist twice a year for an exam and cleaning.      See your eye doctor every 1 to 2 years to screen for conditions such as glaucoma, macular degeneration, cataracts, etc   [facility]    If you have any questions regarding to your visit please contact your care team:     Team Pink:   Clinic Hours Telephone Number   Internal Medicine:  Dr. Ce Stafford, NP       7am-7pm  Monday - Thursday   7am-5pm  Fridays  [phone]  (Appointment scheduling available 24/7)    Questions about your recent visit?  Team Line  [phone]   Urgent [facility] - [place] [facility] - 11am-9pm Monday-Friday Saturday-Sunday- 9am-5pm   [place] - 5pm-9pm Monday-Friday Saturday-Sunday- 9am-5pm  [phone] - [facility]ese[facility] Evangelista  [phone] - [place]       What options do I have for a visit other than an office visit? We offer electronic visits (e-visits) and telephone visits, when medically appropriate.  Please check with your medical insurance to see if these types of visits are covered, as you will be responsible for any charges that are not paid by your insurance.      You can use [employer] (secure electronic communication) to access to  your chart, send your primary care provider a message, or make an appointment. Ask a team member how to get started.       For a price quote for your services, please call our Consumer Price Line at [phone] or our Imaging Cost estimation line at [phone] (for imaging tests).    Discharged by Rhoda IVEY CMA ([facility])

## 2018-07-30 NOTE — MR AVS SNAPSHOT
After Visit Summary   7/30/2018    Ludy Mckenzie    MRN: [id number]           Patient Information     Date Of Birth          1940        Visit Information        Provider Department      7/30/2018 5:15 PM Ce Cano MD [facility]        Today's Diagnoses     Routine general medical examination at a health care facility    -  1    Hypopotassemia        Major depressive disorder, recurrent episode, mild        Mixed hyperlipidemia        Microalbuminuria        Chronic obstructive pulmonary disease, unspecified COPD type (H)        Type 2 diabetes mellitus with other specified complication, without long-term current use of insulin (H)        Hyperlipidemia LDL goal <100        Chronic kidney disease (CKD) stage G1/A2, glomerular filtration rate (GFR) equal to or greater than 90 mL/min/1.73 square meter and albuminuria creatinine ratio between  mg/g        Alcohol abuse, in remission        Diabetic neuropathy with neurologic complication (H)        Screening for diabetic peripheral neuropathy          Care Instructions    Decrease your Sertraline to 100 mg daily.  If doing well in 1 month, we can drop the dose again.  Just MyChart me and I can direct you further.  Return your urine sample.  Shingles vaccine.    Preventive Health Recommendations  Female Ages 65 +    Yearly exam:     See your health care provider every year in order to  o Review health changes.   o Discuss preventive care.    o Review your medicines if your doctor has prescribed any.      You no longer need a yearly Pap test unless you've had an abnormal Pap test in the past 10 years. If you have vaginal symptoms, such as bleeding or discharge, be sure to talk with your provider about a Pap test.      Every 1 to 2 years, have a mammogram.  If you are over 69, talk with your health care provider about whether or not you want to continue having screening mammograms.      Every 10 years, have a colonoscopy.  Or, have a yearly FIT test (stool test). These exams will check for colon cancer.       Have a cholesterol test every 5 years, or more often if your doctor advises it.       Have a diabetes test (fasting glucose) every three years. If you are at risk for diabetes, you should have this test more often.       At age 65, have a bone density scan (DEXA) to check for osteoporosis (brittle bone disease).    Shots:    Get a flu shot each year.    Get a tetanus shot every 10 years.    Talk to your doctor about your pneumonia vaccines. There are now two you should receive - Pneumovax (PPSV 23) and Prevnar (PCV 13).    Talk to your pharmacist about the shingles vaccine.    Talk to your doctor about the hepatitis B vaccine.    Nutrition:     Eat at least 5 servings of fruits and vegetables each day.      Eat whole-grain bread, whole-wheat pasta and brown rice instead of white grains and rice.      Get adequate about Calcium and Vitamin D.     Lifestyle    Exercise at least 150 minutes a week (30 minutes a day, 5 days a week). This will help you control your weight and prevent disease.      Limit alcohol to one drink per day.      No smoking.       Wear sunscreen to prevent skin cancer.       See your dentist twice a year for an exam and cleaning.      See your eye doctor every 1 to 2 years to screen for conditions such as glaucoma, macular degeneration, cataracts, etc   [facility]    If you have any questions regarding to your visit please contact your care team:     Team Pink:   Clinic Hours Telephone Number   Internal Medicine:  Dr. Ce Stafford NP       7am-7pm  Monday - Thursday   7am-5pm  Fridays  [phone]  (Appointment scheduling available 24/7)    Questions about your recent visit?  Team Line  [phone]   Urgent Care - [place] - 11am-9pm Monday-Friday Saturday-Sunday- 9am-5pm   [place] - 5pm-9pm Monday-Friday Saturday-Sunday-  9am-5pm  [phone] - [place]  [phone] - [place]       What options do I have for a visit other than an office visit? We offer electronic visits (e-visits) and telephone visits, when medically appropriate.  Please check with your medical insurance to see if these types of visits are covered, as you will be responsible for any charges that are not paid by your insurance.      You can use [employer] (secure electronic communication) to access to your chart, send your primary care provider a message, or make an appointment. Ask a team member how to get started.       For a price quote for your services, please call our Consumer Price Line at [phone] or our Imaging Cost estimation line at [phone] (for imaging tests).    Discharged by Rhoda IVEY CMA ([facility])              Follow-ups after your visit        Follow-up notes from your care team     Return in about 6 months (around 1/30/2019).      Future tests that were ordered for you today     Open Future Orders        Priority Expected Expires Ordered    **A1C FUTURE anytime Routine 7/30/2018 7/30/2019 7/30/2018    **Basic metabolic panel FUTURE anytime Routine 7/30/2018 7/30/2019 7/30/2018    Albumin Random Urine Quantitative with Creat Ratio Routine  7/30/2019 7/30/2018            Who to contact     If you have questions or need follow up information about today's clinic visit or your schedule please contact [facility] directly at [phone].  Normal or non-critical lab and imaging results will be communicated to you by [employer], letter or phone within 4 business days after the clinic has received the results. If you do not hear from us within 7 days, please contact the clinic through [employer] or phone. If you have a critical or abnormal lab result, we will notify you by phone as soon as possible.  Submit refill requests through [employer] or call your pharmacy and they will forward the refill request to us. Please allow 3 business days  "for your refill to be completed.          Additional Information About Your Visit        [employer] Information     [employer] gives you secure access to your electronic health record. If you see a primary care provider, you can also send messages to your care team and make appointments. If you have questions, please call your primary care clinic.  If you do not have a primary care provider, please call [phone] and they will assist you.        Care EveryWhere ID     This is your Care EveryWhere ID. This could be used by other organizations to access your [facility] medical records  [id number]        Your Vitals Were     Pulse Temperature Respirations Height Pulse Oximetry BMI (Body Mass Index)    78 97.7  F (36.5  C) (Oral) 11 5' 6\" (1.676 m) 96% 21.87 kg/m2       Blood Pressure from Last 3 Encounters:   07/30/18 104/56   07/16/18 100/40   04/25/18 110/60    Weight from Last 3 Encounters:   07/30/18 135 lb 8 oz (61.5 kg)   07/16/18 136 lb (61.7 kg)   04/25/18 138 lb (62.6 kg)              We Performed the Following     FOOT EXAM  NO CHARGE [[id number]]     PAF COMPLETED          Today's Medication Changes          These changes are accurate as of 7/30/18  6:12 PM.  If you have any questions, ask your nurse or doctor.               These medicines have changed or have updated prescriptions.        Dose/Directions    sertraline 100 MG tablet   Commonly known as:  ZOLOFT   This may have changed:  how much to take   Used for:  Major depressive disorder, recurrent episode, mild (H)   Changed by:  Ce Cano MD        Dose:  100 mg   Take 1 tablet (100 mg) by mouth daily   Quantity:  90 tablet   Refills:  3            Where to get your medicines      These medications were sent to [id number] - [place] - [address]  [address] [place] [address]     Phone:  [phone]     albuterol 108 (90 Base) MCG/ACT Inhaler    losartan 25 MG tablet    potassium chloride SA 20 MEQ " CR tablet    rosuvastatin 5 MG tablet    sertraline 100 MG tablet                Primary Care Provider Office Phone # Fax #    Ce Cano [phone] [address] [facility] [address]        Equal Access to Services     [facility]: Hadii aad ku hadasho Soomaali, waaxda luqadaha, qaybta kaalmada adeegyada, waxay idiin haynavjotn adeeg khdelfino lajustinn lavinia. So [facility] [phone].    ATENCIÓN: Si habla español, tiene a aponte disposición servicios gratuitos de asistencia lingüística. Llame al [phone].    We comply with applicable federal civil rights laws and [place] laws. We do not discriminate on the basis of race, color, national origin, age, disability, sex, sexual orientation, or gender identity.            Thank you!     Thank you for choosing [facility]  for your care. Our goal is always to provide you with excellent care. Hearing back from our patients is one way we can continue to improve our services. Please take a few minutes to complete the written survey that you may receive in the mail after your visit with us. Thank you!             Your Updated Medication List - Protect others around you: Learn how to safely use, store and throw away your medicines at www.disposemymeds.org.          This list is accurate as of 7/30/18  6:12 PM.  Always use your most recent med list.                   Brand Name Dispense Instructions for use Diagnosis    ADVAIR DISKUS 500-50 MCG/DOSE diskus inhaler   Generic drug:  fluticasone-salmeterol     3 Inhaler    INHALE 1 PUFF INTO THE LUNGS 2 TIMES DAILY    COPD (chronic obstructive pulmonary disease) (H)       albuterol 108 (90 Base) MCG/ACT Inhaler    PROAIR HFA    8.5 Inhaler    INHALE TWO PUFFS BY MOUTH EVERY FOUR HOURS AS NEEDED SHORTNESS OF BREATH    Chronic obstructive pulmonary disease, unspecified COPD type (H)       aspirin 81 MG tablet      Take 1 tablet by mouth daily.        calcium 600 MG tablet     180 tablet    Take  1 tablet by mouth 2 times daily        cholecalciferol 1000 units capsule    vitamin  -D     Take 1 capsule by mouth daily.        clobetasol propionate 0.05 % Sham    CLOBEX    118 mL    Use once daily for 2 weeks    Bullous pemphigoid       ipratropium - albuterol 0.5 mg/2.5 mg/3 mL 0.5-2.5 (3) MG/3ML neb solution    DUONEB    1 Box    Take 1 vial (3 mLs) by nebulization every 6 hours as needed for shortness of breath / dyspnea    Panlobular emphysema (H)       latanoprost 0.005 % ophthalmic solution    XALATAN    3 Bottle    Place 1 drop into both eyes At Bedtime    Primary open angle glaucoma of both eyes, unspecified glaucoma stage       losartan 25 MG tablet    COZAAR    45 tablet    TAKE 0.5 TABLETS (12.5 MG) BY MOUTH DAILY    Microalbuminuria       multivitamin, therapeutic with minerals Tabs tablet      Take 1 tablet by mouth daily        mycophenolate 500 MG tablet    GENERIC EQUIVALENT    300 tablet    Take 2 tabs in the am and 2 tabs in the evening    Bullous pemphigoid       potassium chloride SA 20 MEQ CR tablet    KLOR-CON    90 tablet    Take 1 tablet (20 mEq) by mouth daily    Hypopotassemia       rosuvastatin 5 MG tablet    CRESTOR    90 tablet    TAKE 1 TABLET (5 MG) BY MOUTH DAILY    Mixed hyperlipidemia       sertraline 100 MG tablet    ZOLOFT    90 tablet    Take 1 tablet (100 mg) by mouth daily    Major depressive disorder, recurrent episode, mild (H)       tiotropium 18 MCG capsule    SPIRIVA HANDIHALER    90 capsule    INHALE 1 CAPSULE INTO THE LUNGS DAILY.    Panlobular emphysema (H)       VITAMIN B 12 PO      Take by mouth daily        vitamin B complex with vitamin C Tabs tablet      Take 1 tablet by mouth daily

## 2018-07-30 NOTE — PATIENT INSTRUCTIONS
Decrease your Sertraline to 100 mg daily.  If doing well in 1 month, we can drop the dose again.  Just MyChart me and I can direct you further.  Return your urine sample.  Shingles vaccine.    Preventive Health Recommendations  Female Ages 65 +    Yearly exam:     See your health care provider every year in order to  o Review health changes.   o Discuss preventive care.    o Review your medicines if your doctor has prescribed any.      You no longer need a yearly Pap test unless you've had an abnormal Pap test in the past 10 years. If you have vaginal symptoms, such as bleeding or discharge, be sure to talk with your provider about a Pap test.      Every 1 to 2 years, have a mammogram.  If you are over 69, talk with your health care provider about whether or not you want to continue having screening mammograms.      Every 10 years, have a colonoscopy. Or, have a yearly FIT test (stool test). These exams will check for colon cancer.       Have a cholesterol test every 5 years, or more often if your doctor advises it.       Have a diabetes test (fasting glucose) every three years. If you are at risk for diabetes, you should have this test more often.       At age 65, have a bone density scan (DEXA) to check for osteoporosis (brittle bone disease).    Shots:    Get a flu shot each year.    Get a tetanus shot every 10 years.    Talk to your doctor about your pneumonia vaccines. There are now two you should receive - Pneumovax (PPSV 23) and Prevnar (PCV 13).    Talk to your pharmacist about the shingles vaccine.    Talk to your doctor about the hepatitis B vaccine.    Nutrition:     Eat at least 5 servings of fruits and vegetables each day.      Eat whole-grain bread, whole-wheat pasta and brown rice instead of white grains and rice.      Get adequate about Calcium and Vitamin D.     Lifestyle    Exercise at least 150 minutes a week (30 minutes a day, 5 days a week). This will help you control your weight and prevent  disease.      Limit alcohol to one drink per day.      No smoking.       Wear sunscreen to prevent skin cancer.       See your dentist twice a year for an exam and cleaning.      See your eye doctor every 1 to 2 years to screen for conditions such as glaucoma, macular degeneration, cataracts, etc   [facility]    If you have any questions regarding to your visit please contact your care team:     Team Pink:   Clinic Hours Telephone Number   Internal Medicine:  Dr. Ce Stafford NP       7am-7pm  Monday - Thursday   7am-5pm  Fridays  [phone]  (Appointment scheduling available 24/7)    Questions about your recent visit?  Team Line  [phone]   Urgent Care - [place] - 11am-9pm Monday-Friday Saturday-Sunday- 9am-5pm   [place] - 5pm-9pm Monday-Friday Saturday-Sunday- 9am-5pm  [phone] - [place]  [phone] - [place]       What options do I have for a visit other than an office visit? We offer electronic visits (e-visits) and telephone visits, when medically appropriate.  Please check with your medical insurance to see if these types of visits are covered, as you will be responsible for any charges that are not paid by your insurance.      You can use [employer] (secure electronic communication) to access to your chart, send your primary care provider a message, or make an appointment. Ask a team member how to get started.       For a price quote for your services, please call our Consumer Price Line at [phone] or our Imaging Cost estimation line at [phone] (for imaging tests).    Discharged by Rhoda IVEY CMA ([facility])

## 2018-08-16 DIAGNOSIS — R80.9 MICROALBUMINURIA: ICD-10-CM

## 2018-08-16 DIAGNOSIS — E11.69 TYPE 2 DIABETES MELLITUS WITH OTHER SPECIFIED COMPLICATION, WITHOUT LONG-TERM CURRENT USE OF INSULIN (H): ICD-10-CM

## 2018-08-16 LAB
CREAT UR-MCNC: 121 MG/DL
MICROALBUMIN UR-MCNC: 30 MG/L
MICROALBUMIN/CREAT UR: 25.04 MG/G CR (ref 0–25)

## 2018-08-16 PROCEDURE — 82043 UR ALBUMIN QUANTITATIVE: CPT | Performed by: INTERNAL MEDICINE

## 2018-10-17 ENCOUNTER — OFFICE VISIT (OUTPATIENT)
Dept: OTOLARYNGOLOGY | Facility: CLINIC | Age: 78
End: 2018-10-17
Payer: COMMERCIAL

## 2018-10-17 DIAGNOSIS — J34.2 NASAL SEPTAL DEVIATION: Primary | ICD-10-CM

## 2018-10-17 PROCEDURE — 99203 OFFICE O/P NEW LOW 30 MIN: CPT | Performed by: OTOLARYNGOLOGY
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
## 2018-10-17 NOTE — PATIENT INSTRUCTIONS
General Scheduling Information  To schedule your CT/MRI scan, please contact [place] [facility] at [phone]   [address] [place] [address]    To schedule your Surgery, please contact our Specialty Schedulers at [phone]    ENT Clinic Locations Clinic Hours Telephone Number     [facility]  [address] [place] [address]   Tuesday:       8:00am -- 4:00pm    Wednesday:  8:00am - 4:00pm   To schedule an appointment with   Dr. Cloud,   please contact our   Specialty Scheduling Department at:     [phone]       [facility]  [address] [place] [address]   Friday:          8:00am - 4:00pm         Urgent Care Locations Clinic Hours Telephone Numbers     [facility]  [address] [place] [address]     Monday-Friday:     11:00pm - 9:00pm    Saturday-Sunday:  9:00am - 5:00pm   [phone]     [facility]  [address] [place] [address]     Monday-Friday:      5:00pm - 9:00pm     Saturday-Sunday:  9:00am - 5:00pm   [phone]
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
## 2018-10-17 NOTE — MR AVS SNAPSHOT
After Visit Summary   10/17/2018    Ludy Mckenzie    MRN: [id number]           Patient Information     Date Of Birth          1940        Visit Information        Provider Department      10/17/2018 1:15 PM Napoleon Cloud MD [facility]        Today's Diagnoses     Nasal septal deviation    -  1      Care Instructions    General Scheduling Information  To schedule your CT/MRI scan, please contact [place] [facility] at [phone] [address] [place] [address]    To schedule your Surgery, please contact our Specialty Schedulers at [phone]    ENT Clinic Locations Clinic Hours Telephone Number     [facility]  [address]  [place] [address]   Tuesday:       8:00am -- 4:00pm    Wednesday:  8:00am - 4:00pm   To schedule an appointment with   Dr. Cloud,   please contact our   Specialty Scheduling Department at:     [phone]       [facility]  [address] [facility] [place] [address]   Friday:          8:00am - 4:00pm         Urgent Care Locations Clinic Hours Telephone Numbers     [facility]  [address]  [place] [address]     Monday-Friday:     11:00pm - 9:00pm    Saturday-Sunday:  9:00am - 5:00pm   [phone]     [facility]  [address]   [place] [address]     Monday-Friday:      5:00pm - 9:00pm     Saturday-Sunday:  9:00am - 5:00pm   [phone]               Follow-ups after your visit        Your next 10 appointments already scheduled     Nov 05, 2018  1:15 PM CST   Return Visit with Arjun Valenzuela MD   [facility] ([facility])    [address] [address] [phone]              Who to contact     If you have questions or need follow up information about today's clinic visit or your schedule please contact [facility] directly at [phone].  Normal or non-critical lab and imaging results will be communicated to you by Pipo  letter or phone within 4 business days after the clinic has received the results. If you do not hear from us within 7 days, please contact the clinic through [employer] or phone. If you have a critical or abnormal lab result, we will notify you by phone as soon as possible.  Submit refill requests through [employer] or call your pharmacy and they will forward the refill request to us. Please allow 3 business days for your refill to be completed.          Additional Information About Your Visit        [employer] Information     [employer] gives you secure access to your electronic health record. If you see a primary care provider, you can also send messages to your care team and make appointments. If you have questions, please call your primary care clinic.  If you do not have a primary care provider, please call [phone] and they will assist you.        Care EveryWhere ID     This is your Care EveryWhere ID. This could be used by other organizations to access your [place] medical records  [id number]         Blood Pressure from Last 3 Encounters:   07/30/18 104/56   07/16/18 100/40   04/25/18 110/60    Weight from Last 3 Encounters:   07/30/18 61.5 kg (135 lb 8 oz)   07/16/18 61.7 kg (136 lb)   04/25/18 62.6 kg (138 lb)              Today, you had the following     No orders found for display       Primary Care Provider Office Phone # Fax #    [facility] [phone]       [address] [facility] [address]        Equal Access to Services     [facility] : Hadii aad ku hadasho Soomaali, waaxda luqadaha, qaybta kaalmada adeegyada, mateo crockett . So [facility] [phone].    ATENCIÓN: Si habla español, tiene a aponte disposición servicios gratuitos de asistencia lingüística. Llame al [phone].    We comply with applicable federal civil rights laws and [place] laws. We do not discriminate on the basis of race, color, national origin, age, disability, sex, sexual  orientation, or gender identity.            Thank you!     Thank you for choosing [facility]  for your care. Our goal is always to provide you with excellent care. Hearing back from our patients is one way we can continue to improve our services. Please take a few minutes to complete the written survey that you may receive in the mail after your visit with us. Thank you!             Your Updated Medication List - Protect others around you: Learn how to safely use, store and throw away your medicines at www.disposemymeds.org.          This list is accurate as of 10/17/18  4:34 PM.  Always use your most recent med list.                   Brand Name Dispense Instructions for use Diagnosis    ADVAIR DISKUS 500-50 MCG/DOSE diskus inhaler   Generic drug:  fluticasone-salmeterol     3 Inhaler    INHALE 1 PUFF INTO THE LUNGS 2 TIMES DAILY    COPD (chronic obstructive pulmonary disease) (H)       albuterol 108 (90 Base) MCG/ACT inhaler    PROAIR HFA    8.5 Inhaler    INHALE TWO PUFFS BY MOUTH EVERY FOUR HOURS AS NEEDED SHORTNESS OF BREATH    Chronic obstructive pulmonary disease, unspecified COPD type (H)       aspirin 81 MG tablet      Take 1 tablet by mouth daily.        calcium 600 MG tablet     180 tablet    Take 1 tablet by mouth 2 times daily        cholecalciferol 1000 units capsule    vitamin  -D     Take 1 capsule by mouth daily.        ipratropium - albuterol 0.5 mg/2.5 mg/3 mL 0.5-2.5 (3) MG/3ML neb solution    DUONEB    1 Box    Take 1 vial (3 mLs) by nebulization every 6 hours as needed for shortness of breath / dyspnea    Panlobular emphysema (H)       latanoprost 0.005 % ophthalmic solution    XALATAN    3 Bottle    Place 1 drop into both eyes At Bedtime    Primary open angle glaucoma of both eyes, unspecified glaucoma stage       losartan 25 MG tablet    COZAAR    45 tablet    TAKE 0.5 TABLETS (12.5 MG) BY MOUTH DAILY    Microalbuminuria       mycophenolate 500 MG tablet    GENERIC EQUIVALENT     300 tablet    Take 2 tabs in the am and 2 tabs in the evening    Bullous pemphigoid       potassium chloride SA 20 MEQ CR tablet    KLOR-CON    90 tablet    Take 1 tablet (20 mEq) by mouth daily    Hypopotassemia       rosuvastatin 5 MG tablet    CRESTOR    90 tablet    TAKE 1 TABLET (5 MG) BY MOUTH DAILY    Mixed hyperlipidemia       sertraline 100 MG tablet    ZOLOFT    90 tablet    Take 1 tablet (100 mg) by mouth daily    Major depressive disorder, recurrent episode, mild (H)       tiotropium 18 MCG capsule    SPIRIVA HANDIHALER    90 capsule    INHALE 1 CAPSULE INTO THE LUNGS DAILY.    Panlobular emphysema (H)       vitamin B complex with vitamin C Tabs tablet      Take 1 tablet by mouth daily

## 2018-11-05 ENCOUNTER — OFFICE VISIT (OUTPATIENT)
Dept: OPHTHALMOLOGY | Facility: CLINIC | Age: 78
End: 2018-11-05
Payer: COMMERCIAL

## 2018-11-05 DIAGNOSIS — H40.1131 PRIMARY OPEN ANGLE GLAUCOMA (POAG) OF BOTH EYES, MILD STAGE: Primary | ICD-10-CM

## 2018-11-05 DIAGNOSIS — Z96.1 PSEUDOPHAKIA OF BOTH EYES: ICD-10-CM

## 2018-11-05 DIAGNOSIS — H43.813 POSTERIOR VITREOUS DETACHMENT OF BOTH EYES: ICD-10-CM

## 2018-11-05 PROCEDURE — 92133 CPTRZD OPH DX IMG PST SGM ON: CPT | Performed by: STUDENT IN AN ORGANIZED HEALTH CARE EDUCATION/TRAINING PROGRAM

## 2018-11-05 PROCEDURE — 92083 EXTENDED VISUAL FIELD XM: CPT | Performed by: STUDENT IN AN ORGANIZED HEALTH CARE EDUCATION/TRAINING PROGRAM

## 2018-11-05 PROCEDURE — 92012 INTRM OPH EXAM EST PATIENT: CPT | Performed by: STUDENT IN AN ORGANIZED HEALTH CARE EDUCATION/TRAINING PROGRAM

## 2018-11-05 ASSESSMENT — CUP TO DISC RATIO
OS_RATIO: 0.5 UD
OD_RATIO: 0.45 UD

## 2018-11-05 ASSESSMENT — TONOMETRY
OD_IOP_MMHG: 15
OS_IOP_MMHG: 15
IOP_METHOD: APPLANATION

## 2018-11-05 ASSESSMENT — VISUAL ACUITY
OD_CC: 20/25
OS_CC: 20/30
METHOD: SNELLEN - LINEAR

## 2018-11-05 ASSESSMENT — EXTERNAL EXAM - RIGHT EYE: OD_EXAM: NORMAL

## 2018-11-05 ASSESSMENT — SLIT LAMP EXAM - LIDS
COMMENTS: 1+ MEIBOMIAN GLAND DYSFUNCTION
COMMENTS: 1+ MEIBOMIAN GLAND DYSFUNCTION

## 2018-11-05 ASSESSMENT — EXTERNAL EXAM - LEFT EYE: OS_EXAM: NORMAL

## 2018-11-05 NOTE — LETTER
11/5/2018         RE: Ludy Mckenzie  [address]  [facility] [place] [address]        Dear Colleague,    Thank you for referring your patient, Ludy Mckenzie, to the [facility].     Her eye exam is stable.  Please see a copy of my visit note below.     Current Eye Medications:  Latanoprost every evening both eyes (10pm)     Subjective:  OCT/HVF/TA       Patient unaware of any changes in vision or eyes.  Doing well.     Objective:  See Ophthalmology Exam.       Assessment:  Ludy Mckenzie is a 78 year old female who presents with:   Encounter Diagnoses   Name Primary?     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, mild stage OCT stable both eyes.  [place]ose[place] visual field (HVF): within normal limits right eye, rim artifact inferonasal left eye.    Intraocular pressure 15/15 today.      Pseudophakia with Yag Caps ou       Posterior vitreous detachment of both eyes        Plan:  Continue Latanoprost (teal top) at bedtime both eyes      Arjun Valenzuela MD  [phone]        Again, thank you for allowing me to participate in the care of your patient.        Sincerely,        Arjun Valenzuela MD

## 2018-11-05 NOTE — PROGRESS NOTES
Current Eye Medications:  Latanoprost every evening both eyes (10pm)     Subjective:  OCT/HVF/TA       Patient unaware of any changes in vision or eyes.  Doing well.     Objective:  See Ophthalmology Exam.       Assessment:  Ludy Mckenzie is a 78 year old female who presents with:   Encounter Diagnoses   Name Primary?     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, mild stage OCT stable both eyes.  [place]oseley visual field (HVF): within normal limits right eye, rim artifact inferonasal left eye.    Intraocular pressure 15/15 today.      Pseudophakia with Yag Caps ou       Posterior vitreous detachment of both eyes        Plan:  Continue Latanoprost (teal top) at bedtime both eyes      Arjun Valenzuela MD  [phone]

## 2018-11-05 NOTE — MR AVS SNAPSHOT
After Visit Summary   11/5/2018    Ludy Mckenzie    MRN: [id number]           Patient Information     Date Of Birth          1940        Visit Information        Provider Department      11/5/2018 1:15 PM Arjun Valenzuela MD [facility]        Today's Diagnoses     Primary open angle glaucoma of both eyes, unspecified glaucoma stage    -  1    Pseudophakia with Yag Caps ou         Posterior vitreous detachment of both eyes          Care Instructions    Continue Latanoprost (teal top) at bedtime both eyes      Arjun Valenzuela MD  [phone]            Follow-ups after your visit        Follow-up notes from your care team     Return in about 6 months (around 5/5/2019) for Complete Exam.      Who to contact     If you have questions or need follow up information about today's clinic visit or your schedule please contact [facility] directly at [phone].  Normal or non-critical lab and imaging results will be communicated to you by MyChart, letter or phone within 4 business days after the clinic has received the results. If you do not hear from us within 7 days, please contact the clinic through [facility] or phone. If you have a critical or abnormal lab result, we will notify you by phone as soon as possible.  Submit refill requests through [employer] or call your pharmacy and they will forward the refill request to us. Please allow 3 business days for your refill to be completed.          Additional Information About Your Visit        MyChart Information     [employer] gives you secure access to your electronic health record. If you see a primary care provider, you can also send messages to your care team and make appointments. If you have questions, please call your primary care clinic.  If you do not have a primary care provider, please call [phone] and they will assist you.        Care EveryWhere ID     This is your Care EveryWhere ID. This could be used by  other organizations to access your [place] medical records  [id number]         Blood Pressure from Last 3 Encounters:   07/30/18 104/56   07/16/18 100/40   04/25/18 110/60    Weight from Last 3 Encounters:   07/30/18 61.5 kg (135 lb 8 oz)   07/16/18 61.7 kg (136 lb)   04/25/18 62.6 kg (138 lb)              We Performed the Following     HC COMPUTERIZED OPHTHALMIC IMAGING OPTIC NERVE     VISUAL FIELDS EXAM (EXTENDED)        Primary Care Provider Office Phone # Fax #    [facility] [phone] [address] [address]        Equal Access to Services     [facility] : Hadii ebonie whitakero Soalonzo, waaxda luqadaha, qaybta kaalmada aderogerioyada, mateo crockett . So [facility] [phone].    ATENCIÓN: Si habla español, tiene a aponte disposición servicios gratuitos de asistencia lingüística. Llame al [phone].    We comply with applicable federal civil rights laws and [place] laws. We do not discriminate on the basis of race, color, national origin, age, disability, sex, sexual orientation, or gender identity.            Thank you!     Thank you for choosing [facility]  for your care. Our goal is always to provide you with excellent care. Hearing back from our patients is one way we can continue to improve our services. Please take a few minutes to complete the written survey that you may receive in the mail after your visit with us. Thank you!             Your Updated Medication List - Protect others around you: Learn how to safely use, store and throw away your medicines at www.disposemymeds.org.          This list is accurate as of 11/5/18  1:55 PM.  Always use your most recent med list.                   Brand Name Dispense Instructions for use Diagnosis    ADVAIR DISKUS 500-50 MCG/DOSE diskus inhaler   Generic drug:  fluticasone-salmeterol     3 Inhaler    INHALE 1 PUFF INTO THE LUNGS 2 TIMES DAILY    COPD (chronic obstructive  pulmonary disease) (H)       albuterol 108 (90 Base) MCG/ACT inhaler    PROAIR HFA    8.5 Inhaler    INHALE TWO PUFFS BY MOUTH EVERY FOUR HOURS AS NEEDED SHORTNESS OF BREATH    Chronic obstructive pulmonary disease, unspecified COPD type (H)       aspirin 81 MG tablet      Take 1 tablet by mouth daily.        calcium 600 MG tablet     180 tablet    Take 1 tablet by mouth 2 times daily        cholecalciferol 1000 units capsule    vitamin  -D     Take 1 capsule by mouth daily.        ipratropium - albuterol 0.5 mg/2.5 mg/3 mL 0.5-2.5 (3) MG/3ML neb solution    DUONEB    1 Box    Take 1 vial (3 mLs) by nebulization every 6 hours as needed for shortness of breath / dyspnea    Panlobular emphysema (H)       latanoprost 0.005 % ophthalmic solution    XALATAN    3 Bottle    Place 1 drop into both eyes At Bedtime    Primary open angle glaucoma of both eyes, unspecified glaucoma stage       losartan 25 MG tablet    COZAAR    45 tablet    TAKE 0.5 TABLETS (12.5 MG) BY MOUTH DAILY    Microalbuminuria       mycophenolate 500 MG tablet    GENERIC EQUIVALENT    300 tablet    Take 2 tabs in the am and 2 tabs in the evening    Bullous pemphigoid       potassium chloride SA 20 MEQ CR tablet    KLOR-CON    90 tablet    Take 1 tablet (20 mEq) by mouth daily    Hypopotassemia       rosuvastatin 5 MG tablet    CRESTOR    90 tablet    TAKE 1 TABLET (5 MG) BY MOUTH DAILY    Mixed hyperlipidemia       sertraline 100 MG tablet    ZOLOFT    90 tablet    Take 1 tablet (100 mg) by mouth daily    Major depressive disorder, recurrent episode, mild (H)       tiotropium 18 MCG capsule    SPIRIVA HANDIHALER    90 capsule    INHALE 1 CAPSULE INTO THE LUNGS DAILY.    Panlobular emphysema (H)       vitamin B complex with vitamin C Tabs tablet      Take 1 tablet by mouth daily

## 2018-12-05 ENCOUNTER — RADIANT APPOINTMENT (OUTPATIENT)
Dept: GENERAL RADIOLOGY | Facility: CLINIC | Age: 78
End: 2018-12-05
Attending: NURSE PRACTITIONER
Payer: COMMERCIAL

## 2018-12-05 ENCOUNTER — OFFICE VISIT (OUTPATIENT)
Dept: FAMILY MEDICINE | Facility: CLINIC | Age: 78
End: 2018-12-05
Payer: COMMERCIAL

## 2018-12-05 VITALS
TEMPERATURE: 97.3 F | HEART RATE: 87 BPM | BODY MASS INDEX: 22.34 KG/M2 | HEIGHT: 66 IN | DIASTOLIC BLOOD PRESSURE: 60 MMHG | OXYGEN SATURATION: 96 % | SYSTOLIC BLOOD PRESSURE: 110 MMHG | WEIGHT: 139 LBS | RESPIRATION RATE: 20 BRPM

## 2018-12-05 DIAGNOSIS — J44.9 CHRONIC OBSTRUCTIVE PULMONARY DISEASE, UNSPECIFIED COPD TYPE (H): ICD-10-CM

## 2018-12-05 DIAGNOSIS — R07.81 RIB PAIN ON RIGHT SIDE: ICD-10-CM

## 2018-12-05 DIAGNOSIS — R82.90 NONSPECIFIC FINDING ON EXAMINATION OF URINE: ICD-10-CM

## 2018-12-05 DIAGNOSIS — N30.00 ACUTE CYSTITIS WITHOUT HEMATURIA: Primary | ICD-10-CM

## 2018-12-05 LAB
ALBUMIN UR-MCNC: ABNORMAL MG/DL
APPEARANCE UR: ABNORMAL
BACTERIA #/AREA URNS HPF: ABNORMAL /HPF
BILIRUB UR QL STRIP: ABNORMAL
COLOR UR AUTO: YELLOW
GLUCOSE UR STRIP-MCNC: NEGATIVE MG/DL
HGB UR QL STRIP: ABNORMAL
KETONES UR STRIP-MCNC: ABNORMAL MG/DL
LEUKOCYTE ESTERASE UR QL STRIP: ABNORMAL
NITRATE UR QL: POSITIVE
NON-SQ EPI CELLS #/AREA URNS LPF: ABNORMAL /LPF
PH UR STRIP: 6.5 PH (ref 5–7)
RBC #/AREA URNS AUTO: ABNORMAL /HPF
SOURCE: ABNORMAL
SP GR UR STRIP: 1.02 (ref 1–1.03)
UROBILINOGEN UR STRIP-ACNC: 0.2 EU/DL (ref 0.2–1)
WBC #/AREA URNS AUTO: ABNORMAL /HPF

## 2018-12-05 PROCEDURE — 87088 URINE BACTERIA CULTURE: CPT | Performed by: NURSE PRACTITIONER

## 2018-12-05 PROCEDURE — 87086 URINE CULTURE/COLONY COUNT: CPT | Performed by: NURSE PRACTITIONER

## 2018-12-05 PROCEDURE — 81001 URINALYSIS AUTO W/SCOPE: CPT | Performed by: NURSE PRACTITIONER

## 2018-12-05 PROCEDURE — 87186 SC STD MICRODIL/AGAR DIL: CPT | Performed by: NURSE PRACTITIONER

## 2018-12-05 PROCEDURE — 99214 OFFICE O/P EST MOD 30 MIN: CPT | Performed by: NURSE PRACTITIONER

## 2018-12-05 PROCEDURE — 71101 X-RAY EXAM UNILAT RIBS/CHEST: CPT | Mod: RT

## 2018-12-05 RX ORDER — CEFDINIR 300 MG/1
300 CAPSULE ORAL 2 TIMES DAILY
Qty: 14 CAPSULE | Refills: 0 | Status: SHIPPED | OUTPATIENT
Start: 2018-12-05 | End: 2018-12-12

## 2018-12-05 RX ORDER — PREDNISONE 20 MG/1
20 TABLET ORAL DAILY
Qty: 5 TABLET | Refills: 0 | Status: SHIPPED | OUTPATIENT
Start: 2018-12-05 | End: 2019-09-13

## 2018-12-05 NOTE — MR AVS SNAPSHOT
After Visit Summary   12/5/2018    Ludy Mckenzie    MRN: [id number]           Patient Information     Date Of Birth          1940        Visit Information        Provider Department      12/5/2018 10:20 AM Sarahi Stafford APRN [facility]        Today's Diagnoses     Acute cystitis without hematuria    -  1    Nonspecific finding on examination of urine        Chronic obstructive pulmonary disease, unspecified COPD type (H)        Rib pain on right side          Care Instructions    [facility]    If you have any questions regarding to your visit please contact your care team:     Team Pink:   Clinic Hours Telephone Number   Internal Medicine:  Dr. Ce Stafford, NP 7am-7pm  Monday - Thursday   7am-5pm  Fridays  [phone]  (Appointment scheduling available 24/7)   Urgent Care - [place] and [facility] - 11am-9pm Monday-Friday Saturday-Sunday- 9am-5pm   [place] - 5pm-9pm Monday-Friday Saturday-Sunday- 9am-5pm  [phone] - [place]  [phone] - [place]       What options do I have for a visit other than an office visit? We offer electronic visits (e-visits) and telephone visits, when medically appropriate.  Please check with your medical insurance to see if these types of visits are covered, as you will be responsible for any charges that are not paid by your insurance.      You can use [employer] (secure electronic communication) to access to your chart, send your primary care provider a message, or make an appointment. Ask a team member how to get started.     For a price quote for your services, please call our Consumer Price Line at [phone] or our Imaging Cost estimation line at [phone] (for imaging tests).  Maria D Murray MA            Follow-ups after your visit        Additional Services     PULMONARY MEDICINE REFERRAL       Your provider has referred you to: MARTY: [facility] - [place] [phone]   [email or web address]    Please be aware that coverage of these services is subject to the terms and limitations of your health insurance plan.  Call member services at your health plan with any benefit or coverage questions.      Please bring the following with you to your appointment:    (1) Any X-Rays, CTs or MRIs which have been performed.  Contact the facility where they were done to arrange for  prior to your scheduled appointment.    (2) List of current medications   (3) This referral request   (4) Any documents/labs given to you for this referral                  Your next 10 appointments already scheduled     May 09, 2019 10:30 AM CDT   New Visit with Arjun Valenzuela MD   [facility] [place] ([facility])    [address] [place] [address]  [place] [address] [phone]              Future tests that were ordered for you today     Open Future Orders        Priority Expected Expires Ordered    XR Ribs & Chest Bilateral G/E 4 Views Routine 12/5/2018 12/5/2019 12/5/2018            Who to contact     If you have questions or need follow up information about today's clinic visit or your schedule please contact [facility] [place] directly at [phone].  Normal or non-critical lab and imaging results will be communicated to you by MyChart, letter or phone within 4 business days after the clinic has received the results. If you do not hear from us within 7 days, please contact the clinic through MyChart or phone. If you have a critical or abnormal lab result, we will notify you by phone as soon as possible.  Submit refill requests through [employer] or call your pharmacy and they will forward the refill request to us. Please allow 3 business days for your refill to be completed.          Additional Information About Your Visit        [employer] Information     [employer] gives  "you secure access to your electronic health record. If you see a primary care provider, you can also send messages to your care team and make appointments. If you have questions, please call your primary care clinic.  If you do not have a primary care provider, please call [phone] and they will assist you.        Care EveryWhere ID     This is your Care EveryWhere ID. This could be used by other organizations to access your [place] medical records  [id number]        Your Vitals Were     Pulse Temperature Respirations Height Pulse Oximetry BMI (Body Mass Index)    87 97.3  F (36.3  C) (Oral) 20 5' 6\" (1.676 m) 96% 22.44 kg/m2       Blood Pressure from Last 3 Encounters:   12/05/18 110/60   07/30/18 104/56   07/16/18 100/40    Weight from Last 3 Encounters:   12/05/18 139 lb (63 kg)   07/30/18 135 lb 8 oz (61.5 kg)   07/16/18 136 lb (61.7 kg)              We Performed the Following     *UA reflex to Microscopic and Culture ([place] and [facility] (except [place] and [place])     PULMONARY MEDICINE REFERRAL     Urine Culture Aerobic Bacterial     Urine Microscopic          Today's Medication Changes          These changes are accurate as of 12/5/18 10:56 AM.  If you have any questions, ask your nurse or doctor.               Start taking these medicines.        Dose/Directions    cefdinir 300 MG capsule   Commonly known as:  OMNICEF   Used for:  Chronic obstructive pulmonary disease, unspecified COPD type (H), Acute cystitis without hematuria   Started by:  Sarahi Stafford APRN CNP        Dose:  300 mg   Take 1 capsule (300 mg) by mouth 2 times daily for 7 days   Quantity:  14 capsule   Refills:  0       predniSONE 20 MG tablet   Commonly known as:  DELTASONE   Used for:  Chronic obstructive pulmonary disease, unspecified COPD type (H)   Started by:  Sarahi Stafford APRN CNP        Dose:  20 mg   Take 1 tablet (20 mg) by mouth daily   Quantity:  5 tablet   Refills:  0          "   Where to get your medicines      These medications were sent to [place] [address] [facility] [place], [place] - [address]  [address] [place] [facility] [address]     Phone:  [phone]     cefdinir 300 MG capsule    predniSONE 20 MG tablet                Primary Care Provider Office Phone # Fax #    Ce Belinda Cano [phone] [address] [facility]  [facility] [address]        Equal Access to Services     [facility]: Hadii aad ku hadasho Soomaali, waaxda luqadaha, qaybta kaalmada adeegyada, waxay idiin hayaan adeeg kharadoris crockett . So [facility] [phone].    ATENCIÓN: Si habla español, tiene a aponte disposición servicios gratuitos de asistencia lingüística. [facility] [phone].    We comply with applicable federal civil rights laws and [place] laws. We do not discriminate on the basis of race, color, national origin, age, disability, sex, sexual orientation, or gender identity.            Thank you!     Thank you for choosing [facility]  for your care. Our goal is always to provide you with excellent care. Hearing back from our patients is one way we can continue to improve our services. Please take a few minutes to complete the written survey that you may receive in the mail after your visit with us. Thank you!             Your Updated Medication List - Protect others around you: Learn how to safely use, store and throw away your medicines at www.disposemymeds.org.          This list is accurate as of 12/5/18 10:56 AM.  Always use your most recent med list.                   Brand Name Dispense Instructions for use Diagnosis    ADVAIR DISKUS 500-50 MCG/DOSE inhaler   Generic drug:  fluticasone-salmeterol     3 Inhaler    INHALE 1 PUFF INTO THE LUNGS 2 TIMES DAILY    COPD (chronic obstructive pulmonary disease) (H)       albuterol 108 (90 Base) MCG/ACT inhaler    PROAIR HFA    8.5 Inhaler    INHALE TWO PUFFS BY MOUTH EVERY FOUR HOURS AS NEEDED SHORTNESS OF  BREATH    Chronic obstructive pulmonary disease, unspecified COPD type (H)       aspirin 81 MG tablet    ASA     Take 1 tablet by mouth daily.        calcium 600 MG tablet     180 tablet    Take 1 tablet by mouth 2 times daily        cefdinir 300 MG capsule    OMNICEF    14 capsule    Take 1 capsule (300 mg) by mouth 2 times daily for 7 days    Chronic obstructive pulmonary disease, unspecified COPD type (H), Acute cystitis without hematuria       cholecalciferol 1000 units (25 mcg) capsule    VITAMIN D3     Take 1 capsule by mouth daily.        ipratropium - albuterol 0.5 mg/2.5 mg/3 mL 0.5-2.5 (3) MG/3ML neb solution    DUONEB    1 Box    Take 1 vial (3 mLs) by nebulization every 6 hours as needed for shortness of breath / dyspnea    Panlobular emphysema (H)       latanoprost 0.005 % ophthalmic solution    XALATAN    3 Bottle    Place 1 drop into both eyes At Bedtime    Primary open angle glaucoma of both eyes, unspecified glaucoma stage       losartan 25 MG tablet    COZAAR    45 tablet    TAKE 0.5 TABLETS (12.5 MG) BY MOUTH DAILY    Microalbuminuria       mycophenolate 500 MG tablet    GENERIC EQUIVALENT    300 tablet    Take 2 tabs in the am and 2 tabs in the evening    Bullous pemphigoid       potassium chloride ER 20 MEQ CR tablet    KLOR-CON    90 tablet    Take 1 tablet (20 mEq) by mouth daily    Hypopotassemia       predniSONE 20 MG tablet    DELTASONE    5 tablet    Take 1 tablet (20 mg) by mouth daily    Chronic obstructive pulmonary disease, unspecified COPD type (H)       rosuvastatin 5 MG tablet    CRESTOR    90 tablet    TAKE 1 TABLET (5 MG) BY MOUTH DAILY    Mixed hyperlipidemia       sertraline 100 MG tablet    ZOLOFT    90 tablet    Take 1 tablet (100 mg) by mouth daily    Major depressive disorder, recurrent episode, mild (H)       tiotropium 18 MCG inhaled capsule    SPIRIVA HANDIHALER    90 capsule    INHALE 1 CAPSULE INTO THE LUNGS DAILY.    Panlobular emphysema (H)       vitamin B complex  with vitamin C tablet      Take 1 tablet by mouth daily

## 2018-12-05 NOTE — PATIENT INSTRUCTIONS
[facility]    If you have any questions regarding to your visit please contact your care team:     Team Pink:   Clinic Hours Telephone Number   Internal Medicine:  Dr. Ce Stafford NP 7am-7pm  Monday - Thursday   7am-5pm  Fridays  [phone]  (Appointment scheduling available 24/7)   Urgent Care - [place] and [facility] - 11am-9pm Monday-Friday Saturday-Sunday- 9am-5pm   [place] - 5pm-9pm Monday-Friday Saturday-Sunday- 9am-5pm  [phone] - [place]  [phone] - [place]       What options do I have for a visit other than an office visit? We offer electronic visits (e-visits) and telephone visits, when medically appropriate.  Please check with your medical insurance to see if these types of visits are covered, as you will be responsible for any charges that are not paid by your insurance.      You can use [employer] (secure electronic communication) to access to your chart, send your primary care provider a message, or make an appointment. Ask a team member how to get started.     For a price quote for your services, please call our Consumer Price Line at [phone] or our Imaging Cost estimation line at [phone] (for imaging tests).  Maria D Murray MA

## 2018-12-05 NOTE — PROGRESS NOTES
SUBJECTIVE:   Ludy Mckenzie is a 78 year old female who presents to clinic today for the following health issues:    Joint Pain    Onset: a month ago    Description:   Location: right side ribs  Character: Sharp pain to touch dull pain lying down    Intensity: moderate    Progression of Symptoms: worse    Accompanying Signs & Symptoms:  Other symptoms: none    History:   Previous similar pain: no       Precipitating factors:   Trauma or overuse: not sure    Alleviating factors:  Improved by: nothing    Therapies Tried and outcome: none    Patient lifted a tub of [place] ornaments and developed pain to her right side.    Patient notes increased shortness of breath over the past several months.  She has been having to use her nebulizer more frequently, about 2-3 times per week.  She continues on advair and spiriva.  She denies chest pain, palpitations, edema, weight gain.  She does note a cough that started several months ago.    URINARY TRACT SYMPTOMS  Onset: 3-4 days ago    Description:   Painful urination (Dysuria): YES  Blood in urine (Hematuria): no   Delay in urine (Hesitency): Yes    Intensity: moderate    Progression of Symptoms:  worsening    Accompanying Signs & Symptoms:  Fever/chills: no   Flank pain no   Nausea and vomiting: no   Any vaginal symptoms: none  Abdominal/Pelvic Pain: no     History:   History of frequent UTI's: YES  History of kidney stones: no   Sexually Active: no   Possibility of pregnancy: No    Precipitating factors:   unknown    Therapies Tried and outcome: ate cranberries but didn't help    Problem list and histories reviewed & adjusted, as indicated.  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)     Primary open angle glaucoma (POAG)     Chronic obstructive pulmonary disease, unspecified COPD type (H)     Chronic kidney disease (CKD) stage G1/A2, glomerular filtration rate (GFR) equal to or greater than 90 mL/min/1.73 square meter and albuminuria creatinine ratio between  mg/g     Loss of balance     Diabetic neuropathy with neurologic complication (H)     Pseudophakia of both eyes     Urge incontinence of urine     Cramp of limb     Right sided sciatica     Pulmonary emphysema, unspecified emphysema type (H)     Age-related osteoporosis without current pathological fracture     Pulmonary nodules     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)     Past Surgical History:   Procedure Laterality Date     BIOPSY       BUNIONECTOMY  1960    JACQUELINE     CATARACT IOL, RT/LT  2010    right     COLONOSCOPY  2/22/2013    Procedure: COLONOSCOPY;  Colonoscopy ;  Surgeon: Hugo Minor MD;  Location:  GI       Social History   Substance Use Topics     Smoking status: Former Smoker     Packs/day: 1.50     Years: 50.00     Types: Cigarettes     Quit date: 7/22/2009     Smokeless tobacco: Never Used     Alcohol use No     Family History   Problem Relation Age of Onset     Cancer Maternal Grandfather      bone     Diabetes Mother 50     dx age 50, hip fracture     Glaucoma Mother      Macular Degeneration Mother      C.A.D. Father      pacemaker     Glaucoma Sister          Current Outpatient Prescriptions   Medication Sig Dispense Refill     ADVAIR DISKUS 500-50 MCG/DOSE diskus inhaler INHALE 1 PUFF INTO THE LUNGS 2 TIMES DAILY 3 Inhaler 1     albuterol (PROAIR HFA) 108 (90 Base) MCG/ACT Inhaler INHALE TWO PUFFS BY MOUTH EVERY FOUR HOURS AS NEEDED SHORTNESS OF BREATH 8.5 Inhaler 3     aspirin 81 MG tablet Take 1 tablet by mouth daily.       calcium 600 MG tablet Take 1 tablet by mouth 2 times daily 180 tablet 3     cefdinir (OMNICEF)  300 MG capsule Take 1 capsule (300 mg) by mouth 2 times daily for 7 days 14 capsule 0     cholecalciferol (VITAMIN D3) 1000 UNIT capsule Take 1 capsule by mouth daily.       ipratropium - albuterol 0.5 mg/2.5 mg/3 mL (DUONEB) 0.5-2.5 (3) MG/3ML nebulization Take 1 vial (3 mLs) by nebulization every 6 hours as needed for shortness of breath / dyspnea 1 Box 3     latanoprost (XALATAN) 0.005 % ophthalmic solution Place 1 drop into both eyes At Bedtime 3 Bottle 4     losartan (COZAAR) 25 MG tablet TAKE 0.5 TABLETS (12.5 MG) BY MOUTH DAILY 45 tablet 3     mycophenolate (GENERIC EQUIVALENT) 500 MG tablet Take 2 tabs in the am and 2 tabs in the evening 300 tablet 0     potassium chloride SA (KLOR-CON) 20 MEQ CR tablet Take 1 tablet (20 mEq) by mouth daily 90 tablet 4     predniSONE (DELTASONE) 20 MG tablet Take 1 tablet (20 mg) by mouth daily 5 tablet 0     rosuvastatin (CRESTOR) 5 MG tablet TAKE 1 TABLET (5 MG) BY MOUTH DAILY 90 tablet 3     sertraline (ZOLOFT) 100 MG tablet Take 1 tablet (100 mg) by mouth daily 90 tablet 3     tiotropium (SPIRIVA HANDIHALER) 18 MCG capsule INHALE 1 CAPSULE INTO THE LUNGS DAILY. 90 capsule 3     vitamin  B complex with vitamin C (VITAMIN  B COMPLEX) TABS Take 1 tablet by mouth daily       Allergies   Allergen Reactions     Amoxicillin GI Disturbance     Augmentin Nausea and Vomiting     Doxycycline GI Disturbance     Iodine Itching and Swelling     Pt is ok with ct contrast dye (isovue 370)     Mercury Unknown     Metronidazole GI Disturbance     Penicillins Unknown     BP Readings from Last 3 Encounters:   12/05/18 110/60   07/30/18 104/56   07/16/18 100/40    Wt Readings from Last 3 Encounters:   12/05/18 139 lb (63 kg)   07/30/18 135 lb 8 oz (61.5 kg)   07/16/18 136 lb (61.7 kg)                  Labs reviewed in EPIC    Reviewed and updated as needed this visit by clinical staff       Reviewed and updated as needed this visit by Provider         ROS:  Constitutional, HEENT,  "cardiovascular, pulmonary, gi and gu systems are negative, except as otherwise noted.    OBJECTIVE:     /60  Pulse 87  Temp 97.3  F (36.3  C) (Oral)  Resp 20  Ht 5' 6\" (1.676 m)  Wt 139 lb (63 kg)  SpO2 96%  BMI 22.44 kg/m2  Body mass index is 22.44 kg/(m^2).  GENERAL: healthy, alert and no distress  RESP: lungs clear to auscultation - no rales, rhonchi or wheezes  CV: regular rate and rhythm, normal S1 S2, no S3 or S4, no murmur, click or rub, no peripheral edema and peripheral pulses strong  ABDOMEN: soft, nontender, no hepatosplenomegaly, no masses and bowel sounds normal  MS: tenderness to palpation of right lateral ribs    Diagnostic Test Results:  pending    ASSESSMENT/PLAN:     1. Acute cystitis without hematuria    - *UA reflex to Microscopic and Culture ([place] and [facility] (except [place] and [place])  - Urine Microscopic  - cefdinir (OMNICEF) 300 MG capsule; Take 1 capsule (300 mg) by mouth 2 times daily for 7 days  Dispense: 14 capsule; Refill: 0    2. Nonspecific finding on examination of urine    - Urine Culture Aerobic Bacterial    3. Chronic obstructive pulmonary disease, unspecified COPD type (H)  Will treat as possible exacerbation and check CXR.  Patient to follow-up with pulmonology if no improvement.  - cefdinir (OMNICEF) 300 MG capsule; Take 1 capsule (300 mg) by mouth 2 times daily for 7 days  Dispense: 14 capsule; Refill: 0  - predniSONE (DELTASONE) 20 MG tablet; Take 1 tablet (20 mg) by mouth daily  Dispense: 5 tablet; Refill: 0  - PULMONARY MEDICINE REFERRAL    4. Rib pain on right side  Rib x-ray.  Patient to avoid pushing, pulling, lifting greater than 20 lbs until pain resolves.      FUTURE APPOINTMENTS:       - Follow-up for annual visit or as needed    OCTAVIO Bradford [facility]    "

## 2018-12-06 NOTE — PROGRESS NOTES
Dear Ludy,    Your recent test results are attached.      Normal rib x-ray.  Please continue with plan to avoid lifting, pushing, pulling greater than 20 lbs until pain resolves.  If no improving over the course of the next 1-2 weeks, please contact clinic.    If you have any questions please feel free to contact [phone] or myself via [employer].    Sincerely,  Sarahi Stafford, CNP

## 2018-12-07 LAB
BACTERIA SPEC CULT: ABNORMAL
SPECIMEN SOURCE: ABNORMAL

## 2018-12-11 ENCOUNTER — MYC MEDICAL ADVICE (OUTPATIENT)
Dept: INTERNAL MEDICINE | Facility: CLINIC | Age: 78
End: 2018-12-11

## 2018-12-11 DIAGNOSIS — J44.9 COPD (CHRONIC OBSTRUCTIVE PULMONARY DISEASE) (H): ICD-10-CM

## 2018-12-11 NOTE — TELEPHONE ENCOUNTER
Per 12/9/2018 encounter:  Dr Cano, Could you please  send me a prescription for Advair 50/500 to fax @ [phone].  Thank  you very much. If you have questions, call [phone].     Pt is requesting Rx to go to [facility].     Talya Henry RN

## 2018-12-12 DIAGNOSIS — J44.9 COPD (CHRONIC OBSTRUCTIVE PULMONARY DISEASE) (H): ICD-10-CM

## 2018-12-12 DIAGNOSIS — J44.9 CHRONIC OBSTRUCTIVE PULMONARY DISEASE, UNSPECIFIED COPD TYPE (H): Primary | ICD-10-CM

## 2018-12-12 NOTE — TELEPHONE ENCOUNTER
"Requested Prescriptions   Pending Prescriptions Disp Refills     fluticasone-salmeterol (ADVAIR DISKUS) 500-50 MCG/DOSE inhaler 3 Inhaler 1    Inhaled Steroids Protocol Failed - 12/12/2018  9:36 AM   Last Written Prescription Date:  7/5/18  Last Fill Quantity: 3,  # refills: 1   Last office visit: 12/5/2018 with prescribing provider:  Jean Marie   Future Office Visit:        Failed - Recent (12 mo) or future (30 days) visit within the authorizing provider's specialty    Patient had office visit in the last 12 months or has a visit in the next 30 days with authorizing provider or within the authorizing provider's specialty.  See \"Patient Info\" tab in inbasket, or \"Choose Columns\" in Meds & Orders section of the refill encounter.             Passed - Patient is age 12 or older        Please fax to [facility]  Toll free fax : [phone]  "

## 2018-12-12 NOTE — TELEPHONE ENCOUNTER
Last rx'd by Dr. Cano 07/05/18. Will route to provider for consideration.     Enriqueta TAYLOR RN

## 2018-12-14 NOTE — TELEPHONE ENCOUNTER
Patient would like for you to mail the Rx for Advair to her home address. Please call if any questions.

## 2018-12-14 NOTE — TELEPHONE ENCOUNTER
Script printed and placed on Dr. Cano's desk for signature. Please mail rx to pt once signed and inform via [employer]. Thanks.    Rhoda Bates RN  [facility]

## 2019-01-03 ENCOUNTER — OFFICE VISIT (OUTPATIENT)
Dept: FAMILY MEDICINE | Facility: CLINIC | Age: 79
End: 2019-01-03
Payer: COMMERCIAL

## 2019-01-03 VITALS
HEART RATE: 85 BPM | BODY MASS INDEX: 21.63 KG/M2 | TEMPERATURE: 96.7 F | OXYGEN SATURATION: 97 % | DIASTOLIC BLOOD PRESSURE: 64 MMHG | SYSTOLIC BLOOD PRESSURE: 124 MMHG | WEIGHT: 134 LBS | RESPIRATION RATE: 18 BRPM

## 2019-01-03 DIAGNOSIS — E78.2 MIXED HYPERLIPIDEMIA: ICD-10-CM

## 2019-01-03 DIAGNOSIS — R10.11 RIGHT UPPER QUADRANT PAIN: Primary | ICD-10-CM

## 2019-01-03 DIAGNOSIS — F33.0 MAJOR DEPRESSIVE DISORDER, RECURRENT EPISODE, MILD (H): ICD-10-CM

## 2019-01-03 DIAGNOSIS — R80.9 MICROALBUMINURIA: ICD-10-CM

## 2019-01-03 PROCEDURE — 99213 OFFICE O/P EST LOW 20 MIN: CPT | Performed by: FAMILY MEDICINE

## 2019-01-03 NOTE — PATIENT INSTRUCTIONS
Patient Education     Chest Wall Pain: Costochondritis    The chest pain that you have had today is caused by costochondritis. This condition is caused by an inflammation of the cartilage joining your ribs to your breastbone. It is not caused by heart or lung problems. Your healthcare team has made sure that the chest pain you feel is not from a life threatening cause of chest pain such as heart attack, collapsed lung, blood clot in the lung, tear in the aorta, or esophageal rupture. The inflammation may have been brought on by a blow to the chest, lifting heavy objects, intense exercise, or an illness that made you cough and sneeze a lot. It often occurs during times of emotional stress. It can be painful, but it is not dangerous. It usually goes away in 1 to 2 weeks. But it may happen again. Rarely, a more serious condition may cause symptoms similar to costochondritis. That s why it s important to watch for the warning signs listed below.  Home care  Follow these guidelines when caring for yourself at home:    If you feel that emotional stress is a cause of your condition, try to figure out the sources of that stress. It may not be obvious. Learn ways to deal with the stress in your life. This can include regular exercise, muscle relaxation, meditation, or simply taking time out for yourself.    You may use acetaminophen, ibuprofen, or naproxen to control pain, unless another pain medicine was prescribed. If you have liver or kidney disease or ever had a stomach ulcer, talk with your healthcare provider before using these medicines.    You can also help ease pain by using a hot, wet compress or heating pad. Use this with or without a medicated skin cream that helps relieves pain.    Do stretching exercise as advised by your provider.    Take any prescribed medicines as directed.  Follow-up care  Follow up with your healthcare provider, or as advised, if you do not start to get better in the next 2 days.  When  to seek medical advice  Call your healthcare provider right away if any of these occur:    A change in the type of pain. Call if it feels different, becomes more serious, lasts longer, or spreads into your shoulder, arm, neck, jaw, or back.    Shortness of breath or pain gets worse when you breathe    Weakness, dizziness, or fainting    Cough with dark-colored sputum (phlegm) or blood    Abdominal pain    Dark red or black stools    Fever of 100.4 F (38 C) or higher, or as directed by your healthcare provider  Date Last Reviewed: 12/1/2016 2000-2018 [employer]. [address] [place] [address]. All rights reserved. This information is not intended as a substitute for professional medical care. Always follow your healthcare professional's instructions.           Patient Education     What are Gallstones?    The gallbladder stores bile, a fluid made by the liver. Bile helps digest fats in the foods you eat. Gallstones form when certain substances in the bile crystallize and become solid. In some cases, the stones don t cause any symptoms. In others, they irritate the walls of the gallbladder. More serious problems can occur if stones move into nearby ducts (such as the common bile duct) and cause blockages. This can block the flow of bile and lead to pain, nausea, and infection.  Common symptoms  Gallbladder problems can cause painful attacks, often after a meal. Some people have only one attack. Others have many. Common symptoms include:    Severe, steady pain or aching in the upper right belly (abdomen), back, or right shoulder blade, lasting from 30 minutes to several hours    A dull ache beneath the ribs or breastbone    Nausea, upset stomach, or vomiting    A buildup of too much bile in the blood (jaundice), which causes yellowing of the skin and eyes, dark urine, and itching. Call your healthcare provider right away if this occurs.  Risk factors for gallstones  You are more at risk for  gallstones if you:    Are a woman    Are obese    Have a history of very fast (rapid) weight loss    Have certain intestinal diseases, such as Crohn s disease    Have certain blood diseases, such as sickle cell anemia    Have a family background that includes Native Americans or [place]an Americans  Diagnosis  Ultrasound is often used to look at the gallbladder and measure the size and exact location of gallstone.  Blood tests are used to measure liver enzymes and bilirubin. Both of these may be high if the gallstones are blocking bile flow or irritating the liver.  Treating gallstones  If your stones are not causing symptoms, you may choose to delay treatment. But if you ve had one or more painful attacks, your healthcare provider will likely recommend removing your gallbladder. This prevents more stones from forming and causing attacks. It also helps prevent problems, such as stones passing into the ducts and causing infection or pancreatitis. After the gallbladder is removed, your liver will still make bile to aid digestion.  If you re pregnant  Hormone changes during pregnancy can make bile more likely to form stones. If your gallbladder needs to be removed, your doctor will talk with you about the timing for surgery. In some cases, it can be delayed until after childbirth. In others, you may have surgery during pregnancy. This helps protect you and your baby s health.   Date Last Reviewed: 12/1/2016 2000-2018 [employer]. [address] [place] [address], [place] [address]. All rights reserved. This information is not intended as a substitute for professional medical care. Always follow your healthcare professional's instructions.

## 2019-01-03 NOTE — TELEPHONE ENCOUNTER
Requested Prescriptions   Pending Prescriptions Disp Refills     losartan (COZAAR) 25 MG tablet 45 tablet 3     Sig: TAKE 0.5 TABLETS (12.5 MG) BY MOUTH DAILY    There is no refill protocol information for this order  Last Written Prescription Date:  7/30/18  Last Fill Quantity: 45,  # refills: 3   Last office visit: 12/5/2018 with prescribing provider:  12/5/18   Future Office Visit:   Next 5 appointments (look out 90 days)    Jan 03, 2019  1:40 PM CST  SHORT with Ross Shabazz MD  [facility] ([facility]) [address] [place], [place] [address] [address]  [phone]                 sertraline (ZOLOFT) 100 MG tablet 90 tablet 3     Sig: Take 1 tablet (100 mg) by mouth daily    There is no refill protocol information for this order  Last Written Prescription Date:  7/30/18  Last Fill Quantity: 90,  # refills: 3   Last office visit: 12/5/2018 with prescribing provider:  12/5/18   Future Office Visit:   Next 5 appointments (look out 90 days)    Jan 03, 2019  1:40 PM CST  SHORT with Ross Shabazz MD  [facility] ([facility]) [address] [facility]  [facility] [address] [phone]                 rosuvastatin (CRESTOR) 5 MG tablet 90 tablet 3     Sig: TAKE 1 TABLET (5 MG) BY MOUTH DAILY    There is no refill protocol information for this order  Last Written Prescription Date:  7/30/18  Last Fill Quantity: 90,  # refills: 3   Last office visit: 12/5/2018 with prescribing provider:  12/5/18   Future Office Visit:   Next 5 appointments (look out 90 days)    Jan 03, 2019  1:40 PM CST  SHORT [facility] MD  [facility] ([facility]) [address] [place], [place] [address] [address]  [phone]

## 2019-01-03 NOTE — PROGRESS NOTES
SUBJECTIVE:   Ludy Mckenzie is a 78 year old female who presents to clinic today for the following health issues:    Chief Complaint   Patient presents with     Breast Pain     Pain under right breast area     Pain right lower chest that sometimes radiates around to her back  At night it's constant  Started about  3 weeks ago, getting worse  Lifted ramiro decorations  Every once and a while gets a sharp pain  Takes ibuprofen which helps.    Problem list and histories reviewed & adjusted, as indicated.  Additional history: as documented    BP Readings from Last 3 Encounters:   01/03/19 124/64   12/05/18 110/60   07/30/18 104/56    Wt Readings from Last 3 Encounters:   01/03/19 60.8 kg (134 lb)   12/05/18 63 kg (139 lb)   07/30/18 61.5 kg (135 lb 8 oz)           Reviewed and updated as needed this visit by clinical staff  Tobacco  Allergies  Meds  Problems  Med Hx  Surg Hx  Fam Hx       Reviewed and updated as needed this visit by Provider  Tobacco  Allergies  Meds  Problems  Med Hx  Surg Hx  Fam Hx         ROS:  Constitutional, HEENT, cardiovascular, pulmonary, gi and gu systems are negative, except as otherwise noted.    OBJECTIVE:     /64   Pulse 85   Temp 96.7  F (35.9  C) (Oral)   Resp 18   Wt 60.8 kg (134 lb)   SpO2 97%   BMI 21.63 kg/m    Body mass index is 21.63 kg/m .  GENERAL: healthy, alert and no distress  EYES: Eyes grossly normal to inspection, PERRL and conjunctivae and sclerae normal  NECK: no adenopathy, no asymmetry, masses, or scars and thyroid normal to palpation  CV: regular rate and rhythm, normal S1 S2, no S3 or S4, no murmur, click or rub, no peripheral edema and peripheral pulses strong  ABDOMEN: soft, ruq pain, lee's sign positive, no hepatosplenomegaly, no masses and bowel sounds normal  MS: right lower rib tenderness, no gross musculoskeletal defects noted, no edema  SKIN: no suspicious lesions or rashes  PSYCH: mentation appears normal, affect  normal/bright    ASSESSMENT/PLAN:     1. Right upper quadrant pain  Will obtain ultrasound to rule out gallbladder pathology, suspect costochondritis as well.  - US Abdomen Complete; Future    Follow up if symptoms worsen or fail to improve.     Ross Agarwal MD  [facility]

## 2019-01-07 ENCOUNTER — ANCILLARY PROCEDURE (OUTPATIENT)
Dept: ULTRASOUND IMAGING | Facility: CLINIC | Age: 79
End: 2019-01-07
Payer: COMMERCIAL

## 2019-01-07 DIAGNOSIS — R10.11 RIGHT UPPER QUADRANT PAIN: ICD-10-CM

## 2019-01-07 PROCEDURE — 76700 US EXAM ABDOM COMPLETE: CPT

## 2019-01-07 RX ORDER — LOSARTAN POTASSIUM 25 MG/1
TABLET ORAL
Qty: 45 TABLET | Refills: 2 | Status: SHIPPED | OUTPATIENT
Start: 2019-01-07 | End: 2019-09-13

## 2019-01-07 RX ORDER — ROSUVASTATIN CALCIUM 5 MG/1
TABLET, COATED ORAL
Qty: 90 TABLET | Refills: 1 | Status: SHIPPED | OUTPATIENT
Start: 2019-01-07 | End: 2019-06-27

## 2019-01-07 RX ORDER — SERTRALINE HYDROCHLORIDE 100 MG/1
100 TABLET, FILM COATED ORAL DAILY
Qty: 30 TABLET | Refills: 0 | Status: SHIPPED | OUTPATIENT
Start: 2019-01-07 | End: 2019-03-27

## 2019-03-06 ENCOUNTER — DOCUMENTATION ONLY (OUTPATIENT)
Dept: OPHTHALMOLOGY | Facility: CLINIC | Age: 79
End: 2019-03-06

## 2019-03-08 ENCOUNTER — OFFICE VISIT (OUTPATIENT)
Dept: INTERNAL MEDICINE | Facility: CLINIC | Age: 79
End: 2019-03-08
Payer: COMMERCIAL

## 2019-03-08 VITALS
BODY MASS INDEX: 21.66 KG/M2 | RESPIRATION RATE: 14 BRPM | WEIGHT: 130 LBS | TEMPERATURE: 96.4 F | DIASTOLIC BLOOD PRESSURE: 68 MMHG | SYSTOLIC BLOOD PRESSURE: 106 MMHG | OXYGEN SATURATION: 99 % | HEIGHT: 65 IN | HEART RATE: 87 BPM

## 2019-03-08 DIAGNOSIS — E11.49 DIABETIC NEUROPATHY WITH NEUROLOGIC COMPLICATION (H): ICD-10-CM

## 2019-03-08 DIAGNOSIS — R10.13 EPIGASTRIC PAIN: ICD-10-CM

## 2019-03-08 DIAGNOSIS — F10.21 ALCOHOLISM IN REMISSION (H): ICD-10-CM

## 2019-03-08 DIAGNOSIS — E11.49 TYPE 2 DIABETES MELLITUS WITH OTHER DIABETIC NEUROLOGICAL COMPLICATION (H): ICD-10-CM

## 2019-03-08 DIAGNOSIS — M79.89 MASS OF SOFT TISSUE: Primary | ICD-10-CM

## 2019-03-08 DIAGNOSIS — K86.89 PANCREATIC CALCIFICATION: ICD-10-CM

## 2019-03-08 DIAGNOSIS — J44.9 CHRONIC OBSTRUCTIVE PULMONARY DISEASE, UNSPECIFIED COPD TYPE (H): ICD-10-CM

## 2019-03-08 DIAGNOSIS — F33.0 MAJOR DEPRESSIVE DISORDER, RECURRENT EPISODE, MILD (H): ICD-10-CM

## 2019-03-08 DIAGNOSIS — E11.40 DIABETIC NEUROPATHY WITH NEUROLOGIC COMPLICATION (H): ICD-10-CM

## 2019-03-08 LAB
ALBUMIN SERPL-MCNC: 3.8 G/DL (ref 3.4–5)
ALP SERPL-CCNC: 79 U/L (ref 40–150)
ALT SERPL W P-5'-P-CCNC: 35 U/L (ref 0–50)
ANION GAP SERPL CALCULATED.3IONS-SCNC: 7 MMOL/L (ref 3–14)
AST SERPL W P-5'-P-CCNC: 43 U/L (ref 0–45)
BILIRUB SERPL-MCNC: 0.5 MG/DL (ref 0.2–1.3)
BUN SERPL-MCNC: 21 MG/DL (ref 7–30)
CALCIUM SERPL-MCNC: 9.1 MG/DL (ref 8.5–10.1)
CHLORIDE SERPL-SCNC: 108 MMOL/L (ref 94–109)
CO2 SERPL-SCNC: 26 MMOL/L (ref 20–32)
CREAT SERPL-MCNC: 0.75 MG/DL (ref 0.52–1.04)
ERYTHROCYTE [DISTWIDTH] IN BLOOD BY AUTOMATED COUNT: 13 % (ref 10–15)
GFR SERPL CREATININE-BSD FRML MDRD: 76 ML/MIN/{1.73_M2}
GLUCOSE SERPL-MCNC: 115 MG/DL (ref 70–99)
HBA1C MFR BLD: 7 % (ref 0–5.6)
HCT VFR BLD AUTO: 40.7 % (ref 35–47)
HGB BLD-MCNC: 13.4 G/DL (ref 11.7–15.7)
MCH RBC QN AUTO: 30.4 PG (ref 26.5–33)
MCHC RBC AUTO-ENTMCNC: 32.9 G/DL (ref 31.5–36.5)
MCV RBC AUTO: 92 FL (ref 78–100)
PLATELET # BLD AUTO: 288 10E9/L (ref 150–450)
POTASSIUM SERPL-SCNC: 4.3 MMOL/L (ref 3.4–5.3)
PROT SERPL-MCNC: 7.2 G/DL (ref 6.8–8.8)
RBC # BLD AUTO: 4.41 10E12/L (ref 3.8–5.2)
SODIUM SERPL-SCNC: 141 MMOL/L (ref 133–144)
WBC # BLD AUTO: 7.6 10E9/L (ref 4–11)

## 2019-03-08 PROCEDURE — 85027 COMPLETE CBC AUTOMATED: CPT | Performed by: INTERNAL MEDICINE

## 2019-03-08 PROCEDURE — 99214 OFFICE O/P EST MOD 30 MIN: CPT | Performed by: INTERNAL MEDICINE

## 2019-03-08 PROCEDURE — 80053 COMPREHEN METABOLIC PANEL: CPT | Performed by: INTERNAL MEDICINE

## 2019-03-08 PROCEDURE — 36415 COLL VENOUS BLD VENIPUNCTURE: CPT | Performed by: INTERNAL MEDICINE

## 2019-03-08 PROCEDURE — 83036 HEMOGLOBIN GLYCOSYLATED A1C: CPT | Performed by: INTERNAL MEDICINE

## 2019-03-08 ASSESSMENT — MIFFLIN-ST. JEOR: SCORE: 1073.05

## 2019-03-08 ASSESSMENT — PATIENT HEALTH QUESTIONNAIRE - PHQ9: SUM OF ALL RESPONSES TO PHQ QUESTIONS 1-9: 2

## 2019-03-08 ASSESSMENT — PAIN SCALES - GENERAL: PAINLEVEL: NO PAIN (0)

## 2019-03-08 NOTE — LETTER
My COPD Action Plan     Name: Ludy Mckenzie    YOB: 1940   Date: 3/7/2019    My doctor: Ce Cano MD   [facility] [address]  [phone]  My Controller Medicine: { :857479}   Dose: ***     My Rescue Medicine: { :336146}   Dose: ***     My Flare Up Medicine: { :123813}   Dose: ***     My COPD Severity: { :652726}      Use of Oxygen: { :224002}     Make sure you've had your pneumonia   vaccines.          GREEN ZONE       Doing well today      Usual level of activity and exercise    Usual amount of cough and mucus    No shortness of breath    Usual level of health (thinking clearly, sleeping well, feel like eating) Actions:      Take daily medicines    Use oxygen as prescribed    Follow regular exercise and diet plan    Avoid cigarette smoke and other irritants that harm the lungs           YELLOW ZONE          Having a bad day or flare up      Short of breath more than usual    A lot more sputum (mucus) than usual    Sputum looks yellow, green, tan, brown or bloody    More coughing or wheezing    Fever or chills    Less energy; trouble completing activities    Trouble thinking or focusing    Using quick relief inhaler or nebulizer more often    Poor sleep; symptoms wake me up    Do not feel like eating Actions:      Get plenty of rest    Take daily medicines    Use quick relief inhaler every *** hours    If you use oxygen, call you doctor to see if you should adjust your oxygen    Do breathing exercises or other things to help you relax    Let a loved one, friend or neighbor know you are feeling worse    Call your care team if you have 2 or more symptoms.  Start taking steroids or antibiotics if directed by your care team           RED ZONE       Need medical care now      Severe shortness of breath (feel you can't breathe)    Fever, chills    Not enough breath to do any activity    Trouble coughing up mucus, walking or  talking    Blood in mucus    Frequent coughing   Rescue medicines are not working    Not able to sleep because of breathing    Feel confused or drowsy    Chest pain    Actions:      Call your health care team.  If you cannot reach your care team, call 911 or go to the emergency room.        Annual Reminders:  Meet with Care Team, Flu Shot every Fall  Pharmacy:    [facility]  [facility] [id number] [facility] [employer] - [facility], [place] - [address]  [id number] - [facility], [place] - [address] [facility].

## 2019-03-08 NOTE — PROGRESS NOTES
SUBJECTIVE:   Ludy Mckenzie is a 78 year old female who presents to clinic today for the following health issues:      Right Upper Quadrant -Pain      Duration: Started in January    Description (location/character/radiation): Right Upper Quadrant pain and swelling. Pain comes and goes and is a sharp pain.     Intensity:  Moderate to severe    Accompanying signs and symptoms: Pain comes and goes.     History (similar episodes/previous evaluation): Seen on 01/03/19 by Dr. Agarwal. Ultrasound on 01/07/19 notes in [facility].     Therapies tried and outcome: ice, heat, ibuprofen, tylenol, OTC gas pill and nothing has help.    She has a bulge in her right upper quadrant and feels like something is moving around in her abdomen.  Some days she will have it off and on all day and other days she won't get it so much but does get it every day.  Food doesn't make it better or worse.    At night when she lays down, laying on the right side feels better than the left.      Patient denies any exertional chest pain, dyspnea, palpitations, syncope, orthopnea, edema or paroxysmal nocturnal dyspnea.     No fever/chills.      She initially had been lifting a ramiro tote when this happened.  She feels the area is getting bigger and more painful.    She feels her neuropathy is unchanged.  No etoh use.  No blood sugar checks.    See phq9 for her symptoms.     ULTRASOUND ABDOMEN COMPLETE  1/7/2019 10:46 AM       HISTORY: Right upper quadrant pain.      COMPARISON: 5/2/2018.     FINDINGS: The liver is normal in size and texture without focal mass.  There is no intra or extrahepatic biliary dilatation. The common  hepatic duct measures 0.7 cm. The gallbladder is normal appearance  without gallstones. The pancreas head and body appear normal. The tail  is obscured by bowel gas. The spleen is normal size and appearance  measuring 9.6 cm. There are multiple calcified granulomas in the  spleen. The right kidney measures 9.2 cm and the left  kidney measures  9.6 cm. The kidneys are normal in appearance. The proximal abdominal  aorta and IVC appear normal.                                                                      IMPRESSION:    1. No gallstone or biliary dilatation.  2. Calcified granulomas in the spleen.     ELIDIA HANCOCK MD    RIBS AND CHEST RIGHT THREE VIEW 12/5/2018 11:27 AM      HISTORY: Chronic obstructive pulmonary disease, unspecified COPD type  (H). Rib pain on right side.     COMPARISON: 3/20/2018                                                                      IMPRESSION: No displaced fractures are identified on the rib detail  views. The heart size and pulmonary vasculature are normal. There are  biapical fibrotic changes. No evidence of pneumothorax.     DMITRI HEATH MD              Problem list and histories reviewed & adjusted, as indicated.  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)     Primary open angle glaucoma (POAG)     Chronic obstructive pulmonary disease, unspecified COPD type (H)     Chronic kidney disease (CKD) stage G1/A2, glomerular filtration rate (GFR) equal to or greater than 90 mL/min/1.73 square meter and albuminuria creatinine ratio between  mg/g     Loss of balance     Diabetic neuropathy with neurologic complication (H)     Pseudophakia of both eyes     Urge incontinence of urine     Cramp of limb     Right sided sciatica     Pulmonary emphysema, unspecified emphysema type (H)     Age-related osteoporosis without current pathological fracture     Pulmonary nodules     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)     Past Surgical  History:   Procedure Laterality Date     BIOPSY       BUNIONECTOMY  1960    JACQUELINE     CATARACT IOL, RT/LT      right     COLONOSCOPY  2013    Procedure: COLONOSCOPY;  Colonoscopy ;  Surgeon: Hugo Minor MD;  Location:  GI       Social History     Tobacco Use     Smoking status: Former Smoker     Packs/day: 1.50     Years: 50.00     Pack years: 75.00     Types: Cigarettes     Last attempt to quit: 2009     Years since quittin.6     Smokeless tobacco: Never Used   Substance Use Topics     Alcohol use: No     Alcohol/week: 2.4 oz     Types: 4 Standard drinks or equivalent per week     Family History   Problem Relation Age of Onset     Cancer Maternal Grandfather         bone     Diabetes Mother 50        dx age 50, hip fracture     Glaucoma Mother      Macular Degeneration Mother      C.A.D. Father         pacemaker     Glaucoma Sister          Current Outpatient Medications   Medication Sig Dispense Refill     albuterol (PROAIR HFA) 108 (90 Base) MCG/ACT Inhaler INHALE TWO PUFFS BY MOUTH EVERY FOUR HOURS AS NEEDED SHORTNESS OF BREATH 8.5 Inhaler 3     aspirin 81 MG tablet Take 1 tablet by mouth daily.       calcium 600 MG tablet Take 1 tablet by mouth 2 times daily 180 tablet 3     cholecalciferol (VITAMIN D3) 1000 UNIT capsule Take 1 capsule by mouth daily.       fluticasone-salmeterol (ADVAIR DISKUS) 500-50 MCG/DOSE inhaler INHALE 1 PUFF INTO THE LUNGS 2 TIMES DAILY 3 Inhaler 1     ipratropium - albuterol 0.5 mg/2.5 mg/3 mL (DUONEB) 0.5-2.5 (3) MG/3ML nebulization Take 1 vial (3 mLs) by nebulization every 6 hours as needed for shortness of breath / dyspnea 1 Box 3     latanoprost (XALATAN) 0.005 % ophthalmic solution Place 1 drop into both eyes At Bedtime 3 Bottle 4     losartan (COZAAR) 25 MG tablet TAKE 0.5 TABLETS (12.5 MG) BY MOUTH DAILY 45 tablet 2     potassium chloride SA (KLOR-CON) 20 MEQ CR tablet Take 1 tablet (20 mEq) by mouth daily 90 tablet 4     rosuvastatin (CRESTOR) 5 MG  "tablet TAKE 1 TABLET (5 MG) BY MOUTH DAILY 90 tablet 1     sertraline (ZOLOFT) 100 MG tablet Take 1 tablet (100 mg) by mouth daily 30 tablet 0     tiotropium (SPIRIVA HANDIHALER) 18 MCG capsule INHALE 1 CAPSULE INTO THE LUNGS DAILY. 90 capsule 3     vitamin  B complex with vitamin C (VITAMIN  B COMPLEX) TABS Take 1 tablet by mouth daily       fluticasone-salmeterol (ADVAIR DISKUS) 500-50 MCG/DOSE inhaler INHALE 1 PUFF INTO THE LUNGS 2 TIMES DAILY (Patient not taking: Reported on 3/8/2019) 3 Inhaler 1     mycophenolate (GENERIC EQUIVALENT) 500 MG tablet Take 2 tabs in the am and 2 tabs in the evening (Patient not taking: Reported on 3/8/2019) 300 tablet 0     predniSONE (DELTASONE) 20 MG tablet Take 1 tablet (20 mg) by mouth daily (Patient not taking: Reported on 3/8/2019.) 5 tablet 0     Allergies   Allergen Reactions     Amoxicillin GI Disturbance     Augmentin Nausea and Vomiting     Doxycycline GI Disturbance     Iodine Itching and Swelling     Pt is ok with ct contrast dye (isovue 370)     Mercury Unknown     Metronidazole GI Disturbance     Penicillins Unknown       Reviewed and updated as needed this visit by clinical staff  Tobacco  Allergies  Meds  Problems  Med Hx  Surg Hx  Fam Hx       Reviewed and updated as needed this visit by Provider  Tobacco  Allergies  Meds  Problems  Med Hx  Surg Hx  Fam Hx         ROS:   ROS: 10 point ROS neg other than the symptoms noted above in the HPI.     OBJECTIVE:     /68   Pulse 87   Temp 96.4  F (35.8  C) (Oral)   Resp 14   Ht 1.655 m (5' 5.16\")   Wt 59 kg (130 lb)   SpO2 99%   BMI 21.53 kg/m    Body mass index is 21.53 kg/m .  GENERAL APPEARANCE: healthy, alert and no distress  NECK: no adenopathy, no asymmetry, masses, or scars and thyroid normal to palpation  RESP: lungs clear to auscultation - no rales, rhonchi or wheezes  CV: regular rates and rhythm and normal S1 S2, no S3 or S4  ABDOMEN:  soft, nontender, no HSM and bowel sounds normal, " right upper quadrant with a prominence in the soft tissue that is roughly the size of my hand and is tender.  Can only visually be seen when the patient is standing.   SKIN: no suspicious lesions or rashes  PSYCH: mentation appears normal. and affect normal/bright  No edema   Gait reveals imbalance      Diagnostic Test Results:  Results for orders placed or performed in visit on 01/07/19   US Abdomen Complete    Narrative    ULTRASOUND ABDOMEN COMPLETE  1/7/2019 10:46 AM      HISTORY: Right upper quadrant pain.     COMPARISON: 5/2/2018.    FINDINGS: The liver is normal in size and texture without focal mass.  There is no intra or extrahepatic biliary dilatation. The common  hepatic duct measures 0.7 cm. The gallbladder is normal appearance  without gallstones. The pancreas head and body appear normal. The tail  is obscured by bowel gas. The spleen is normal size and appearance  measuring 9.6 cm. There are multiple calcified granulomas in the  spleen. The right kidney measures 9.2 cm and the left kidney measures  9.6 cm. The kidneys are normal in appearance. The proximal abdominal  aorta and IVC appear normal.      Impression    IMPRESSION:    1. No gallstone or biliary dilatation.  2. Calcified granulomas in the spleen.    ELIDIA HANCOCK MD       ASSESSMENT/PLAN:             1. Major depressive disorder, recurrent episode, mild (H)  Well controlled with medications without side effects.     2. Diabetic neuropathy with neurologic complication (H)  Stable.  Has gait instability     3. 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)  No recurrence     4. Chronic obstructive pulmonary disease, unspecified COPD type (H)  Stable.  Asymptomatic     5. 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   - HEMOGLOBIN A1C  - Comprehensive metabolic panel  - CBC with platelets    6. Mass of soft tissue  Needs further workup.  Xray and ultrasound negative but appears  to be in soft tissue.    - CT Abdomen w Contrast; Future  - Comprehensive metabolic panel  - CBC with platelets    Patient Instructions   Schedule the Ct of your abdomen.       Ce Cano MD  [facility]

## 2019-03-11 ENCOUNTER — ANCILLARY PROCEDURE (OUTPATIENT)
Dept: CT IMAGING | Facility: CLINIC | Age: 79
End: 2019-03-11
Attending: INTERNAL MEDICINE
Payer: COMMERCIAL

## 2019-03-11 DIAGNOSIS — M79.89 MASS OF SOFT TISSUE: ICD-10-CM

## 2019-03-11 PROCEDURE — 74177 CT ABD & PELVIS W/CONTRAST: CPT | Mod: TC

## 2019-03-11 RX ORDER — IOPAMIDOL 755 MG/ML
500 INJECTION, SOLUTION INTRAVASCULAR ONCE
Status: COMPLETED | OUTPATIENT
Start: 2019-03-11 | End: 2019-03-11

## 2019-03-11 RX ADMIN — IOPAMIDOL 64 ML: 755 INJECTION, SOLUTION INTRAVASCULAR at 09:24

## 2019-03-11 RX ADMIN — Medication 66 ML: at 09:24

## 2019-03-11 NOTE — RESULT ENCOUNTER NOTE
I called patient.  Will follow up with GI for pancreatic calcification and will schedule an upper endoscopy.  Pain remains.

## 2019-03-11 NOTE — RESULT ENCOUNTER NOTE
Normal electrolytes. Normal kidney function. Normal liver blood test. Normal blood count. Increase in 3 month sugar average.  How have your blood sugars been?  Dr. Cano

## 2019-03-13 ENCOUNTER — HOSPITAL ENCOUNTER (OUTPATIENT)
Facility: AMBULATORY SURGERY CENTER | Age: 79
End: 2019-03-13
Attending: INTERNAL MEDICINE | Admitting: INTERNAL MEDICINE
Payer: COMMERCIAL

## 2019-03-23 RX ORDER — ONDANSETRON 2 MG/ML
4 INJECTION INTRAMUSCULAR; INTRAVENOUS
Status: CANCELLED | OUTPATIENT
Start: 2019-03-23

## 2019-03-23 RX ORDER — LIDOCAINE 40 MG/G
CREAM TOPICAL
Status: CANCELLED | OUTPATIENT
Start: 2019-03-23

## 2019-03-27 ENCOUNTER — MYC REFILL (OUTPATIENT)
Dept: FAMILY MEDICINE | Facility: CLINIC | Age: 79
End: 2019-03-27

## 2019-03-27 ENCOUNTER — OFFICE VISIT (OUTPATIENT)
Dept: GASTROENTEROLOGY | Facility: CLINIC | Age: 79
End: 2019-03-27
Attending: INTERNAL MEDICINE
Payer: COMMERCIAL

## 2019-03-27 VITALS
BODY MASS INDEX: 21.08 KG/M2 | WEIGHT: 131.2 LBS | OXYGEN SATURATION: 99 % | SYSTOLIC BLOOD PRESSURE: 110 MMHG | HEART RATE: 78 BPM | DIASTOLIC BLOOD PRESSURE: 56 MMHG | HEIGHT: 66 IN

## 2019-03-27 DIAGNOSIS — R10.11 RUQ ABDOMINAL PAIN: Primary | ICD-10-CM

## 2019-03-27 DIAGNOSIS — K86.89 PANCREATIC CALCIFICATION: ICD-10-CM

## 2019-03-27 DIAGNOSIS — F10.11 ALCOHOL ABUSE, IN REMISSION: ICD-10-CM

## 2019-03-27 DIAGNOSIS — F33.0 MAJOR DEPRESSIVE DISORDER, RECURRENT EPISODE, MILD (H): ICD-10-CM

## 2019-03-27 DIAGNOSIS — J43.1 PANLOBULAR EMPHYSEMA (H): ICD-10-CM

## 2019-03-27 DIAGNOSIS — M79.89 SOFT TISSUE MASS: ICD-10-CM

## 2019-03-27 LAB
ALBUMIN SERPL-MCNC: 3.7 G/DL (ref 3.4–5)
ALP SERPL-CCNC: 90 U/L (ref 40–150)
ALT SERPL W P-5'-P-CCNC: 33 U/L (ref 0–50)
AST SERPL W P-5'-P-CCNC: 42 U/L (ref 0–45)
BILIRUB DIRECT SERPL-MCNC: 0.1 MG/DL (ref 0–0.2)
BILIRUB SERPL-MCNC: 0.5 MG/DL (ref 0.2–1.3)
FOLATE SERPL-MCNC: 15.8 NG/ML
LIPASE SERPL-CCNC: 93 U/L (ref 73–393)
PROT SERPL-MCNC: 7.2 G/DL (ref 6.8–8.8)
VIT B12 SERPL-MCNC: 298 PG/ML (ref 193–986)

## 2019-03-27 PROCEDURE — 82784 ASSAY IGA/IGD/IGG/IGM EACH: CPT | Performed by: PHYSICIAN ASSISTANT

## 2019-03-27 PROCEDURE — 99204 OFFICE O/P NEW MOD 45 MIN: CPT | Performed by: PHYSICIAN ASSISTANT

## 2019-03-27 PROCEDURE — 36415 COLL VENOUS BLD VENIPUNCTURE: CPT | Performed by: PHYSICIAN ASSISTANT

## 2019-03-27 PROCEDURE — 83516 IMMUNOASSAY NONANTIBODY: CPT | Performed by: PHYSICIAN ASSISTANT

## 2019-03-27 PROCEDURE — 82306 VITAMIN D 25 HYDROXY: CPT | Performed by: PHYSICIAN ASSISTANT

## 2019-03-27 PROCEDURE — 82746 ASSAY OF FOLIC ACID SERUM: CPT | Performed by: PHYSICIAN ASSISTANT

## 2019-03-27 PROCEDURE — 83690 ASSAY OF LIPASE: CPT | Performed by: PHYSICIAN ASSISTANT

## 2019-03-27 PROCEDURE — 80076 HEPATIC FUNCTION PANEL: CPT | Performed by: PHYSICIAN ASSISTANT

## 2019-03-27 PROCEDURE — 82607 VITAMIN B-12: CPT | Performed by: PHYSICIAN ASSISTANT

## 2019-03-27 RX ORDER — TIOTROPIUM BROMIDE 18 UG/1
CAPSULE ORAL; RESPIRATORY (INHALATION)
Qty: 90 CAPSULE | Refills: 0 | Status: SHIPPED | OUTPATIENT
Start: 2019-03-27 | End: 2019-07-25

## 2019-03-27 ASSESSMENT — ENCOUNTER SYMPTOMS
SHORTNESS OF BREATH: 0
CONSTIPATION: 0
COUGH: 0
DIAPHORESIS: 0
BLOOD IN STOOL: 0
ABDOMINAL PAIN: 1
PHOTOPHOBIA: 0
VOMITING: 0
DIFFICULTY URINATING: 0
SORE THROAT: 0
WEAKNESS: 0
DIARRHEA: 0
NERVOUS/ANXIOUS: 0
TROUBLE SWALLOWING: 0
HEMATURIA: 0
FEVER: 0
NECK PAIN: 0
FLANK PAIN: 0
FREQUENCY: 0
NAUSEA: 0
CHEST TIGHTNESS: 0

## 2019-03-27 ASSESSMENT — MIFFLIN-ST. JEOR: SCORE: 1091.87

## 2019-03-27 ASSESSMENT — PAIN SCALES - GENERAL: PAINLEVEL: NO PAIN (0)

## 2019-03-27 NOTE — NURSING NOTE
"Ludy Mckenzie's goals for this visit include:   Chief Complaint   Patient presents with     Consult     Pancreatic Calcification; Abdominal pain- patient reports pain has been since January       She requests these members of her care team be copied on today's visit information: Yes    PCP: Ce Cano    Referring Provider:  Ce Cano MD  [address] [place] [address]    /56 (BP Location: Left arm, Patient Position: Sitting, Cuff Size: Adult Regular)   Pulse 78   Ht 1.676 m (5' 6\")   Wt 59.5 kg (131 lb 3.2 oz)   SpO2 99%   BMI 21.18 kg/m      Do you need any medication refills at today's visit? No    Apoorva Galeano LPN      "

## 2019-03-27 NOTE — Clinical Note
This patient is scheduled for an EGD with Dr. Duane. I will change this to be with you, however wondering if she would benefit from EUS to eval pancreatic calcifications. Let me know what you think and I can coordinate for EUS, if not a good idea I will just reschedule her for EGD with you. Thanks!
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
## 2019-03-27 NOTE — Clinical Note
This patient is scheduled for EGD with Dr. Duane on 4/3/19, this day doesn't work for her anyways. Can we get her switched to another day with Dr. Davila.

## 2019-03-28 LAB
DEPRECATED CALCIDIOL+CALCIFEROL SERPL-MC: 45 UG/L (ref 20–75)
IGA SERPL-MCNC: 197 MG/DL (ref 70–380)
TTG IGA SER-ACNC: 1 U/ML
TTG IGG SER-ACNC: <1 U/ML

## 2019-03-28 RX ORDER — SERTRALINE HYDROCHLORIDE 100 MG/1
TABLET, FILM COATED ORAL
Qty: 30 TABLET | Refills: 0 | OUTPATIENT
Start: 2019-03-28

## 2019-03-28 RX ORDER — SERTRALINE HYDROCHLORIDE 100 MG/1
100 TABLET, FILM COATED ORAL DAILY
Qty: 90 TABLET | Refills: 1 | Status: SHIPPED | OUTPATIENT
Start: 2019-03-28 | End: 2019-09-13

## 2019-03-28 NOTE — TELEPHONE ENCOUNTER
"Prescription approved per [facility] [place].    Requested Prescriptions   Signed Prescriptions Disp Refills     sertraline (ZOLOFT) 100 MG tablet 90 tablet 1     Sig: Take 1 tablet (100 mg) by mouth daily    SSRIs Protocol Passed - 3/28/2019  7:05 AM       Passed - PHQ-9 score less than 5 in past 6 months    Please review last PHQ-9 score.          Passed - Medication is active on med list       Passed - Patient is age 18 or older       Passed - No active pregnancy on record       Passed - No positive pregnancy test in last 12 months       Passed - Recent (6 mo) or future (30 days) visit within the authorizing provider's specialty    Patient had office visit in the last 6 months or has a visit in the next 30 days with authorizing provider or within the authorizing provider's specialty.  See \"Patient Info\" tab in inbasket, or \"Choose Columns\" in Meds & Orders section of the refill encounter.            Rhoda Bates, RN  [facility]    "

## 2019-03-28 NOTE — TELEPHONE ENCOUNTER
Please see mychart encounter from 3/27/19. Medication has been filled.    Rhoda Bates RN  [facility]

## 2019-04-13 ENCOUNTER — MYC REFILL (OUTPATIENT)
Dept: FAMILY MEDICINE | Facility: CLINIC | Age: 79
End: 2019-04-13

## 2019-04-13 DIAGNOSIS — J43.1 PANLOBULAR EMPHYSEMA (H): ICD-10-CM

## 2019-04-15 RX ORDER — IPRATROPIUM BROMIDE AND ALBUTEROL SULFATE 2.5; .5 MG/3ML; MG/3ML
3 SOLUTION RESPIRATORY (INHALATION) EVERY 6 HOURS PRN
Qty: 1 BOX | Refills: 1 | Status: SHIPPED | OUTPATIENT
Start: 2019-04-15 | End: 2019-09-13

## 2019-04-16 ENCOUNTER — TELEPHONE (OUTPATIENT)
Dept: INTERNAL MEDICINE | Facility: CLINIC | Age: 79
End: 2019-04-16

## 2019-04-16 NOTE — TELEPHONE ENCOUNTER
Spoke with pt. She tried to schedule this but got the run around and was not able to. States she needs the EGD to include the pancreas and this can only be done at the [facility]. Called [phone] and they are able to use the current order to schedule, but they are not sure about the EGD to include the pancreas request she has.     Routing to ordering provider to clarify if the EGD needs to include the pancreas?    Rhoda Bates RN  [facility] [place]

## 2019-04-16 NOTE — TELEPHONE ENCOUNTER
Please follow up with patient.  EGD has already been ordered but it is not clear why it isn't scheduled.  She saw GI for her pancreas and they recommended a repeat CT in 3 months (not a pancreatic ultrasound )   ----- Message -----   From: Monse Haynes   Sent: 4/15/2019   9:46 AM   To: Ce Cano MD   Subject: FW: Referral Request                                     ----- Message -----   From: Ludy Mckenzie   Sent: 4/15/2019   9:08 AM   To: [facility] Referrals   Subject: Referral Request                                   ----- Message from [employer] sent at 4/15/2019  9:08 AM CDT -----      Ludy Mckenzie would like to request a referral.   Reason: endoscopy   Requested provider: abel gu   Comment:   Please call me re: Endoscopy exam.  The [facility] doesn't seem to have orders for the endoscopy including the pancreas/   [phone]. Thank you, Ludy Henry, RN

## 2019-04-17 ENCOUNTER — HOSPITAL ENCOUNTER (OUTPATIENT)
Facility: AMBULATORY SURGERY CENTER | Age: 79
End: 2019-04-17
Attending: INTERNAL MEDICINE
Payer: COMMERCIAL

## 2019-04-17 NOTE — TELEPHONE ENCOUNTER
Called and spoke with patient. We will proceed with an EGD to evaluate for her symptoms. We will not be checking the pancreas via EGD. We will instead repeat a CT scan in 6 months to evaluate her pancreas.     Patient agrees with plan. We can proceed with scheduling an EGD at [place] with Dr. Davila.     Thank you,     Lonnie Mireles PA-C  Gastroenterology  [facility]

## 2019-06-06 ENCOUNTER — OFFICE VISIT (OUTPATIENT)
Dept: FAMILY MEDICINE | Facility: CLINIC | Age: 79
End: 2019-06-06
Payer: COMMERCIAL

## 2019-06-06 VITALS
SYSTOLIC BLOOD PRESSURE: 110 MMHG | BODY MASS INDEX: 20.47 KG/M2 | TEMPERATURE: 97.2 F | OXYGEN SATURATION: 95 % | HEART RATE: 82 BPM | WEIGHT: 126.8 LBS | DIASTOLIC BLOOD PRESSURE: 58 MMHG

## 2019-06-06 DIAGNOSIS — N30.00 ACUTE CYSTITIS WITHOUT HEMATURIA: Primary | ICD-10-CM

## 2019-06-06 DIAGNOSIS — R82.90 NONSPECIFIC FINDING ON EXAMINATION OF URINE: ICD-10-CM

## 2019-06-06 LAB
ALBUMIN UR-MCNC: 30 MG/DL
APPEARANCE UR: CLEAR
BACTERIA #/AREA URNS HPF: ABNORMAL /HPF
BILIRUB UR QL STRIP: NEGATIVE
COLOR UR AUTO: YELLOW
GLUCOSE UR STRIP-MCNC: NEGATIVE MG/DL
HGB UR QL STRIP: ABNORMAL
KETONES UR STRIP-MCNC: NEGATIVE MG/DL
LEUKOCYTE ESTERASE UR QL STRIP: ABNORMAL
MUCOUS THREADS #/AREA URNS LPF: PRESENT /LPF
NITRATE UR QL: NEGATIVE
NON-SQ EPI CELLS #/AREA URNS LPF: ABNORMAL /LPF
PH UR STRIP: 8 PH (ref 5–7)
RBC #/AREA URNS AUTO: ABNORMAL /HPF
SOURCE: ABNORMAL
SP GR UR STRIP: 1.01 (ref 1–1.03)
UROBILINOGEN UR STRIP-ACNC: 0.2 EU/DL (ref 0.2–1)
WBC #/AREA URNS AUTO: >100 /HPF

## 2019-06-06 PROCEDURE — 87086 URINE CULTURE/COLONY COUNT: CPT | Performed by: FAMILY MEDICINE

## 2019-06-06 PROCEDURE — 81001 URINALYSIS AUTO W/SCOPE: CPT | Performed by: FAMILY MEDICINE

## 2019-06-06 PROCEDURE — 99214 OFFICE O/P EST MOD 30 MIN: CPT | Performed by: FAMILY MEDICINE

## 2019-06-06 RX ORDER — CIPROFLOXACIN 250 MG/1
250 TABLET, FILM COATED ORAL 2 TIMES DAILY
Qty: 14 TABLET | Refills: 0 | Status: SHIPPED | OUTPATIENT
Start: 2019-06-06 | End: 2019-09-13

## 2019-06-06 RX ORDER — PHENAZOPYRIDINE HYDROCHLORIDE 95 MG/1
95 TABLET ORAL 3 TIMES DAILY PRN
Qty: 30 TABLET | Refills: 0 | Status: SHIPPED | OUTPATIENT
Start: 2019-06-06 | End: 2019-09-13

## 2019-06-06 NOTE — PROGRESS NOTES
Subjective     Ludy Mckenzie is a 78 year old female who presents to clinic today for the following health issues:    HPI   URINARY TRACT SYMPTOMS      Duration: 1 day    Description  dysuria, frequency, urgency and hesitancy    Intensity:  moderate    Accompanying signs and symptoms:  Fever/chills: no   Flank pain no   Nausea and vomiting: no   Vaginal symptoms: none  Abdominal/Pelvic Pain: no     History  History of frequent UTI's: YES  History of kidney stones: no   Sexually Active: no   Possibility of pregnancy: No    Precipitating or alleviating factors: None    Therapies tried and outcome: none   Outcome: none    Ludy presents today for possible UTI.  For the past few days she has had dysuria, frequency, urgency, foul urine odor, incomplete bladder emptying.  She has been afebrile, no nausea or vomiting, no flank pain.       BP Readings from Last 3 Encounters:   06/06/19 110/58   03/27/19 110/56   03/08/19 106/68    Wt Readings from Last 3 Encounters:   06/06/19 57.5 kg (126 lb 12.8 oz)   03/27/19 59.5 kg (131 lb 3.2 oz)   03/08/19 59 kg (130 lb)                      Reviewed and updated as needed this visit by Provider  Tobacco  Allergies  Meds  Problems  Med Hx  Surg Hx  Fam Hx         Review of Systems   ROS COMP: Constitutional, HEENT, cardiovascular, pulmonary, gi and gu systems are negative, except as otherwise noted.      Objective    /58   Pulse 82   Temp 97.2  F (36.2  C) (Oral)   Wt 57.5 kg (126 lb 12.8 oz)   SpO2 95%   BMI 20.47 kg/m    Body mass index is 20.47 kg/m .  Physical Exam   GENERAL: healthy, alert and no distress  EYES: Eyes grossly normal to inspection, PERRL and conjunctivae and sclerae normal  NECK: no adenopathy, no asymmetry, masses, or scars and thyroid normal to palpation  ABDOMEN: soft, nontender, no hepatosplenomegaly, no masses and bowel sounds normal  SKIN: no suspicious lesions or rashes  PSYCH: mentation appears normal, affect normal/bright     Assessment  & Plan       ICD-10-CM    1. Acute cystitis without hematuria N30.00 UA reflex to Microscopic and Culture     Urine Microscopic     ciprofloxacin (CIPRO) 250 MG tablet     phenazopyridine (AZO URINARY PAIN RELIEF) 95 MG tablet   2. Nonspecific finding on examination of urine R82.90 Urine Culture Aerobic Bacterial          Patient Instructions       Patient Education     Understanding Urinary Tract Infections (UTIs)  Most UTIs are caused by bacteria, although they may also be caused by viruses or fungi. Bacteria from the bowel are the most common source of infection. The infection may start because of any of the following:    Sexual activity. During sex, bacteria can travel from the penis, vagina, or rectum into the urethra.     Bacteria on the skin outside the rectum may travel into the urethra. This is more common in women since the rectum and urethra are closer to each other than in men. Wiping from front to back after using the toilet and keeping the area clean can help prevent germs from getting to the urethra.    Blockage of urine flow through the urinary tract. If urine sits too long, germs may start to grow out of control.      Parts of the urinary tract  The infection can occur in any part of the urinary tract.    The kidneys collect and store urine.    The ureters carry urine from the kidneys to the bladder.    The bladder holds urine until you are ready to let it out.    The urethra carries urine from the bladder out of the body. It is shorter in women, so bacteria can move through it more easily. The urethra is longer in men, so a UTI is less likely to reach the bladder or kidneys in men.  Date Last Reviewed: 1/1/2017 2000-2018 [employer]. [address] [place] [address], [place] [address]. All rights reserved. This information is not intended as a substitute for professional medical care. Always follow your healthcare professional's instructions.           Patient Education     Urinary Tract  Infections in Women    Urinary tract infections (UTIs) are most often caused by bacteria. These bacteria enter the urinary tract. The bacteria may come from outside the body. Or they may travel from the skin outside the rectum or vagina into the urethra. Female anatomy makes it easy for bacteria from the bowel to enter a woman s urinary tract, which is the most common source of UTI. This means women develop UTIs more often than men. Pain in or around the urinary tract is a common UTI symptom. But the only way to know for sure if you have a UTI for the healthcare provider to test your urine. The two tests that may be done are the urinalysis and urine culture.  Types of UTIs    Cystitis. A bladder infection (cystitis) is the most common UTI in women. You may have urgent or frequent urination. You may also have pain, burning when you urinate, and bloody urine.    Urethritis. This is an inflamed urethra, which is the tube that carries urine from the bladder to outside the body. You may have lower stomach or back pain. You may also have urgent or frequent urination.    Pyelonephritis. This is a kidney infection. If not treated, it can be serious and damage your kidneys. In severe cases, you may need to stay in the hospital. You may have a fever and lower back pain.  Medicines to treat a UTI  Most UTIs are treated with antibiotics. These kill the bacteria. The length of time you need to take them depends on the type of infection. It may be as short as 3 days. If you have repeated UTIs, you may need a low-dose antibiotic for several months. Take antibiotics exactly as directed. Don t stop taking them until all of the medicine is gone. If you stop taking the antibiotic too soon, the infection may not go away. You may also develop a resistance to the antibiotic. This can make it much harder to treat.  Lifestyle changes to treat and prevent UTIs  The lifestyle changes below will help get rid of your UTI. They may also help  prevent future UTIs.    Drink plenty of fluids. This includes water, juice, or other caffeine-free drinks. Fluids help flush bacteria out of your body.    Empty your bladder. Always empty your bladder when you feel the urge to urinate. And always urinate before going to sleep. Urine that stays in your bladder can lead to infection. Try to urinate before and after sex as well.    Practice good personal hygiene. Wipe yourself from front to back after using the toilet. This helps keep bacteria from getting into the urethra.    Use condoms during sex. These help prevent UTIs caused by sexually transmitted bacteria. Also don't use spermicides during sex. These can increase the risk for UTIs. Choose other forms of birth control instead. For women who tend to get UTIs after sex, a low-dose of a preventive antibiotic may be used. Be sure to discuss this option with your healthcare provider.    Follow up with your healthcare provider as directed. He or she may test to make sure the infection has cleared. If needed, more treatment may be started.  Date Last Reviewed: 1/1/2017 2000-2018 [employer]. [address] [place] [address]. All rights reserved. This information is not intended as a substitute for professional medical care. Always follow your healthcare professional's instructions.               No follow-ups on file.    Ross Agarwal MD  [facility]

## 2019-06-06 NOTE — PATIENT INSTRUCTIONS
Patient Education     Understanding Urinary Tract Infections (UTIs)  Most UTIs are caused by bacteria, although they may also be caused by viruses or fungi. Bacteria from the bowel are the most common source of infection. The infection may start because of any of the following:    Sexual activity. During sex, bacteria can travel from the penis, vagina, or rectum into the urethra.     Bacteria on the skin outside the rectum may travel into the urethra. This is more common in women since the rectum and urethra are closer to each other than in men. Wiping from front to back after using the toilet and keeping the area clean can help prevent germs from getting to the urethra.    Blockage of urine flow through the urinary tract. If urine sits too long, germs may start to grow out of control.      Parts of the urinary tract  The infection can occur in any part of the urinary tract.    The kidneys collect and store urine.    The ureters carry urine from the kidneys to the bladder.    The bladder holds urine until you are ready to let it out.    The urethra carries urine from the bladder out of the body. It is shorter in women, so bacteria can move through it more easily. The urethra is longer in men, so a UTI is less likely to reach the bladder or kidneys in men.  Date Last Reviewed: 1/1/2017 2000-2018 [employer]. [address] [place] [address], [place] [address]. All rights reserved. This information is not intended as a substitute for professional medical care. Always follow your healthcare professional's instructions.           Patient Education     Urinary Tract Infections in Women    Urinary tract infections (UTIs) are most often caused by bacteria. These bacteria enter the urinary tract. The bacteria may come from outside the body. Or they may travel from the skin outside the rectum or vagina into the urethra. Female anatomy makes it easy for bacteria from the bowel to enter a woman s urinary tract, which  is the most common source of UTI. This means women develop UTIs more often than men. Pain in or around the urinary tract is a common UTI symptom. But the only way to know for sure if you have a UTI for the healthcare provider to test your urine. The two tests that may be done are the urinalysis and urine culture.  Types of UTIs    Cystitis. A bladder infection (cystitis) is the most common UTI in women. You may have urgent or frequent urination. You may also have pain, burning when you urinate, and bloody urine.    Urethritis. This is an inflamed urethra, which is the tube that carries urine from the bladder to outside the body. You may have lower stomach or back pain. You may also have urgent or frequent urination.    Pyelonephritis. This is a kidney infection. If not treated, it can be serious and damage your kidneys. In severe cases, you may need to stay in the hospital. You may have a fever and lower back pain.  Medicines to treat a UTI  Most UTIs are treated with antibiotics. These kill the bacteria. The length of time you need to take them depends on the type of infection. It may be as short as 3 days. If you have repeated UTIs, you may need a low-dose antibiotic for several months. Take antibiotics exactly as directed. Don t stop taking them until all of the medicine is gone. If you stop taking the antibiotic too soon, the infection may not go away. You may also develop a resistance to the antibiotic. This can make it much harder to treat.  Lifestyle changes to treat and prevent UTIs  The lifestyle changes below will help get rid of your UTI. They may also help prevent future UTIs.    Drink plenty of fluids. This includes water, juice, or other caffeine-free drinks. Fluids help flush bacteria out of your body.    Empty your bladder. Always empty your bladder when you feel the urge to urinate. And always urinate before going to sleep. Urine that stays in your bladder can lead to infection. Try to urinate  before and after sex as well.    Practice good personal hygiene. Wipe yourself from front to back after using the toilet. This helps keep bacteria from getting into the urethra.    Use condoms during sex. These help prevent UTIs caused by sexually transmitted bacteria. Also don't use spermicides during sex. These can increase the risk for UTIs. Choose other forms of birth control instead. For women who tend to get UTIs after sex, a low-dose of a preventive antibiotic may be used. Be sure to discuss this option with your healthcare provider.    Follow up with your healthcare provider as directed. He or she may test to make sure the infection has cleared. If needed, more treatment may be started.  Date Last Reviewed: 1/1/2017 2000-2018 [employer]. [address] [place] [address], [place] [address]. All rights reserved. This information is not intended as a substitute for professional medical care. Always follow your healthcare professional's instructions.

## 2019-06-07 LAB
BACTERIA SPEC CULT: NORMAL
SPECIMEN SOURCE: NORMAL

## 2019-06-15 ENCOUNTER — MYC MEDICAL ADVICE (OUTPATIENT)
Dept: FAMILY MEDICINE | Facility: CLINIC | Age: 79
End: 2019-06-15

## 2019-06-15 DIAGNOSIS — N30.00 ACUTE CYSTITIS WITHOUT HEMATURIA: Primary | ICD-10-CM

## 2019-06-27 DIAGNOSIS — E78.2 MIXED HYPERLIPIDEMIA: ICD-10-CM

## 2019-06-27 RX ORDER — ROSUVASTATIN CALCIUM 5 MG/1
TABLET, COATED ORAL
Qty: 90 TABLET | Refills: 0 | Status: SHIPPED | OUTPATIENT
Start: 2019-06-27 | End: 2019-09-13

## 2019-06-27 NOTE — TELEPHONE ENCOUNTER
"Requested Prescriptions   Pending Prescriptions Disp Refills     rosuvastatin (CRESTOR) 5 MG tablet [Pharmacy Med Name: ROSUVASTATIN CALCIUM 5 MG TAB] 90 tablet 1     Sig: TAKE 1 TABLET BY MOUTH EVERY DAY       Statins Protocol Passed - 6/27/2019 12:09 PM        Passed - LDL on file in past 12 months     Recent Labs   Lab Test 07/27/18  1055   LDL 69             Passed - No abnormal creatine kinase in past 12 months     Recent Labs   Lab Test 02/23/16  1506                   Passed - Recent (12 mo) or future (30 days) visit within the authorizing provider's specialty     Patient had office visit in the last 12 months or has a visit in the next 30 days with authorizing provider or within the authorizing provider's specialty.  See \"Patient Info\" tab in inbasket, or \"Choose Columns\" in Meds & Orders section of the refill encounter.              Passed - Medication is active on med list        Passed - Patient is age 18 or older        Passed - No active pregnancy on record        Passed - No positive pregnancy test in past 12 months        Medication filled per [facility] protocol.     Ann Marie Blas RN    "

## 2019-06-28 ENCOUNTER — TELEPHONE (OUTPATIENT)
Dept: FAMILY MEDICINE | Facility: CLINIC | Age: 79
End: 2019-06-28

## 2019-06-28 NOTE — TELEPHONE ENCOUNTER
Reason for Call:  Form, our goal is to have forms completed with 72 hours, however, some forms may require a visit or additional information.    Type of letter, form or note:  [employer]    Who is the form from?: Patient    Where did the form come from: Patient or family brought in       What clinic location was the form placed at?: [facility]    Where the form was placed: Dr. Cano's Box/Folder    What number is listed as a contact on the form?: [phone]       Additional comments: Please fill out forms and mail back to patient.     Call taken on 6/28/2019 at 10:54 AM by Radha Riley

## 2019-07-11 ENCOUNTER — OFFICE VISIT (OUTPATIENT)
Dept: OPHTHALMOLOGY | Facility: CLINIC | Age: 79
End: 2019-07-11
Payer: COMMERCIAL

## 2019-07-11 DIAGNOSIS — E11.69 TYPE 2 DIABETES MELLITUS WITH OTHER SPECIFIED COMPLICATION, WITHOUT LONG-TERM CURRENT USE OF INSULIN (H): Primary | ICD-10-CM

## 2019-07-11 DIAGNOSIS — H52.4 PRESBYOPIA: ICD-10-CM

## 2019-07-11 DIAGNOSIS — H43.813 POSTERIOR VITREOUS DETACHMENT OF BOTH EYES: ICD-10-CM

## 2019-07-11 DIAGNOSIS — H52.223 REGULAR ASTIGMATISM OF BOTH EYES: ICD-10-CM

## 2019-07-11 DIAGNOSIS — H40.1131 PRIMARY OPEN ANGLE GLAUCOMA (POAG) OF BOTH EYES, MILD STAGE: ICD-10-CM

## 2019-07-11 DIAGNOSIS — Z01.01 ENCOUNTER FOR EXAMINATION OF EYES AND VISION WITH ABNORMAL FINDINGS: ICD-10-CM

## 2019-07-11 DIAGNOSIS — Z96.1 PSEUDOPHAKIA OF BOTH EYES: ICD-10-CM

## 2019-07-11 PROCEDURE — 92015 DETERMINE REFRACTIVE STATE: CPT | Performed by: STUDENT IN AN ORGANIZED HEALTH CARE EDUCATION/TRAINING PROGRAM

## 2019-07-11 PROCEDURE — 92014 COMPRE OPH EXAM EST PT 1/>: CPT | Performed by: STUDENT IN AN ORGANIZED HEALTH CARE EDUCATION/TRAINING PROGRAM

## 2019-07-11 ASSESSMENT — REFRACTION_WEARINGRX
OS_CYLINDER: SPHERE
OD_CYLINDER: +1.75
OD_CYLINDER: SPHERE
OS_AXIS: 178
OS_SPHERE: -0.50
OS_SPHERE: +1.75
OD_AXIS: 176
OS_CYLINDER: +1.25
OD_SPHERE: +1.75
SPECS_TYPE: SVL
OD_SPHERE: -1.25
SPECS_TYPE: OTC READERS

## 2019-07-11 ASSESSMENT — SLIT LAMP EXAM - LIDS
COMMENTS: 1+ MEIBOMIAN GLAND DYSFUNCTION
COMMENTS: 1+ MEIBOMIAN GLAND DYSFUNCTION

## 2019-07-11 ASSESSMENT — VISUAL ACUITY
OD_CC: 20/30
METHOD: SNELLEN - LINEAR
CORRECTION_TYPE: GLASSES
OS_CC: 20/25

## 2019-07-11 ASSESSMENT — REFRACTION_MANIFEST
OD_AXIS: 176
OD_SPHERE: -1.50
OS_ADD: +2.50
OD_CYLINDER: +2.00
OS_AXIS: 174
OD_ADD: +2.50
OS_SPHERE: -0.75
OS_CYLINDER: +1.25

## 2019-07-11 ASSESSMENT — CONF VISUAL FIELD
OD_NORMAL: 1
METHOD: COUNTING FINGERS
OS_NORMAL: 1

## 2019-07-11 ASSESSMENT — TONOMETRY
OD_IOP_MMHG: 10
IOP_METHOD: APPLANATION
OS_IOP_MMHG: 12

## 2019-07-11 ASSESSMENT — EXTERNAL EXAM - RIGHT EYE: OD_EXAM: NORMAL

## 2019-07-11 ASSESSMENT — EXTERNAL EXAM - LEFT EYE: OS_EXAM: NORMAL

## 2019-07-11 ASSESSMENT — CUP TO DISC RATIO
OD_RATIO: 0.45
OS_RATIO: 0.5

## 2019-07-11 NOTE — LETTER
"    7/11/2019         RE: Ludy Mckenzie  [address]  [facility] [place] [address]        Dear Colleague,    Thank you for referring your patient, Ludy Mckenzie, to the [facility]. Please see a copy of my visit note below.    Note  Current Eye Medications:  Latanoprost at bedtime both eyes, last took at 10 pm     Subjective:   Complete eye exam. Vision is fine both eyes. No eye pain or discomfort in either eye.     Diabetic for 15 years maybe. Patient doesn't check BS. States \"it is always good\".  Lab Results   Component Value Date    A1C 7.0 03/08/2019    A1C 6.6 07/27/2018    A1C 7.2 03/27/2018    A1C 6.4 06/02/2017    A1C 6.3 09/19/2016      Objective:  See Ophthalmology Exam.      Assessment:  Ludy Mckenzie is a 78 year old female who presents with:     Type 2 diabetes mellitus with other specified complication, without long-term current use of insulin (H) Negative diabetic retinopathy       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, mild stage Intraocular pressure 10/12 today.      Pseudophakia with Yag Caps ou       Posterior vitreous detachment of both eyes        Plan:  Continue Latanoprost (teal top) at bedtime both eyes     Glasses prescription given - optional to update    Keep blood sugars and blood pressure under good control.    Arjun Valenzuela MD  [phone]                        Again, thank you for allowing me to participate in the care of your patient.        Sincerely,        Arjun Valenzuela MD    "

## 2019-07-11 NOTE — PATIENT INSTRUCTIONS
Continue Latanoprost (teal top) at bedtime both eyes     Glasses prescription given - optional to update    Keep blood sugars and blood pressure under good control.    Arjun Valenzuela MD  [phone]    Patient Education   Diabetes weakens the blood vessels all over the body, including the eyes. Damage to the blood vessels in the eyes can cause swelling or bleeding into part of the eye (called the retina). This is called diabetic retinopathy ([facility]-thee). If not treated, this disease can cause vision loss or blindness.   Symptoms may include blurred or distorted vision, but many people have no symptoms. It's important to see your eye doctor regularly to check for problems.   Early treatment and good control can help protect your vision. Here are the things you can do to help prevent vision loss:      1. Keep your blood sugar levels under tight control.      2. Bring high blood pressure under control.      3. No smoking.      4. Have yearly dilated eye exams.

## 2019-07-11 NOTE — PROGRESS NOTES
"Note  Current Eye Medications:  Latanoprost at bedtime both eyes, last took at 10 pm     Subjective:   Complete eye exam. Vision is fine both eyes. No eye pain or discomfort in either eye.     Diabetic for 15 years maybe. Patient doesn't check BS. States \"it is always good\".  Lab Results   Component Value Date    A1C 7.0 03/08/2019    A1C 6.6 07/27/2018    A1C 7.2 03/27/2018    A1C 6.4 06/02/2017    A1C 6.3 09/19/2016      Objective:  See Ophthalmology Exam.      Assessment:  Ludy Mckenzie is a 78 year old female who presents with:     Type 2 diabetes mellitus with other specified complication, without long-term current use of insulin (H) Negative diabetic retinopathy       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, mild stage Intraocular pressure 10/12 today.      Pseudophakia with Yag Caps ou       Posterior vitreous detachment of both eyes        Plan:  Continue Latanoprost (teal top) at bedtime both eyes     Glasses prescription given - optional to update    Keep blood sugars and blood pressure under good control.    Arjun Valenzuela MD  [phone]                      "

## 2019-07-22 DIAGNOSIS — H40.1130 PRIMARY OPEN ANGLE GLAUCOMA OF BOTH EYES, UNSPECIFIED GLAUCOMA STAGE: ICD-10-CM

## 2019-07-22 RX ORDER — LATANOPROST 50 UG/ML
1 SOLUTION/ DROPS OPHTHALMIC AT BEDTIME
Qty: 3 BOTTLE | Refills: 4 | Status: SHIPPED | OUTPATIENT
Start: 2019-07-22 | End: 2020-02-07

## 2019-07-22 NOTE — TELEPHONE ENCOUNTER
Recommend refilling Latanoprost at bedtime both eyes per last visit note 7/11/19. Patient has appointment scheduled for 1/14/20

## 2019-07-22 NOTE — TELEPHONE ENCOUNTER
Fax received from pharmacy requesting refill of  latanoprost (XALATAN) 0.005 % ophthalmic solution

## 2019-07-25 DIAGNOSIS — J43.1 PANLOBULAR EMPHYSEMA (H): ICD-10-CM

## 2019-07-26 RX ORDER — TIOTROPIUM BROMIDE 18 UG/1
CAPSULE ORAL; RESPIRATORY (INHALATION)
Qty: 90 CAPSULE | Refills: 0 | Status: SHIPPED | OUTPATIENT
Start: 2019-07-26 | End: 2019-09-13

## 2019-09-10 NOTE — PROGRESS NOTES
Subjective     Ludy Mckenzie is a 78 year old female who presents to clinic today for the following health issues:        HPI   [facility]  Patient relates that she got a call from [facility] and went over her medication list. She was told to take more aspirin because of her age. She wonders what she should do about that. She notes that she had taken some of her sister's Trilogy medication.  Glaucoma - stable  Depression - stable  Thyroid - no changes  Osteoporosis - stable    Urinary incontinence - she gets the urge to urinate and can't make it to the bathroom. She notes occasional burning but denies blood in urine. She relates that night time doesn't bother her because she doesn't drink water before going to bed. She leaks sometimes when she coughs or sneeze.    Cough - She relates a nagging cough that has been ongoing for a couple months. She has shortness of breath with the cough. She has to be moving around to get that shortness of breath. She notes wheezing. She has a hard time breathing out air. She denies any chest pains.    Diabetes - She doesn't check her blood sugar often- her a1c is 6.6. Her neuropathy is stable- no pain the moment. She notes to walking funny.  Leg cramps - Patient reports leg cramps. She stands or walk in place for relief. During the day, her hands cramp up more than her legs.    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)     Primary open angle glaucoma (POAG)     Chronic obstructive pulmonary disease, unspecified COPD type (H)     Chronic kidney disease (CKD) stage G1/A2, glomerular filtration rate (GFR) equal to or greater than 90 mL/min/1.73 square  meter and albuminuria creatinine ratio between  mg/g     Loss of balance     Diabetic neuropathy with neurologic complication (H)     Pseudophakia of both eyes     Urge incontinence of urine     Cramp of limb     Right sided sciatica     Pulmonary emphysema, unspecified emphysema type (H)     Age-related osteoporosis without current pathological fracture     Pulmonary nodules     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)     Past Surgical History:   Procedure Laterality Date     BIOPSY       BUNIONECTOMY  1960    JACQUELINE     CATARACT IOL, RT/LT  2010    right     COLONOSCOPY  2/22/2013    Procedure: COLONOSCOPY;  Colonoscopy ;  Surgeon: Hugo Minor MD;  Location:  GI       Social History     Tobacco Use     Smoking status: Former Smoker     Packs/day: 1.50     Years: 50.00     Pack years: 75.00     Types: Cigarettes     Last attempt to quit: 7/22/2009     Years since quitting: 10.1     Smokeless tobacco: Never Used   Substance Use Topics     Alcohol use: No     Alcohol/week: 2.4 oz     Types: 4 Standard drinks or equivalent per week     Family History   Problem Relation Age of Onset     Cancer Maternal Grandfather         bone     Diabetes Mother 50        dx age 50, hip fracture     Glaucoma Mother      Macular Degeneration Mother      C.A.D. Father         pacemaker     Glaucoma Sister          Current Outpatient Medications   Medication Sig Dispense Refill     albuterol (ACCUNEB) 1.25 MG/3ML neb solution Take 1 vial (1.25 mg) by nebulization every 6 hours as needed for shortness of breath / dyspnea or wheezing 30 vial 3     albuterol (PROAIR HFA) 108 (90 Base) MCG/ACT inhaler INHALE TWO PUFFS BY MOUTH EVERY FOUR HOURS AS NEEDED SHORTNESS OF BREATH 8.5 Inhaler 3     aspirin 81 MG tablet Take 1 tablet by mouth daily.       calcium 600 MG tablet Take 1 tablet by mouth daily  180 tablet 3     cholecalciferol (VITAMIN D3) 1000 UNIT capsule Take 1 capsule by mouth daily.       fluticasone-salmeterol (ADVAIR DISKUS) 500-50  "MCG/DOSE inhaler INHALE 1 PUFF INTO THE LUNGS 2 TIMES DAILY 3 Inhaler 1     latanoprost (XALATAN) 0.005 % ophthalmic solution Place 1 drop into both eyes At Bedtime 3 Bottle 4     losartan (COZAAR) 25 MG tablet TAKE 0.5 TABLETS (12.5 MG) BY MOUTH DAILY 45 tablet 2     potassium chloride ER (KLOR-CON) 20 MEQ CR tablet Take 1 tablet (20 mEq) by mouth daily 90 tablet 4     rosuvastatin (CRESTOR) 5 MG tablet TAKE 1 TABLET BY MOUTH EVERY DAY 90 tablet 0     sertraline (ZOLOFT) 100 MG tablet Take 1 tablet (100 mg) by mouth daily 90 tablet 1     tiotropium (SPIRIVA HANDIHALER) 18 MCG inhaled capsule INHALE 1 CAPSULE INTO THE LUNGS DAILY. 90 capsule 1     vitamin  B complex with vitamin C (VITAMIN  B COMPLEX) TABS Take 1 tablet by mouth daily       Allergies   Allergen Reactions     Amoxicillin GI Disturbance and Nausea     Amoxicillin-Pot Clavulanate Nausea     Augmentin Nausea and Vomiting     Doxycycline GI Disturbance     Iodine Itching and Swelling     Pt is ok with ct contrast dye (isovue 370)  Pt is ok with ct contrast dye (isovue 370)     Mercury Unknown     Metronidazole GI Disturbance     Penicillins Unknown     Phenylmercuric Nitrate Unknown       Reviewed and updated as needed this visit by Provider  Tobacco  Allergies  Meds  Problems  Med Hx  Surg Hx  Fam Hx         Review of Systems   ROS COMP: Constitutional, HEENT, cardiovascular, pulmonary, GI, , musculoskeletal, neuro, skin, endocrine and psych systems are negative, except as otherwise noted.    This document serves as a record of the services and decisions personally performed and made by Ce Cano MD. It was created on her behalf by Naomie Caceres, a trained medical scribe. The creation of this document is based on the provider's statements to the medical scribe.  Naomie Caceres 10:26 AM September 13, 2019      Objective    /60   Pulse 88   Temp 97.6  F (36.4  C) (Oral)   Resp 18   Ht 1.676 m (5' 6\")   Wt 59.9 kg (132 lb)   SpO2 93%   BMI " 21.31 kg/m    Body mass index is 21.31 kg/m .  Physical Exam   GENERAL: healthy, alert and no distress  NECK: no adenopathy, no asymmetry, masses, or scars and thyroid normal to palpation  RESP: lungs clear to auscultation - no rales, rhonchi or wheezes, prolonged expiratory phase  CV: regular rate and rhythm, normal S1 S2, no S3 or S4, no murmur, click or rub, no peripheral edema and peripheral pulses strong  ABDOMEN: soft, nontender, no hepatosplenomegaly, no masses and bowel sounds normal  MS: no gross musculoskeletal defects noted, no edema  NEURO: Normal strength and tone, mentation intact and speech normal, ataxia  PSYCH: mentation appears normal, affect normal/bright  Diabetic foot exam: normal DP and PT pulses, no trophic changes or ulcerative lesions, normal sensory exam and normal monofilament exam      Diagnostic Test Results:  Labs reviewed in Epic  Results for orders placed or performed in visit on 09/13/19 (from the past 24 hour(s))   HEMOGLOBIN A1C   Result Value Ref Range    Hemoglobin A1C 6.6 (H) 0 - 5.6 %   Hemoglobin   Result Value Ref Range    Hemoglobin 12.6 11.7 - 15.7 g/dL   *UA reflex to Microscopic and Culture ([facility] (except [place] and [place])   Result Value Ref Range    Color Urine Yellow     Appearance Urine Clear     Glucose Urine Negative NEG^Negative mg/dL    Bilirubin Urine Negative NEG^Negative    Ketones Urine Negative NEG^Negative mg/dL    Specific Gravity Urine 1.010 1.003 - 1.035    Blood Urine Negative NEG^Negative    pH Urine 7.0 5.0 - 7.0 pH    Protein Albumin Urine Negative NEG^Negative mg/dL    Urobilinogen Urine 0.2 0.2 - 1.0 EU/dL    Nitrite Urine Negative NEG^Negative    Leukocyte Esterase Urine Negative NEG^Negative    Source Midstream Urine            Assessment & Plan     1. Pancreatic calcification  Would like abdomen CT for recheck of pancreatic calcification as directed by GI in 3/2019.  Per patient instructions.  CT ordered today.  Will  continue to monitor.  - CT Abdomen Pelvis w Contrast; Future    2. Panlobular emphysema (H)  Uncontrolled.  Discussed with patient different inhalers to try.  She would like to see pulmonary instead which works well since she needs pfts   Refills placed today.  Will continue to monitor.  - tiotropium (SPIRIVA HANDIHALER) 18 MCG inhaled capsule; INHALE 1 CAPSULE INTO THE LUNGS DAILY.  Dispense: 90 capsule; Refill: 0    3. Major depressive disorder, recurrent episode, mild  Stable with current regimen without complications.  Refills placed today.  Will continue to monitor.  - sertraline (ZOLOFT) 100 MG tablet; Take 1 tablet (100 mg) by mouth daily  Dispense: 90 tablet; Refill: 1    4. Mixed hyperlipidemia  Controlled.  Refills placed today.  Will continue to monitor.  - rosuvastatin (CRESTOR) 5 MG tablet; TAKE 1 TABLET BY MOUTH EVERY DAY  Dispense: 90 tablet; Refill: 0    5. Hypopotassemia  Will like to recheck potassium today.  Refills placed today.  Will continue to monitor.  - potassium chloride ER (KLOR-CON) 20 MEQ CR tablet; Take 1 tablet (20 mEq) by mouth daily  Dispense: 90 tablet; Refill: 4    6. Microalbuminuria  Urine checked today.  Refills placed today.  Will continue to monitor.  - losartan (COZAAR) 25 MG tablet; TAKE 0.5 TABLETS (12.5 MG) BY MOUTH DAILY  Dispense: 45 tablet; Refill: 2    7. Chronic obstructive pulmonary disease, unspecified COPD type (H)  Per above  - albuterol (PROAIR HFA) 108 (90 Base) MCG/ACT inhaler; INHALE TWO PUFFS BY MOUTH EVERY FOUR HOURS AS NEEDED SHORTNESS OF BREATH  Dispense: 8.5 Inhaler; Refill: 3    8. 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  a1c is 6.6 today.  Will continue to monitor.  - HEMOGLOBIN A1C    9. Urinary urgency  Patient notes urinary incontinence with occasional burning but denies hematuria.  I advised physical therapy for pelvic floor exercises but patient declined at this moment.   Offered medications but patient declined due to side effects   Will continue to monitor.    10. Microscopic hematuria  Has had history of traces of microscopic hematuria.  Would like to recheck urine.  Needs urology referral if still present as last urinalysis with blood but negative culture    11. Leg cramps  Checking labs on magnesium and potassium.  This has been a long term problem for her.      12. Cough  Notes nagging cough with some wheezing that has been ongoing for a couple months.  I would like CT for lungs and a pulmonary test.   Patient will see pulmonologist afterwards.    13. SOB (shortness of breath)  Patient notes shortness of breath with the cough. She relates shortness of breath when moving around. Troubles with breathing out air. Denies any chest pains.  I would like CT for lungs and a pulmonary test.   Patient will see pulmonologist afterwards- Pulmonology at [facility].      Patient Instructions   - Your gastroenterologist would like a CT of your abdomen for your pancreatic calcification.  If it has worsened then I will have you make a follow up appointment with them.   - I want a CT scan of your chest/lungs and a pulmonary function test. You can see a Pulmonologist afterwards.  - Schedule appointment for Pulmonology at [facility].  - Complete Cologuard for colon screening.  It will be mailed to you.      The information in this document, created by the medical scribe for me, accurately reflects the services I personally performed and the decisions made by me. I have reviewed and approved this document for accuracy prior to leaving the patient care area.  September 13, 2019 10:27 AM    I spent 28 minutes of time with the patient and >50% of it was in education and counseling regarding health maintenance and medication recheck.  In: 10:20 AM  Out: 10:48 AM    Ce Cano MD  [facility]

## 2019-09-13 ENCOUNTER — OFFICE VISIT (OUTPATIENT)
Dept: INTERNAL MEDICINE | Facility: CLINIC | Age: 79
End: 2019-09-13
Payer: COMMERCIAL

## 2019-09-13 ENCOUNTER — MEDICAL CORRESPONDENCE (OUTPATIENT)
Dept: HEALTH INFORMATION MANAGEMENT | Facility: CLINIC | Age: 79
End: 2019-09-13

## 2019-09-13 VITALS
OXYGEN SATURATION: 93 % | TEMPERATURE: 97.6 F | WEIGHT: 132 LBS | RESPIRATION RATE: 18 BRPM | DIASTOLIC BLOOD PRESSURE: 60 MMHG | BODY MASS INDEX: 21.21 KG/M2 | HEART RATE: 88 BPM | SYSTOLIC BLOOD PRESSURE: 108 MMHG | HEIGHT: 66 IN

## 2019-09-13 DIAGNOSIS — R80.9 MICROALBUMINURIA: ICD-10-CM

## 2019-09-13 DIAGNOSIS — Z23 NEED FOR PROPHYLACTIC VACCINATION AND INOCULATION AGAINST INFLUENZA: ICD-10-CM

## 2019-09-13 DIAGNOSIS — Z12.11 SCREEN FOR COLON CANCER: ICD-10-CM

## 2019-09-13 DIAGNOSIS — E11.40 DIABETIC NEUROPATHY WITH NEUROLOGIC COMPLICATION (H): ICD-10-CM

## 2019-09-13 DIAGNOSIS — R39.15 URINARY URGENCY: ICD-10-CM

## 2019-09-13 DIAGNOSIS — R05.9 COUGH: ICD-10-CM

## 2019-09-13 DIAGNOSIS — J43.1 PANLOBULAR EMPHYSEMA (H): ICD-10-CM

## 2019-09-13 DIAGNOSIS — M81.0 AGE-RELATED OSTEOPOROSIS WITHOUT CURRENT PATHOLOGICAL FRACTURE: ICD-10-CM

## 2019-09-13 DIAGNOSIS — F10.21 ALCOHOLISM IN REMISSION (H): ICD-10-CM

## 2019-09-13 DIAGNOSIS — R25.2 LEG CRAMPS: ICD-10-CM

## 2019-09-13 DIAGNOSIS — K86.89 PANCREATIC CALCIFICATION: Primary | ICD-10-CM

## 2019-09-13 DIAGNOSIS — J44.9 CHRONIC OBSTRUCTIVE PULMONARY DISEASE, UNSPECIFIED COPD TYPE (H): ICD-10-CM

## 2019-09-13 DIAGNOSIS — E87.6 HYPOPOTASSEMIA: ICD-10-CM

## 2019-09-13 DIAGNOSIS — E11.49 DIABETIC NEUROPATHY WITH NEUROLOGIC COMPLICATION (H): ICD-10-CM

## 2019-09-13 DIAGNOSIS — E78.2 MIXED HYPERLIPIDEMIA: ICD-10-CM

## 2019-09-13 DIAGNOSIS — F33.0 MAJOR DEPRESSIVE DISORDER, RECURRENT EPISODE, MILD (H): ICD-10-CM

## 2019-09-13 DIAGNOSIS — R06.02 SOB (SHORTNESS OF BREATH): ICD-10-CM

## 2019-09-13 DIAGNOSIS — R31.29 MICROSCOPIC HEMATURIA: ICD-10-CM

## 2019-09-13 DIAGNOSIS — L12.0 BULLOUS PEMPHIGOID (H): ICD-10-CM

## 2019-09-13 DIAGNOSIS — E11.49 TYPE 2 DIABETES MELLITUS WITH OTHER DIABETIC NEUROLOGICAL COMPLICATION (H): ICD-10-CM

## 2019-09-13 DIAGNOSIS — E04.1 THYROID NODULE: ICD-10-CM

## 2019-09-13 DIAGNOSIS — R91.8 PULMONARY NODULES: ICD-10-CM

## 2019-09-13 LAB
ALBUMIN SERPL-MCNC: 3.7 G/DL (ref 3.4–5)
ALBUMIN UR-MCNC: NEGATIVE MG/DL
ALP SERPL-CCNC: 93 U/L (ref 40–150)
ALT SERPL W P-5'-P-CCNC: 25 U/L (ref 0–50)
ANION GAP SERPL CALCULATED.3IONS-SCNC: 8 MMOL/L (ref 3–14)
APPEARANCE UR: CLEAR
AST SERPL W P-5'-P-CCNC: 35 U/L (ref 0–45)
BILIRUB SERPL-MCNC: 0.5 MG/DL (ref 0.2–1.3)
BILIRUB UR QL STRIP: NEGATIVE
BUN SERPL-MCNC: 11 MG/DL (ref 7–30)
CALCIUM SERPL-MCNC: 8.7 MG/DL (ref 8.5–10.1)
CHLORIDE SERPL-SCNC: 104 MMOL/L (ref 94–109)
CHOLEST SERPL-MCNC: 139 MG/DL
CO2 SERPL-SCNC: 23 MMOL/L (ref 20–32)
COLOR UR AUTO: YELLOW
CREAT SERPL-MCNC: 0.63 MG/DL (ref 0.52–1.04)
CREAT UR-MCNC: 128 MG/DL
GFR SERPL CREATININE-BSD FRML MDRD: 86 ML/MIN/{1.73_M2}
GLUCOSE SERPL-MCNC: 159 MG/DL (ref 70–99)
GLUCOSE UR STRIP-MCNC: NEGATIVE MG/DL
HBA1C MFR BLD: 6.6 % (ref 0–5.6)
HDLC SERPL-MCNC: 46 MG/DL
HGB BLD-MCNC: 12.6 G/DL (ref 11.7–15.7)
HGB UR QL STRIP: NEGATIVE
KETONES UR STRIP-MCNC: NEGATIVE MG/DL
LDLC SERPL CALC-MCNC: 79 MG/DL
LEUKOCYTE ESTERASE UR QL STRIP: NEGATIVE
MAGNESIUM SERPL-MCNC: 2.1 MG/DL (ref 1.6–2.3)
MICROALBUMIN UR-MCNC: 34 MG/L
MICROALBUMIN/CREAT UR: 26.64 MG/G CR (ref 0–25)
NITRATE UR QL: NEGATIVE
NONHDLC SERPL-MCNC: 93 MG/DL
PH UR STRIP: 7 PH (ref 5–7)
POTASSIUM SERPL-SCNC: 4.7 MMOL/L (ref 3.4–5.3)
PROT SERPL-MCNC: 7.1 G/DL (ref 6.8–8.8)
SODIUM SERPL-SCNC: 135 MMOL/L (ref 133–144)
SOURCE: NORMAL
SP GR UR STRIP: 1.01 (ref 1–1.03)
TRIGL SERPL-MCNC: 70 MG/DL
UROBILINOGEN UR STRIP-ACNC: 0.2 EU/DL (ref 0.2–1)

## 2019-09-13 PROCEDURE — 36415 COLL VENOUS BLD VENIPUNCTURE: CPT | Performed by: INTERNAL MEDICINE

## 2019-09-13 PROCEDURE — 83036 HEMOGLOBIN GLYCOSYLATED A1C: CPT | Performed by: INTERNAL MEDICINE

## 2019-09-13 PROCEDURE — 80061 LIPID PANEL: CPT | Performed by: INTERNAL MEDICINE

## 2019-09-13 PROCEDURE — 82043 UR ALBUMIN QUANTITATIVE: CPT | Performed by: INTERNAL MEDICINE

## 2019-09-13 PROCEDURE — 85018 HEMOGLOBIN: CPT | Performed by: INTERNAL MEDICINE

## 2019-09-13 PROCEDURE — 80053 COMPREHEN METABOLIC PANEL: CPT | Performed by: INTERNAL MEDICINE

## 2019-09-13 PROCEDURE — 83735 ASSAY OF MAGNESIUM: CPT | Performed by: INTERNAL MEDICINE

## 2019-09-13 PROCEDURE — 99207 C PAF COMPLETED  NO CHARGE: CPT | Performed by: INTERNAL MEDICINE

## 2019-09-13 PROCEDURE — 99214 OFFICE O/P EST MOD 30 MIN: CPT | Mod: 25 | Performed by: INTERNAL MEDICINE

## 2019-09-13 PROCEDURE — 81003 URINALYSIS AUTO W/O SCOPE: CPT | Performed by: INTERNAL MEDICINE

## 2019-09-13 PROCEDURE — 90662 IIV NO PRSV INCREASED AG IM: CPT | Performed by: INTERNAL MEDICINE

## 2019-09-13 PROCEDURE — G0008 ADMIN INFLUENZA VIRUS VAC: HCPCS | Performed by: INTERNAL MEDICINE

## 2019-09-13 RX ORDER — POTASSIUM CHLORIDE 1500 MG/1
20 TABLET, EXTENDED RELEASE ORAL DAILY
Qty: 90 TABLET | Refills: 4 | Status: SHIPPED | OUTPATIENT
Start: 2019-09-13 | End: 2021-01-04

## 2019-09-13 RX ORDER — SERTRALINE HYDROCHLORIDE 100 MG/1
100 TABLET, FILM COATED ORAL DAILY
Qty: 90 TABLET | Refills: 1 | Status: SHIPPED | OUTPATIENT
Start: 2019-09-13 | End: 2020-07-15

## 2019-09-13 RX ORDER — LOSARTAN POTASSIUM 25 MG/1
TABLET ORAL
Qty: 45 TABLET | Refills: 2 | Status: SHIPPED | OUTPATIENT
Start: 2019-09-13 | End: 2020-04-22

## 2019-09-13 RX ORDER — ALBUTEROL SULFATE 90 UG/1
AEROSOL, METERED RESPIRATORY (INHALATION)
Qty: 8.5 INHALER | Refills: 3 | Status: SHIPPED | OUTPATIENT
Start: 2019-09-13 | End: 2020-01-29

## 2019-09-13 RX ORDER — ALBUTEROL SULFATE 1.25 MG/3ML
1.25 SOLUTION RESPIRATORY (INHALATION) EVERY 6 HOURS PRN
Qty: 30 VIAL | Refills: 3 | Status: SHIPPED | OUTPATIENT
Start: 2019-09-13 | End: 2020-09-24

## 2019-09-13 RX ORDER — ROSUVASTATIN CALCIUM 5 MG/1
TABLET, COATED ORAL
Qty: 90 TABLET | Refills: 0 | Status: SHIPPED | OUTPATIENT
Start: 2019-09-13 | End: 2020-04-19

## 2019-09-13 RX ORDER — TIOTROPIUM BROMIDE 18 UG/1
CAPSULE ORAL; RESPIRATORY (INHALATION)
Qty: 90 CAPSULE | Refills: 1 | Status: SHIPPED | OUTPATIENT
Start: 2019-09-13 | End: 2019-11-08

## 2019-09-13 ASSESSMENT — PAIN SCALES - GENERAL: PAINLEVEL: NO PAIN (0)

## 2019-09-13 ASSESSMENT — MIFFLIN-ST. JEOR: SCORE: 1095.5

## 2019-09-13 NOTE — RESULT ENCOUNTER NOTE
Good cholesterol. Normal electrolytes. Normal kidney function. Normal liver blood test. Normal magnesium.

## 2019-09-23 ENCOUNTER — OFFICE VISIT (OUTPATIENT)
Dept: FAMILY MEDICINE | Facility: CLINIC | Age: 79
End: 2019-09-23
Payer: COMMERCIAL

## 2019-09-23 VITALS
WEIGHT: 129.9 LBS | HEIGHT: 66 IN | TEMPERATURE: 98.1 F | SYSTOLIC BLOOD PRESSURE: 119 MMHG | OXYGEN SATURATION: 97 % | HEART RATE: 82 BPM | DIASTOLIC BLOOD PRESSURE: 68 MMHG | BODY MASS INDEX: 20.88 KG/M2

## 2019-09-23 DIAGNOSIS — N30.00 ACUTE CYSTITIS WITHOUT HEMATURIA: Primary | ICD-10-CM

## 2019-09-23 LAB
ALBUMIN UR-MCNC: 30 MG/DL
APPEARANCE UR: ABNORMAL
BACTERIA #/AREA URNS HPF: ABNORMAL /HPF
BILIRUB UR QL STRIP: NEGATIVE
COLOR UR AUTO: YELLOW
GLUCOSE UR STRIP-MCNC: NEGATIVE MG/DL
HGB UR QL STRIP: ABNORMAL
KETONES UR STRIP-MCNC: NEGATIVE MG/DL
LEUKOCYTE ESTERASE UR QL STRIP: ABNORMAL
NITRATE UR QL: POSITIVE
NON-SQ EPI CELLS #/AREA URNS LPF: ABNORMAL /LPF
PH UR STRIP: 8 PH (ref 5–7)
RBC #/AREA URNS AUTO: ABNORMAL /HPF
SOURCE: ABNORMAL
SP GR UR STRIP: 1.01 (ref 1–1.03)
UROBILINOGEN UR STRIP-ACNC: 0.2 EU/DL (ref 0.2–1)
WBC #/AREA URNS AUTO: >100 /HPF

## 2019-09-23 PROCEDURE — 81001 URINALYSIS AUTO W/SCOPE: CPT | Performed by: PHYSICIAN ASSISTANT

## 2019-09-23 PROCEDURE — 99213 OFFICE O/P EST LOW 20 MIN: CPT | Performed by: PHYSICIAN ASSISTANT

## 2019-09-23 PROCEDURE — 87088 URINE BACTERIA CULTURE: CPT | Performed by: PHYSICIAN ASSISTANT

## 2019-09-23 PROCEDURE — 87086 URINE CULTURE/COLONY COUNT: CPT | Performed by: PHYSICIAN ASSISTANT

## 2019-09-23 PROCEDURE — 87186 SC STD MICRODIL/AGAR DIL: CPT | Performed by: PHYSICIAN ASSISTANT

## 2019-09-23 RX ORDER — NITROFURANTOIN 25; 75 MG/1; MG/1
100 CAPSULE ORAL 2 TIMES DAILY
Qty: 14 CAPSULE | Refills: 0 | Status: SHIPPED | OUTPATIENT
Start: 2019-09-23 | End: 2020-01-06

## 2019-09-23 ASSESSMENT — ANXIETY QUESTIONNAIRES
7. FEELING AFRAID AS IF SOMETHING AWFUL MIGHT HAPPEN: NOT AT ALL
5. BEING SO RESTLESS THAT IT IS HARD TO SIT STILL: NOT AT ALL
GAD7 TOTAL SCORE: 5
6. BECOMING EASILY ANNOYED OR IRRITABLE: SEVERAL DAYS
2. NOT BEING ABLE TO STOP OR CONTROL WORRYING: SEVERAL DAYS
3. WORRYING TOO MUCH ABOUT DIFFERENT THINGS: SEVERAL DAYS
1. FEELING NERVOUS, ANXIOUS, OR ON EDGE: SEVERAL DAYS

## 2019-09-23 ASSESSMENT — PAIN SCALES - GENERAL: PAINLEVEL: SEVERE PAIN (7)

## 2019-09-23 ASSESSMENT — PATIENT HEALTH QUESTIONNAIRE - PHQ9
5. POOR APPETITE OR OVEREATING: SEVERAL DAYS
SUM OF ALL RESPONSES TO PHQ QUESTIONS 1-9: 3

## 2019-09-23 ASSESSMENT — MIFFLIN-ST. JEOR: SCORE: 1085.97

## 2019-09-23 NOTE — PROGRESS NOTES
Subjective     Ludy Mckenzie is a 78 year old female who presents to clinic today for the following health issues:    HPI   URINARY TRACT SYMPTOMS  Onset: 2 days ago    Description:   Painful urination (Dysuria): YES           Frequency: YES  Blood in urine (Hematuria): no   Delay in urine (Hesitency): YES    Intensity: severe, 7/10    Progression of Symptoms:  worsening    Accompanying Signs & Symptoms:  Fever/chills: no   Flank pain no   Nausea and vomiting: no   Any vaginal symptoms: none  Abdominal/Pelvic Pain: no     History:   History of frequent UTI's: YES  History of kidney stones: no   Sexually Active: no   Possibility of pregnancy: No    Precipitating factors:   none    Therapies Tried and outcome: none      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)     Primary open angle glaucoma (POAG)     Chronic obstructive pulmonary disease, unspecified COPD type (H)     Chronic kidney disease (CKD) stage G1/A2, glomerular filtration rate (GFR) equal to or greater than 90 mL/min/1.73 square meter and albuminuria creatinine ratio between  mg/g     Loss of balance     Diabetic neuropathy with neurologic complication (H)     Pseudophakia of both eyes     Urge incontinence of urine     Cramp of limb     Right sided sciatica     Pulmonary emphysema, unspecified emphysema type (H)     Age-related osteoporosis without current pathological fracture     Pulmonary nodules     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)     Past Surgical History:   Procedure Laterality Date     BIOPSY       BUNIONECTOMY  1960    JACQUELINE     CATARACT IOL, RT/LT  2010    right     COLONOSCOPY  2/22/2013     Procedure: COLONOSCOPY;  Colonoscopy ;  Surgeon: Hugo Minor MD;  Location:  GI       Social History     Tobacco Use     Smoking status: Former Smoker     Packs/day: 1.50     Years: 50.00     Pack years: 75.00     Types: Cigarettes     Last attempt to quit: 7/22/2009     Years since quitting: 10.1     Smokeless tobacco: Never Used   Substance Use Topics     Alcohol use: No     Alcohol/week: 4.0 standard drinks     Types: 4 Standard drinks or equivalent per week     Family History   Problem Relation Age of Onset     Cancer Maternal Grandfather         bone     Diabetes Mother 50        dx age 50, hip fracture     Glaucoma Mother      Macular Degeneration Mother      C.A.D. Father         pacemaker     Glaucoma Sister          Current Outpatient Medications   Medication Sig Dispense Refill     albuterol (ACCUNEB) 1.25 MG/3ML neb solution Take 1 vial (1.25 mg) by nebulization every 6 hours as needed for shortness of breath / dyspnea or wheezing 30 vial 3     albuterol (PROAIR HFA) 108 (90 Base) MCG/ACT inhaler INHALE TWO PUFFS BY MOUTH EVERY FOUR HOURS AS NEEDED SHORTNESS OF BREATH 8.5 Inhaler 3     aspirin 81 MG tablet Take 1 tablet by mouth daily.       calcium 600 MG tablet Take 1 tablet by mouth daily  180 tablet 3     cholecalciferol (VITAMIN D3) 1000 UNIT capsule Take 1 capsule by mouth daily.       fluticasone-salmeterol (ADVAIR DISKUS) 500-50 MCG/DOSE inhaler INHALE 1 PUFF INTO THE LUNGS 2 TIMES DAILY 3 Inhaler 1     latanoprost (XALATAN) 0.005 % ophthalmic solution Place 1 drop into both eyes At Bedtime 3 Bottle 4     losartan (COZAAR) 25 MG tablet TAKE 0.5 TABLETS (12.5 MG) BY MOUTH DAILY 45 tablet 2     nitroFURantoin macrocrystal-monohydrate (MACROBID) 100 MG capsule Take 1 capsule (100 mg) by mouth 2 times daily for 7 days 14 capsule 0     potassium chloride ER (KLOR-CON) 20 MEQ CR tablet Take 1 tablet (20 mEq) by mouth daily 90 tablet 4     rosuvastatin (CRESTOR) 5 MG tablet TAKE 1 TABLET BY  "MOUTH EVERY DAY 90 tablet 0     sertraline (ZOLOFT) 100 MG tablet Take 1 tablet (100 mg) by mouth daily 90 tablet 1     tiotropium (SPIRIVA HANDIHALER) 18 MCG inhaled capsule INHALE 1 CAPSULE INTO THE LUNGS DAILY. 90 capsule 1     vitamin  B complex with vitamin C (VITAMIN  B COMPLEX) TABS Take 1 tablet by mouth daily       Allergies   Allergen Reactions     Amoxicillin GI Disturbance and Nausea     Amoxicillin-Pot Clavulanate Nausea     Augmentin Nausea and Vomiting     Doxycycline GI Disturbance     Iodine Itching and Swelling     Pt is ok with ct contrast dye (isovue 370)  Pt is ok with ct contrast dye (isovue 370)     Mercury Unknown     Metronidazole GI Disturbance     Penicillins Unknown     Phenylmercuric Nitrate Unknown         Reviewed and updated as needed this visit by Provider  Tobacco  Allergies  Meds  Problems  Med Hx  Surg Hx  Fam Hx         Review of Systems   ROS COMP: Constitutional, HEENT, cardiovascular, pulmonary, gi and gu systems are negative, except as otherwise noted.      Objective    /68 (BP Location: Left arm, Patient Position: Chair, Cuff Size: Adult Regular)   Pulse 82   Temp 98.1  F (36.7  C) (Oral)   Ht 1.676 m (5' 6\")   Wt 58.9 kg (129 lb 14.4 oz)   SpO2 97%   BMI 20.97 kg/m    Body mass index is 20.97 kg/m .  Physical Exam   GENERAL: healthy, alert and no distress  NECK: no adenopathy, no asymmetry, masses, or scars and thyroid normal to palpation  RESP: lungs clear to auscultation - no rales, rhonchi or wheezes  CV: regular rate and rhythm, normal S1 S2, no S3 or S4, no murmur, click or rub, no peripheral edema and peripheral pulses strong  ABDOMEN: soft, nontender, no hepatosplenomegaly, no masses and bowel sounds normal  MS: no gross musculoskeletal defects noted, no edema  BACK: no CVA tenderness, no paralumbar tenderness    Diagnostic Test Results:  Labs reviewed in Epic  Results for orders placed or performed in visit on 09/23/19 (from the past 24 hour(s)) "   UA with Microscopic   Result Value Ref Range    Color Urine Yellow     Appearance Urine Slightly Cloudy     Glucose Urine Negative NEG^Negative mg/dL    Bilirubin Urine Negative NEG^Negative    Ketones Urine Negative NEG^Negative mg/dL    Specific Gravity Urine 1.015 1.003 - 1.035    pH Urine 8.0 (H) 5.0 - 7.0 pH    Protein Albumin Urine 30 (A) NEG^Negative mg/dL    Urobilinogen Urine 0.2 0.2 - 1.0 EU/dL    Nitrite Urine Positive (A) NEG^Negative    Blood Urine Moderate (A) NEG^Negative    Leukocyte Esterase Urine Large (A) NEG^Negative    Source Midstream Urine     WBC Urine >100 (A) OTO5^0 - 5 /HPF    RBC Urine 10-25 (A) OTO2^O - 2 /HPF    Squamous Epithelial /LPF Urine Few FEW^Few /LPF    Bacteria Urine Many (A) NEG^Negative /HPF       Assessment & Plan       ICD-10-CM    1. Acute cystitis without hematuria N30.00 UA with Microscopic     Urine Culture Aerobic Bacterial     nitroFURantoin macrocrystal-monohydrate (MACROBID) 100 MG capsule   Please take Nitrofurantoin 100 mg  twice a day for 7 days with large amount of water  Increase fluids  Follow up or call in 3 days if not better  Urine culture is pending  For prevention wipe front to back, wash with soap and water after BM    Return in about 3 days (around 9/26/2019), or if symptoms worsen or fail to improve.    Karla Gardner PA-C  [facility]

## 2019-09-23 NOTE — PATIENT INSTRUCTIONS
Please take Nitrofurantoin 100 mg  twice a day for 7 days with large amount of water  Increase fluids  Follow up or call in 3 days if not better  Urine culture is pending  For prevention wipe front to back, wash with soap and water after BM

## 2019-09-24 LAB
BACTERIA SPEC CULT: ABNORMAL
SPECIMEN SOURCE: ABNORMAL

## 2019-09-24 ASSESSMENT — ANXIETY QUESTIONNAIRES: GAD7 TOTAL SCORE: 5

## 2019-10-03 ENCOUNTER — MYC MEDICAL ADVICE (OUTPATIENT)
Dept: FAMILY MEDICINE | Facility: CLINIC | Age: 79
End: 2019-10-03

## 2019-10-23 ENCOUNTER — TRANSFERRED RECORDS (OUTPATIENT)
Dept: HEALTH INFORMATION MANAGEMENT | Facility: CLINIC | Age: 79
End: 2019-10-23

## 2019-10-23 LAB — COLOGUARD-ABSTRACT: POSITIVE

## 2019-10-30 ENCOUNTER — TELEPHONE (OUTPATIENT)
Dept: INTERNAL MEDICINE | Facility: CLINIC | Age: 79
End: 2019-10-30

## 2019-10-30 DIAGNOSIS — R19.5 POSITIVE COLORECTAL CANCER SCREENING USING COLOGUARD TEST: ICD-10-CM

## 2019-10-30 DIAGNOSIS — Z12.11 SCREEN FOR COLON CANCER: Primary | ICD-10-CM

## 2019-10-30 NOTE — TELEPHONE ENCOUNTER
Cologuard results have been received.     The results are: Positive    Result Date: 10-29-19    Results have been placed in provider basket- provider to review and sign off on results. Please send back to team with additional follow-up needed.      will send copy of results to scanning after provider reviews.    Bushra Grant,

## 2019-10-30 NOTE — TELEPHONE ENCOUNTER
Patient notified of Provider's message as written.  Referral placed  Phone number from referral given  Patient verbalized understanding.    Dmitriy Kaplan RN

## 2019-10-30 NOTE — TELEPHONE ENCOUNTER
Please let patient know that Cologuard was positive so she needs a colonoscopy.  Order is ready to be signed if patient agrees.

## 2019-10-31 NOTE — TELEPHONE ENCOUNTER
Called and spoke with pt.  Read back to pt Dr. Garsia's Result Note with recommendations:     Pt verbalized understanding and agreed with plan.  Pt stated she has an appt with Dr. Manrique 11/30/17 at 9:15am she would like to keep.  Advised pt to call if any further questions or concerns.    Janet Chowdhury LPN      Bullous Pemphigoid Antigens   Status:  Final result   Visible to patient:  Yes (MyChart) Dx:  Bullous pemphigoid Order: [id number]       Notes Recorded by Janet Chowdhury on 8/22/2017 at 1:15 PM  Called and spoke with pt.  Telephone Encounter started 08/22/17.  Janet Chowdhury LPN    ------    Notes Recorded by Austin Garsia MD on 8/22/2017 at 12:39 PM  Please let Ms. 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 Currently her disease isn't active. Now this doesn't necessarily mean it won't become active. Regardless, there is no need for her to go back on [employer] right now.    She should see Dr. Manrique again in 6 months to follow up on the Bullous Pemphigoid.       
I have reviewed and confirmed nurses' notes...

## 2019-11-04 ASSESSMENT — MIFFLIN-ST. JEOR: SCORE: 1104.11

## 2019-11-07 DIAGNOSIS — J43.1 PANLOBULAR EMPHYSEMA (H): ICD-10-CM

## 2019-11-07 NOTE — TELEPHONE ENCOUNTER
Fax request from [facility] [employer] phone number [phone]. Fax number [phone]. Unable to locate pharmacy in [facility]. Maria D Murray MA

## 2019-11-08 RX ORDER — TIOTROPIUM BROMIDE 18 UG/1
CAPSULE ORAL; RESPIRATORY (INHALATION)
Qty: 90 CAPSULE | Refills: 1 | Status: SHIPPED | OUTPATIENT
Start: 2019-11-08 | End: 2020-04-24

## 2019-11-08 NOTE — TELEPHONE ENCOUNTER
The prescription has been confirmed faxed to the Pharmacy. Leda Vera,     tiotropium (SPIRIVA HANDIHALER) 18 MCG inhaled capsule

## 2019-11-11 ENCOUNTER — SURGERY (OUTPATIENT)
Age: 79
End: 2019-11-11
Payer: COMMERCIAL

## 2019-11-11 ENCOUNTER — HOSPITAL ENCOUNTER (OUTPATIENT)
Facility: AMBULATORY SURGERY CENTER | Age: 79
Discharge: HOME | End: 2019-11-11
Attending: SURGERY | Admitting: SURGERY
Payer: COMMERCIAL

## 2019-11-11 VITALS
HEIGHT: 66 IN | BODY MASS INDEX: 21.69 KG/M2 | WEIGHT: 135 LBS | RESPIRATION RATE: 16 BRPM | DIASTOLIC BLOOD PRESSURE: 56 MMHG | OXYGEN SATURATION: 96 % | HEART RATE: 69 BPM | SYSTOLIC BLOOD PRESSURE: 104 MMHG | TEMPERATURE: 97.5 F

## 2019-11-11 LAB — COLONOSCOPY: NORMAL

## 2019-11-11 PROCEDURE — G8918 PT W/O PREOP ORDER IV AB PRO: HCPCS

## 2019-11-11 PROCEDURE — 45380 COLONOSCOPY AND BIOPSY: CPT | Performed by: SURGERY

## 2019-11-11 PROCEDURE — G8907 PT DOC NO EVENTS ON DISCHARG: HCPCS

## 2019-11-11 PROCEDURE — 88305 TISSUE EXAM BY PATHOLOGIST: CPT | Performed by: SURGERY

## 2019-11-11 PROCEDURE — 45380 COLONOSCOPY AND BIOPSY: CPT

## 2019-11-11 RX ORDER — FENTANYL CITRATE 50 UG/ML
INJECTION, SOLUTION INTRAMUSCULAR; INTRAVENOUS PRN
Status: DISCONTINUED | OUTPATIENT
Start: 2019-11-11 | End: 2019-11-11 | Stop reason: HOSPADM

## 2019-11-11 RX ORDER — LIDOCAINE 40 MG/G
CREAM TOPICAL
Status: DISCONTINUED | OUTPATIENT
Start: 2019-11-11 | End: 2019-11-12 | Stop reason: HOSPADM

## 2019-11-11 RX ORDER — ONDANSETRON 4 MG/1
4 TABLET, ORALLY DISINTEGRATING ORAL EVERY 6 HOURS PRN
Status: DISCONTINUED | OUTPATIENT
Start: 2019-11-11 | End: 2019-11-12 | Stop reason: HOSPADM

## 2019-11-11 RX ORDER — ONDANSETRON 2 MG/ML
4 INJECTION INTRAMUSCULAR; INTRAVENOUS EVERY 6 HOURS PRN
Status: DISCONTINUED | OUTPATIENT
Start: 2019-11-11 | End: 2019-11-12 | Stop reason: HOSPADM

## 2019-11-11 RX ORDER — ONDANSETRON 2 MG/ML
4 INJECTION INTRAMUSCULAR; INTRAVENOUS
Status: DISCONTINUED | OUTPATIENT
Start: 2019-11-11 | End: 2019-11-12 | Stop reason: HOSPADM

## 2019-11-11 RX ORDER — NALOXONE HYDROCHLORIDE 0.4 MG/ML
.1-.4 INJECTION, SOLUTION INTRAMUSCULAR; INTRAVENOUS; SUBCUTANEOUS
Status: DISCONTINUED | OUTPATIENT
Start: 2019-11-11 | End: 2019-11-12 | Stop reason: HOSPADM

## 2019-11-11 RX ORDER — FLUMAZENIL 0.1 MG/ML
0.2 INJECTION, SOLUTION INTRAVENOUS
Status: SHIPPED | OUTPATIENT
Start: 2019-11-11 | End: 2019-11-11

## 2019-11-11 RX ADMIN — FENTANYL CITRATE 50 MCG: 50 INJECTION, SOLUTION INTRAMUSCULAR; INTRAVENOUS at 10:41

## 2019-11-13 LAB — COPATH REPORT: NORMAL

## 2019-12-14 NOTE — TELEPHONE ENCOUNTER
Naldo with Dr. Cano. Please see mychart response to pt below.    Rhoda Bates RN  [facility]     Fall Risk

## 2019-12-16 ENCOUNTER — TELEPHONE (OUTPATIENT)
Dept: SURGERY | Facility: CLINIC | Age: 79
End: 2019-12-16

## 2019-12-17 ENCOUNTER — CARE COORDINATION (OUTPATIENT)
Dept: GASTROENTEROLOGY | Facility: CLINIC | Age: 79
End: 2019-12-17

## 2019-12-17 ENCOUNTER — TELEPHONE (OUTPATIENT)
Dept: GASTROENTEROLOGY | Facility: CLINIC | Age: 79
End: 2019-12-17

## 2019-12-17 DIAGNOSIS — K63.9 NODULE OF COLON: Primary | ICD-10-CM

## 2019-12-17 NOTE — PROGRESS NOTES
Care Coordination New Patient Referral  [facility]    NP referral date: 12/16/19  Referred to KADIE Neely via in basket message    Referring MD:  LONI Escobedo  Referring contact information see initial referral note  - not referred through referral line  Referral for procedure - nodule fold- TA on biopsy- about 1/3 circumference, one fold proximal to IC valve.     Diagnosis: ascending colon nodule    Imaging:   CT  Pending for 12/19/19 Location: [facility]  Date:  Previous 3/11/19  MRI  no    Procedures:  11/11/19 colonoscopy  Pathology - tubular adenoma    Referral reviewed by Dr. Leal.  Orders sent to endoscopy for repeat colonoscopy with MAC    Referral sent to endoscopy scheduling on 12/17/19 at 12/17/19 via in basket staff message and the patient will be contacted with appointment.       Magda HOOKSN, HNBC  RN Care Coordinator  [facility]  Ph: [phone]  Email: [email or web address]

## 2019-12-18 ENCOUNTER — TELEPHONE (OUTPATIENT)
Dept: PULMONOLOGY | Facility: CLINIC | Age: 79
End: 2019-12-18

## 2019-12-18 NOTE — TELEPHONE ENCOUNTER
Contacted patient regarding pulmonary consult scheduled in  on Thursday 12/26/19. Patient scheduled for a COPD consult with Dr. Connor on Thursday 12/26/19. Patient was a no show to her PFT appointment that was scheduled for Tuesday 12/17/19. Informed patient that pulmonary function testing prior to pulmonary consult is required per [facility] protocol. Unfortunately,  does not have any PFT openings prior to scheduled COPD consult.     [facility]. Patient expressed interest in rescheduling COPD consult w/ PFT testing at the [facility], as  does not have general pulm availability until April 30th 2020. Contact number for general pulm scheduling at the [facility] given to patient in order for her to reschedule PFT and COPD consult.    Leeann Dye LPN    Rheumatology / Pulmonology  [facility]

## 2019-12-19 ENCOUNTER — ANCILLARY PROCEDURE (OUTPATIENT)
Dept: CT IMAGING | Facility: CLINIC | Age: 79
End: 2019-12-19
Attending: INTERNAL MEDICINE
Payer: COMMERCIAL

## 2019-12-19 DIAGNOSIS — K86.89 PANCREATIC CALCIFICATION: ICD-10-CM

## 2019-12-19 LAB
CREAT BLD-MCNC: 0.6 MG/DL (ref 0.52–1.04)
GFR SERPL CREATININE-BSD FRML MDRD: >90 ML/MIN/{1.73_M2}
RADIOLOGIST FLAGS: NORMAL

## 2019-12-19 PROCEDURE — 71260 CT THORAX DX C+: CPT | Mod: 51 | Performed by: RADIOLOGY

## 2019-12-19 PROCEDURE — 74177 CT ABD & PELVIS W/CONTRAST: CPT | Performed by: RADIOLOGY

## 2019-12-19 RX ORDER — IOPAMIDOL 755 MG/ML
82 INJECTION, SOLUTION INTRAVASCULAR ONCE
Status: COMPLETED | OUTPATIENT
Start: 2019-12-19 | End: 2019-12-19

## 2019-12-19 RX ADMIN — IOPAMIDOL 82 ML: 755 INJECTION, SOLUTION INTRAVASCULAR at 10:14

## 2019-12-20 NOTE — RESULT ENCOUNTER NOTE
Ludy Corona.  There are several findings here.  The thyroid nodule is still there and has not changed.  This is good news.  The calcium deposits in the pancreas are unchanged as well.  The lung nodules and pancreatic cyst are unchanged too.    There is a new area of haziness in the right lower part of the lung that the pulmonologist will look at and discuss at your upcoming appointment.  It is not obvious what this hazy area is.    We will need to repeat the CT of the pancreas in 1 year.      Dr. Cano

## 2019-12-23 DIAGNOSIS — J44.9 CHRONIC OBSTRUCTIVE PULMONARY DISEASE, UNSPECIFIED COPD TYPE (H): ICD-10-CM

## 2019-12-23 DIAGNOSIS — R06.02 SOB (SHORTNESS OF BREATH): ICD-10-CM

## 2019-12-25 LAB
DLCOUNC-%PRED-PRE: 62 %
DLCOUNC-PRE: 12.47 ML/MIN/MMHG
DLCOUNC-PRED: 19.99 ML/MIN/MMHG
ERV-%PRED-PRE: 115 %
ERV-PRE: 0.97 L
ERV-PRED: 0.84 L
EXPTIME-PRE: 10.88 SEC
FEF2575-%PRED-POST: 30 %
FEF2575-%PRED-PRE: 26 %
FEF2575-POST: 0.51 L/SEC
FEF2575-PRE: 0.44 L/SEC
FEF2575-PRED: 1.64 L/SEC
FEFMAX-%PRED-PRE: 53 %
FEFMAX-PRE: 2.74 L/SEC
FEFMAX-PRED: 5.16 L/SEC
FEV1-%PRED-PRE: 61 %
FEV1-PRE: 1.28 L
FEV1FEV6-PRE: 46 %
FEV1FEV6-PRED: 78 %
FEV1FVC-PRE: 39 %
FEV1FVC-PRED: 77 %
FEV1SVC-PRE: 37 %
FEV1SVC-PRED: 67 %
FIFMAX-PRE: 4.23 L/SEC
FRCPLETH-%PRED-PRE: 161 %
FRCPLETH-PRE: 4.55 L
FRCPLETH-PRED: 2.82 L
FVC-%PRED-PRE: 120 %
FVC-PRE: 3.29 L
FVC-PRED: 2.73 L
IC-%PRED-PRE: 108 %
IC-PRE: 2.45 L
IC-PRED: 2.25 L
RVPLETH-%PRED-PRE: 156 %
RVPLETH-PRE: 3.58 L
RVPLETH-PRED: 2.29 L
TLCPLETH-%PRED-PRE: 133 %
TLCPLETH-PRE: 7.01 L
TLCPLETH-PRED: 5.23 L
VA-%PRED-PRE: 106 %
VA-PRE: 5.3 L
VC-%PRED-PRE: 110 %
VC-PRE: 3.43 L
VC-PRED: 3.09 L

## 2019-12-26 ENCOUNTER — OFFICE VISIT (OUTPATIENT)
Dept: PULMONOLOGY | Facility: CLINIC | Age: 79
End: 2019-12-26
Payer: COMMERCIAL

## 2019-12-26 VITALS
HEIGHT: 66 IN | OXYGEN SATURATION: 97 % | BODY MASS INDEX: 20.39 KG/M2 | SYSTOLIC BLOOD PRESSURE: 112 MMHG | RESPIRATION RATE: 18 BRPM | HEART RATE: 93 BPM | DIASTOLIC BLOOD PRESSURE: 68 MMHG | WEIGHT: 126.9 LBS

## 2019-12-26 DIAGNOSIS — R06.09 DYSPNEA ON EXERTION: Primary | ICD-10-CM

## 2019-12-26 DIAGNOSIS — R93.89 ABNORMAL CT OF THE CHEST: ICD-10-CM

## 2019-12-26 DIAGNOSIS — J43.2 CENTRILOBULAR EMPHYSEMA (H): ICD-10-CM

## 2019-12-26 PROCEDURE — 99215 OFFICE O/P EST HI 40 MIN: CPT | Performed by: INTERNAL MEDICINE

## 2019-12-26 RX ORDER — FLUTICASONE PROPIONATE AND SALMETEROL XINAFOATE 230; 21 UG/1; UG/1
2 AEROSOL, METERED RESPIRATORY (INHALATION) 2 TIMES DAILY
Qty: 1 INHALER | Refills: 11 | Status: SHIPPED | OUTPATIENT
Start: 2019-12-26 | End: 2020-12-21

## 2019-12-26 ASSESSMENT — PAIN SCALES - GENERAL: PAINLEVEL: NO PAIN (0)

## 2019-12-26 ASSESSMENT — MIFFLIN-ST. JEOR: SCORE: 1067.36

## 2019-12-26 NOTE — NURSING NOTE
"Ludy Mckenzie's goals for this visit include: Consult  She requests these members of her care team be copied on today's visit information: PCP    PCP: Ce Cano    Referring Provider:  Ce Cano MD  [address] [facility] [place] [address]    /68   Pulse 93   Resp 18   Ht 1.676 m (5' 6\")   Wt 57.6 kg (126 lb 14.4 oz)   SpO2 97%   BMI 20.48 kg/m      Do you need any medication refills at today's visit? N    "
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
## 2020-01-06 ENCOUNTER — OFFICE VISIT (OUTPATIENT)
Dept: FAMILY MEDICINE | Facility: CLINIC | Age: 80
End: 2020-01-06
Payer: COMMERCIAL

## 2020-01-06 ENCOUNTER — ANCILLARY PROCEDURE (OUTPATIENT)
Dept: GENERAL RADIOLOGY | Facility: CLINIC | Age: 80
End: 2020-01-06
Attending: NURSE PRACTITIONER
Payer: COMMERCIAL

## 2020-01-06 VITALS
OXYGEN SATURATION: 94 % | HEART RATE: 82 BPM | WEIGHT: 127 LBS | TEMPERATURE: 97.1 F | HEIGHT: 66 IN | DIASTOLIC BLOOD PRESSURE: 52 MMHG | BODY MASS INDEX: 20.41 KG/M2 | SYSTOLIC BLOOD PRESSURE: 106 MMHG

## 2020-01-06 DIAGNOSIS — R05.9 COUGH: ICD-10-CM

## 2020-01-06 DIAGNOSIS — J44.1 COPD EXACERBATION (H): Primary | ICD-10-CM

## 2020-01-06 LAB
FLUAV+FLUBV AG SPEC QL: NEGATIVE
FLUAV+FLUBV AG SPEC QL: NEGATIVE
SPECIMEN SOURCE: NORMAL

## 2020-01-06 PROCEDURE — 99214 OFFICE O/P EST MOD 30 MIN: CPT | Performed by: NURSE PRACTITIONER

## 2020-01-06 PROCEDURE — 87804 INFLUENZA ASSAY W/OPTIC: CPT | Performed by: NURSE PRACTITIONER

## 2020-01-06 PROCEDURE — 71046 X-RAY EXAM CHEST 2 VIEWS: CPT

## 2020-01-06 RX ORDER — AZITHROMYCIN 250 MG/1
TABLET, FILM COATED ORAL
Qty: 6 TABLET | Refills: 0 | Status: SHIPPED | OUTPATIENT
Start: 2020-01-06 | End: 2020-01-11

## 2020-01-06 RX ORDER — PREDNISONE 20 MG/1
40 TABLET ORAL DAILY
Qty: 10 TABLET | Refills: 0 | Status: SHIPPED | OUTPATIENT
Start: 2020-01-06 | End: 2020-01-11

## 2020-01-06 ASSESSMENT — MIFFLIN-ST. JEOR: SCORE: 1067.82

## 2020-01-06 NOTE — PROGRESS NOTES
"Subjective     Ludy Mckenzie is a 79 year old female who presents to clinic today for the following health issues:    HPI     RESPIRATORY SYMPTOMS      Duration: started Friday    Description  cough    Severity: severe    Accompanying signs and symptoms: states she'll cough until she's exhausted, chills    History (predisposing factors):  Emphysema     Precipitating or alleviating factors: None    Therapies tried and outcome:  oral decongestant which helps temporarily, [place], mucous relief DM     Patient attests to above symptoms. Additionally describes shortness of breath and is using Albuterol a couple times per day with some relief. Awakening early in the morning with coughing.      Reviewed and updated as needed this visit by Provider         Review of Systems   ROS COMP: Constitutional, HEENT, cardiovascular, pulmonary, gi and gu systems are negative, except as otherwise noted.      Objective    /52 (BP Location: Left arm, Patient Position: Chair, Cuff Size: Adult Regular)   Pulse 82   Temp 97.1  F (36.2  C) (Oral)   Ht 1.676 m (5' 6\")   Wt 57.6 kg (127 lb)   SpO2 94%   BMI 20.50 kg/m    Body mass index is 20.5 kg/m .  Physical Exam   GENERAL: healthy, alert and no distress  EYES: Eyes grossly normal to inspection, PERRL and conjunctivae and sclerae normal  HENT: ear canals and TM's normal, nose and mouth without ulcers or lesions  NECK: no adenopathy, no asymmetry, masses, or scars and thyroid normal to palpation  RESP: prolonged expiratory phase  CV: regular rate and rhythm, normal S1 S2, no S3 or S4, no murmur, click or rub, no peripheral edema and peripheral pulses strong    Diagnostic Test Results:  Results for orders placed or performed in visit on 01/06/20 (from the past 24 hour(s))   Influenza A/B antigen   Result Value Ref Range    Influenza A/B Agn Specimen Nasal     Influenza A Negative NEG^Negative    Influenza B Negative NEG^Negative     CXR - unremarkable        Assessment & " Plan     1. COPD exacerbation (H)  Reviewed warning signs of worsening infection, respiratory distress, dehydration and when to follow up   - XR Chest 2 Views; Future  - Influenza A/B antigen  - azithromycin (ZITHROMAX) 250 MG tablet; Take 2 tablets (500 mg) by mouth daily for 1 day, THEN 1 tablet (250 mg) daily for 4 days.  Dispense: 6 tablet; Refill: 0  - predniSONE (DELTASONE) 20 MG tablet; Take 2 tablets (40 mg) by mouth daily for 5 days  Dispense: 10 tablet; Refill: 0       See Patient Instructions    Return in about 1 week (around 1/13/2020) for if not improving.    OCTAVIO Bryson [facility]

## 2020-01-27 ENCOUNTER — TELEPHONE (OUTPATIENT)
Dept: INTERNAL MEDICINE | Facility: CLINIC | Age: 80
End: 2020-01-27

## 2020-01-27 DIAGNOSIS — J44.9 CHRONIC OBSTRUCTIVE PULMONARY DISEASE, UNSPECIFIED COPD TYPE (H): ICD-10-CM

## 2020-01-27 NOTE — TELEPHONE ENCOUNTER
A non-formulary letter has been received from [facility]/[employer] and given to RN to determine if substitution can be done per protocol.     Letter states they have provided the patient with a 30 day temporary supply of: albuterol HFA 90 MCG inhaler    Given on: 1-    This is either not covered (non-formulary) or is subject to the limits.    Before the temporary supply is out do one of the following:    *Change the medication to a formulary  *Request a Prior Approval/ coverage determination if it meets our criteria for coverage  *Request an exception from criteria for coverage    To request coverage determination or an exception contact:    Gerson Lugo,PharmD  [facility] pharmacy director  [phone]

## 2020-01-27 NOTE — TELEPHONE ENCOUNTER
Please check with pharmacy  Was it specifically the Proair that is not covered?  Did they dispense an alternative (Proventil or Ventolin) that was covered?    Dmitriy Kaplan RN

## 2020-01-27 NOTE — TELEPHONE ENCOUNTER
Tried calling pharmacy two times and nobody answered, will call back and another time.         Virginia GIVENS CMA ([facility])

## 2020-01-29 RX ORDER — ALBUTEROL SULFATE 90 UG/1
2 AEROSOL, METERED RESPIRATORY (INHALATION) EVERY 4 HOURS PRN
Qty: 8.5 G | Refills: 3 | Status: SHIPPED | OUTPATIENT
Start: 2020-01-29 | End: 2020-12-21

## 2020-01-29 NOTE — TELEPHONE ENCOUNTER
Called pharmacy and they said that pt picked up Proair on 1/22/20. She said disregard it.  Lauren ENCISO CMA

## 2020-02-07 ENCOUNTER — OFFICE VISIT (OUTPATIENT)
Dept: OPHTHALMOLOGY | Facility: CLINIC | Age: 80
End: 2020-02-07
Payer: COMMERCIAL

## 2020-02-07 DIAGNOSIS — H40.1131 PRIMARY OPEN ANGLE GLAUCOMA (POAG) OF BOTH EYES, MILD STAGE: Primary | ICD-10-CM

## 2020-02-07 PROCEDURE — 92133 CPTRZD OPH DX IMG PST SGM ON: CPT | Performed by: STUDENT IN AN ORGANIZED HEALTH CARE EDUCATION/TRAINING PROGRAM

## 2020-02-07 PROCEDURE — 92012 INTRM OPH EXAM EST PATIENT: CPT | Performed by: STUDENT IN AN ORGANIZED HEALTH CARE EDUCATION/TRAINING PROGRAM

## 2020-02-07 PROCEDURE — 92083 EXTENDED VISUAL FIELD XM: CPT | Performed by: STUDENT IN AN ORGANIZED HEALTH CARE EDUCATION/TRAINING PROGRAM

## 2020-02-07 RX ORDER — LATANOPROST 50 UG/ML
1 SOLUTION/ DROPS OPHTHALMIC AT BEDTIME
Qty: 3 BOTTLE | Refills: 4 | Status: SHIPPED | OUTPATIENT
Start: 2020-02-07 | End: 2020-09-02

## 2020-02-07 ASSESSMENT — SLIT LAMP EXAM - LIDS
COMMENTS: 1+ MEIBOMIAN GLAND DYSFUNCTION
COMMENTS: 1+ MEIBOMIAN GLAND DYSFUNCTION

## 2020-02-07 ASSESSMENT — VISUAL ACUITY
METHOD: SNELLEN - LINEAR
OS_PH_SC: 20/25
OS_PH_SC+: -3
OS_SC+: -2
OD_SC: 20/70
OD_CC: 20/30
OD_PH_SC+: +1
OS_SC: 20/40
OS_CC: 20/30
OD_PH_SC: 20/40

## 2020-02-07 ASSESSMENT — EXTERNAL EXAM - RIGHT EYE: OD_EXAM: NORMAL

## 2020-02-07 ASSESSMENT — EXTERNAL EXAM - LEFT EYE: OS_EXAM: NORMAL

## 2020-02-07 ASSESSMENT — TONOMETRY
IOP_METHOD: APPLANATION
OS_IOP_MMHG: 12
OD_IOP_MMHG: 10

## 2020-02-07 ASSESSMENT — CUP TO DISC RATIO
OD_RATIO: 0.45
OS_RATIO: 0.5

## 2020-02-07 NOTE — PATIENT INSTRUCTIONS
Continue Latanoprost (teal top) at bedtime both eyes     Glasses prescription given     Could use a gel tear at bedtime and/or up to four times a day (like Refresh Liquigel or GenTeal Gel Tears)     Arjun Valenzuela MD  [phone]

## 2020-02-07 NOTE — LETTER
"    2/7/2020         RE: Ludy Mckenzie  [address]  [place] [address]        Dear Colleague,    Thank you for referring your patient, Ludy Mckenzie, to the [facility]. Please see a copy of my visit note below.     Current Eye Medications:  Latanoprost at bedtime both eyes     Subjective:  Here for HVF and OCT for glaucoma. Says her eyes feel gritty and art tears do not help. Also \"needs to do something about her astigmatism, but doesn't wear glasses to drive\"   Explained to patient that she needs to get some glasses to drive, she has the option to get computer glasses as well, but would help her in driving. She agreed.     Objective:  See Ophthalmology Exam.      Assessment:  Ludy Mckenzie is a 79 year old female who presents with:     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, mild stage Moseley visual field (HVF) 24-2: within normal limits right eye; borderline scattered loss left eye.    OCT optic nerve: avg retinal nerve fiber layer 91/93; stable in both eyes.     Intraocular pressure 10/12 today.      Also discussed makeup present that is covering meibomian glands and getting into tear film.    Plan:  Continue Latanoprost (teal top) at bedtime both eyes     Glasses prescription given     Could use a gel tear at bedtime and/or up to four times a day (like Refresh Liquigel or GenTeal Gel Tears)     Arjun Valenzuela MD  [phone]             Again, thank you for allowing me to participate in the care of your patient.        Sincerely,        Arjun Valenzuela MD    "

## 2020-02-07 NOTE — PROGRESS NOTES
" Current Eye Medications:  Latanoprost at bedtime both eyes     Subjective:  Here for HVF and OCT for glaucoma. Says her eyes feel gritty and art tears do not help. Also \"needs to do something about her astigmatism, but doesn't wear glasses to drive\"   Explained to patient that she needs to get some glasses to drive, she has the option to get computer glasses as well, but would help her in driving. She agreed.     Objective:  See Ophthalmology Exam.      Assessment:  Ludy Mckenzie is a 79 year old female who presents with:     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, mild stage [place] visual field (HVF) 24-2: within normal limits right eye; borderline scattered loss left eye.    OCT optic nerve: avg retinal nerve fiber layer 91/93; stable in both eyes.     Intraocular pressure 10/12 today.      Also discussed makeup present that is covering meibomian glands and getting into tear film.    Plan:  Continue Latanoprost (teal top) at bedtime both eyes     Glasses prescription given     Could use a gel tear at bedtime and/or up to four times a day (like Refresh Liquigel or GenTeal Gel Tears)     Arjun Valenzuela MD  [phone]           "

## 2020-02-10 ENCOUNTER — ANCILLARY PROCEDURE (OUTPATIENT)
Dept: CARDIOLOGY | Facility: CLINIC | Age: 80
End: 2020-02-10
Attending: INTERNAL MEDICINE
Payer: COMMERCIAL

## 2020-02-10 ENCOUNTER — HEALTH MAINTENANCE LETTER (OUTPATIENT)
Age: 80
End: 2020-02-10

## 2020-02-10 DIAGNOSIS — J43.2 CENTRILOBULAR EMPHYSEMA (H): ICD-10-CM

## 2020-02-10 DIAGNOSIS — R06.09 DYSPNEA ON EXERTION: ICD-10-CM

## 2020-02-10 DIAGNOSIS — R93.89 ABNORMAL CT OF THE CHEST: ICD-10-CM

## 2020-02-10 PROCEDURE — 93306 TTE W/DOPPLER COMPLETE: CPT | Performed by: INTERNAL MEDICINE

## 2020-02-12 ENCOUNTER — PATIENT OUTREACH (OUTPATIENT)
Dept: PULMONOLOGY | Facility: CLINIC | Age: 80
End: 2020-02-12

## 2020-02-12 NOTE — PROGRESS NOTES
Notified patient that her Echo was normal. Patient scheduled to follow up on 6/25/20.     Patient states she is doing fine and has no questions.     Kaylyn Tyson RN   [facility] Care Coordinator  [facility]

## 2020-04-18 DIAGNOSIS — E78.2 MIXED HYPERLIPIDEMIA: ICD-10-CM

## 2020-04-19 RX ORDER — ROSUVASTATIN CALCIUM 5 MG/1
TABLET, COATED ORAL
Qty: 90 TABLET | Refills: 1 | Status: SHIPPED | OUTPATIENT
Start: 2020-04-19 | End: 2021-01-04

## 2020-04-19 NOTE — TELEPHONE ENCOUNTER
Prescription approved per [facility] Refill Protocol.  Adele Chao RN

## 2020-04-22 DIAGNOSIS — R80.9 MICROALBUMINURIA: ICD-10-CM

## 2020-04-22 RX ORDER — LOSARTAN POTASSIUM 25 MG/1
TABLET ORAL
Qty: 45 TABLET | Refills: 1 | Status: SHIPPED | OUTPATIENT
Start: 2020-04-22 | End: 2020-11-20

## 2020-04-24 DIAGNOSIS — J43.1 PANLOBULAR EMPHYSEMA (H): ICD-10-CM

## 2020-04-24 RX ORDER — TIOTROPIUM BROMIDE 18 UG/1
CAPSULE ORAL; RESPIRATORY (INHALATION)
Qty: 90 CAPSULE | Refills: 1 | Status: SHIPPED | OUTPATIENT
Start: 2020-04-24 | End: 2020-11-12

## 2020-06-10 ENCOUNTER — PREP FOR PROCEDURE (OUTPATIENT)
Dept: GASTROENTEROLOGY | Facility: CLINIC | Age: 80
End: 2020-06-10

## 2020-06-10 ENCOUNTER — PATIENT OUTREACH (OUTPATIENT)
Dept: GASTROENTEROLOGY | Facility: CLINIC | Age: 80
End: 2020-06-10

## 2020-06-10 DIAGNOSIS — Z86.0100 HISTORY OF COLONIC POLYPS: Primary | ICD-10-CM

## 2020-06-10 NOTE — PROGRESS NOTES
Care Coordination Telephone Call  [facility]     No return call.    Magda HOOKSN, HNBC, STAR-CARLOS  RN Care Coordinator  [facility]  Ph: [phone]  FAX: [phone]

## 2020-06-12 ENCOUNTER — TELEPHONE (OUTPATIENT)
Dept: PULMONOLOGY | Facility: CLINIC | Age: 80
End: 2020-06-12

## 2020-06-12 NOTE — TELEPHONE ENCOUNTER
Patient is scheduled for pulmonary follow up 06-. D/t COVID 19 this office visit must be converted to video/telephone visit. Chest CT may be r/s prior to 06-25. PFTs must be cancelled    Informed patient of this information. Patient requesting to cancel chest CT as she is not comfortable coming into clinic d/t COVID concerns. CT and PFT's cancelled.     Patient requesting to conduct a telephone visit. Pulmonary office visit has been converted to a telephone visit per pt request.      Rheumatology / Pulmonology  [facility]

## 2020-06-25 ENCOUNTER — VIRTUAL VISIT (OUTPATIENT)
Dept: PULMONOLOGY | Facility: CLINIC | Age: 80
End: 2020-06-25
Payer: COMMERCIAL

## 2020-06-25 DIAGNOSIS — R93.89 ABNORMAL CT OF THE CHEST: ICD-10-CM

## 2020-06-25 DIAGNOSIS — J41.0 SIMPLE CHRONIC BRONCHITIS (H): Primary | ICD-10-CM

## 2020-06-25 PROCEDURE — 99212 OFFICE O/P EST SF 10 MIN: CPT | Mod: TEL | Performed by: INTERNAL MEDICINE

## 2020-06-25 NOTE — PROGRESS NOTES
"Ludy Mckenzie is a 79 year old female who is being evaluated via a billable telephone visit.      The patient has been notified of following:     \"This telephone visit will be conducted via a call between you and your physician/provider. We have found that certain health care needs can be provided without the need for a physical exam.  This service lets us provide the care you need with a short phone conversation.  If a prescription is necessary we can send it directly to your pharmacy.  If lab work is needed we can place an order for that and you can then stop by our lab to have the test done at a later time.    Telephone visits are billed at different rates depending on your insurance coverage. During this emergency period, for some insurers they may be billed the same as an in-person visit.  Please reach out to your insurance provider with any questions.    If during the course of the call the physician/provider feels a telephone visit is not appropriate, you will not be charged for this service.\"    Patient has given verbal consent for Telephone visit?  Yes    What phone number would you like to be contacted at? Telephone visit - please call [phone]    How would you like to obtain your AVS? Mail a copy     Leeann Dye LPN    Rheumatology / Pulmonology  [facility]    HPI:    Ms. Ludy Mckenzie is a 79-year-old female with a past medical history of hypertension, hyperlipidemia, and COPD who presents to pulmonary clinic today for follow up of COPD and dyspnea.  To briefly review, she was diagnosed with COPD around the age of 50 at which time she was  experiencing recurrent episodes of bronchitis.  She has not really had issues with further episodes of bronchitis since she quit smoking in 2009.  When we last visited, she had been experiencing increased dyspnea over the preceding months.  PFTs were notable for moderate obstruction, likely stable compared with OSH testing and CT chest was " notable for LLL plugging and distal ground glass possibly consistent with acute vs chronic bronchitis or reflux/recurrent aspiration.  We switched her inhaler to the Advair HFA formation to see if this would improve inhaler technique and made a plan to obtain TTE and repeat PFTs and CT in 6 months.  TTE returned normal.    -breathing is going pretty good per her report -- no new or worsening shortness of breath  -new Advair seems to have helped significantly  -still taking spiriva and albuterol as needed -- uses albuterol rarely -- maybe once per week  -no cough, sputum production, or hemoptysis  -denies fevers or chills  -one episode of AECOPD since our last visit -- treated by PCP in January for cough and shortness of breath.  -only able to walk about 1/2 block before would need to stop and rest -- sensitive to heat and humidity     Assessment and Plan:   Ludy Mckenzie is a 79 year old female with a history of COPD who presents to pulmonary clinic today for further evaluation of COPD and worsening dyspnea.    1) COPD.  Moderate obstruction by PFTs.  Her breathing is stable to improved since switching to HFA formulation of advair.  She has had one AECOPD since our last visit, which is reasonable. She is on maximal inhaler therapy with LABA/LAMA/ICS and should continue on this. Continue albuterol as needed.  Historically has not had problems with recurrent exacerbation, but if she develops in the future, would consider roflumilast or chronic azithromycin for her.    2) Dyspnea. Stable to improved subjectively since her last visit, although self-reported walk distance sounds slightly decreased.  TTE obtained in 2/2020 and was without obvious cardiac etiology of dyspnea. Will still plan to obtain repeat PFTs post-COVID to ensure and better gauge stability of lung function.  Would likely benefit from pulmonary rehab as well.  Can re-discuss with her post-COVID.    3) Abnormal CT Chest.  LLL plugging with distal  ground glass and a new RLL nodular opacity seen on CT from December.  Findings could be consistent with acute or chronic bronchitis or acid reflux/recurrent aspiration, given lower lobe distribution.  Due for repeat CT chest now to evaluate for resolution vs persistence of findings.      The above findings and plan were discussed with Ms. Mckenzie who voiced understanding and agreement. Questions and concerns were answered to the patient's satisfaction.  she was provided with my contact information should new questions or concerns arise in the interim.    she should return to clinic in 6 month for follow up. Repeat CT chest and PFTs at her discretion/comfort, post-COVID.      She is up to date on Prevnar (2015), pneumovax (2008) and a seasonal flu vaccine.    Phone call duration: 6 minutes    Gregoria Connor MD

## 2020-07-15 DIAGNOSIS — F33.0 MAJOR DEPRESSIVE DISORDER, RECURRENT EPISODE, MILD (H): ICD-10-CM

## 2020-07-15 RX ORDER — SERTRALINE HYDROCHLORIDE 100 MG/1
TABLET, FILM COATED ORAL
Qty: 30 TABLET | Refills: 0 | Status: SHIPPED | OUTPATIENT
Start: 2020-07-15 | End: 2020-07-27

## 2020-07-15 ASSESSMENT — PATIENT HEALTH QUESTIONNAIRE - PHQ9: SUM OF ALL RESPONSES TO PHQ QUESTIONS 1-9: 2

## 2020-07-15 NOTE — TELEPHONE ENCOUNTER
Please call and complete PHQ-9. Patient also due for depression appointment.     Talya Fontaine RN

## 2020-07-15 NOTE — TELEPHONE ENCOUNTER
PHQ-9 completed.   PHQ-9 score:    PHQ 7/15/2020   PHQ-9 Total Score 2   Q9: Thoughts of better off dead/self-harm past 2 weeks Not at all     Patient declines to schedule follow today she will schedule in one month.Maria D Murray MA

## 2020-07-27 DIAGNOSIS — F33.0 MAJOR DEPRESSIVE DISORDER, RECURRENT EPISODE, MILD (H): ICD-10-CM

## 2020-07-27 NOTE — TELEPHONE ENCOUNTER
Requesting 90 day for sertraline (ZOLOFT) 100 MG tablet   Melecio Caceres CMA on 7/27/2020 at 3:23 PM

## 2020-07-28 RX ORDER — SERTRALINE HYDROCHLORIDE 100 MG/1
100 TABLET, FILM COATED ORAL DAILY
Qty: 90 TABLET | Refills: 0 | Status: SHIPPED | OUTPATIENT
Start: 2020-07-28 | End: 2020-12-22

## 2020-07-28 NOTE — TELEPHONE ENCOUNTER
Routing to provider to approve or decline because devora fill was already sent. Pt declined to schedule f/u and 30 tabs were sent 07/15/2020.

## 2020-09-02 DIAGNOSIS — H40.1131 PRIMARY OPEN ANGLE GLAUCOMA (POAG) OF BOTH EYES, MILD STAGE: ICD-10-CM

## 2020-09-02 RX ORDER — LATANOPROST 50 UG/ML
1 SOLUTION/ DROPS OPHTHALMIC AT BEDTIME
Qty: 3 BOTTLE | Refills: 4 | Status: SHIPPED | OUTPATIENT
Start: 2020-09-02 | End: 2021-03-05

## 2020-09-02 NOTE — TELEPHONE ENCOUNTER
Message added to refill requesting the patient to call and reschedule her appointment with Dr. Valenzuela.

## 2020-09-02 NOTE — TELEPHONE ENCOUNTER
Fax received from pharmacy requesting refill of latanoprost (XALATAN) 0.005 % ophthalmic solution

## 2020-09-21 DIAGNOSIS — R06.02 SOB (SHORTNESS OF BREATH): ICD-10-CM

## 2020-09-21 DIAGNOSIS — J44.9 CHRONIC OBSTRUCTIVE PULMONARY DISEASE, UNSPECIFIED COPD TYPE (H): ICD-10-CM

## 2020-09-24 RX ORDER — ALBUTEROL SULFATE 1.25 MG/3ML
SOLUTION RESPIRATORY (INHALATION)
Qty: 75 ML | Refills: 0 | Status: SHIPPED | OUTPATIENT
Start: 2020-09-24 | End: 2020-12-21

## 2020-11-12 ENCOUNTER — TELEPHONE (OUTPATIENT)
Dept: PULMONOLOGY | Facility: CLINIC | Age: 80
End: 2020-11-12

## 2020-11-12 DIAGNOSIS — J43.1 PANLOBULAR EMPHYSEMA (H): ICD-10-CM

## 2020-11-12 RX ORDER — TIOTROPIUM BROMIDE 18 UG/1
CAPSULE ORAL; RESPIRATORY (INHALATION)
Qty: 90 CAPSULE | Refills: 1 | Status: SHIPPED | OUTPATIENT
Start: 2020-11-12 | End: 2020-12-21

## 2020-11-12 NOTE — TELEPHONE ENCOUNTER
Patient is due for pulmonary follow up. Reminder letter has been mailed to the patient. If patient would like to follow up in  she will need to follow up with Dr. Gr. If patient prefers to follow up with Dr. Connor, [place] [facility] has been removed from the recall list.      Leeann Dye LPN    Rheumatology / Pulmonology  [facility]

## 2020-11-16 ENCOUNTER — HEALTH MAINTENANCE LETTER (OUTPATIENT)
Age: 80
End: 2020-11-16

## 2020-11-16 DIAGNOSIS — R80.9 MICROALBUMINURIA: ICD-10-CM

## 2020-11-16 NOTE — TELEPHONE ENCOUNTER
Routing refill request to provider for review/approval because:  Labs not current:  Potassium  Norma Sim BSN, RN

## 2020-11-23 RX ORDER — LOSARTAN POTASSIUM 25 MG/1
TABLET ORAL
Qty: 15 TABLET | Refills: 0 | Status: SHIPPED | OUTPATIENT
Start: 2020-11-23 | End: 2020-12-28

## 2020-12-21 ENCOUNTER — VIRTUAL VISIT (OUTPATIENT)
Dept: PULMONOLOGY | Facility: CLINIC | Age: 80
End: 2020-12-21
Payer: COMMERCIAL

## 2020-12-21 DIAGNOSIS — J43.2 CENTRILOBULAR EMPHYSEMA (H): ICD-10-CM

## 2020-12-21 DIAGNOSIS — R93.89 ABNORMAL CT OF THE CHEST: ICD-10-CM

## 2020-12-21 DIAGNOSIS — F33.0 MAJOR DEPRESSIVE DISORDER, RECURRENT EPISODE, MILD (H): ICD-10-CM

## 2020-12-21 DIAGNOSIS — J43.1 PANLOBULAR EMPHYSEMA (H): ICD-10-CM

## 2020-12-21 DIAGNOSIS — J44.9 CHRONIC OBSTRUCTIVE PULMONARY DISEASE, UNSPECIFIED COPD TYPE (H): ICD-10-CM

## 2020-12-21 DIAGNOSIS — R06.09 DYSPNEA ON EXERTION: ICD-10-CM

## 2020-12-21 DIAGNOSIS — R06.02 SOB (SHORTNESS OF BREATH): ICD-10-CM

## 2020-12-21 PROCEDURE — 99215 OFFICE O/P EST HI 40 MIN: CPT | Mod: 95 | Performed by: INTERNAL MEDICINE

## 2020-12-21 RX ORDER — TIOTROPIUM BROMIDE 18 UG/1
1 CAPSULE ORAL; RESPIRATORY (INHALATION) DAILY
Qty: 90 CAPSULE | Refills: 1 | Status: SHIPPED | OUTPATIENT
Start: 2020-12-21 | End: 2021-06-23

## 2020-12-21 RX ORDER — ALBUTEROL SULFATE 1.25 MG/3ML
SOLUTION RESPIRATORY (INHALATION)
Qty: 75 ML | Refills: 6 | Status: SHIPPED | OUTPATIENT
Start: 2020-12-21 | End: 2021-01-20 | Stop reason: DRUGHIGH

## 2020-12-21 RX ORDER — ALBUTEROL SULFATE 90 UG/1
2 AEROSOL, METERED RESPIRATORY (INHALATION) EVERY 4 HOURS PRN
Qty: 8.5 G | Refills: 3 | Status: SHIPPED | OUTPATIENT
Start: 2020-12-21 | End: 2021-06-23

## 2020-12-21 RX ORDER — FLUTICASONE PROPIONATE AND SALMETEROL XINAFOATE 230; 21 UG/1; UG/1
2 AEROSOL, METERED RESPIRATORY (INHALATION) 2 TIMES DAILY
Qty: 1 INHALER | Refills: 11 | Status: SHIPPED | OUTPATIENT
Start: 2020-12-21 | End: 2021-03-19 | Stop reason: DRUGHIGH

## 2020-12-21 NOTE — PROGRESS NOTES
"Ludy Mckenzie is a 80 year old female who is being evaluated via a billable telephone visit.      The patient has been notified of following:     \"This telephone visit will be conducted via a call between you and your physician/provider. We have found that certain health care needs can be provided without the need for a physical exam.  This service lets us provide the care you need with a short phone conversation.  If a prescription is necessary we can send it directly to your pharmacy.  If lab work is needed we can place an order for that and you can then stop by our lab to have the test done at a later time.    Telephone visits are billed at different rates depending on your insurance coverage. During this emergency period, for some insurers they may be billed the same as an in-person visit.  Please reach out to your insurance provider with any questions.    If during the course of the call the physician/provider feels a telephone visit is not appropriate, you will not be charged for this service.\"    Patient has given verbal consent for Telephone visit?  Yes    What phone number would you like to be contacted at? Mobile    How would you like to obtain your AVS? MyChart    Phone call duration: 20 minutes    Kenny Gr MD    HPI:    Ms. Ludy Mckenzie is a 79-year-old female with a past medical history of hypertension, hyperlipidemia, and COPD who presents to pulmonary clinic today for follow up of COPD and dyspnea. She was last seen by my partner in 06/2020. During that time, her COPD symptoms were better controlled since switching her Advair to HFA type due to concern for inhaler technique.  Historically, she was diagnosed with COPD around the age of 50 at which time she was  experiencing recurrent episodes of bronchitis.  She has not really had issues with further episodes of bronchitis since she quit smoking in 2009. Additional evaluation for her symptoms of dyspnea included a TTE which was unremarkable. She " "had abnormal CT findings of LLL mucus plugging and abnormal rounded opacity which were to be followed with repeat CT scans.  Today,   -breathing is not the greatest, she believes that there has been a slight interval worsening of her SOB. For example, going to the grocery stores gets her very winded which wasn't the case a few months ago and she recently brought out ramiro \"things\" which was surprisingly difficult>  - Has a very remarkable improvement with albuterol nebs and not albuterol inhaler. Now uses the nebs instead of the rescue inhaler. Uses nebs about once per week. Noticed improved mucus clearance with use of nebs  - Not sure if her inhalers in general are helpful.  - Regimen consists of Spiriva at night, Advair HFA 2 puffs BID and albuterol nebs as needed.  - Denies any cough, sputum production, or hemoptysis  -denies fevers or chills  -No AECOPDs since our last visit.    Past Medical History:   Diagnosis Date     Acute ischemic left TREMAYNE stroke (H) 2009     Astigmatism, unspecified      Bullous pemphigoid      COPD (chronic obstructive pulmonary disease) (H)      CVA (cerebral infarction) 8/09     Depressive disorder      Diabetes (H)      Family history of diabetes mellitus      Glaucoma (increased eye pressure)      Goiter 3/12/2007     HTN (hypertension) 8/25/2009     Hypocalcemia      Hypokalemia      Hyponatremia      Mixed hyperlipidemia      Personal history of other diseases of circulatory system      Pneumonia 11/16/2010     Primary open-angle glaucoma(365.11)      Thyroid nodule 3/23/2012     Unspecified cerebral artery occlusion with cerebral infarction      Unspecified cerebral artery occlusion with cerebral infarction 1998     Past Surgical History:   Procedure Laterality Date     BIOPSY       BUNIONECTOMY  1960    JACQUELINE     CATARACT IOL, RT/LT  2010    right     COLONOSCOPY  2/22/2013    Procedure: COLONOSCOPY;  Colonoscopy ;  Surgeon: Hugo Minor MD;  Location:  GI     " COLONOSCOPY N/A 11/11/2019    Procedure: Colonoscopy, With Polypectomy And Biopsy;  Surgeon: Selena Escobedo MD;  Location: [facility] OR     COLONOSCOPY WITH CO2 INSUFFLATION N/A 11/11/2019    Procedure: COLONOSCOPY, WITH CO2 INSUFFLATION;  Surgeon: Selena Escobedo MD;  Location: [facility] OR     Current Outpatient Medications   Medication Instructions     albuterol (ACCUNEB) 1.25 MG/3ML neb solution PLEASE SEE ATTACHED FOR DETAILED DIRECTIONS     albuterol (PROAIR HFA/PROVENTIL HFA/VENTOLIN HFA) 108 (90 Base) MCG/ACT inhaler 2 puffs, Inhalation, EVERY 4 HOURS PRN     aspirin 81 MG tablet 1 tablet, DAILY     calcium 600 MG tablet 1 tablet, Oral, DAILY     cholecalciferol (VITAMIN D3) 1000 UNIT capsule 1 capsule, DAILY     fluticasone-salmeterol (ADVAIR-HFA) 230-21 MCG/ACT inhaler 2 puffs, Inhalation, 2 TIMES DAILY     latanoprost (XALATAN) 0.005 % ophthalmic solution 1 drop, Both Eyes, AT BEDTIME     losartan (COZAAR) 25 MG tablet TAKE 1/2 TABLET BY MOUTH DAILY     potassium chloride ER (KLOR-CON) 20 MEQ CR tablet 20 mEq, Oral, DAILY     Revefenacin 175 mcg/day, Inhalation, DAILY     rosuvastatin (CRESTOR) 5 MG tablet TAKE 1 TABLET BY MOUTH EVERY DAY     sertraline (ZOLOFT) 100 mg, Oral, DAILY     Spiriva HandiHaler 18 mcg, Inhalation, DAILY, Patient can use for now until she gets her Revefenacin Nebs. She knows to switch and not use both Spiriva and Revefenacin Nebs at the same time.     vitamin  B complex with vitamin C (VITAMIN  B COMPLEX) TABS 1 tablet, DAILY       Assessment and Plan:   Ludy Mckenzie is a 80 year old female with a history of COPD who presents to pulmonary clinic today for further evaluation of COPD and worsening dyspnea.    1) COPD (Group D)  Moderate obstruction by PFTs. Symptoms are somewhat worsened over the year although there isn't a sharp decline per se.  She appears to demonstrate significant improvement to Albuterol Nebs and not her albuterol inhaler. I am unable to check her inhaler  technique but I am concerned. Therefore, she could benefit from using nebulized LAMA such as revefenacin. She is willing to give it a try and she knows that she cannot use concomitantly with Spiriva. I also explained that it may be more expensive and if she doesn't derive any benefit, she should revert to Spiriva. I will see if she derives any benefit from revefenacin nebs before considering adding chronic azithromycin. Review of her 2018 EKG showed no QT prolongation. Prescriptions were provided for revefenacin, Spiriva, Advair and albuterol. We will also set her up for pulmonary rehab after the pandemic. She doesn't appear to be an ideal canidate for roflumilast as she doesn't appear to have any productive cough.    2) Abnormal CT Chest.    LLL plugging with distal ground glass and a new RLL nodular opacity seen on CT from December.  Findings could be consistent with mucus plugging, acute or chronic bronchitis or acid reflux/recurrent aspiration, given lower lobe distribution. Doesn't appear to have had any CT chest since that time but is scheduled for the future, will follow. We discussed chest physio including postural drainage. Hopefully, the use of albuterol nebs and revefenacin will help with mucus clearance.    The above findings and plan were discussed with MsLeslie Magaly who voiced understanding and agreement. Questions and concerns were answered to the patient's satisfaction.  she was provided with my contact information should new questions or concerns arise in the interim.    She should return to clinic in 6 month for follow up with CT chest (already scheduled)    She is up to date on Prevnar (2015), pneumovax (2008) and a seasonal flu vaccine.    Kenny Gr MD  Pulmonary, Critical Care and Sleep Medicine  [facility]  Pager: [phone]

## 2020-12-21 NOTE — PROGRESS NOTES
"Ludy Mckenzie is a 80 year old female who is being evaluated via a billable telephone visit.      The patient has been notified of following:     \"This telephone visit will be conducted via a call between you and your physician/provider. We have found that certain health care needs can be provided without the need for a physical exam.  This service lets us provide the care you need with a short phone conversation.  If a prescription is necessary we can send it directly to your pharmacy.  If lab work is needed we can place an order for that and you can then stop by our lab to have the test done at a later time.    Telephone visits are billed at different rates depending on your insurance coverage. During this emergency period, for some insurers they may be billed the same as an in-person visit.  Please reach out to your insurance provider with any questions.    If during the course of the call the physician/provider feels a telephone visit is not appropriate, you will not be charged for this service.\"    Patient has given verbal consent for Telephone visit? Yes  What phone number would you like to be contacted at? [phone] (H)    How would you like to obtain your AVS? Mail    "

## 2020-12-22 RX ORDER — SERTRALINE HYDROCHLORIDE 100 MG/1
100 TABLET, FILM COATED ORAL DAILY
Qty: 90 TABLET | Refills: 1 | Status: SHIPPED | OUTPATIENT
Start: 2020-12-22 | End: 2021-01-04

## 2020-12-28 ENCOUNTER — TELEPHONE (OUTPATIENT)
Facility: CLINIC | Age: 80
End: 2020-12-28

## 2020-12-28 ENCOUNTER — TELEPHONE (OUTPATIENT)
Dept: PULMONOLOGY | Facility: CLINIC | Age: 80
End: 2020-12-28

## 2020-12-28 DIAGNOSIS — J44.9 CHRONIC OBSTRUCTIVE PULMONARY DISEASE, UNSPECIFIED COPD TYPE (H): ICD-10-CM

## 2020-12-28 NOTE — TELEPHONE ENCOUNTER
Central Prior Authorization Team   Phone: [phone]      PA Initiation    Medication: Revefenacin (Yupelri) 175 MCG/3ML [employer]  Insurance Company: [employer] - Phone [phone] Fax [phone]  Pharmacy Filling the Rx: [id number] - [address]  Filling Pharmacy Phone: [phone]  Filling Pharmacy Fax:    Start Date: 12/28/2020

## 2020-12-28 NOTE — TELEPHONE ENCOUNTER
Prior Authorization Retail Medication Request    Medication/Dose: Revefenacin 175 MCG/3ML SOLN  ICD code (if different than what is on RX):  N/A  Previously Tried and Failed:  Spiriva. Patient has better results with nebulizer instead of inhalers  Rationale: She appears to demonstrate significant improvement to Albuterol Nebs and not her albuterol inhaler. I am unable to check her inhaler technique but I am concerned. Therefore, she could benefit from using nebulized LAMA such as revefenacin.     Insurance Name:  [employer]  Insurance ID:       Pharmacy Information (if different than what is on RX)  Name: [facility]  Phone:  [phone]

## 2020-12-28 NOTE — TELEPHONE ENCOUNTER
[facility] Call Center    Phone Message    May a detailed message be left on voicemail: yes     Reason for Call: Other: pt sister is calling stating pt is wanting to know if there is a stronger does of albuterol. please advise     Action Taken: Message routed to:  Adult Clinics: Pulmonology p [id number]

## 2020-12-28 NOTE — TELEPHONE ENCOUNTER
I spoke to patient's sister regarding her medications. She states that her sister is confused about what medications she should be taking.     I advised Cecelia that the patient should be using her Revefenacin daily, her Advair BID and her albuterol nebs as needed. Cecelia states that the patient said that the Revefenacin was not at her pharmacy. I spoke to [facility] and a PA is required.     Previous prescription for Revefenacin was for a 4 day supply, new prescription sent to pharmacy. Will watch for a request for a PA and get that processed.     Updated Cecelia that Ludy should continue her Spiriva until the PA for her Revefenacin is approved.     Cecelia is agreeable to the plan. She will call with any other questions or concerns.     Kaylyn Tyson RN   [facility] Care Coordinator  [facility]

## 2020-12-30 NOTE — PROGRESS NOTES
Subjective   Ludy Mckenzie is a 80 year old female who presents to clinic today for the following health issues:  HPI          Patient wants to check electrolytes, also wants to discuss getting shingles shot. Patient had a colonoscopy and finally received the results and wondering if she should get another one or if a biopsy is needed.  A tubular adenoma was found.    For her breathing she is going to try Revefenacin    She is having burning and incontinence.  She has some problems with initiation and ending of urination.  The burning for the last couple of months.  No fever/chills.      She continues to have worsening of her neuropathy felt to be due to her toxic damage from alcohol in the past.  She is feeling better emotionally.     Diabetes Follow-up  She doesn't watch what she is eating.     How often are you checking your blood sugar? Not at all    What concerns do you have today about your diabetes? None     Do you have any of these symptoms? (Select all that apply)  No numbness or tingling in feet.  No redness, sores or blisters on feet.  No complaints of excessive thirst.  No reports of blurry vision.  No significant changes to weight.    Have you had a diabetic eye exam in the last 12 months? No  BP Readings from Last 2 Encounters:   01/04/21 127/74   01/06/20 106/52     Hemoglobin A1C (%)   Date Value   01/04/2021 7.5 (H)   09/13/2019 6.6 (H)     LDL Cholesterol Calculated (mg/dL)   Date Value   09/13/2019 79   07/27/2018 69             Hyperlipidemia Follow-Up    Are you regularly taking any medication or supplement to lower your cholesterol?   Yes- Rosuvastatin    Are you having muscle aches or other side effects that you think could be caused by your cholesterol lowering medication?  No    Depression Followup    How are you doing with your depression since your last visit? Improved     Are you having other symptoms that might be associated with depression? No    Have you had a significant life event?   OTHER: Covid      Are you feeling anxious or having panic attacks?   No    Do you have any concerns with your use of alcohol or other drugs? No  Social History     Tobacco Use     Smoking status: Former Smoker     Packs/day: 1.50     Years: 50.00     Pack years: 75.00     Types: Cigarettes     Quit date: 2009     Years since quittin.4     Smokeless tobacco: Never Used   Substance Use Topics     Alcohol use: No     Alcohol/week: 4.0 standard drinks     Types: 4 Standard drinks or equivalent per week     Drug use: No     PHQ 2019 7/15/2020 2021   PHQ-9 Total Score 3 2 0   Q9: Thoughts of better off dead/self-harm past 2 weeks Not at all Not at all Not at all     EMILY-7 SCORE 2016   Total Score - - -   Total Score 0 0 5     Last PHQ-9 2021   1.  Little interest or pleasure in doing things 0   2.  Feeling down, depressed, or hopeless 0   3.  Trouble falling or staying asleep, or sleeping too much 0   4.  Feeling tired or having little energy 0   5.  Poor appetite or overeating 0   6.  Feeling bad about yourself 0   7.  Trouble concentrating 0   8.  Moving slowly or restless 0   Q9: Thoughts of better off dead/self-harm past 2 weeks 0   PHQ-9 Total Score 0   Difficulty at work, home, or with people Not difficult at all         Suicide Assessment Five-step Evaluation and Treatment (SAFE-T)    Chronic Kidney Disease Follow-up    Do you take any over the counter pain medicine?: No      How many servings of fruits and vegetables do you eat daily?  0-1    On average, how many sweetened beverages do you drink each day (Examples: soda, juice, sweet tea, etc.  Do NOT count diet or artificially sweetened beverages)?   0    How many days per week do you exercise enough to make your heart beat faster? 3 or less    How many minutes a day do you exercise enough to make your heart beat faster? 9 or less    How many days per week do you miss taking your medication? 0        Review of  "Systems    ROS: 10 point ROS neg other than the symptoms noted above in the HPI.       Objective    /74 (BP Location: Right arm, Cuff Size: Adult Regular)   Pulse 87   Temp 97.6  F (36.4  C) (Oral)   Resp 17   Ht 1.67 m (5' 5.75\")   Wt 61.2 kg (135 lb)   SpO2 96%   BMI 21.96 kg/m    Body mass index is 21.96 kg/m .  Physical Exam   GENERAL APPEARANCE: healthy, alert and no distress  NECK: no adenopathy, no asymmetry, masses, or scars and thyroid normal to palpation  RESP: lungs clear to auscultation - no rales, rhonchi or wheezes  CV: regular rates and rhythm and normal S1 S2, no S3 or S4  ABDOMEN:  soft, nontender, no HSM or masses and bowel sounds normal  SKIN: no suspicious lesions or rashes  PSYCH: mentation appears normal. and affect normal/bright  No edema  Sensory exam of the foot is normal, tested with the monofilament. Good pulses, no lesions or ulcers, good peripheral pulses.     Office Visit on 01/06/2020   Component Date Value Ref Range Status     Influenza A/B Agn Specimen 01/06/2020 Nasal   Final     Influenza A 01/06/2020 Negative  NEG^Negative Final     Influenza B 01/06/2020 Negative  NEG^Negative Final    Comment: Test results must be correlated with clinical data. If necessary, results   should be confirmed by a molecular assay or viral culture.         Results for orders placed or performed in visit on 01/04/21   HEMOGLOBIN A1C     Status: Abnormal   Result Value Ref Range    Hemoglobin A1C 7.5 (H) 0 - 5.6 %   *UA reflex to Microscopic and Culture ([place] and [facility] (except [place] and [place])     Status: Abnormal    Specimen: Midstream Urine   Result Value Ref Range    Color Urine Yellow     Appearance Urine Clear     Glucose Urine Negative NEG^Negative mg/dL    Bilirubin Urine Negative NEG^Negative    Ketones Urine Negative NEG^Negative mg/dL    Specific Gravity Urine 1.025 1.003 - 1.035    Blood Urine Small (A) NEG^Negative    pH Urine 6.0 5.0 - 7.0 pH    Protein " Albumin Urine Negative NEG^Negative mg/dL    Urobilinogen Urine 0.2 0.2 - 1.0 EU/dL    Nitrite Urine Positive (A) NEG^Negative    Leukocyte Esterase Urine Small (A) NEG^Negative    Source Midstream Urine    Urine Microscopic     Status: Abnormal   Result Value Ref Range    WBC Urine  (A) OTO5^0 - 5 /HPF    RBC Urine 5-10 (A) OTO2^O - 2 /HPF    Squamous Epithelial /LPF Urine Moderate (A) FEW^Few /LPF    Bacteria Urine Many (A) NEG^Negative /HPF           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  She feels it is related to diet and will try diet changes first.  Will follow up in 3 months for reassessment.     Patient Instructions   Taper off Zoloft (Sertraline) per bottle directions.  Start Myrbetriq for your bladder control.   Start checking your blood sugar.     - HEMOGLOBIN A1C  - BASIC METABOLIC PANEL  - Lipid panel reflex to direct LDL Fasting  - Albumin Random Urine Quantitative with Creat Ratio  - FOOT EXAM  - blood glucose monitoring (NO BRAND SPECIFIED) meter device kit; Use to test blood sugar one times daily or as directed. Preferred blood glucose meter OR supplies to accompany: Blood Glucose Monitor Brands: per insurance.  - blood glucose (NO BRAND SPECIFIED) test strip; Use to test blood sugar 1 times daily or as directed. To accompany: Blood Glucose Monitor Brands: per insurance.  - blood glucose calibration (NO BRAND SPECIFIED) solution; To accompany: Blood Glucose Monitor Brands: per insurance.  - thin (NO BRAND SPECIFIED) lancets; Use with lanceting device. To accompany: Blood Glucose Monitor Brands: per insurance.  - alcohol swab prep pads; Use to swab area of injection/surjit as directed.    Panlobular emphysema (H)  Per pulmonary.  Notes reviewed.    - Urine Microscopic    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)  Sober     Major depressive disorder, recurrent episode, mild (H)  Doing well. Taper off.   - sertraline (ZOLOFT) 50 MG  tablet; Take 1 tablet (50 mg) by mouth daily for 14 days, THEN 0.5 tablets (25 mg) daily for 14 days.    Diabetic neuropathy with neurologic complication (H)  Poor control.  Discussed with patient fall prevention measures     Cerebral infarction, unspecified mechanism (H)    - BASIC METABOLIC PANEL  - Lipid panel reflex to direct LDL Fasting  - Albumin Random Urine Quantitative with Creat Ratio  - FOOT EXAM  - blood glucose monitoring (NO BRAND SPECIFIED) meter device kit; Use to test blood sugar one times daily or as directed. Preferred blood glucose meter OR supplies to accompany: Blood Glucose Monitor Brands: per insurance.  - blood glucose (NO BRAND SPECIFIED) test strip; Use to test blood sugar 1 times daily or as directed. To accompany: Blood Glucose Monitor Brands: per insurance.  - blood glucose calibration (NO BRAND SPECIFIED) solution; To accompany: Blood Glucose Monitor Brands: per insurance.  - thin (NO BRAND SPECIFIED) lancets; Use with lanceting device. To accompany: Blood Glucose Monitor Brands: per insurance.  - alcohol swab prep pads; Use to swab area of injection/surjit as directed.    Mixed hyperlipidemia    - BASIC METABOLIC PANEL  - Lipid panel reflex to direct LDL Fasting  - Albumin Random Urine Quantitative with Creat Ratio  - FOOT EXAM  - rosuvastatin (CRESTOR) 5 MG tablet; Take 1 tablet (5 mg) by mouth daily  - blood glucose monitoring (NO BRAND SPECIFIED) meter device kit; Use to test blood sugar one times daily or as directed. Preferred blood glucose meter OR supplies to accompany: Blood Glucose Monitor Brands: per insurance.  - blood glucose (NO BRAND SPECIFIED) test strip; Use to test blood sugar 1 times daily or as directed. To accompany: Blood Glucose Monitor Brands: per insurance.  - blood glucose calibration (NO BRAND SPECIFIED) solution; To accompany: Blood Glucose Monitor Brands: per insurance.  - thin (NO BRAND SPECIFIED) lancets; Use with lanceting device. To accompany: Blood  Glucose Monitor Brands: per insurance.  - alcohol swab prep pads; Use to swab area of injection/surjit as directed.    Chronic kidney disease (CKD) stage G1/A2, glomerular filtration rate (GFR) equal to or greater than 90 mL/min/1.73 square meter and albuminuria creatinine ratio between  mg/g  Bmp       Hypopotassemia    - BASIC METABOLIC PANEL  - Lipid panel reflex to direct LDL Fasting  - Albumin Random Urine Quantitative with Creat Ratio  - FOOT EXAM  - potassium chloride ER (KLOR-CON) 20 MEQ CR tablet; Take 1 tablet (20 mEq) by mouth daily  - blood glucose monitoring (NO BRAND SPECIFIED) meter device kit; Use to test blood sugar one times daily or as directed. Preferred blood glucose meter OR supplies to accompany: Blood Glucose Monitor Brands: per insurance.  - blood glucose (NO BRAND SPECIFIED) test strip; Use to test blood sugar 1 times daily or as directed. To accompany: Blood Glucose Monitor Brands: per insurance.  - blood glucose calibration (NO BRAND SPECIFIED) solution; To accompany: Blood Glucose Monitor Brands: per insurance.  - thin (NO BRAND SPECIFIED) lancets; Use with lanceting device. To accompany: Blood Glucose Monitor Brands: per insurance.  - alcohol swab prep pads; Use to swab area of injection/surjit as directed.    Microalbuminuria    - BASIC METABOLIC PANEL  - Lipid panel reflex to direct LDL Fasting  - Albumin Random Urine Quantitative with Creat Ratio  - FOOT EXAM  - losartan (COZAAR) 25 MG tablet; TAKE 1/2 TABLET BY MOUTH EVERY DAY  - blood glucose monitoring (NO BRAND SPECIFIED) meter device kit; Use to test blood sugar one times daily or as directed. Preferred blood glucose meter OR supplies to accompany: Blood Glucose Monitor Brands: per insurance.  - blood glucose (NO BRAND SPECIFIED) test strip; Use to test blood sugar 1 times daily or as directed. To accompany: Blood Glucose Monitor Brands: per insurance.  - blood glucose calibration (NO BRAND SPECIFIED) solution; To accompany:  Blood Glucose Monitor Brands: per insurance.  - thin (NO BRAND SPECIFIED) lancets; Use with lanceting device. To accompany: Blood Glucose Monitor Brands: per insurance.  - alcohol swab prep pads; Use to swab area of injection/surjit as directed.    Major depressive disorder, recurrent episode, mild    - sertraline (ZOLOFT) 50 MG tablet; Take 1 tablet (50 mg) by mouth daily for 14 days, THEN 0.5 tablets (25 mg) daily for 14 days.    Dysuria  UTI present - keflex prescribed   - *UA reflex to Microscopic and Culture ([place] and [facility] (except [place] and [place])    Overactive bladder  If no improvement after keflex would start myrbetriq   - mirabegron (MYRBETRIQ) 25 MG 24 hr tablet; Take 1 tablet (25 mg) by mouth daily    Nonspecific finding on examination of urine    - Urine Culture Aerobic Bacterial    Urinary tract infection without hematuria, site unspecified    - cephALEXin (KEFLEX) 500 MG capsule; Take 1 capsule (500 mg) by mouth 2 times daily for 7 days        Patient Instructions   Taper off Zoloft (Sertraline) per bottle directions.  Start Myrbetriq for your bladder control.   Start checking your blood sugar.       Return in about 5 weeks (around 2/8/2021) for Medication check.    Ce Cano MD  [facility]

## 2020-12-30 NOTE — TELEPHONE ENCOUNTER
Approval letter from Medicare part A/B for Revefenacin 175 MCG/3ML SOLN has been scanned into the patient's chart.        Leeann Dye LPN    Pulmonary Division:  [facility]  Phone: [phone] Fax: [phone]

## 2020-12-30 NOTE — TELEPHONE ENCOUNTER
We received a fax that the medication was approved under her Part B. I spoke with the pharmacy and they ran it through with no problems. They need to order the medication and expect it should be ready tomorrow or Friday. They will notify patient when it is ready.     I updated the patient with this information.     Kaylyn Tyson RN   Medical Specialty Clinic Care Coordinator  [facility]   Phone: [phone]  Fax: [phone]

## 2021-01-04 ENCOUNTER — PREP FOR PROCEDURE (OUTPATIENT)
Dept: GASTROENTEROLOGY | Facility: CLINIC | Age: 81
End: 2021-01-04

## 2021-01-04 ENCOUNTER — OFFICE VISIT (OUTPATIENT)
Dept: INTERNAL MEDICINE | Facility: CLINIC | Age: 81
End: 2021-01-04
Payer: COMMERCIAL

## 2021-01-04 VITALS
BODY MASS INDEX: 21.69 KG/M2 | DIASTOLIC BLOOD PRESSURE: 74 MMHG | OXYGEN SATURATION: 96 % | HEIGHT: 66 IN | TEMPERATURE: 97.6 F | WEIGHT: 135 LBS | RESPIRATION RATE: 17 BRPM | SYSTOLIC BLOOD PRESSURE: 127 MMHG | HEART RATE: 87 BPM

## 2021-01-04 DIAGNOSIS — E11.49 DIABETIC NEUROPATHY WITH NEUROLOGIC COMPLICATION (H): ICD-10-CM

## 2021-01-04 DIAGNOSIS — R82.90 NONSPECIFIC FINDING ON EXAMINATION OF URINE: ICD-10-CM

## 2021-01-04 DIAGNOSIS — F10.21 ALCOHOLISM IN REMISSION (H): ICD-10-CM

## 2021-01-04 DIAGNOSIS — F33.0 MAJOR DEPRESSIVE DISORDER, RECURRENT EPISODE, MILD (H): ICD-10-CM

## 2021-01-04 DIAGNOSIS — I63.9 CEREBRAL INFARCTION, UNSPECIFIED MECHANISM (H): ICD-10-CM

## 2021-01-04 DIAGNOSIS — J43.1 PANLOBULAR EMPHYSEMA (H): ICD-10-CM

## 2021-01-04 DIAGNOSIS — R30.0 DYSURIA: ICD-10-CM

## 2021-01-04 DIAGNOSIS — E11.49 TYPE 2 DIABETES MELLITUS WITH OTHER DIABETIC NEUROLOGICAL COMPLICATION (H): Primary | ICD-10-CM

## 2021-01-04 DIAGNOSIS — N32.81 OVERACTIVE BLADDER: ICD-10-CM

## 2021-01-04 DIAGNOSIS — N39.0 URINARY TRACT INFECTION WITHOUT HEMATURIA, SITE UNSPECIFIED: ICD-10-CM

## 2021-01-04 DIAGNOSIS — E87.6 HYPOPOTASSEMIA: ICD-10-CM

## 2021-01-04 DIAGNOSIS — N18.1 CHRONIC KIDNEY DISEASE (CKD) STAGE G1/A2, GLOMERULAR FILTRATION RATE (GFR) EQUAL TO OR GREATER THAN 90 ML/MIN/1.73 SQUARE METER AND ALBUMINURIA CREATININE RATIO BETWEEN 30-299 MG/G: ICD-10-CM

## 2021-01-04 DIAGNOSIS — E11.40 DIABETIC NEUROPATHY WITH NEUROLOGIC COMPLICATION (H): ICD-10-CM

## 2021-01-04 DIAGNOSIS — Z86.0100 HISTORY OF COLONIC POLYPS: Primary | ICD-10-CM

## 2021-01-04 DIAGNOSIS — E78.2 MIXED HYPERLIPIDEMIA: ICD-10-CM

## 2021-01-04 DIAGNOSIS — R80.9 MICROALBUMINURIA: ICD-10-CM

## 2021-01-04 LAB
ALBUMIN UR-MCNC: NEGATIVE MG/DL
ANION GAP SERPL CALCULATED.3IONS-SCNC: 9 MMOL/L (ref 3–14)
APPEARANCE UR: CLEAR
BACTERIA #/AREA URNS HPF: ABNORMAL /HPF
BILIRUB UR QL STRIP: NEGATIVE
BUN SERPL-MCNC: 10 MG/DL (ref 7–30)
CALCIUM SERPL-MCNC: 8.8 MG/DL (ref 8.5–10.1)
CHLORIDE SERPL-SCNC: 105 MMOL/L (ref 94–109)
CHOLEST SERPL-MCNC: 137 MG/DL
CO2 SERPL-SCNC: 23 MMOL/L (ref 20–32)
COLOR UR AUTO: YELLOW
CREAT SERPL-MCNC: 0.64 MG/DL (ref 0.52–1.04)
CREAT UR-MCNC: 148 MG/DL
GFR SERPL CREATININE-BSD FRML MDRD: 84 ML/MIN/{1.73_M2}
GLUCOSE SERPL-MCNC: 183 MG/DL (ref 70–99)
GLUCOSE UR STRIP-MCNC: NEGATIVE MG/DL
HBA1C MFR BLD: 7.5 % (ref 0–5.6)
HDLC SERPL-MCNC: 36 MG/DL
HGB UR QL STRIP: ABNORMAL
KETONES UR STRIP-MCNC: NEGATIVE MG/DL
LDLC SERPL CALC-MCNC: 72 MG/DL
LEUKOCYTE ESTERASE UR QL STRIP: ABNORMAL
MICROALBUMIN UR-MCNC: 48 MG/L
MICROALBUMIN/CREAT UR: 32.16 MG/G CR (ref 0–25)
NITRATE UR QL: POSITIVE
NON-SQ EPI CELLS #/AREA URNS LPF: ABNORMAL /LPF
NONHDLC SERPL-MCNC: 101 MG/DL
PH UR STRIP: 6 PH (ref 5–7)
POTASSIUM SERPL-SCNC: 4 MMOL/L (ref 3.4–5.3)
RBC #/AREA URNS AUTO: ABNORMAL /HPF
SODIUM SERPL-SCNC: 137 MMOL/L (ref 133–144)
SOURCE: ABNORMAL
SP GR UR STRIP: 1.02 (ref 1–1.03)
TRIGL SERPL-MCNC: 146 MG/DL
UROBILINOGEN UR STRIP-ACNC: 0.2 EU/DL (ref 0.2–1)
WBC #/AREA URNS AUTO: ABNORMAL /HPF

## 2021-01-04 PROCEDURE — 87088 URINE BACTERIA CULTURE: CPT | Performed by: INTERNAL MEDICINE

## 2021-01-04 PROCEDURE — 80061 LIPID PANEL: CPT | Performed by: INTERNAL MEDICINE

## 2021-01-04 PROCEDURE — 81001 URINALYSIS AUTO W/SCOPE: CPT | Performed by: INTERNAL MEDICINE

## 2021-01-04 PROCEDURE — 82043 UR ALBUMIN QUANTITATIVE: CPT | Performed by: INTERNAL MEDICINE

## 2021-01-04 PROCEDURE — 80048 BASIC METABOLIC PNL TOTAL CA: CPT | Performed by: INTERNAL MEDICINE

## 2021-01-04 PROCEDURE — 99207 PR FOOT EXAM NO CHARGE: CPT | Mod: 25 | Performed by: INTERNAL MEDICINE

## 2021-01-04 PROCEDURE — 87086 URINE CULTURE/COLONY COUNT: CPT | Performed by: INTERNAL MEDICINE

## 2021-01-04 PROCEDURE — 87186 SC STD MICRODIL/AGAR DIL: CPT | Performed by: INTERNAL MEDICINE

## 2021-01-04 PROCEDURE — 36415 COLL VENOUS BLD VENIPUNCTURE: CPT | Performed by: INTERNAL MEDICINE

## 2021-01-04 PROCEDURE — 83036 HEMOGLOBIN GLYCOSYLATED A1C: CPT | Performed by: INTERNAL MEDICINE

## 2021-01-04 PROCEDURE — 99214 OFFICE O/P EST MOD 30 MIN: CPT | Performed by: INTERNAL MEDICINE

## 2021-01-04 RX ORDER — ROSUVASTATIN CALCIUM 5 MG/1
5 TABLET, COATED ORAL DAILY
Qty: 90 TABLET | Refills: 1 | Status: SHIPPED | OUTPATIENT
Start: 2021-01-04 | End: 2022-01-04

## 2021-01-04 RX ORDER — LOSARTAN POTASSIUM 25 MG/1
TABLET ORAL
Qty: 45 TABLET | Refills: 4 | Status: SHIPPED | OUTPATIENT
Start: 2021-01-04 | End: 2022-01-04

## 2021-01-04 RX ORDER — POTASSIUM CHLORIDE 1500 MG/1
20 TABLET, EXTENDED RELEASE ORAL DAILY
Qty: 90 TABLET | Refills: 4 | Status: SHIPPED | OUTPATIENT
Start: 2021-01-04 | End: 2022-01-04

## 2021-01-04 RX ORDER — MIRABEGRON 25 MG/1
25 TABLET, FILM COATED, EXTENDED RELEASE ORAL DAILY
Qty: 30 TABLET | Refills: 3 | Status: SHIPPED | OUTPATIENT
Start: 2021-01-04 | End: 2022-01-04

## 2021-01-04 RX ORDER — GLUCOSAMINE HCL/CHONDROITIN SU 500-400 MG
CAPSULE ORAL
Qty: 100 EACH | Refills: 3 | Status: SHIPPED | OUTPATIENT
Start: 2021-01-04 | End: 2021-11-30

## 2021-01-04 RX ORDER — LANCETS
EACH MISCELLANEOUS
Qty: 100 EACH | Refills: 6 | Status: SHIPPED | OUTPATIENT
Start: 2021-01-04

## 2021-01-04 RX ORDER — CEPHALEXIN 500 MG/1
500 CAPSULE ORAL 2 TIMES DAILY
Qty: 14 CAPSULE | Refills: 0 | Status: SHIPPED | OUTPATIENT
Start: 2021-01-04 | End: 2021-01-11

## 2021-01-04 ASSESSMENT — PATIENT HEALTH QUESTIONNAIRE - PHQ9: SUM OF ALL RESPONSES TO PHQ QUESTIONS 1-9: 0

## 2021-01-04 ASSESSMENT — MIFFLIN-ST. JEOR: SCORE: 1095.11

## 2021-01-04 NOTE — RESULT ENCOUNTER NOTE
Please notify patient that it appears she has a bladder infection.  Please start Keflex 1 tab twice daily for 1 week.  Script sent.  She should not start Myrbetriq unless urinary symptoms do not resolve with the antibiotic.

## 2021-01-04 NOTE — PATIENT INSTRUCTIONS
Taper off Zoloft (Sertraline) per bottle directions.  Start Myrbetriq for your bladder control.   Start checking your blood sugar.

## 2021-01-05 ENCOUNTER — PATIENT OUTREACH (OUTPATIENT)
Dept: GASTROENTEROLOGY | Facility: CLINIC | Age: 81
End: 2021-01-05

## 2021-01-05 DIAGNOSIS — Z86.0100 HISTORY OF COLONIC POLYPS: Primary | ICD-10-CM

## 2021-01-05 LAB
BACTERIA SPEC CULT: ABNORMAL
Lab: ABNORMAL
SPECIMEN SOURCE: ABNORMAL

## 2021-01-05 NOTE — PROGRESS NOTES
Care Coordination Telephone Call  [facility]     Called patient to discuss requested colonoscopy procedure.  Left message for patient to call to review and asked for call back.    Ludy called back and is in agreement with procedure.  We discussed the need for her to have this done at the [facility] and she voiced understanding.     Golytely prep has been ordered and confirmed for her pharmacy.    She would like to have all instructions sent to her home.    I have asked the patient to call with any additional questions or concerns and have provided my contact information.    Plan:  Colonoscopy with EMR - holding time for 2/15/21    Magda HOOKSN, HNBC, STAR-T  RN Care Coordinator  [facility]  Ph: [phone]  FAX: [phone]

## 2021-01-11 ENCOUNTER — MYC MEDICAL ADVICE (OUTPATIENT)
Dept: INTERNAL MEDICINE | Facility: CLINIC | Age: 81
End: 2021-01-11

## 2021-01-11 DIAGNOSIS — F33.0 MAJOR DEPRESSIVE DISORDER, RECURRENT EPISODE, MILD (H): ICD-10-CM

## 2021-01-13 ENCOUNTER — TELEPHONE (OUTPATIENT)
Dept: PULMONOLOGY | Facility: CLINIC | Age: 81
End: 2021-01-13

## 2021-01-13 DIAGNOSIS — J02.9 SORE THROAT: ICD-10-CM

## 2021-01-13 DIAGNOSIS — R05.9 COUGH: ICD-10-CM

## 2021-01-13 DIAGNOSIS — R06.09 DYSPNEA ON EXERTION: ICD-10-CM

## 2021-01-13 DIAGNOSIS — R06.09 DYSPNEA ON EXERTION: Primary | ICD-10-CM

## 2021-01-13 DIAGNOSIS — J02.0 STREPTOCOCCAL SORE THROAT: ICD-10-CM

## 2021-01-13 LAB
SARS-COV-2 RNA RESP QL NAA+PROBE: NORMAL
SPECIMEN SOURCE: NORMAL

## 2021-01-13 PROCEDURE — U0003 INFECTIOUS AGENT DETECTION BY NUCLEIC ACID (DNA OR RNA); SEVERE ACUTE RESPIRATORY SYNDROME CORONAVIRUS 2 (SARS-COV-2) (CORONAVIRUS DISEASE [COVID-19]), AMPLIFIED PROBE TECHNIQUE, MAKING USE OF HIGH THROUGHPUT TECHNOLOGIES AS DESCRIBED BY CMS-2020-01-R: HCPCS | Performed by: INTERNAL MEDICINE

## 2021-01-13 PROCEDURE — U0005 INFEC AGEN DETEC AMPLI PROBE: HCPCS | Performed by: INTERNAL MEDICINE

## 2021-01-13 NOTE — TELEPHONE ENCOUNTER
I spoke with patient's sister who asked me to reach out to the patient. The patient states she started revenfenicin 2 days ago. She states her breathing is much worse. She stopped Spiriva to start the revefenicin. She used her albuterol inhaler this morning which helped with the wheezing.      Patient states since started the medicine she has developed a cough which she hasn't had previously. She coughed up yellow phlegm. She also has a sore throat which is new for her.     Will route to Dr. Gr to advise.     Kaylyn Tyson RN   Medical Specialty Clinic Care Coordinator  [facility]   Phone: [phone]  Fax: [phone]

## 2021-01-13 NOTE — TELEPHONE ENCOUNTER
Cj Babin,    I agree, she can restart Spiriva and stop the revenfenicin. She should also get COVID tested too. If her symptoms worsen, she should go get checked out at the Urgent care to rule out a pneumonia.    Thank you.    Kenny Gr MD

## 2021-01-13 NOTE — TELEPHONE ENCOUNTER
Notified patient that she should stop the revefenacin and continue the Spiriva. Patient has a refill available at her pharmacy.     Orders placed for patient to get COVID tested.     Advised patient to seek care at urgent care if symptoms worsen. Encouraged patient to call back with questions or concerns.     Patient agreeable to the plan.     Kaylyn Tyson RN   Medical Specialty Clinic Care Coordinator  [facility]   Phone: [phone]  Fax: [phone]

## 2021-01-13 NOTE — TELEPHONE ENCOUNTER
[facility] Call Center    Phone Message    May a detailed message be left on voicemail: yes     Reason for Call: Patients sister called stating that the patient has been wheezing since taking the new inhaler. Patients sister thinks that she needs to be seen. Please advise. Thank you.    Action Taken: Message routed to:  Adult Clinics: Pulmonology p [id number]    Travel Screening: Not Applicable

## 2021-01-14 ENCOUNTER — TELEPHONE (OUTPATIENT)
Dept: PULMONOLOGY | Facility: CLINIC | Age: 81
End: 2021-01-14

## 2021-01-14 DIAGNOSIS — J44.1 COPD EXACERBATION (H): Primary | ICD-10-CM

## 2021-01-14 DIAGNOSIS — J44.9 CHRONIC OBSTRUCTIVE PULMONARY DISEASE, UNSPECIFIED COPD TYPE (H): ICD-10-CM

## 2021-01-14 LAB
LABORATORY COMMENT REPORT: NORMAL
SARS-COV-2 RNA RESP QL NAA+PROBE: NEGATIVE
SPECIMEN SOURCE: NORMAL

## 2021-01-14 RX ORDER — PREDNISONE 20 MG/1
40 TABLET ORAL DAILY
Qty: 10 TABLET | Refills: 0 | Status: SHIPPED | OUTPATIENT
Start: 2021-01-14 | End: 2021-03-19

## 2021-01-14 RX ORDER — AZITHROMYCIN 250 MG/1
500 TABLET, FILM COATED ORAL DAILY
Qty: 10 TABLET | Refills: 0 | Status: SHIPPED | OUTPATIENT
Start: 2021-01-14 | End: 2021-01-19

## 2021-01-14 NOTE — TELEPHONE ENCOUNTER
Spoke with patient to discuss the plan. Patient will follow up with us after the completion of her medications if she is not feeling better.     Kaylyn Tyson RN   Medical Specialty Clinic Care Coordinator  [facility]   Phone: [phone]  Fax: [phone]

## 2021-01-14 NOTE — RESULT ENCOUNTER NOTE
Results discussed directly with patient while patient was present. Any further details documented in the note.   Kenny Gr MD

## 2021-01-14 NOTE — TELEPHONE ENCOUNTER
Cj Patiño,    Let's send a prescription for prednisone 40 mg po daily x 5 days and azithromycin 500 mg po daily x 5 days. She has taken both meds in the past with good results for similar symptoms. It appears that she may be having an exacerbation. I will also notify her of her Covid results.    Thank you.    Kenny Gr MD

## 2021-01-14 NOTE — TELEPHONE ENCOUNTER
Patient called today wondering if Dr. Gr would prescribe her something for he shortness of breath. She thinks the revenfencin caused her to have an infection.     Patient's COVID test was negative.     I discussed with the patient that Dr. Gr did recommend she be seen in urgent care if her symptoms were not getting better but patient wants to know if Dr. Gr will just send her a prescription.     Will discuss with Dr. Gr.     Kaylyn Tyson RN   Medical Specialty Clinic Care Coordinator  [facility]   Phone: [phone]  Fax: [phone]

## 2021-01-19 ENCOUNTER — MYC MEDICAL ADVICE (OUTPATIENT)
Dept: PULMONOLOGY | Facility: CLINIC | Age: 81
End: 2021-01-19

## 2021-01-19 DIAGNOSIS — J44.9 CHRONIC OBSTRUCTIVE PULMONARY DISEASE, UNSPECIFIED COPD TYPE (H): Primary | ICD-10-CM

## 2021-01-19 DIAGNOSIS — R06.02 SOB (SHORTNESS OF BREATH): ICD-10-CM

## 2021-01-20 RX ORDER — ALBUTEROL SULFATE 1.25 MG/3ML
1.25 SOLUTION RESPIRATORY (INHALATION) EVERY 6 HOURS PRN
Qty: 90 ML | Refills: 6 | Status: SHIPPED | OUTPATIENT
Start: 2021-01-20 | End: 2021-06-23

## 2021-01-22 NOTE — TELEPHONE ENCOUNTER
Spoke to Ludy. She reports that she is feeling good, but since starting the nebulizer she is still bringing up a lot of yellow phlegm. She has finished the ABX coarse and does not report feeling ill or having a fever. She is wondering if she needs further treatment or if this is expected. I let her know that I would send a message to Dr. Gr. Advised that if she starts to feel ill again or significantly worse she should seek treatment at urgent care. She expressed understanding.     NEVA NarvaezN, RN   Medical Specialty Care Coordinator   [facility]

## 2021-01-26 NOTE — TELEPHONE ENCOUNTER
Hi,    Let her know that she doesn't require further antibiotics or prednisone. The nebulizer may be helping with improved airway clearance and increased expectoration which may be helpful. She should continue with the nebs and if she feels ill or worse, she should let us know.    Kenny Gr md

## 2021-01-26 NOTE — TELEPHONE ENCOUNTER
My chart message sent to the patient with Dr. Gr's recommendations.      Leeann Dye LPN  Pulmonary Division:  [facility]  Phone: [phone] Fax: [phone]

## 2021-01-27 DIAGNOSIS — F33.0 MAJOR DEPRESSIVE DISORDER, RECURRENT EPISODE, MILD (H): ICD-10-CM

## 2021-01-30 DIAGNOSIS — Z11.59 ENCOUNTER FOR SCREENING FOR OTHER VIRAL DISEASES: ICD-10-CM

## 2021-02-03 ENCOUNTER — MYC MEDICAL ADVICE (OUTPATIENT)
Dept: INTERNAL MEDICINE | Facility: CLINIC | Age: 81
End: 2021-02-03

## 2021-02-03 NOTE — TELEPHONE ENCOUNTER
Left message for patient to return call. Patient was to taper off sertraline (ZOLOFT) 50 MG tablet. Refill request received from pharmacy. Is patient still taking mediation? Maria D Murray MA

## 2021-02-08 NOTE — TELEPHONE ENCOUNTER
Called patient she states she is no longer taking this medication per pcp   Please delete duplicate   Thanks

## 2021-02-10 ENCOUNTER — PATIENT OUTREACH (OUTPATIENT)
Dept: GASTROENTEROLOGY | Facility: CLINIC | Age: 81
End: 2021-02-10

## 2021-02-10 NOTE — PROGRESS NOTES
"Care Coordination Telephone Call  [facility]     Returned call to patient to discuss upcoming procedure.  She sounds short of breath due to her emphysema and she relates that \"she is not feeling very well\" with fever and chills.  She has requested that we move her procedure to a different date.      Called patient.  She relates that she is not febrile, however \"still does not feel very good\"  Has cough and did cancel her preoperative physical that was to have been done yesterday.  She does not feel that she could do a colon prep at this time.  We will plan to reschedule preocedure for 3/29/21.  She is in agreement \"that sounds better.\"  She will call PCP to reschedule physical.    I have asked the patient to call with any additional questions or concerns and have provided my contact information.    Magda HOOKSN, HNBC, STAR-T  RN Care Coordinator  [facility]  Ph: [phone]  FAX: [phone]          "

## 2021-03-05 ENCOUNTER — OFFICE VISIT (OUTPATIENT)
Dept: OPHTHALMOLOGY | Facility: CLINIC | Age: 81
End: 2021-03-05
Payer: COMMERCIAL

## 2021-03-05 DIAGNOSIS — E11.69 TYPE 2 DIABETES MELLITUS WITH OTHER SPECIFIED COMPLICATION, WITHOUT LONG-TERM CURRENT USE OF INSULIN (H): ICD-10-CM

## 2021-03-05 DIAGNOSIS — H43.813 POSTERIOR VITREOUS DETACHMENT OF BOTH EYES: ICD-10-CM

## 2021-03-05 DIAGNOSIS — Z96.1 PSEUDOPHAKIA OF BOTH EYES: ICD-10-CM

## 2021-03-05 DIAGNOSIS — H52.223 MYOPIA OF BOTH EYES WITH REGULAR ASTIGMATISM AND PRESBYOPIA: ICD-10-CM

## 2021-03-05 DIAGNOSIS — H35.3131 EARLY DRY STAGE NONEXUDATIVE AGE-RELATED MACULAR DEGENERATION OF BOTH EYES: ICD-10-CM

## 2021-03-05 DIAGNOSIS — H40.1131 PRIMARY OPEN ANGLE GLAUCOMA (POAG) OF BOTH EYES, MILD STAGE: ICD-10-CM

## 2021-03-05 DIAGNOSIS — H52.13 MYOPIA OF BOTH EYES WITH REGULAR ASTIGMATISM AND PRESBYOPIA: ICD-10-CM

## 2021-03-05 DIAGNOSIS — H52.4 MYOPIA OF BOTH EYES WITH REGULAR ASTIGMATISM AND PRESBYOPIA: ICD-10-CM

## 2021-03-05 DIAGNOSIS — Z01.00 EXAMINATION OF EYES AND VISION: Primary | ICD-10-CM

## 2021-03-05 PROCEDURE — 92014 COMPRE OPH EXAM EST PT 1/>: CPT | Performed by: STUDENT IN AN ORGANIZED HEALTH CARE EDUCATION/TRAINING PROGRAM

## 2021-03-05 PROCEDURE — 92015 DETERMINE REFRACTIVE STATE: CPT | Performed by: STUDENT IN AN ORGANIZED HEALTH CARE EDUCATION/TRAINING PROGRAM

## 2021-03-05 ASSESSMENT — TONOMETRY
OS_IOP_MMHG: 13
OD_IOP_MMHG: 15
OD_IOP_MMHG: 11
IOP_METHOD: APPLANATION
IOP_METHOD: ICARE
OD_IOP_MMHG: 10
IOP_METHOD: APPLANATION
OS_IOP_MMHG: 19
OS_IOP_MMHG: 20

## 2021-03-05 ASSESSMENT — REFRACTION_WEARINGRX
OS_CYLINDER: +1.25
OD_CYLINDER: SPHERE
OD_SPHERE: +1.75
OS_SPHERE: +1.75
OD_AXIS: 176
OD_CYLINDER: +1.75
OD_SPHERE: -1.25
SPECS_TYPE: OTC READERS
SPECS_TYPE: SVL
OS_AXIS: 178
OS_CYLINDER: SPHERE
OS_SPHERE: -0.50

## 2021-03-05 ASSESSMENT — REFRACTION_MANIFEST
OS_ADD: +3.00
OS_CYLINDER: +0.50
OD_ADD: +3.00
OD_CYLINDER: +1.50
OS_SPHERE: -0.25
OS_AXIS: 180
OD_SPHERE: -1.00
OD_AXIS: 180

## 2021-03-05 ASSESSMENT — CONF VISUAL FIELD
OS_NORMAL: 1
OD_NORMAL: 1

## 2021-03-05 ASSESSMENT — SLIT LAMP EXAM - LIDS
COMMENTS: 1+ MEIBOMIAN GLAND DYSFUNCTION
COMMENTS: 1+ MEIBOMIAN GLAND DYSFUNCTION

## 2021-03-05 ASSESSMENT — CUP TO DISC RATIO
OS_RATIO: 0.5
OD_RATIO: 0.45

## 2021-03-05 ASSESSMENT — VISUAL ACUITY
OD_SC: 20/70
OS_SC: 20/25
METHOD: SNELLEN - LINEAR
OD_SC+: -1
OS_SC+: -1
OD_PH_SC+: -3
OD_PH_SC: 20/25

## 2021-03-05 ASSESSMENT — EXTERNAL EXAM - RIGHT EYE: OD_EXAM: NORMAL

## 2021-03-05 ASSESSMENT — EXTERNAL EXAM - LEFT EYE: OS_EXAM: NORMAL

## 2021-03-05 NOTE — LETTER
"    3/5/2021         RE: Ludy Mckenzie  [address]  [place] [address]        Dear Colleague,    Thank you for referring your patient, Ludy Mckenzie, to the [facility]. Please see a copy of my visit note below.     Current Eye Medications: Latanoprost at bedtime both eyes, no lubricating drops     Subjective:  Here for complete today. DM, last a1c was 7.5 on 1-4-21. Doesn't take meds for this, only diet controlled. Lives alone.  Eyes always feel like there is a blurry film over them. Says that after wearing her cheaters for puzzles her eyes feel awful and blurred. Wondering about laser after cataract surgery.     Objective:  See Ophthalmology Exam.       Assessment:  Ludy Mckenzie is a 80 year old female who presents with:   Encounter Diagnoses   Name Primary?     Examination of eyes and vision      Myopia of both eyes with regular astigmatism and presbyopia        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, mild stage Intraocular pressure 15/19 today on latanoprost. Discs appear stable.        Type 2 diabetes mellitus with other specified complication, without long-term current use of insulin (H) Negative diabetic retinopathy      Pseudophakia with Yag Caps ou       Early dry stage nonexudative age-related macular degeneration of both eyes Mild both eyes. Discussed AREDS2 eye vitamins.     Posterior vitreous detachment of both eyes      Plan:  Continue Latanoprost (teal top) at bedtime both eyes      Glasses prescription given or can continue using over the counter readers     Use artificial tears up to four times a day (like Refresh Optive, Systane Balance, TheraTears, or generic artificial tears are ok. Avoid \"get the red out\" drops).     Recommend taking an eye vitamin with AREDS2 on the packaging (like Preservision, iCaps, or generic). Follow dosing directions on the package.      Arjun Valenzuela MD  [phone]        Again, thank you for allowing me to " participate in the care of your patient.        Sincerely,        Arjun Valenzuela MD

## 2021-03-05 NOTE — PROGRESS NOTES
" Current Eye Medications: Latanoprost at bedtime both eyes, no lubricating drops     Subjective:  Here for complete today. DM, last a1c was 7.5 on 1-4-21. Doesn't take meds for this, only diet controlled. Lives alone.  Eyes always feel like there is a blurry film over them. Says that after wearing her cheaters for puzzles her eyes feel awful and blurred. Wondering about laser after cataract surgery.     Objective:  See Ophthalmology Exam.       Assessment:  Ludy Mckenzie is a 80 year old female who presents with:   Encounter Diagnoses   Name Primary?     Examination of eyes and vision      Myopia of both eyes with regular astigmatism and presbyopia        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, mild stage Intraocular pressure 15/19 today on latanoprost. Discs appear stable.        Type 2 diabetes mellitus with other specified complication, without long-term current use of insulin (H) Negative diabetic retinopathy      Pseudophakia with Yag Caps ou       Early dry stage nonexudative age-related macular degeneration of both eyes Mild both eyes. Discussed AREDS2 eye vitamins.     Posterior vitreous detachment of both eyes      Plan:  Continue Latanoprost (teal top) at bedtime both eyes      Glasses prescription given or can continue using over the counter readers     Use artificial tears up to four times a day (like Refresh Optive, Systane Balance, TheraTears, or generic artificial tears are ok. Avoid \"get the red out\" drops).     Recommend taking an eye vitamin with AREDS2 on the packaging (like Preservision, iCaps, or generic). Follow dosing directions on the package.      Arjun Valenzuela MD  [phone]    "

## 2021-03-05 NOTE — PATIENT INSTRUCTIONS
"Continue Latanoprost (teal top) at bedtime both eyes      Glasses prescription given or can continue using over the counter readers     Use artificial tears up to four times a day (like Refresh Optive, Systane Balance, TheraTears, or generic artificial tears are ok. Avoid \"get the red out\" drops).     Recommend taking an eye vitamin with AREDS2 on the packaging (like Preservision, iCaps, or generic). Follow dosing directions on the package.      Keep blood sugars and blood pressure under good control.     Arjun Valenzuela MD  [phone]    Patient Education   Diabetes weakens the blood vessels all over the body, including the eyes. Damage to the blood vessels in the eyes can cause swelling or bleeding into part of the eye (called the retina). This is called diabetic retinopathy (KEVEN-tin--pu-thee). If not treated, this disease can cause vision loss or blindness.   Symptoms may include blurred or distorted vision, but many people have no symptoms. It's important to see your eye doctor regularly to check for problems.   Early treatment and good control can help protect your vision. Here are the things you can do to help prevent vision loss:      1. Keep your blood sugar levels under tight control.      2. Bring high blood pressure under control.      3. No smoking.      4. Have yearly dilated eye exams.      "

## 2021-03-07 RX ORDER — LATANOPROST 50 UG/ML
1 SOLUTION/ DROPS OPHTHALMIC AT BEDTIME
Qty: 7.5 ML | Refills: 3 | Status: SHIPPED | OUTPATIENT
Start: 2021-03-07 | End: 2022-01-25

## 2021-03-10 ENCOUNTER — IMMUNIZATION (OUTPATIENT)
Dept: NURSING | Facility: CLINIC | Age: 81
End: 2021-03-10
Payer: COMMERCIAL

## 2021-03-10 PROCEDURE — 91300 PR COVID VAC PFIZER DIL RECON 30 MCG/0.3 ML IM: CPT

## 2021-03-10 PROCEDURE — 0001A PR COVID VAC PFIZER DIL RECON 30 MCG/0.3 ML IM: CPT

## 2021-03-19 ENCOUNTER — OFFICE VISIT (OUTPATIENT)
Dept: FAMILY MEDICINE | Facility: CLINIC | Age: 81
End: 2021-03-19
Payer: COMMERCIAL

## 2021-03-19 VITALS
HEART RATE: 71 BPM | BODY MASS INDEX: 20.98 KG/M2 | TEMPERATURE: 97.1 F | OXYGEN SATURATION: 96 % | DIASTOLIC BLOOD PRESSURE: 70 MMHG | SYSTOLIC BLOOD PRESSURE: 137 MMHG | WEIGHT: 129 LBS

## 2021-03-19 DIAGNOSIS — J44.1 COPD EXACERBATION (H): Primary | ICD-10-CM

## 2021-03-19 PROCEDURE — 99214 OFFICE O/P EST MOD 30 MIN: CPT | Performed by: NURSE PRACTITIONER

## 2021-03-19 RX ORDER — PREDNISONE 10 MG/1
TABLET ORAL
Qty: 30 TABLET | Refills: 0 | Status: SHIPPED | OUTPATIENT
Start: 2021-03-19 | End: 2021-03-30

## 2021-03-19 RX ORDER — AZITHROMYCIN 250 MG/1
TABLET, FILM COATED ORAL
Qty: 6 TABLET | Refills: 0 | Status: SHIPPED | OUTPATIENT
Start: 2021-03-19 | End: 2021-05-10

## 2021-03-19 NOTE — PATIENT INSTRUCTIONS
Patient Education     COPD Flare-Up    You have had a flare-up of your COPD.  COPD (chronic obstructive pulmonary disease) is a common lung disease. It causes your airways to get irritated and narrower. This makes it harder for you to breathe. Emphysema and chronic bronchitis are both types of COPD. This is a long-term (chronic) condition. This means you always have it. Sometimes it gets worse. When this happens, it's called a flare-up.   Symptoms of COPD  People with COPD may have symptoms most of the time. In a flare-up, your symptoms get worse. These symptoms may mean you are having a flare-up:     Shortness of breath, shallow or rapid breathing, or wheezing that gets worse    Lung infection    Cough that gets worse    More mucus (or sputum), thicker mucus, or mucus of a different color    Tiredness, less energy, or trouble doing your normal activities    Fever    Chest tightness    Your symptoms don t get better even when you use your normal medicines, inhalers, and nebulizer    Trouble talking    You feel confused  Causes of flare-ups  Unfortunately, a flare-up can happen even if you did everything right. And even if you followed your healthcare provider s instructions. Some causes of flare-ups are:     Cold weather    Smoking or secondhand smoke    Use of e-cigarettes or vaping products    Colds, the flu, or respiratory infections    Air pollution    Sudden change in the weather    Dust, vapors, gases, irritating chemicals, or strong fumes    Not taking your medicines as prescribed    Indoor pollution such as burning wood, smoke from home cooking, or heating fuels  Home care  Here are some things you can do at home to treat a flare-up:    Keep calm and try not to panic. This makes it harder to breathe, and keeps you from doing the right things.    Don t smoke or be around others who are smoking. If you smoke, quit. Smoking is the main cause of COPD. Quitting will help you be able to better manage your COPD.  Don't use e-cigarettes or vaping products either. Ask your healthcare provider about ways to help you quit smoking.    Try to drink more fluids than normal during a flare-up, unless your healthcare provider has told you not to because of heart and kidney problems. More fluids can help loosen the mucus.    Eat a healthy, balanced diet. This is important to staying as healthy as possible. So is trying to stay at your ideal weight. Being overweight or underweight can affect your health. Make sure you have a lot of fruits and vegetables every day. And also eat balanced portions of whole grains, lean meats and fish, and low-fat dairy products.    Use your inhalers and nebulizer, if you have one, as you have been told to. When using a metered dose inhaler or nebulizer, it's very important to use the proper techniques. If you have any questions about how to use your device, contact your healthcare provider or refer to the user manual.    If you were given antibiotics, take them until they are used up or your provider tells you to stop. It s important to finish the antibiotics, even though you feel better. This will make sure the infection has cleared.    If you were given a steroid, finish it even if you feel better.    Learn the names of your medicines, as well as how and when to use them. Talk with your provider about other conditions you have and their treatment and how it may affect your COPD.    Oxygen may be prescribed if tests show that your blood contains too little oxygen. Ask your provider about long-term oxygen therapy.    Coping tips for shortness of breath include:  ? Exercise. Try to be as active as possible. This will improve energy levels and strengthen your muscles, so you can do more.  ? Breathing methods. Ask your healthcare provider or nurse show you how to do pursed-lip breathing.  ? Balance rest and activity. Each day, try to balance rest periods with activity. For example, you might start the day  with getting dressed and eating breakfast. Then you can relax and read the paper. After that, take a brief walk. And then sit with your feet up for a while.  ? Pulmonary rehab (rehabilitation).  Community-based and home-based programs work as well as hospital-based programs as long as they are as often and as intense. Standard home-based pulmonary rehab programs help shortness of breath in people with COPD. Supervised, traditional pulmonary rehab remains the best option for people with COPD. These programs help with managing your disease, and also help with breathing methods, exercise, support, and counseling. To find one, ask your provider or call your local hospital. Also talk with your healthcare provider about which rehab or self-management program is best for you.  Preventing a flare-up  Flare-ups happen. But the best way to treat one is to prevent it before it starts. Here are some pointers:     Don t smoke or be around others who are smoking. Avoid using e-cigarettes due to their harmful side effects.    Take your medicines as discussed with your healthcare provider.    Talk with your provider about getting a flu shot every year. Also find out if you need a pneumonia shot.    If there is a weather advisory warning to stay indoors, try to stay inside when possible.    Try to eat healthy, exercise, and get plenty of sleep.    Try to stay away from things that normally set you off. These include dust, chemical fumes, hairsprays, or strong perfumes.  Follow-up care  Follow up with your healthcare provider, or as advised.   If a culture was done, you will be told if your treatment needs to be changed. You can call as directed for the results.   If X-rays were done, you will be told of any new findings that may affect your care.   During each appointment, talk with your healthcare provider about your ability to:     [place] in your normal environment    Correctly use inhaler (or your medicine delivery systems)    [place]  with other conditions you have and their treatments and how they may affect your COPD  Call 911  Call 911 if any of these occur:     Wheezing or shortness or breath does not get better with treatment    Chest pain or chest tightness    Feeling lightheaded or dizzy    You have trouble breathing    You feel confused or it s hard to wake you up    You faint or lose consciousness    You have a rapid heart rate    You have new pain in your chest, arm, shoulder, neck, or upper back  When to seek medical advice  Call your healthcare provider right away if any of these occur:    Fever of 100.4 F (38 C) or higher, or as directed by your healthcare provider    Coughing up lots of dark-colored or bloody mucus (sputum)    You don't start to get better within 24 hours    Swelling of your ankles gets worse    Weakness  [employer] last reviewed this educational content on 4/1/2019 2000-2020 [employer]. All rights reserved. This information is not intended as a substitute for professional medical care. Always follow your healthcare professional's instructions.

## 2021-03-19 NOTE — PROGRESS NOTES
Assessment & Plan     COPD exacerbation (H)  Counseled on self-care measures including:  cough drops, OTC acetaminophen PRN, hydration, rest, self quantine, masking, handwashing, and red flags of when to return including difficulty breathing, fever, worsening symptoms.  Schedule follow up with pulmonology.  - predniSONE (DELTASONE) 10 MG tablet; Take 4 tablets by mouth daily for 3 days, then 3 tablets by mouth daily for 3 days, then 2 tablets by mouth daily for 3 days, then 1 tablet by mouth daily for 3 days.  - azithromycin (ZITHROMAX) 250 MG tablet; Take 500 mg (2 tabs) by mouth on day 1. Then take 250 mg (1 tab) by mouth on days 2-5.  - fluticasone-salmeterol (ADVAIR) 500-50 MCG/DOSE inhaler; Inhale 1 puff into the lungs 2 times daily    Return in about 1 week (around 3/26/2021), or if symptoms worsen or fail to improve.    OCTAVIO Lester CNP  [facility] [place] is a 80 year old who presents for the following health issues     HPI     Acute Illness -breathing problem   Acute illness concerns:   Onset/Duration: 1-2 weeks   Symptoms:  Fever: no  Chills/Sweats: no  Headache (location?): no  Sinus Pressure: no  Conjunctivitis:  no  Ear Pain: no  Rhinorrhea: no  Congestion: no  Sore Throat: no  Cough: YES-  Wheeze: YES  Decreased Appetite: YES  Nausea: no  Vomiting: no  Diarrhea: no  Dysuria/Freq.: no  Dysuria or Hematuria: no  Fatigue/Achiness: YES  Sick/Strep Exposure: no  Therapies tried and outcome: None  States that she is taking Advair, Spiriva, and albuterol. Stopped revenfenacin in Jan/feb as she felt it made her breathing worse. Has pulmonary follow up scheduled in June.  Feels that her breathing is worsened on the lower dose of Advair (230).     Review of Systems   Constitutional, HEENT, cardiovascular, pulmonary, gi and gu systems are negative, except as otherwise noted.      Objective    /70   Pulse 71   Temp 97.1  F (36.2  C) (Tympanic)   Wt 58.5  kg (129 lb)   SpO2 96%   BMI 20.98 kg/m    Body mass index is 20.98 kg/m .  Physical Exam   GENERAL: healthy, alert and no distress  NECK: no adenopathy, no asymmetry, masses, or scars and thyroid normal to palpation  RESP: no rales , no rhonchi, no wheezes, prolonged expiratory phase and decreased breath sounds throughout  CV: regular rate and rhythm, normal S1 S2, no S3 or S4, no murmur, click or rub, no peripheral edema and peripheral pulses strong  ABDOMEN: soft, nontender, no hepatosplenomegaly, no masses and bowel sounds normal  PSYCH: mentation appears normal, affect normal/bright

## 2021-03-23 ENCOUNTER — ANCILLARY PROCEDURE (OUTPATIENT)
Dept: GENERAL RADIOLOGY | Facility: CLINIC | Age: 81
End: 2021-03-23
Attending: FAMILY MEDICINE
Payer: COMMERCIAL

## 2021-03-23 ENCOUNTER — OFFICE VISIT (OUTPATIENT)
Dept: URGENT CARE | Facility: URGENT CARE | Age: 81
End: 2021-03-23
Payer: COMMERCIAL

## 2021-03-23 VITALS
HEART RATE: 89 BPM | SYSTOLIC BLOOD PRESSURE: 112 MMHG | OXYGEN SATURATION: 97 % | TEMPERATURE: 97.7 F | DIASTOLIC BLOOD PRESSURE: 62 MMHG

## 2021-03-23 DIAGNOSIS — J20.9 ACUTE BRONCHITIS WITH SYMPTOMS > 10 DAYS: ICD-10-CM

## 2021-03-23 DIAGNOSIS — R05.9 COUGH: Primary | ICD-10-CM

## 2021-03-23 LAB
SARS-COV-2 RNA RESP QL NAA+PROBE: NORMAL
SPECIMEN SOURCE: NORMAL

## 2021-03-23 PROCEDURE — U0003 INFECTIOUS AGENT DETECTION BY NUCLEIC ACID (DNA OR RNA); SEVERE ACUTE RESPIRATORY SYNDROME CORONAVIRUS 2 (SARS-COV-2) (CORONAVIRUS DISEASE [COVID-19]), AMPLIFIED PROBE TECHNIQUE, MAKING USE OF HIGH THROUGHPUT TECHNOLOGIES AS DESCRIBED BY CMS-2020-01-R: HCPCS | Performed by: FAMILY MEDICINE

## 2021-03-23 PROCEDURE — 99213 OFFICE O/P EST LOW 20 MIN: CPT | Performed by: FAMILY MEDICINE

## 2021-03-23 PROCEDURE — 71046 X-RAY EXAM CHEST 2 VIEWS: CPT | Performed by: RADIOLOGY

## 2021-03-23 PROCEDURE — U0005 INFEC AGEN DETEC AMPLI PROBE: HCPCS | Performed by: FAMILY MEDICINE

## 2021-03-23 ASSESSMENT — PAIN SCALES - GENERAL: PAINLEVEL: NO PAIN (0)

## 2021-03-23 NOTE — PROGRESS NOTES
mariel  SUBJECTIVE:  Ludy Mckenzie is a 80 year old female who presents to the clinic today with a chief complaint of cough  for 2 week(s).  Her cough is described as occasional and nonproductive.    The patient's symptoms are mild and stable.  Associated symptoms include malaise. The patient's symptoms are exacerbated by no particular triggers  Patient has been using albuterol nebs, inhaler and seen 4 days ago and started on Zithromax, prednisone and combivent for COPD exacerbation.  She lives in senior housing and has no known covid exposure.    Past Medical History:   Diagnosis Date     Acute ischemic left TREMAYNE stroke (H) 2009     Astigmatism, unspecified      Bullous pemphigoid      COPD (chronic obstructive pulmonary disease) (H)      CVA (cerebral infarction) 8/09     Depressive disorder      Diabetes (H)      Family history of diabetes mellitus      Glaucoma (increased eye pressure)      Goiter 3/12/2007     HTN (hypertension) 8/25/2009     Hypocalcemia      Hypokalemia      Hyponatremia      Mixed hyperlipidemia      Personal history of other diseases of circulatory system      Pneumonia 11/16/2010     Primary open-angle glaucoma(365.11)      Thyroid nodule 3/23/2012     Unspecified cerebral artery occlusion with cerebral infarction      Unspecified cerebral artery occlusion with cerebral infarction 1998     Current Outpatient Medications   Medication Sig Dispense Refill     albuterol (ACCUNEB) 1.25 MG/3ML neb solution Take 1 vial (1.25 mg) by nebulization every 6 hours as needed for shortness of breath / dyspnea or wheezing 90 mL 6     albuterol (PROAIR HFA/PROVENTIL HFA/VENTOLIN HFA) 108 (90 Base) MCG/ACT inhaler Inhale 2 puffs into the lungs every 4 hours as needed for shortness of breath / dyspnea or wheezing 8.5 g 3     alcohol swab prep pads Use to swab area of injection/surjit as directed. 100 each 3     aspirin 81 MG tablet Take 1 tablet by mouth daily.       azithromycin (ZITHROMAX) 250 MG tablet Take  500 mg (2 tabs) by mouth on day 1. Then take 250 mg (1 tab) by mouth on days 2-5. (Patient not taking: Reported on 3/23/2021) 6 tablet 0     blood glucose (NO BRAND SPECIFIED) test strip Use to test blood sugar 1 times daily or as directed. To accompany: Blood Glucose Monitor Brands: per insurance. 100 strip 6     blood glucose calibration (NO BRAND SPECIFIED) solution To accompany: Blood Glucose Monitor Brands: per insurance. 1 Bottle 3     blood glucose monitoring (NO BRAND SPECIFIED) meter device kit Use to test blood sugar one times daily or as directed. Preferred blood glucose meter OR supplies to accompany: Blood Glucose Monitor Brands: per insurance. 1 kit 0     calcium 600 MG tablet Take 1 tablet by mouth daily  180 tablet 3     cholecalciferol (VITAMIN D3) 1000 UNIT capsule Take 1 capsule by mouth daily.       fluticasone-salmeterol (ADVAIR) 500-50 MCG/DOSE inhaler Inhale 1 puff into the lungs 2 times daily 14 each 3     latanoprost (XALATAN) 0.005 % ophthalmic solution Place 1 drop into both eyes At Bedtime 7.5 mL 3     losartan (COZAAR) 25 MG tablet TAKE 1/2 TABLET BY MOUTH EVERY DAY 45 tablet 4     mirabegron (MYRBETRIQ) 25 MG 24 hr tablet Take 1 tablet (25 mg) by mouth daily 30 tablet 3     PEG 3350-KCl-NaBcb-NaCl-NaSulf (GOLYTELY) 227.1 g PACK 4 - 6 pm start drinking GoLytely as fast possible. Drink 8oz every 10-15 min. Drink 8 glasses.  Finish drinking 9 hours prior to exam. 1 each 0     potassium chloride ER (KLOR-CON) 20 MEQ CR tablet Take 1 tablet (20 mEq) by mouth daily 90 tablet 4     predniSONE (DELTASONE) 10 MG tablet Take 4 tablets by mouth daily for 3 days, then 3 tablets by mouth daily for 3 days, then 2 tablets by mouth daily for 3 days, then 1 tablet by mouth daily for 3 days. 30 tablet 0     rosuvastatin (CRESTOR) 5 MG tablet Take 1 tablet (5 mg) by mouth daily 90 tablet 1     sertraline (ZOLOFT) 50 MG tablet Take 1 tablet (50 mg) by mouth daily (Patient not taking: Reported on 3/19/2021)  90 tablet 1     thin (NO BRAND SPECIFIED) lancets Use with lanceting device. To accompany: Blood Glucose Monitor Brands: per insurance. 100 each 6     tiotropium (SPIRIVA HANDIHALER) 18 MCG inhaled capsule Inhale 1 capsule (18 mcg) into the lungs daily Patient can use for now until she gets her Revefenacin Nebs. She knows to switch and not use both Spiriva and Revefenacin Nebs at the same time. 90 capsule 1     vitamin  B complex with vitamin C (VITAMIN  B COMPLEX) TABS Take 1 tablet by mouth daily       History   Smoking Status     Former Smoker     Packs/day: 1.50     Years: 50.00     Types: Cigarettes     Quit date: 7/22/2009   Smokeless Tobacco     Never Used       ROS  Review of systems negative except as stated above.    OBJECTIVE:  /62 (BP Location: Left arm, Patient Position: Sitting, Cuff Size: Adult Regular)   Pulse 89   Temp 97.7  F (36.5  C) (Tympanic)   SpO2 97%   GENERAL APPEARANCE: alert, active, cooperative and slightly anxious  EYES: EOMI,  PERRL, conjunctiva clear  HENT: ear canals and TM's normal.  Nose and mouth without ulcers, erythema or lesions  NECK: supple, nontender, no lymphadenopathy  RESP: lungs clear to auscultation - no rales, rhonchi or wheezes  CV: regular rates and rhythm, normal S1 S2, no murmur noted  ABDOMEN:  soft, nontender, no HSM or masses and bowel sounds normal    ASSESSMENT:    COPD exacerbation    PLAN: covid testing done today, continue present medication.  Reassured patient and discussed O2 sat of 97%.  Cxr is negative.  Orders Placed This Encounter   Procedures     XR Chest 2 Views     Symptomatic COVID-19 Virus (Coronavirus) by PCR     Symptomatic measures encouraged, humidified air, plenty of fluids.  Follow up with primary care provider if no improvement.

## 2021-03-24 ENCOUNTER — TELEPHONE (OUTPATIENT)
Dept: URGENT CARE | Facility: URGENT CARE | Age: 81
End: 2021-03-24

## 2021-03-24 ENCOUNTER — NURSE TRIAGE (OUTPATIENT)
Dept: NURSING | Facility: CLINIC | Age: 81
End: 2021-03-24

## 2021-03-24 DIAGNOSIS — U07.1 2019 NOVEL CORONAVIRUS DISEASE (COVID-19): ICD-10-CM

## 2021-03-24 LAB
LABORATORY COMMENT REPORT: ABNORMAL
SARS-COV-2 RNA RESP QL NAA+PROBE: POSITIVE
SPECIMEN SOURCE: ABNORMAL

## 2021-03-24 NOTE — TELEPHONE ENCOUNTER
"-Coronavirus (COVID-19) Notification    Caller Name (Patient, parent, daughter/son, grandparent, etc)  Patient     Reason for call  Notify of Positive Coronavirus (COVID-19) lab results, assess symptoms,  review  [employer] [facility] recommendations    Lab Result    Lab test:  2019-nCoV rRt-PCR or SARS-CoV-2 PCR    Oropharyngeal AND/OR nasopharyngeal swabs is POSITIVE for 2019-nCoV RNA/SARS-COV-2 PCR (COVID-19 virus)    RN Recommendations/Instructions per [facility] Coronavirus COVID-19 recommendations    Brief introduction script  Introduce self then review script:  \"I am calling on behalf of [employer].  We were notified that your Coronavirus test (COVID-19) for was POSITIVE for the virus.  I have some information to relay to you but first I wanted to mention that the [place] Dept of Health will be contacting you shortly [it's possible MD already called Patient] to talk to you more about how you are feeling and other people you have had contact with who might now also have the virus.  Also, [facility] is Partnering with the [facility] for Covid-19 research, you may be contacted directly by research staff.\"    Assessment (Inquire about Patient's current symptoms)   Assessment   Current Symptoms at time of phone call: (if no symptoms, document No symptoms] Slight cough and confusion.    My 02 Sats were 95% in clinic yesterday.    Symptoms onset (if applicable) I do not remember       If at time of call, Patients symptoms hare worsened, the Patient should contact 911 or have someone drive them to Emergency Dept promptly:      If Patient calling 911, inform 911 personal that you have tested positive for the Coronavirus (COVID-19).  Place mask on and await 911 to arrive.    If Emergency Dept, If possible, please have another adult drive you to the Emergency Dept but you need to wear mask when in contact with other people.      Monoclonal Antibody Administration    You may be eligible to receive a new " "treatment with a monoclonal antibody for preventing hospitalization in patients at high risk for complications from COVID-19.   This medication is still experimental and available on a limited basis; it is given through an IV and must be given at an infusion center. Please note that not all people who are eligible will receive the medication since it is in limited supply.     Are you interested in being considered for this medication?  Yes Is the patient symptomatic?  Yes. Is the patient 18 years of age or older? Yes.  Does the patient use supplemental oxygen?  No. Patient criteria for selection: 65 years old or more and 55 years old or more with COPD/other chronic respiratory condition  Does the patient fit the criteria: Yes: COVID-19 Monoclonal Antibody Referral completed.  Diagnosis code: COVID-19  U07.1    If patient qualifies based on above criteria:  \"We will contact you if you are selected to receive the medication in the next 1-2 days.   This is time sensitive and if you are not selected in the next 1-2 days, you will not receive the medication.  If you do not receive a call to schedule, you have not been selected.\"      Review information with Patient    Your result was positive. This means you have COVID-19 (coronavirus).  We have sent you a letter that reviews the information that I'll be reviewing with you now.    How can I protect others?    If you have symptoms: stay home and away from others (self-isolate) until:    You've had no fever--and no medicine that reduces fever--for 1 full day (24 hours). And       Your other symptoms have gotten better. For example, your cough or breathing has improved. And     At least 10 days have passed since your symptoms started. (If you've been told by a doctor that you have a weak immune system, wait 20 days.)     If you don't have symptoms: Stay home and away from others (self-isolate) until at least 10 days have passed since your first positive COVID-19 test. (Date " test collected)    During this time:    Stay in your own room, including for meals. Use your own bathroom if you can.    Stay away from others in your home. No hugging, kissing or shaking hands. No visitors.     Don't go to work, school or anywhere else.     Clean  high touch  surfaces often (doorknobs, counters, handles, etc.). Use a household cleaning spray or wipes. You'll find a full list on the EPA website at www.epa.gov/pesticide-registration/list-n-disinfectants-use-against-sars-cov-2.     Cover your mouth and nose with a mask, tissue or other face covering to avoid spreading germs.    Wash your hands and face often with soap and water.    Make a list of people you have been in close contact with recently, even if either of you wore a face covering.   ; Start your list from 2 days before you became ill or had a positive test.  ; Include anyone that was within 6 feet of you for a cumulative total of 15 minutes or more in 24 hours. (Example: if you sat next to Jaziel for 5 minutes in the morning and 10 minutes in the afternoon, then you were in close contact for 15 minutes total that day. Jaziel would be added to your list.)    A public health worker will call or text you. It is important that you answer. They will ask you questions about possible exposures to COVID-19, such as people you have been in direct contact with and places you have visited.    Tell the people on your list that you have COVID-19; they should stay away from others for 14 days starting from the last time they were in contact with you (unless you are told something different from a public health worker).     Caregivers in these groups are at risk for severe illness due to COVID-19:  o People 65 years and older  o People who live in a nursing home or long-term care facility  o People with chronic disease (lung, heart, cancer, diabetes, kidney, liver, immunologic)  o People who have a weakened immune system, including those who:  - Are in cancer  treatment  - Take medicine that weakens the immune system, such as corticosteroids  - Had a bone marrow or organ transplant  - Have an immune deficiency  - Have poorly controlled HIV or AIDS  - Are obese (body mass index of 40 or higher)  - Smoke regularly    Caregivers should wear gloves while washing dishes, handling laundry and cleaning bedrooms and bathrooms.    Wash and dry laundry with special caution. Don't shake dirty laundry, and use the warmest water setting you can.    If you have a weakened immune system, ask your doctor about other actions you should take.    For more tips, go to www.cdc.gov/coronavirus/2019-ncov/downloads/10Things.pdf.    You should not go back to work until you meet the guidelines above for ending your home isolation. You don't need to be retested for COVID-19 before going back to work--studies show that you won't spread the virus if it's been at least 10 days since your symptoms started (or 20 days, if you have a weak immune system).    Employers: This document serves as formal notice of your employee's medical guidelines for going back to work. They must meet the above guidelines before going back to work in person.    How can I take care of myself?    1. Get lots of rest. Drink extra fluids (unless a doctor has told you not to).    2. Take Tylenol (acetaminophen) for fever or pain. If you have liver or kidney problems, ask your family doctor if it's okay to take Tylenol.     Take either:     650 mg (two 325 mg pills) every 4 to 6 hours, or     1,000 mg (two 500 mg pills) every 8 hours as needed.     Note: Don't take more than 3,000 mg in one day. Acetaminophen is found in many medicines (both prescribed and over-the-counter medicines). Read all labels to be sure you don't take too much.    For children, check the Tylenol bottle for the right dose (based on their age or weight).    3. If you have other health problems (like cancer, heart failure, an organ transplant or severe kidney  disease): Call your specialty clinic if you don't feel better in the next 2 days.    4. Know when to call 911: Emergency warning signs include:    Trouble breathing or shortness of breath    Pain or pressure in the chest that doesn't go away    Feeling confused like you haven't felt before, or not being able to wake up    Bluish-colored lips or face    5. Sign up for [employer] [facility] [employer] We know it's scary to hear that you have COVID-19. We want to track your symptoms to make sure you're okay over the next 2 weeks. Please look for an email from [employer]this is a free, online program that we'll use to keep in touch. To sign up, follow the link in the email. Learn more at [email or web address].    Where can I get more information?    [facility]: [email or web address]    Coronavirus Basics: www.health.[facility].[place]./diseases/coronavirus/basics.html    What to Do If You're Sick: www.cdc.gov/coronavirus/2019-ncov/about/steps-when-sick.html    Ending Home Isolation: www.cdc.gov/coronavirus/2019-ncov/hcp/disposition-in-home-patients.html     Caring for Someone with COVID-19: www.cdc.gov/coronavirus/2019-ncov/if-you-are-sick/care-for-someone.html     [facility] clinical trials (COVID-19 research studies): clinicalaffairs.[facility]-clinical-trials     A Positive COVID-19 letter will be sent via [employer] or the mail. (Exception, no letters sent to Presurgerical/Preprocedure Patients)    Kareen Holliday LPN

## 2021-03-24 NOTE — TELEPHONE ENCOUNTER
"-Coronavirus (COVID-19) Notification    Caller Name (Patient, parent, daughter/son, grandparent, etc)  Cliftonjeniffer Graydaphnie    Reason for call  Notify of Positive Coronavirus (COVID-19) lab results, assess symptoms,  review  [facility] recommendations    Lab Result    Lab test:  2019-nCoV rRt-PCR or SARS-CoV-2 PCR    Oropharyngeal AND/OR nasopharyngeal swabs is POSITIVE for 2019-nCoV RNA/SARS-COV-2 PCR (COVID-19 virus)    RN Recommendations/Instructions per [facility] Coronavirus COVID-19 recommendations    Brief introduction script  Introduce self then review script:  \"I am calling on behalf of [employer].  We were notified that your Coronavirus test (COVID-19) for was POSITIVE for the virus.  I have some information to relay to you but first I wanted to mention that the [place] Dept of Health will be contacting you shortly [it's possible MD already called Patient] to talk to you more about how you are feeling and other people you have had contact with who might now also have the virus.  Also,  [employer] [facility] is Partnering with the [facility] for Covid-19 research, you may be contacted directly by research staff.\"    Assessment (Inquire about Patient's current symptoms)   Assessment   Current Symptoms at time of phone call: (if no symptoms, document No symptoms] Ongoing cough for a few weeks / Shortness of breath   Symptoms onset (if applicable) Few weeks      If at time of call, Patients symptoms hare worsened, the Patient should contact 911 or have someone drive them to Emergency Dept promptly:      If Patient calling 911, inform 911 personal that you have tested positive for the Coronavirus (COVID-19).  Place mask on and await 911 to arrive.    If Emergency Dept, If possible, please have another adult drive you to the Emergency Dept but you need to wear mask when in contact with other people.      Monoclonal Antibody Administration    You may be eligible to receive a new treatment with a " "monoclonal antibody for preventing hospitalization in patients at high risk for complications from COVID-19.   This medication is still experimental and available on a limited basis; it is given through an IV and must be given at an infusion center. Please note that not all people who are eligible will receive the medication since it is in limited supply.     Are you interested in being considered for this medication?  Yes Is the patient symptomatic?  Yes. Is the patient 18 years of age or older? Yes.  Does the patient use supplemental oxygen?  No. Patient criteria for selection: 55 years old or more with Hypertensionand emphysema  Does the patient fit the criteria: Yes: COVID-19 Monoclonal Antibody Referral completed.  Diagnosis code: COVID-19  U07.1    If patient qualifies based on above criteria:  \"We will contact you if you are selected to receive the medication in the next 1-2 days.   This is time sensitive and if you are not selected in the next 1-2 days, you will not receive the medication.  If you do not receive a call to schedule, you have not been selected.\"      Review information with Patient    Your result was positive. This means you have COVID-19 (coronavirus).  We have sent you a letter that reviews the information that I'll be reviewing with you now.    How can I protect others?    If you have symptoms: stay home and away from others (self-isolate) until:    You've had no fever--and no medicine that reduces fever--for 1 full day (24 hours). And       Your other symptoms have gotten better. For example, your cough or breathing has improved. And     At least 10 days have passed since your symptoms started. (If you've been told by a doctor that you have a weak immune system, wait 20 days.)     If you don't have symptoms: Stay home and away from others (self-isolate) until at least 10 days have passed since your first positive COVID-19 test. (Date test collected)    During this time:    Stay in your own " room, including for meals. Use your own bathroom if you can.    Stay away from others in your home. No hugging, kissing or shaking hands. No visitors.     Don't go to work, school or anywhere else.     Clean  high touch  surfaces often (doorknobs, counters, handles, etc.). Use a household cleaning spray or wipes. You'll find a full list on the EPA website at www.epa.gov/pesticide-registration/list-n-disinfectants-use-against-sars-cov-2.     Cover your mouth and nose with a mask, tissue or other face covering to avoid spreading germs.    Wash your hands and face often with soap and water.    Make a list of people you have been in close contact with recently, even if either of you wore a face covering.   ; Start your list from 2 days before you became ill or had a positive test.  ; Include anyone that was within 6 feet of you for a cumulative total of 15 minutes or more in 24 hours. (Example: if you sat next to Jaziel for 5 minutes in the morning and 10 minutes in the afternoon, then you were in close contact for 15 minutes total that day. Jaziel would be added to your list.)    A public health worker will call or text you. It is important that you answer. They will ask you questions about possible exposures to COVID-19, such as people you have been in direct contact with and places you have visited.    Tell the people on your list that you have COVID-19; they should stay away from others for 14 days starting from the last time they were in contact with you (unless you are told something different from a public health worker).     Caregivers in these groups are at risk for severe illness due to COVID-19:  o People 65 years and older  o People who live in a nursing home or long-term care facility  o People with chronic disease (lung, heart, cancer, diabetes, kidney, liver, immunologic)  o People who have a weakened immune system, including those who:  - Are in cancer treatment  - Take medicine that weakens the immune  system, such as corticosteroids  - Had a bone marrow or organ transplant  - Have an immune deficiency  - Have poorly controlled HIV or AIDS  - Are obese (body mass index of 40 or higher)  - Smoke regularly    Caregivers should wear gloves while washing dishes, handling laundry and cleaning bedrooms and bathrooms.    Wash and dry laundry with special caution. Don't shake dirty laundry, and use the warmest water setting you can.    If you have a weakened immune system, ask your doctor about other actions you should take.    For more tips, go to www.cdc.gov/coronavirus/2019-ncov/downloads/10Things.pdf.    You should not go back to work until you meet the guidelines above for ending your home isolation. You don't need to be retested for COVID-19 before going back to work--studies show that you won't spread the virus if it's been at least 10 days since your symptoms started (or 20 days, if you have a weak immune system).    Employers: This document serves as formal notice of your employee's medical guidelines for going back to work. They must meet the above guidelines before going back to work in person.    How can I take care of myself?    1. Get lots of rest. Drink extra fluids (unless a doctor has told you not to).    2. Take Tylenol (acetaminophen) for fever or pain. If you have liver or kidney problems, ask your family doctor if it's okay to take Tylenol.     Take either:     650 mg (two 325 mg pills) every 4 to 6 hours, or     1,000 mg (two 500 mg pills) every 8 hours as needed.     Note: Don't take more than 3,000 mg in one day. Acetaminophen is found in many medicines (both prescribed and over-the-counter medicines). Read all labels to be sure you don't take too much.    For children, check the Tylenol bottle for the right dose (based on their age or weight).    3. If you have other health problems (like cancer, heart failure, an organ transplant or severe kidney disease): Call your specialty clinic if you don't  feel better in the next 2 days.    4. Know when to call 911: Emergency warning signs include:    Trouble breathing or shortness of breath    Pain or pressure in the chest that doesn't go away    Feeling confused like you haven't felt before, or not being able to wake up    Bluish-colored lips or face    5. Sign up for [employer] We know it's scary to hear that you have COVID-19. We want to track your symptoms to make sure you're okay over the next 2 weeks. Please look for an email from [employer]--this is a free, online program that we'll use to keep in touch. To sign up, follow the link in the email. Learn more at [email or web address].    Where can I get more information?    [facility]: [email or web address]    Coronavirus Basics: www.health.[facility].[place]/diseases/coronavirus/basics.html    What to Do If You're Sick: www.cdc.gov/coronavirus/2019-ncov/about/steps-when-sick.html    Ending Home Isolation: www.cdc.gov/coronavirus/2019-ncov/hcp/disposition-in-home-patients.html     Caring for Someone with COVID-19: www.cdc.gov/coronavirus/2019-ncov/if-you-are-sick/care-for-someone.html     [facility] clinical trials (COVID-19 research studies): clinicalaffairs.[facility]/n-clinical-trials     A Positive COVID-19 letter will be sent via [employer] or the mail. (Exception, no letters sent to Presurgerical/Preprocedure Patients)    Alena Zheng RN

## 2021-03-25 ENCOUNTER — PATIENT OUTREACH (OUTPATIENT)
Dept: GASTROENTEROLOGY | Facility: CLINIC | Age: 81
End: 2021-03-25

## 2021-03-25 ENCOUNTER — HOME INFUSION (PRE-WILLOW HOME INFUSION) (OUTPATIENT)
Dept: PHARMACY | Facility: CLINIC | Age: 81
End: 2021-03-25

## 2021-03-25 DIAGNOSIS — U07.1 2019 NOVEL CORONAVIRUS DISEASE (COVID-19): ICD-10-CM

## 2021-03-25 RX ORDER — HEPARIN SODIUM (PORCINE) LOCK FLUSH IV SOLN 100 UNIT/ML 100 UNIT/ML
5 SOLUTION INTRAVENOUS
Status: CANCELLED | OUTPATIENT
Start: 2021-03-25

## 2021-03-25 RX ORDER — HEPARIN SODIUM,PORCINE 10 UNIT/ML
5 VIAL (ML) INTRAVENOUS
Status: CANCELLED | OUTPATIENT
Start: 2021-03-25

## 2021-03-25 NOTE — PROGRESS NOTES
"0930  Pt called back \"I have emphysema and I always cough but this cough is different, its dry\" Has not checked her temperature, advised her to do so   No nausea, able to stay hydrated    Pt was unaware that she also needed a pre op physical in preparation for the procedure. She will arrange the appt in 10 days time once she is out of her COVID isolation.   Discussed the procedure and why it is indicated. Pt knows about the prep needed and has the \"bottle\" and dulcolax at home.  Will call pt in 10 days to determine how she is feeling and to reschedule the procedure.      Pt to have procedure on 3/29 but had a positive covid result.  Called patient and son, left VM on both with request for follow up to reschedule    Ann Marie Greene RN, BSN,   [facility]  Care coordinator   [phone]  Fax [phone]    "

## 2021-03-26 ENCOUNTER — DOCUMENTATION ONLY (OUTPATIENT)
Dept: PHARMACY | Facility: CLINIC | Age: 81
End: 2021-03-26

## 2021-03-26 ENCOUNTER — HOME INFUSION (PRE-WILLOW HOME INFUSION) (OUTPATIENT)
Dept: PHARMACY | Facility: CLINIC | Age: 81
End: 2021-03-26

## 2021-03-26 NOTE — PROGRESS NOTES
This is a recent snapshot of the patient's [facility] medical record.  For current drug dose and complete information and questions, call [phone] or In [facility] pool, fv home infusion ([id number])  CSN Number:  [id number]

## 2021-03-26 NOTE — PROGRESS NOTES
Skilled Nurse visit in the  patient home to administer BAMLANIVIMAB.  No recent elevated temperature, fever, chills, productive cough, coughing for 3 weeks or longer or hemoptysis, abnormal vital signs, night sweats, chest pain. No  decrease in your appetite, unexplained weight loss or fatigue.  No other new onset medical symptoms.  Current weight 129lbs.  PIV placed right forearm, 1 attempt.   Infusion completed without complication or reaction.  Luz Martin RN, BSN   [facility]  [phone]  [email or web address]

## 2021-03-29 NOTE — PROGRESS NOTES
This is a recent snapshot of the patient's [facility] medical record.  For current drug dose and complete information and questions, call [phone] or In [facility] pool, fv home infusion ([id number])  CSN Number:  [id number]

## 2021-04-03 ENCOUNTER — HEALTH MAINTENANCE LETTER (OUTPATIENT)
Age: 81
End: 2021-04-03

## 2021-04-05 ENCOUNTER — PATIENT OUTREACH (OUTPATIENT)
Dept: GASTROENTEROLOGY | Facility: CLINIC | Age: 81
End: 2021-04-05

## 2021-04-05 DIAGNOSIS — Z13.6 SCREENING FOR CARDIOVASCULAR CONDITION: Primary | ICD-10-CM

## 2021-04-05 NOTE — PROGRESS NOTES
I had an virtual appt with my PCP yesterday, resolution of symptoms. Would like to reschedule procedure. Message sent to OR   Pt has her colonoscopy prep and education, did not want any further information. Did reinforce that need for a  and 24 hour caregiver. Pt had a an office visit on 3/23, needs an EKG which she says she will call her PCP to get done. Order for EKG placed in chart.     Called patient, left VM regarding rescheduling her procedure s/p positive COVID.    Shai Leal MD, Patient Class: Outpatient, Location:  OR   Procedure: COLONOSCOPY with endomucosal resection, Provider: Shai Leal MD, Laterality: N/A, Anesthesia Type: Monitor Anesthesia Care    Ann Marie Greene RN, BSN,   [facility]  Care coordinator   [phone]  Fax [phone]

## 2021-04-06 ENCOUNTER — VIRTUAL VISIT (OUTPATIENT)
Dept: INTERNAL MEDICINE | Facility: CLINIC | Age: 81
End: 2021-04-06
Payer: COMMERCIAL

## 2021-04-06 DIAGNOSIS — U07.1 2019 NOVEL CORONAVIRUS DISEASE (COVID-19): Primary | ICD-10-CM

## 2021-04-06 PROCEDURE — 99213 OFFICE O/P EST LOW 20 MIN: CPT | Mod: 95 | Performed by: INTERNAL MEDICINE

## 2021-04-06 NOTE — PROGRESS NOTES
Ludy is a 80 year old who is being evaluated via a billable telephone visit.      What phone number would you like to be contacted at? [phone]  How would you like to obtain your AVS? Psychiatrict    Assessment & Plan     2019 novel coronavirus disease (COVID-19)  Today was an interesting discussion , this is an octagenarian patient formerly a patient with Dr Ce Cano who's no longer at [facility] . As an aside patient needs new primary health care provider and she says she plans to see me down the road.    She has a few different questions. The good news is that she feels fine. So these are largely academic questions regarding her recent positive Coronavirus (COVID-19) testing. She's completed a 14 day self-quarantine . I am answering her questions here one by one    1. Yes she can terminate the self-quarantine and rejoin the world, although she should still observe recommendations to mask up and maintain social distancing  2. Yes she can go forwards with planned colonoscopy   3  We then got into a discussion about the fact that she has been given an infusion treatment with Bamlanivimab, an investigational medicine used to treat mild to moderate symptoms of COVID-19 in non-hospitalized adults and adolescents. Patient explains to me that a] [facility] came to her house and she was given an infusion with this experimental drug b] she was in-between her Coronavirus (COVID-19) vaccines and was told that she needed now to reschedule her second vaccine for covvi for 90 days instead of the more customary 1 month wait between vaccination 1 and vaccination 2. I had not heard this. I will clarify and get back to patient on this point.    This was the extent of her questions !   956}      Return in about 3 months (around 7/6/2021).    Dereje Gardiner MD  [facility]    Subjective   Ludy is a 80 year old who presents for the following health issues     HPI   Chief  Complaint   Patient presents with     Covid Concern     when should patient get retested, is patient still contagious, can patient still have colonoscopy, when should her next covid shot.      Patient feels fine. I did meet this patient before, not since 5/2017 while Dr Ce Cano was out of town . She did self-quarantine for 14 day since urgent care clinic when she had a positive Coronavirus (COVID-19) march 23rd . Today is 14 days . Has no symptoms , but she never had really any significant symptoms to speak of. She can completely go out of self-quarantine now.    No longer contagious for Coronavirus (COVID-19)   She took some kind of home infusion from [facility]. Was this Coronavirus (COVID-19) antibodies . Patient is just not sure from whence this infusion    Bamlanivimab - used to treat Coronavirus (COVID-19) as an experiment drug  Phone number [phone]  [facility]     Review of Systems   Constitutional, HEENT, cardiovascular, pulmonary, gi and gu systems are negative, except as otherwise noted.      Objective           Vitals:  No vitals were obtained today due to virtual visit.    Physical Exam   healthy, alert and no distress  PSYCH: Alert and oriented times 3; coherent speech, normal   rate and volume, able to articulate logical thoughts, able   to abstract reason, no tangential thoughts, no hallucinations   or delusions  Her affect is normal  RESP: No cough, no audible wheezing, able to talk in full sentences  Remainder of exam unable to be completed due to telephone visits            Phone call duration: 15 minutes    12:10 PM   12:25 PM

## 2021-04-13 ENCOUNTER — PATIENT OUTREACH (OUTPATIENT)
Dept: GASTROENTEROLOGY | Facility: CLINIC | Age: 81
End: 2021-04-13

## 2021-04-13 ENCOUNTER — TELEPHONE (OUTPATIENT)
Dept: GASTROENTEROLOGY | Facility: CLINIC | Age: 81
End: 2021-04-13

## 2021-04-13 NOTE — PROGRESS NOTES
Pt called with request for knowing when her colonoscopy is rescheduled. Nothing scheduled at this time.   Messaged OR  for follow up    Ann Marie Greene RN, BSN,   [facility]  Care coordinator   [phone]  Fax [phone]

## 2021-04-13 NOTE — TELEPHONE ENCOUNTER
LVM for patient in regards to scheduling procedure with Dr. Leal. Left direct line for patient to call to go over scheduling details.

## 2021-04-16 ENCOUNTER — NURSE TRIAGE (OUTPATIENT)
Dept: FAMILY MEDICINE | Facility: CLINIC | Age: 81
End: 2021-04-16

## 2021-04-16 NOTE — TELEPHONE ENCOUNTER
Pt reports painful to urinate-she thinks she has urinary trac infection- has had several in the pasat. Cant get appt until next week.     She asked if the  can call meds in for her so she doens't have to suffer all weekend? Please clal her at [phone] to elt her know.

## 2021-04-16 NOTE — TELEPHONE ENCOUNTER
Spoke with pt. Has had symptoms for 4 days. Has a constant burning sensation. Doesn't know if it's actually pain. Always has frequency and urgency. No hematuria. No fever or chills. No back pain. No abdominal pain. No nausea or vomiting.  No openings for in person or virtual today at . Pt is requesting a couple of abx until she can be seen by provider. Note that pt has not scheduled an appt at this time.     Additional Information    Negative: Shock suspected (e.g., cold/pale/clammy skin, too weak to stand, low BP, rapid pulse)    Negative: Sounds like a life-threatening emergency to the triager    Negative: Unable to urinate (or only a few drops) and bladder feels very full    Negative: Vomiting    Negative: Patient sounds very sick or weak to the triager    Negative: SEVERE pain with urination    Negative: Fever > 100.5 F (38.1 C)    Negative: Side (flank) or lower back pain present    Age > 50 years    Negative: Patient is worried about sexually transmitted disease (STD)    Negative: > 2 UTIs in last year    Negative: Unusual vaginal discharge    Negative: Taking antibiotic > 3 days for UTI and painful urination not improved    Negative: Taking antibiotic > 24 hours for UTI and fever persists    Protocols used: URINATION PAIN - FEMALE-A-OH

## 2021-04-16 NOTE — TELEPHONE ENCOUNTER
Patient needs appointment. If she can not get an appointment with us today, she will need to go to [facility].   OCTAVIO Herrera, CNP

## 2021-04-16 NOTE — TELEPHONE ENCOUNTER
Patient notified of provider message as written. Patient verbalized good understanding. She stated that she would like to schedule an appointment to be seen on Monday, however writer advised patient that it would not be a good idea to wait the weekend to be seen. She was advised to be seen in urgent care and agreed with plan.    Janeen CORDOVA, RN  [facility]

## 2021-04-19 ENCOUNTER — OFFICE VISIT (OUTPATIENT)
Dept: FAMILY MEDICINE | Facility: CLINIC | Age: 81
End: 2021-04-19
Payer: COMMERCIAL

## 2021-04-19 VITALS
HEART RATE: 85 BPM | DIASTOLIC BLOOD PRESSURE: 80 MMHG | WEIGHT: 131 LBS | HEIGHT: 65 IN | RESPIRATION RATE: 18 BRPM | SYSTOLIC BLOOD PRESSURE: 134 MMHG | TEMPERATURE: 97.7 F | OXYGEN SATURATION: 97 % | BODY MASS INDEX: 21.83 KG/M2

## 2021-04-19 DIAGNOSIS — R30.0 DYSURIA: ICD-10-CM

## 2021-04-19 DIAGNOSIS — R82.90 NONSPECIFIC FINDING ON EXAMINATION OF URINE: ICD-10-CM

## 2021-04-19 DIAGNOSIS — N39.0 RECURRENT UTI (URINARY TRACT INFECTION): Primary | ICD-10-CM

## 2021-04-19 LAB
ALBUMIN UR-MCNC: NEGATIVE MG/DL
APPEARANCE UR: ABNORMAL
BACTERIA #/AREA URNS HPF: ABNORMAL /HPF
BILIRUB UR QL STRIP: NEGATIVE
COLOR UR AUTO: YELLOW
GLUCOSE UR STRIP-MCNC: NEGATIVE MG/DL
HGB UR QL STRIP: NEGATIVE
KETONES UR STRIP-MCNC: NEGATIVE MG/DL
LEUKOCYTE ESTERASE UR QL STRIP: ABNORMAL
NITRATE UR QL: POSITIVE
NON-SQ EPI CELLS #/AREA URNS LPF: ABNORMAL /LPF
PH UR STRIP: 7 PH (ref 5–7)
RBC #/AREA URNS AUTO: ABNORMAL /HPF
SOURCE: ABNORMAL
SP GR UR STRIP: 1.01 (ref 1–1.03)
UROBILINOGEN UR STRIP-ACNC: 0.2 EU/DL (ref 0.2–1)
WBC #/AREA URNS AUTO: ABNORMAL /HPF

## 2021-04-19 PROCEDURE — 99213 OFFICE O/P EST LOW 20 MIN: CPT | Performed by: NURSE PRACTITIONER

## 2021-04-19 PROCEDURE — 87186 SC STD MICRODIL/AGAR DIL: CPT | Performed by: NURSE PRACTITIONER

## 2021-04-19 PROCEDURE — 87088 URINE BACTERIA CULTURE: CPT | Performed by: NURSE PRACTITIONER

## 2021-04-19 PROCEDURE — 81001 URINALYSIS AUTO W/SCOPE: CPT | Performed by: NURSE PRACTITIONER

## 2021-04-19 PROCEDURE — 87086 URINE CULTURE/COLONY COUNT: CPT | Performed by: NURSE PRACTITIONER

## 2021-04-19 RX ORDER — CIPROFLOXACIN 250 MG/1
250 TABLET, FILM COATED ORAL 2 TIMES DAILY
Qty: 6 TABLET | Refills: 0 | Status: SHIPPED | OUTPATIENT
Start: 2021-04-19 | End: 2021-04-22

## 2021-04-19 ASSESSMENT — MIFFLIN-ST. JEOR: SCORE: 1057.15

## 2021-04-19 ASSESSMENT — PAIN SCALES - GENERAL: PAINLEVEL: EXTREME PAIN (8)

## 2021-04-19 NOTE — PROGRESS NOTES
"    Assessment & Plan     (N39.0) Recurrent UTI (urinary tract infection)  (primary encounter diagnosis)  Comment: Counseled on risk/benefits of antibiotic.   Plan: ciprofloxacin (CIPRO) 250 MG tablet          (R30.0) Dysuria  Comment:   Plan: *UA reflex to Microscopic and Culture ([place]         and [facility] (except [place] and         [place]), Urine Microscopic          (R82.90) Nonspecific finding on examination of urine  Comment:   Plan: Urine Culture Aerobic Bacterial       Return in about 2 weeks (around 5/3/2021) for Preop physical .    Annmarie Solorzano, OCTAVIO CNP  [facility] GRACY [place] is a 80 year old who presents for the following health issues     HPI     Genitourinary - Female  Onset/Duration: 4/15/2021   Description:   Painful urination (Dysuria): YES           Frequency: YES  Blood in urine (Hematuria): no  Delay in urine (Hesitency): YES  Intensity: mild  Progression of Symptoms:  improving  Accompanying Signs & Symptoms:  Fever/chills: no  Flank pain: no  Nausea and vomiting: no  Vaginal symptoms: none  Abdominal/Pelvic Pain: no  History:   History of frequent UTI s: YES, last UTI was 1/2021 and patient states that she never felt like her symptoms completely resolved, states that she did finish all of her Keflex antibiotics. Worsened 4 days ago.   History of kidney stones: no  Sexually Active: no  Possibility of pregnancy: No  Precipitating or alleviating factors: Cranberry juice   Therapies tried and outcome: Cranberry juice prn (contraindicated in Coumadin patients) and  ibuprofen; did help.     Review of Systems   Constitutional, HEENT, cardiovascular, pulmonary, gi and gu systems are negative, except as otherwise noted.      Objective    Resp 18   Ht 1.638 m (5' 4.5\")   Wt 59.4 kg (131 lb)   BMI 22.14 kg/m    Body mass index is 22.14 kg/m .  Physical Exam   GENERAL: healthy, alert and no distress  EYES: Eyes grossly normal to inspection, PERRL and " conjunctivae and sclerae normal  ABDOMEN: soft, nontender, no hepatosplenomegaly, no masses and bowel sounds normal  BACK: no CVA tenderness  PSYCH: mentation appears normal, affect normal/bright    Results for orders placed or performed in visit on 04/19/21 (from the past 24 hour(s))   *UA reflex to Microscopic and Culture ([place] and [facility] (except [place] and [place])    Specimen: Midstream Urine   Result Value Ref Range    Color Urine Yellow     Appearance Urine Slightly Cloudy     Glucose Urine Negative NEG^Negative mg/dL    Bilirubin Urine Negative NEG^Negative    Ketones Urine Negative NEG^Negative mg/dL    Specific Gravity Urine 1.015 1.003 - 1.035    Blood Urine Negative NEG^Negative    pH Urine 7.0 5.0 - 7.0 pH    Protein Albumin Urine Negative NEG^Negative mg/dL    Urobilinogen Urine 0.2 0.2 - 1.0 EU/dL    Nitrite Urine Positive (A) NEG^Negative    Leukocyte Esterase Urine Large (A) NEG^Negative    Source Midstream Urine    Urine Microscopic   Result Value Ref Range    WBC Urine 25-50 (A) OTO5^0 - 5 /HPF    RBC Urine 2-5 (A) OTO2^O - 2 /HPF    Squamous Epithelial /LPF Urine Moderate (A) FEW^Few /LPF    Bacteria Urine Many (A) NEG^Negative /HPF

## 2021-04-19 NOTE — PATIENT INSTRUCTIONS

## 2021-04-20 LAB
BACTERIA SPEC CULT: ABNORMAL
Lab: ABNORMAL
SPECIMEN SOURCE: ABNORMAL

## 2021-05-01 DIAGNOSIS — Z11.59 ENCOUNTER FOR SCREENING FOR OTHER VIRAL DISEASES: ICD-10-CM

## 2021-05-10 ENCOUNTER — OFFICE VISIT (OUTPATIENT)
Dept: FAMILY MEDICINE | Facility: CLINIC | Age: 81
End: 2021-05-10
Payer: COMMERCIAL

## 2021-05-10 VITALS
WEIGHT: 130 LBS | DIASTOLIC BLOOD PRESSURE: 58 MMHG | SYSTOLIC BLOOD PRESSURE: 126 MMHG | HEART RATE: 80 BPM | TEMPERATURE: 97.3 F | BODY MASS INDEX: 21.97 KG/M2 | OXYGEN SATURATION: 98 %

## 2021-05-10 DIAGNOSIS — Z86.73 HISTORY OF STROKE: ICD-10-CM

## 2021-05-10 DIAGNOSIS — Z11.59 ENCOUNTER FOR SCREENING FOR OTHER VIRAL DISEASES: ICD-10-CM

## 2021-05-10 DIAGNOSIS — Z01.818 PREOPERATIVE EXAMINATION: Primary | ICD-10-CM

## 2021-05-10 DIAGNOSIS — R73.9 HYPERGLYCEMIA: ICD-10-CM

## 2021-05-10 DIAGNOSIS — J43.8 OTHER EMPHYSEMA (H): ICD-10-CM

## 2021-05-10 DIAGNOSIS — Z86.0100 HISTORY OF COLONIC POLYPS: ICD-10-CM

## 2021-05-10 LAB
HBA1C MFR BLD: 7.8 % (ref 0–5.6)
SARS-COV-2 RNA RESP QL NAA+PROBE: NORMAL
SPECIMEN SOURCE: NORMAL

## 2021-05-10 PROCEDURE — 83036 HEMOGLOBIN GLYCOSYLATED A1C: CPT | Performed by: FAMILY MEDICINE

## 2021-05-10 PROCEDURE — U0005 INFEC AGEN DETEC AMPLI PROBE: HCPCS | Performed by: INTERNAL MEDICINE

## 2021-05-10 PROCEDURE — 99214 OFFICE O/P EST MOD 30 MIN: CPT | Performed by: FAMILY MEDICINE

## 2021-05-10 PROCEDURE — 36415 COLL VENOUS BLD VENIPUNCTURE: CPT | Performed by: FAMILY MEDICINE

## 2021-05-10 PROCEDURE — 93000 ELECTROCARDIOGRAM COMPLETE: CPT | Performed by: FAMILY MEDICINE

## 2021-05-10 PROCEDURE — U0003 INFECTIOUS AGENT DETECTION BY NUCLEIC ACID (DNA OR RNA); SEVERE ACUTE RESPIRATORY SYNDROME CORONAVIRUS 2 (SARS-COV-2) (CORONAVIRUS DISEASE [COVID-19]), AMPLIFIED PROBE TECHNIQUE, MAKING USE OF HIGH THROUGHPUT TECHNOLOGIES AS DESCRIBED BY CMS-2020-01-R: HCPCS | Performed by: INTERNAL MEDICINE

## 2021-05-10 NOTE — PROGRESS NOTES
[facility]  [address] [address]  Phone: [phone]  Primary Provider: Annmarie Solorzano  Pre-op Performing Provider: TONY AVILA      PREOPERATIVE EVALUATION:  Today's date: 5/10/2021    Ludy Mckenzie is a 80 year old female who presents for a preoperative evaluation.    Surgical Information:  Surgery/Procedure: COLONOSCOPY with endomucosal resection  Surgery Location: [facility]  Surgeon: Shai Leal MD  Surgery Date: 5/14/2021  Time of Surgery: 1:50 PM  Where patient plans to recover: At home with family  Fax number for surgical facility: Note does not need to be faxed, will be available electronically in Epic.    Type of Anesthesia Anticipated: Local with MAC    Assessment & Plan     The proposed surgical procedure is considered LOW risk.    Preoperative examination  No concerning risk factors, recommended to hold aspirin prior to the procedure  - EKG 12-lead complete w/read - Clinics    History of colonic polyps:    -   Due for colonoscopy.    History of Stroke:     -   Has residual gait abnormality.    Type 2 Diabetes/Hyperglycemia     Diet controlled.  A1c at goal but trending upwards, down the road might require medication  - Hemoglobin A1c    Risks and Recommendations:  The patient has the following additional risks and recommendations for perioperative complications:   - No identified additional risk factors other than previously addressed    Medication Instructions:   - aspirin: Discontinue aspirin 5-7 days prior to procedure to reduce bleeding risk. It should be resumed postoperatively.     RECOMMENDATION:  APPROVAL GIVEN to proceed with proposed procedure, without further diagnostic evaluation.    Subjective     HPI related to upcoming procedure: Colonoscopy for colonoc polyps      No flowsheet data found.    Health Care Directive:  Patient has a Health Care Directive on file      Preoperative Review of :   reviewed - no record of  controlled substances prescribed.      Status of Chronic Conditions:      Review of Systems  Constitutional, neuro, ENT, endocrine, pulmonary, cardiac, gastrointestinal, genitourinary, musculoskeletal, integument and psychiatric systems are negative, except as otherwise noted.    Patient Active Problem List    Diagnosis Date Noted     Major depressive disorder, recurrent episode, mild (H) 03/27/2012     Priority: High     She was on Prozac and that evened her out.       Mixed hyperlipidemia 03/22/2012     Priority: High     Cerebral infarction (H) 03/22/2012     Priority: High     No residual effects       2019 novel coronavirus disease (COVID-19) 03/25/2021     Priority: Medium     History of colonic polyps 01/05/2021     Priority: Medium     Added automatically from request for surgery [id number]       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) 07/30/2018     Priority: Medium     Urge incontinence of urine 06/02/2017     Priority: Medium     Cramp of limb 06/02/2017     Priority: Medium     Right sided sciatica 06/02/2017     Priority: Medium     Pulmonary emphysema, unspecified emphysema type (H) 06/02/2017     Priority: Medium     Age-related osteoporosis without current pathological fracture 06/02/2017     Priority: Medium     Pulmonary nodules 06/02/2017     Priority: Medium     Pseudophakia of both eyes 10/24/2016     Priority: Medium     Diabetic neuropathy with neurologic complication (H) 09/19/2016     Priority: Medium     Loss of balance 03/17/2016     Priority: Medium     Chronic obstructive pulmonary disease, unspecified COPD type (H) 03/02/2016     Priority: Medium     Chronic kidney disease (CKD) stage G1/A2, glomerular filtration rate (GFR) equal to or greater than 90 mL/min/1.73 square meter and albuminuria creatinine ratio between  mg/g 03/02/2016     Priority: Medium     Hypopotassemia 10/21/2015     Priority: Medium     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) 10/21/2015      Priority: Medium     Primary open angle glaucoma (POAG) 10/21/2015     Priority: Medium     Bullous pemphigoid 10/01/2013     Priority: Medium     Went off Cellcept in 2017 and has not had recurrence       Health Care Home 09/05/2012     Priority: Medium     Sondra Horta, RN-PHN  [facility]   [phone]   DX V65.8 REPLACED WITH [facility] (04/08/2013)       Thyroid nodule 03/23/2012     Priority: Medium     Seasonal allergic rhinitis 03/23/2012     Priority: Medium     Alcohol abuse, in remission 10/06/2011     Priority: Medium     Colon polyp 01/24/2009     Priority: Medium      Past Medical History:   Diagnosis Date     Acute ischemic left TREMAYNE stroke (H) 2009     Astigmatism, unspecified      Bullous pemphigoid      COPD (chronic obstructive pulmonary disease) (H)      CVA (cerebral infarction) 8/09     Depressive disorder      Diabetes (H)      Family history of diabetes mellitus      Glaucoma (increased eye pressure)      Goiter 3/12/2007     HTN (hypertension) 8/25/2009     Hypocalcemia      Hypokalemia      Hyponatremia      Mixed hyperlipidemia      Personal history of other diseases of circulatory system      Pneumonia 11/16/2010     Primary open-angle glaucoma(365.11)      Thyroid nodule 3/23/2012     Unspecified cerebral artery occlusion with cerebral infarction      Unspecified cerebral artery occlusion with cerebral infarction 1998     Past Surgical History:   Procedure Laterality Date     BIOPSY       BUNIONECTOMY  1960    JACQUELINE     CATARACT IOL, RT/LT  2010    right     COLONOSCOPY  2/22/2013    Procedure: COLONOSCOPY;  Colonoscopy ;  Surgeon: Hugo Minor MD;  Location:  GI     COLONOSCOPY N/A 11/11/2019    Procedure: Colonoscopy, With Polypectomy And Biopsy;  Surgeon: Selena Escobedo MD;  Location: [facility] OR     COLONOSCOPY WITH CO2 INSUFFLATION N/A 11/11/2019    Procedure: COLONOSCOPY, WITH CO2 INSUFFLATION;  Surgeon: Selena Escobedo MD;   Location: [facility] OR     Current Outpatient Medications   Medication Sig Dispense Refill     albuterol (ACCUNEB) 1.25 MG/3ML neb solution Take 1 vial (1.25 mg) by nebulization every 6 hours as needed for shortness of breath / dyspnea or wheezing 90 mL 6     albuterol (PROAIR HFA/PROVENTIL HFA/VENTOLIN HFA) 108 (90 Base) MCG/ACT inhaler Inhale 2 puffs into the lungs every 4 hours as needed for shortness of breath / dyspnea or wheezing 8.5 g 3     alcohol swab prep pads Use to swab area of injection/surjit as directed. 100 each 3     aspirin 81 MG tablet Take 1 tablet by mouth daily.       blood glucose (NO BRAND SPECIFIED) test strip Use to test blood sugar 1 times daily or as directed. To accompany: Blood Glucose Monitor Brands: per insurance. 100 strip 6     blood glucose calibration (NO BRAND SPECIFIED) solution To accompany: Blood Glucose Monitor Brands: per insurance. 1 Bottle 3     blood glucose monitoring (NO BRAND SPECIFIED) meter device kit Use to test blood sugar one times daily or as directed. Preferred blood glucose meter OR supplies to accompany: Blood Glucose Monitor Brands: per insurance. 1 kit 0     calcium 600 MG tablet Take 1 tablet by mouth daily  180 tablet 3     cholecalciferol (VITAMIN D3) 1000 UNIT capsule Take 1 capsule by mouth daily.       fluticasone-salmeterol (ADVAIR) 500-50 MCG/DOSE inhaler Inhale 1 puff into the lungs 2 times daily 14 each 3     latanoprost (XALATAN) 0.005 % ophthalmic solution Place 1 drop into both eyes At Bedtime 7.5 mL 3     losartan (COZAAR) 25 MG tablet TAKE 1/2 TABLET BY MOUTH EVERY DAY 45 tablet 4     mirabegron (MYRBETRIQ) 25 MG 24 hr tablet Take 1 tablet (25 mg) by mouth daily 30 tablet 3     PEG 3350-KCl-NaBcb-NaCl-NaSulf (GOLYTELY) 227.1 g PACK 4 - 6 pm start drinking GoLytely as fast possible. Drink 8oz every 10-15 min. Drink 8 glasses.  Finish drinking 9 hours prior to exam. 1 each 0     potassium chloride ER (KLOR-CON) 20 MEQ CR tablet Take 1 tablet (20 mEq) by  mouth daily 90 tablet 4     rosuvastatin (CRESTOR) 5 MG tablet Take 1 tablet (5 mg) by mouth daily 90 tablet 1     thin (NO BRAND SPECIFIED) lancets Use with lanceting device. To accompany: Blood Glucose Monitor Brands: per insurance. 100 each 6     tiotropium (SPIRIVA HANDIHALER) 18 MCG inhaled capsule Inhale 1 capsule (18 mcg) into the lungs daily Patient can use for now until she gets her Revefenacin Nebs. She knows to switch and not use both Spiriva and Revefenacin Nebs at the same time. 90 capsule 1     vitamin  B complex with vitamin C (VITAMIN  B COMPLEX) TABS Take 1 tablet by mouth daily       sertraline (ZOLOFT) 50 MG tablet Take 1 tablet (50 mg) by mouth daily 90 tablet 1       Allergies   Allergen Reactions     Amoxicillin GI Disturbance and Nausea     Amoxicillin-Pot Clavulanate Nausea     Augmentin Nausea and Vomiting     Doxycycline GI Disturbance     Iodine Itching and Swelling     Pt is ok with ct contrast dye (isovue 370)  Pt is ok with ct contrast dye (isovue 370)     Mercury Unknown     Metronidazole GI Disturbance     Penicillins Unknown     Phenylmercuric Nitrate Unknown        Social History     Tobacco Use     Smoking status: Former Smoker     Packs/day: 1.50     Years: 50.00     Pack years: 75.00     Types: Cigarettes     Quit date: 2009     Years since quittin.8     Smokeless tobacco: Never Used   Substance Use Topics     Alcohol use: No     Alcohol/week: 4.0 standard drinks     Family History   Problem Relation Age of Onset     Cancer Maternal Grandfather         bone     Diabetes Mother 50        dx age 50, hip fracture     Glaucoma Mother      Macular Degeneration Mother      C.A.D. Father         pacemaker     Glaucoma Sister      History   Drug Use No         Objective     /58   Pulse 80   Temp 97.3  F (36.3  C) (Oral)   Wt 59 kg (130 lb)   SpO2 98%   BMI 21.97 kg/m      Physical Exam    GENERAL APPEARANCE: healthy, alert and no distress     EYES: EOMI, PERRL      HENT: ear canals and TM's normal and nose and mouth without ulcers or lesions     NECK: no adenopathy and thyroid normal to palpation     RESP: lungs clear to auscultation - no rales, rhonchi or wheezes     CV: regular rates and rhythm and no murmur, click or rub     ABDOMEN:  soft, nontender, no HSM or masses and bowel sounds normal     SKIN: no suspicious lesions or rashes     NEURO: Ataxic gait, normal strength and tone, mentation intact      PSYCH: mentation appears normal. and affect normal/bright    Recent Labs   Lab Test 01/04/21  0931 09/13/19  1012   HGB  --  12.6    135   POTASSIUM 4.0 4.7   CR 0.64 0.63   A1C 7.5* 6.6*      Results for orders placed or performed in visit on 05/10/21   Asymptomatic COVID-19 Virus (Coronavirus) by PCR     Status: None    Specimen: Nasopharyngeal   Result Value Ref Range    COVID-19 Virus PCR to [facility] - Source Nasopharyngeal     COVID-19 Virus PCR to [facility] - Result       Test received-See reflex to IDDL test SARS CoV2 (COVID-19) Virus RT-PCR   SARS-CoV-2 COVID-19 Virus (Coronavirus) by PCR     Status: None    Specimen: Nasopharyngeal   Result Value Ref Range    SARS-CoV-2 Virus Specimen Source Nasopharyngeal     SARS-CoV-2 PCR Result NEGATIVE     SARS-CoV-2 PCR Comment       Testing was performed using the fred SARS-CoV-2 assay on the fred 6800 System.2   Results for orders placed or performed in visit on 05/10/21   Hemoglobin A1c     Status: Abnormal   Result Value Ref Range    Hemoglobin A1C 7.8 (H) 0 - 5.6 %       Diagnostics:  Labs pending at this time.  Results will be reviewed when available.   EKG: appears normal, NSR, nonspecific ST-T changes, unchanged from previous tracings    Revised Cardiac Risk Index (RCRI):  The patient has the following serious cardiovascular risks for perioperative complications:   - No serious cardiac risks = 0 points     RCRI Interpretation: 0 points: Class I (very low risk - 0.4% complication rate)    Signed Electronically by:  Villa Felton MD  Copy of this evaluation report is provided to requesting physician.

## 2021-05-12 NOTE — BRIEF OP NOTE
[facility] Brief Operative Note    Pre-operative diagnosis: History of colonic polyps [Z86.010]   Post-operative diagnosis Two ascending colon polyps   Procedure: Procedure(s):  COLONOSCOPY with endomucosal resection   Surgeon: Shai Leal MD   Assistants(s): Efrain Doran MD   Estimated blood loss: Minimal    Specimens: Yes       Findings:  - 20-mm flat elevated polyp ([place] 0-IIa; NICE type I) in ascending colon, located a few centimeters downstream of the IC valve. Piecemeal endoscopic mucosal resection performed. Snare tip soft coagulation of EMR margins. EMR defect closed with hemoclips.  - 15-mm flat elevated polyp (A[place]ora 0-IIa; NICE type I) in ascending colon, located a few centimeters downstream of the more proximal polyp. Piecemeal endoscopic mucosal resection performed. Snare tip soft coagulation of EMR margins. EMR defect closed with hemoclips.    Recommendations:  - Observe in PACU and discharge.  - Hold aspirin for 3-days.    Efrain Doran MD on 5/14/2021 at 3:16 PM    History:  79 yo M w/ h/o CVA (2009), COPD, bullous pemphigoid, & HLD. 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 Today (5/14/21), patient presents for colonoscopy w/ EMR of ascending colon SSA.

## 2021-05-14 ENCOUNTER — ANESTHESIA (OUTPATIENT)
Dept: SURGERY | Facility: CLINIC | Age: 81
End: 2021-05-14
Payer: COMMERCIAL

## 2021-05-14 ENCOUNTER — ANESTHESIA EVENT (OUTPATIENT)
Dept: SURGERY | Facility: CLINIC | Age: 81
End: 2021-05-14
Payer: COMMERCIAL

## 2021-05-14 ENCOUNTER — HOSPITAL ENCOUNTER (OUTPATIENT)
Facility: CLINIC | Age: 81
Discharge: HOME | End: 2021-05-14
Attending: INTERNAL MEDICINE | Admitting: INTERNAL MEDICINE
Payer: COMMERCIAL

## 2021-05-14 VITALS
TEMPERATURE: 98.7 F | BODY MASS INDEX: 21.6 KG/M2 | OXYGEN SATURATION: 97 % | WEIGHT: 126.54 LBS | RESPIRATION RATE: 18 BRPM | HEART RATE: 80 BPM | DIASTOLIC BLOOD PRESSURE: 78 MMHG | SYSTOLIC BLOOD PRESSURE: 102 MMHG | HEIGHT: 64 IN

## 2021-05-14 DIAGNOSIS — Z86.0100 HISTORY OF COLONIC POLYPS: ICD-10-CM

## 2021-05-14 LAB
COLONOSCOPY: NORMAL
GLUCOSE BLDC GLUCOMTR-MCNC: 123 MG/DL (ref 70–99)
INR PPP: 1 (ref 0.86–1.14)

## 2021-05-14 PROCEDURE — 88305 TISSUE EXAM BY PATHOLOGIST: CPT | Mod: 26 | Performed by: PATHOLOGY

## 2021-05-14 PROCEDURE — 272N000001 HC OR GENERAL SUPPLY STERILE: Performed by: INTERNAL MEDICINE

## 2021-05-14 PROCEDURE — 85610 PROTHROMBIN TIME: CPT | Performed by: STUDENT IN AN ORGANIZED HEALTH CARE EDUCATION/TRAINING PROGRAM

## 2021-05-14 PROCEDURE — 250N000011 HC RX IP 250 OP 636: Performed by: NURSE ANESTHETIST, CERTIFIED REGISTERED

## 2021-05-14 PROCEDURE — 360N000075 HC SURGERY LEVEL 2, PER MIN: Performed by: INTERNAL MEDICINE

## 2021-05-14 PROCEDURE — 370N000017 HC ANESTHESIA TECHNICAL FEE, PER MIN: Performed by: INTERNAL MEDICINE

## 2021-05-14 PROCEDURE — 258N000003 HC RX IP 258 OP 636: Performed by: NURSE ANESTHETIST, CERTIFIED REGISTERED

## 2021-05-14 PROCEDURE — 250N000009 HC RX 250: Performed by: NURSE ANESTHETIST, CERTIFIED REGISTERED

## 2021-05-14 PROCEDURE — 272N000002 HC OR SUPPLY OTHER OPNP: Performed by: INTERNAL MEDICINE

## 2021-05-14 PROCEDURE — 36415 COLL VENOUS BLD VENIPUNCTURE: CPT | Performed by: STUDENT IN AN ORGANIZED HEALTH CARE EDUCATION/TRAINING PROGRAM

## 2021-05-14 PROCEDURE — 250N000009 HC RX 250: Performed by: INTERNAL MEDICINE

## 2021-05-14 PROCEDURE — 88305 TISSUE EXAM BY PATHOLOGIST: CPT | Mod: TC | Performed by: INTERNAL MEDICINE

## 2021-05-14 PROCEDURE — 999N000141 HC STATISTIC PRE-PROCEDURE NURSING ASSESSMENT: Performed by: INTERNAL MEDICINE

## 2021-05-14 PROCEDURE — 82962 GLUCOSE BLOOD TEST: CPT

## 2021-05-14 PROCEDURE — 710N000012 HC RECOVERY PHASE 2, PER MINUTE: Performed by: INTERNAL MEDICINE

## 2021-05-14 RX ORDER — MEPERIDINE HYDROCHLORIDE 25 MG/ML
12.5 INJECTION INTRAMUSCULAR; INTRAVENOUS; SUBCUTANEOUS
Status: CANCELLED | OUTPATIENT
Start: 2021-05-14

## 2021-05-14 RX ORDER — NALOXONE HYDROCHLORIDE 0.4 MG/ML
0.4 INJECTION, SOLUTION INTRAMUSCULAR; INTRAVENOUS; SUBCUTANEOUS
Status: CANCELLED | OUTPATIENT
Start: 2021-05-14

## 2021-05-14 RX ORDER — FENTANYL CITRATE 50 UG/ML
25-50 INJECTION, SOLUTION INTRAMUSCULAR; INTRAVENOUS
Status: CANCELLED | OUTPATIENT
Start: 2021-05-14

## 2021-05-14 RX ORDER — ONDANSETRON 2 MG/ML
4 INJECTION INTRAMUSCULAR; INTRAVENOUS EVERY 6 HOURS PRN
Status: CANCELLED | OUTPATIENT
Start: 2021-05-14

## 2021-05-14 RX ORDER — PROPOFOL 10 MG/ML
INJECTION, EMULSION INTRAVENOUS CONTINUOUS PRN
Status: DISCONTINUED | OUTPATIENT
Start: 2021-05-14 | End: 2021-05-14

## 2021-05-14 RX ORDER — NALOXONE HYDROCHLORIDE 0.4 MG/ML
0.2 INJECTION, SOLUTION INTRAMUSCULAR; INTRAVENOUS; SUBCUTANEOUS
Status: CANCELLED | OUTPATIENT
Start: 2021-05-14

## 2021-05-14 RX ORDER — PROCHLORPERAZINE MALEATE 5 MG
5 TABLET ORAL EVERY 6 HOURS PRN
Status: CANCELLED | OUTPATIENT
Start: 2021-05-14

## 2021-05-14 RX ORDER — LIDOCAINE 40 MG/G
CREAM TOPICAL
Status: DISCONTINUED | OUTPATIENT
Start: 2021-05-14 | End: 2021-05-14 | Stop reason: HOSPADM

## 2021-05-14 RX ORDER — SODIUM CHLORIDE, SODIUM LACTATE, POTASSIUM CHLORIDE, CALCIUM CHLORIDE 600; 310; 30; 20 MG/100ML; MG/100ML; MG/100ML; MG/100ML
INJECTION, SOLUTION INTRAVENOUS CONTINUOUS PRN
Status: DISCONTINUED | OUTPATIENT
Start: 2021-05-14 | End: 2021-05-14

## 2021-05-14 RX ORDER — LIDOCAINE HYDROCHLORIDE 20 MG/ML
INJECTION, SOLUTION INFILTRATION; PERINEURAL PRN
Status: DISCONTINUED | OUTPATIENT
Start: 2021-05-14 | End: 2021-05-14

## 2021-05-14 RX ORDER — ONDANSETRON 4 MG/1
4 TABLET, ORALLY DISINTEGRATING ORAL EVERY 6 HOURS PRN
Status: CANCELLED | OUTPATIENT
Start: 2021-05-14

## 2021-05-14 RX ORDER — ONDANSETRON 2 MG/ML
4 INJECTION INTRAMUSCULAR; INTRAVENOUS
Status: DISCONTINUED | OUTPATIENT
Start: 2021-05-14 | End: 2021-05-14 | Stop reason: HOSPADM

## 2021-05-14 RX ORDER — FLUMAZENIL 0.1 MG/ML
0.2 INJECTION, SOLUTION INTRAVENOUS
Status: CANCELLED | OUTPATIENT
Start: 2021-05-14 | End: 2021-05-15

## 2021-05-14 RX ORDER — ONDANSETRON 2 MG/ML
4 INJECTION INTRAMUSCULAR; INTRAVENOUS EVERY 30 MIN PRN
Status: CANCELLED | OUTPATIENT
Start: 2021-05-14

## 2021-05-14 RX ORDER — PROPOFOL 10 MG/ML
INJECTION, EMULSION INTRAVENOUS PRN
Status: DISCONTINUED | OUTPATIENT
Start: 2021-05-14 | End: 2021-05-14

## 2021-05-14 RX ORDER — HYDROMORPHONE HYDROCHLORIDE 1 MG/ML
.3-.5 INJECTION, SOLUTION INTRAMUSCULAR; INTRAVENOUS; SUBCUTANEOUS EVERY 10 MIN PRN
Status: CANCELLED | OUTPATIENT
Start: 2021-05-14

## 2021-05-14 RX ORDER — SODIUM CHLORIDE, SODIUM LACTATE, POTASSIUM CHLORIDE, CALCIUM CHLORIDE 600; 310; 30; 20 MG/100ML; MG/100ML; MG/100ML; MG/100ML
INJECTION, SOLUTION INTRAVENOUS CONTINUOUS
Status: CANCELLED | OUTPATIENT
Start: 2021-05-14

## 2021-05-14 RX ORDER — ONDANSETRON 4 MG/1
4 TABLET, ORALLY DISINTEGRATING ORAL EVERY 30 MIN PRN
Status: CANCELLED | OUTPATIENT
Start: 2021-05-14

## 2021-05-14 RX ORDER — METOPROLOL TARTRATE 1 MG/ML
1-2 INJECTION, SOLUTION INTRAVENOUS EVERY 5 MIN PRN
Status: CANCELLED | OUTPATIENT
Start: 2021-05-14

## 2021-05-14 RX ADMIN — PROPOFOL 20 MG: 10 INJECTION, EMULSION INTRAVENOUS at 13:38

## 2021-05-14 RX ADMIN — PROPOFOL 20 MG: 10 INJECTION, EMULSION INTRAVENOUS at 13:50

## 2021-05-14 RX ADMIN — PHENYLEPHRINE HYDROCHLORIDE 100 MCG: 10 INJECTION INTRAVENOUS at 14:43

## 2021-05-14 RX ADMIN — PROPOFOL 30 MG: 10 INJECTION, EMULSION INTRAVENOUS at 13:46

## 2021-05-14 RX ADMIN — PHENYLEPHRINE HYDROCHLORIDE 100 MCG: 10 INJECTION INTRAVENOUS at 14:35

## 2021-05-14 RX ADMIN — PHENYLEPHRINE HYDROCHLORIDE 100 MCG: 10 INJECTION INTRAVENOUS at 14:53

## 2021-05-14 RX ADMIN — SODIUM CHLORIDE, POTASSIUM CHLORIDE, SODIUM LACTATE AND CALCIUM CHLORIDE: 600; 310; 30; 20 INJECTION, SOLUTION INTRAVENOUS at 13:27

## 2021-05-14 RX ADMIN — PROPOFOL 125 MCG/KG/MIN: 10 INJECTION, EMULSION INTRAVENOUS at 13:35

## 2021-05-14 RX ADMIN — LIDOCAINE HYDROCHLORIDE 100 MG: 20 INJECTION, SOLUTION INFILTRATION; PERINEURAL at 13:35

## 2021-05-14 RX ADMIN — PHENYLEPHRINE HYDROCHLORIDE 100 MCG: 10 INJECTION INTRAVENOUS at 15:02

## 2021-05-14 RX ADMIN — GLUCAGON HYDROCHLORIDE 1 MG: KIT at 14:23

## 2021-05-14 RX ADMIN — SODIUM CHLORIDE, POTASSIUM CHLORIDE, SODIUM LACTATE AND CALCIUM CHLORIDE: 600; 310; 30; 20 INJECTION, SOLUTION INTRAVENOUS at 14:59

## 2021-05-14 ASSESSMENT — MIFFLIN-ST. JEOR: SCORE: 1036.75

## 2021-05-14 NOTE — ANESTHESIA CARE TRANSFER NOTE
Patient: Ludy Mckenzie    Procedure(s):  COLONOSCOPY with endomucosal resection    Diagnosis: History of colonic polyps [Z86.010]  Diagnosis Additional Information: No value filed.    Anesthesia Type:   MAC     Note:    Oropharynx: oropharynx clear of all foreign objects and spontaneously breathing  Level of Consciousness: awake  Oxygen Supplementation: room air    Independent Airway: airway patency satisfactory and stable  Dentition: dentition unchanged  Vital Signs Stable: post-procedure vital signs reviewed and stable  Report to RN Given: handoff report given  Patient transferred to: Phase II  Comments: Baseline neuro status  Handoff Report: Identifed the Patient, Identified the Reponsible Provider, Reviewed the pertinent medical history, Discussed the surgical course, Reviewed Intra-OP anesthesia mangement and issues during anesthesia, Set expectations for post-procedure period and Allowed opportunity for questions and acknowledgement of understanding      Vitals: (Last set prior to Anesthesia Care Transfer)  CRNA VITALS  5/14/2021 1445 - 5/14/2021 1520      5/14/2021             Resp Rate (observed):  24    EKG:  Sinus rhythm        Electronically Signed By: OCTAVIO Xiong CRNA  May 14, 2021  3:20 PM

## 2021-05-14 NOTE — ANESTHESIA PREPROCEDURE EVALUATION
Anesthesia Pre-Procedure Evaluation    Patient: Ludy Mckenzie   MRN: [id number] : 1940        Preoperative Diagnosis: History of colonic polyps [Z86.010]   Procedure : Procedure(s):  COLONOSCOPY with endomucosal resection     Past Medical History:   Diagnosis Date     Acute ischemic left TREMAYNE stroke (H)      Astigmatism, unspecified      Bullous pemphigoid      COPD (chronic obstructive pulmonary disease) (H)      CVA (cerebral infarction)      Depressive disorder      Diabetes (H)      Family history of diabetes mellitus      Glaucoma (increased eye pressure)      Goiter 3/12/2007     HTN (hypertension) 2009     Hypocalcemia      Hypokalemia      Hyponatremia      Mixed hyperlipidemia      Personal history of other diseases of circulatory system      Pneumonia 2010     Primary open-angle glaucoma(365.11)      Thyroid nodule 3/23/2012     Unspecified cerebral artery occlusion with cerebral infarction      Unspecified cerebral artery occlusion with cerebral infarction       Past Surgical History:   Procedure Laterality Date     BIOPSY       BUNIONECTOMY  1960    JACQUELINE     CATARACT IOL, RT/LT      right     COLONOSCOPY  2013    Procedure: COLONOSCOPY;  Colonoscopy ;  Surgeon: Hugo Minor MD;  Location: [facility]     COLONOSCOPY N/A 2019    Procedure: Colonoscopy, With Polypectomy And Biopsy;  Surgeon: Selena Escobedo MD;  Location: [facility]     COLONOSCOPY WITH CO2 INSUFFLATION N/A 2019    Procedure: COLONOSCOPY, WITH CO2 INSUFFLATION;  Surgeon: Selena Escobedo MD;  Location: [facility]      Allergies   Allergen Reactions     Amoxicillin GI Disturbance and Nausea     Augmentin Nausea and Vomiting     Doxycycline GI Disturbance     Iodine Itching and Swelling     Pt is ok with ct contrast dye (isovue 370)     Mercury Unknown     Metronidazole GI Disturbance     Penicillins Unknown     Phenylmercuric Nitrate Unknown      Social History     Tobacco Use     Smoking  status: Former Smoker     Packs/day: 1.50     Years: 50.00     Pack years: 75.00     Types: Cigarettes     Quit date: 2009     Years since quittin.8     Smokeless tobacco: Never Used   Substance Use Topics     Alcohol use: No     Alcohol/week: 4.0 standard drinks      Wt Readings from Last 1 Encounters:   21 57.4 kg (126 lb 8.7 oz)        Anesthesia Evaluation            ROS/MED HX  ENT/Pulmonary:       Neurologic:     (+) CVA, TIA,     Cardiovascular:     (+) Dyslipidemia hypertension-----    METS/Exercise Tolerance:     Hematologic:       Musculoskeletal:       GI/Hepatic:       Renal/Genitourinary:     (+) renal disease,     Endo:     (+) type II DM, thyroid problem, hypothyroidism,     Psychiatric/Substance Use:       Infectious Disease:       Malignancy:       Other:            Physical Exam    Airway  airway exam normal           Respiratory Devices and Support         Dental           Cardiovascular             Pulmonary                   OUTSIDE LABS:  CBC:   Lab Results   Component Value Date    WBC 7.6 2019    WBC 7.2 2018    HGB 12.6 2019    HGB 13.4 2019    HCT 40.7 2019    HCT 36.1 2018     2019     2018     BMP:   Lab Results   Component Value Date     2021     2019    POTASSIUM 4.0 2021    POTASSIUM 4.7 2019    CHLORIDE 105 2021    CHLORIDE 104 2019    CO2 23 2021    CO2 23 2019    BUN 10 2021    BUN 11 2019    CR 0.64 2021    CR 0.63 2019     (H) 2021     (H) 2019     COAGS:   Lab Results   Component Value Date    PTT 31 11/15/2010    INR 1.00 2021     POC:   Lab Results   Component Value Date     (H) 2021     HEPATIC:   Lab Results   Component Value Date    ALBUMIN 3.7 2019    PROTTOTAL 7.1 2019    ALT 25 2019    AST 35 2019    GGT 42 (H) 2013    ALKPHOS 93  09/13/2019    BILITOTAL 0.5 09/13/2019     OTHER:   Lab Results   Component Value Date    LACT 1.5 04/06/2014    A1C 7.8 (H) 05/10/2021    IAN 8.8 01/04/2021    PHOS 2.6 11/18/2015    MAG 2.1 09/13/2019    LIPASE 93 03/27/2019    AMYLASE 61 11/15/2010    TSH 3.44 03/27/2018    T4 0.91 04/01/2008    CRP 3.0 08/24/2015    SED 9 08/24/2015       Anesthesia Plan    ASA Status:  3   NPO Status:  NPO Appropriate    Anesthesia Type: MAC.     - Reason for MAC: straight local not clinically adequate   Induction: Intravenous.   Maintenance: Balanced.        Consents    Anesthesia Plan(s) and associated risks, benefits, and realistic alternatives discussed. Questions answered and patient/representative(s) expressed understanding.     - Discussed with:  Patient         Postoperative Care    Pain management: Oral pain medications.   PONV prophylaxis: Ondansetron (or other 5HT-3), Dexamethasone or Solumedrol     Comments:                Efrain Aburto MD

## 2021-05-14 NOTE — DISCHARGE INSTRUCTIONS
[facility]  Same-Day Surgery   Adult Discharge Orders & Instructions     For 24 hours after surgery    1. Get plenty of rest.  A responsible adult must stay with you for at least 24 hours after you leave the hospital.   2. Do not drive or use heavy equipment.  If you have weakness or tingling, don't drive or use heavy equipment until this feeling goes away.  3. Do not drink alcohol.  4. Avoid strenuous or risky activities.  Ask for help when climbing stairs.   5. You may feel lightheaded.  IF so, sit for a few minutes before standing.  Have someone help you get up.   6. If you have nausea (feel sick to your stomach): Drink only clear liquids such as apple juice, ginger ale, broth or 7-Up.  Rest may also help.  Be sure to drink enough fluids.  Move to a regular diet as you feel able.  7. You may have a slight fever. Call the doctor if your fever is over 100 F (37.7 C) (taken under the tongue) or lasts longer than 24 hours.  8. You may have a dry mouth, a sore throat, muscle aches or trouble sleeping.  These should go away after 24 hours.  9. Do not make important or legal decisions.   Call your doctor for any of the followin.  Signs of infection (fever, growing tenderness at the surgery site, a large amount of drainage or bleeding, severe pain, foul-smelling drainage, redness, swelling).    2. It has been over 8 to 10 hours since surgery and you are still not able to urinate (pass water).      To contact a doctor, call Dr Leal's office at [phone] at the GI Clinic________________________________________ or:        [phone] and ask for the resident on call for   ______________________________________________ (answered 24 hours a day)      Emergency Department:    [facility]: [phone]       (TTY for hearing impaired: [phone])

## 2021-05-14 NOTE — ANESTHESIA POSTPROCEDURE EVALUATION
Patient: Ludy Mckenzie    Procedure(s):  COLONOSCOPY with endomucosal resection    Diagnosis:History of colonic polyps [Z86.010]  Diagnosis Additional Information: No value filed.    Anesthesia Type:  MAC    Note:  Disposition: Outpatient   Postop Pain Control: Uneventful            Sign Out: Well controlled pain   PONV: No   Neuro/Psych: Uneventful            Sign Out: Acceptable/Baseline neuro status   Airway/Respiratory: Uneventful            Sign Out: Acceptable/Baseline resp. status   CV/Hemodynamics: Uneventful            Sign Out: Acceptable CV status; No obvious hypovolemia; No obvious fluid overload   Other NRE:    DID A NON-ROUTINE EVENT OCCUR?            Last vitals:  Vitals:    05/14/21 1230   BP: (!) 140/71   Pulse: 80   Resp: 18   Temp: 37  C (98.6  F)   SpO2: 96%       Last vitals prior to Anesthesia Care Transfer:  CRNA VITALS  5/14/2021 1445 - 5/14/2021 1532      5/14/2021             Resp Rate (observed):  24    EKG:  Sinus rhythm          Electronically Signed By: Efrain Aburto MD  May 14, 2021  3:32 PM

## 2021-05-17 LAB — COPATH REPORT: NORMAL

## 2021-05-18 NOTE — RESULT ENCOUNTER NOTE
I called the patient and left a message and sent a [employer] message regarding her pathology results from her colonoscopy. See procedure report for details. Two SSAs removed in piecemeal. Recommend repeat colonoscopy in 6 months to survey the EMR sites. After that can probably stop surveillance colonoscopies.    Laury,  Please assist in scheduling:     Procedure/Imaging/Clinic: Colonoscopy  Physician: Elvis  Timin months  Procedure length: 45 min  Anesthesia: moderate sedation  Dx: colon polyps  Tier: 4  Location: [facility]  Thanks    Shai Leal MD  [facility]  Division of Gastroenterology and Hepatology  [facility] [address] [id number]

## 2021-05-24 ENCOUNTER — MYC MEDICAL ADVICE (OUTPATIENT)
Dept: PULMONOLOGY | Facility: CLINIC | Age: 81
End: 2021-05-24

## 2021-05-24 DIAGNOSIS — J43.2 CENTRILOBULAR EMPHYSEMA (H): Primary | ICD-10-CM

## 2021-05-24 NOTE — TELEPHONE ENCOUNTER
Patient is requesting a replacement nebulizer.     Last Visit: 12/2020  Next Visit: 6/2021    Prescription pended, will route to Dr. Gr to review and sign.     Kaylyn Tyson RN   [facility] Care Coordinator  [facility]   Phone: [phone]  Fax: [phone]

## 2021-05-27 ENCOUNTER — TELEPHONE (OUTPATIENT)
Dept: PULMONOLOGY | Facility: CLINIC | Age: 81
End: 2021-05-27

## 2021-05-27 NOTE — TELEPHONE ENCOUNTER
Orders for nebulizer machine and supplies has been faxed to the [facility] in [place].      Leeann Dye LPN  Pulmonary Medicine:  [facility]  Phone: [phone] Fax: [phone]

## 2021-06-03 ENCOUNTER — TELEPHONE (OUTPATIENT)
Dept: PULMONOLOGY | Facility: CLINIC | Age: 81
End: 2021-06-03

## 2021-06-03 NOTE — TELEPHONE ENCOUNTER
Cj Mooney,    Yes, she can have PFTs with her visit on 6/23. Sounds like she recently had covid 19 from review of her chart. Her symptoms may be related to complications from covid and not necessarily from COPD. I will not change her regimen for now. If her symptoms continue to worsen, I will advise that she comes in for a chest x ray.    Thank you.

## 2021-06-03 NOTE — TELEPHONE ENCOUNTER
Patient called today asking if Dr. Gr would like PFTs with her visit on 6/23. In addition, patient is reporting some difficulty breathing. Increased with activity and outings. She reports dry, nonproductive cough. She states this has been going on for the past couple weeks. She reports compliance with current medication regimen.

## 2021-06-07 ENCOUNTER — OFFICE VISIT (OUTPATIENT)
Dept: OPHTHALMOLOGY | Facility: CLINIC | Age: 81
End: 2021-06-07
Payer: COMMERCIAL

## 2021-06-07 DIAGNOSIS — H40.1131 PRIMARY OPEN ANGLE GLAUCOMA (POAG) OF BOTH EYES, MILD STAGE: Primary | ICD-10-CM

## 2021-06-07 DIAGNOSIS — H52.4 PRESBYOPIA: ICD-10-CM

## 2021-06-07 DIAGNOSIS — H35.3131 EARLY DRY STAGE NONEXUDATIVE AGE-RELATED MACULAR DEGENERATION OF BOTH EYES: ICD-10-CM

## 2021-06-07 PROCEDURE — 92012 INTRM OPH EXAM EST PATIENT: CPT | Performed by: STUDENT IN AN ORGANIZED HEALTH CARE EDUCATION/TRAINING PROGRAM

## 2021-06-07 PROCEDURE — 92083 EXTENDED VISUAL FIELD XM: CPT | Performed by: STUDENT IN AN ORGANIZED HEALTH CARE EDUCATION/TRAINING PROGRAM

## 2021-06-07 PROCEDURE — 92133 CPTRZD OPH DX IMG PST SGM ON: CPT | Performed by: STUDENT IN AN ORGANIZED HEALTH CARE EDUCATION/TRAINING PROGRAM

## 2021-06-07 ASSESSMENT — REFRACTION_MANIFEST
OS_SPHERE: -0.25
OD_CYLINDER: +1.50
OD_SPHERE: -1.50
OD_AXIS: 180
OS_AXIS: 180
OD_ADD: +3.00
OS_CYLINDER: +0.50
OS_ADD: +3.00

## 2021-06-07 ASSESSMENT — VISUAL ACUITY
OS_CC: 20/25
OD_SC+: -2
OS_CC+: -1
METHOD: SNELLEN - LINEAR
OD_CC: 20/25
OS_SC: 20/40
OD_CC+: -2
OD_CC: J1+-
CORRECTION_TYPE: GLASSES
OS_CC: J1+-
OS_SC+: +2
OD_SC: 20/70

## 2021-06-07 ASSESSMENT — CUP TO DISC RATIO
OD_RATIO: 0.45
OS_RATIO: 0.5

## 2021-06-07 ASSESSMENT — TONOMETRY
OS_IOP_MMHG: 16
IOP_METHOD: APPLANATION
OD_IOP_MMHG: 18

## 2021-06-07 ASSESSMENT — REFRACTION_WEARINGRX
OD_AXIS: 003
OS_CYLINDER: +0.50
OD_CYLINDER: +1.25
OS_SPHERE: +2.75
OS_AXIS: 003
OS_SPHERE: -0.25
OD_AXIS: 178
OD_CYLINDER: +1.50
OD_SPHERE: +2.00
OD_SPHERE: -1.00
SPECS_TYPE: SVL DISTANCE
OS_CYLINDER: +0.50
SPECS_TYPE: SVL READING
OS_AXIS: 178

## 2021-06-07 ASSESSMENT — EXTERNAL EXAM - RIGHT EYE: OD_EXAM: NORMAL

## 2021-06-07 ASSESSMENT — EXTERNAL EXAM - LEFT EYE: OS_EXAM: NORMAL

## 2021-06-07 ASSESSMENT — SLIT LAMP EXAM - LIDS
COMMENTS: 1+ MEIBOMIAN GLAND DYSFUNCTION
COMMENTS: 1+ MEIBOMIAN GLAND DYSFUNCTION

## 2021-06-07 NOTE — PROGRESS NOTES
Current Eye Medications: Latanoprost at bedtime both eyes     Subjective:  Here for HVF/OCT and IOP today. Sister just had cataract/glaucoma surgery at the same time, she is wondering for herself if this would work. Also having trouble with her new glasses. Needs computer distance glasses and has readers and distance only.      Objective:  See Ophthalmology Exam.       Assessment:  Ludy Mckenzie is a 80 year old female who presents with:   Encounter Diagnoses   Name Primary?     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, mild stage Intraocular pressure 18/16 today on latanoprost. Stable OCT and visual field. Intraocular pressure is a big higher than usual for her, but testing is stable. Continue same medication.    OCT optic nerve: avg retinal nerve fiber layer 89/92; stable and within normal limits both eyes.     [place] visual field (HVF) 24-2: mild scattered loss both eyes.     Early dry stage nonexudative age-related macular degeneration of both eyes        Plan:  Continue Latanoprost (green top) at bedtime both eyes     Glasses prescription given     Arjun Valenzuela MD  [phone]

## 2021-06-07 NOTE — PATIENT INSTRUCTIONS
Continue Latanoprost (green top) at bedtime both eyes     Glasses prescription given     Arjun Valenzuela MD  [phone]

## 2021-06-07 NOTE — LETTER
6/7/2021         RE: Ludy Mckenzie  [address]  [place] [address]        Dear Colleague,    Thank you for referring your patient, Ludy Mckenzie, to the [facility]. Please see a copy of my visit note below.     Current Eye Medications: Latanoprost at bedtime both eyes     Subjective:  Here for HVF/OCT and IOP today. Sister just had cataract/glaucoma surgery at the same time, she is wondering for herself if this would work. Also having trouble with her new glasses. Needs computer distance glasses and has readers and distance only.      Objective:  See Ophthalmology Exam.       Assessment:  Ludy Mckenzie is a 80 year old female who presents with:   Encounter Diagnoses   Name Primary?     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, mild stage Intraocular pressure 18/16 today on latanoprost. Stable OCT and visual field. Intraocular pressure is a big higher than usual for her, but testing is stable. Continue same medication.    OCT optic nerve: avg retinal nerve fiber layer 89/92; stable and within normal limits both eyes.     [place] visual field (HVF) 24-2: mild scattered loss both eyes.     Early dry stage nonexudative age-related macular degeneration of both eyes        Plan:  Continue Latanoprost (green top) at bedtime both eyes     Glasses prescription given     Arjun Valenzuela MD  [phone]          Again, thank you for allowing me to participate in the care of your patient.        Sincerely,        Arjun Valenzuela MD

## 2021-06-08 NOTE — TELEPHONE ENCOUNTER
VM left for patient informing her that PFT can wait until follow up. Requested CB to pulmonary clinic with any questions.      Leeann Dye LPN  Pulmonary Medicine:  [facility]  Phone: [phone] Fax: [phone]

## 2021-06-10 ENCOUNTER — TELEPHONE (OUTPATIENT)
Dept: PULMONOLOGY | Facility: CLINIC | Age: 81
End: 2021-06-10

## 2021-06-10 NOTE — TELEPHONE ENCOUNTER
Called informing patient of change in appointment from telephone to in person on 6/23 per her request. Patient reports symptoms have not changed since last time calling in. Patient verbalized understanding and told to call if anything needed before appointment.

## 2021-06-10 NOTE — TELEPHONE ENCOUNTER
[facility] Call Center    Phone Message    May a detailed message be left on voicemail: yes     Reason for Call: The patient is requesting a call to discuss an in person visit with Dr. Gr.  Please advise.  Thank you.     Action Taken: Message routed to:  Adult Clinics: Pulmonology p [phone]    Travel Screening: Not Applicable

## 2021-06-21 ENCOUNTER — ANCILLARY PROCEDURE (OUTPATIENT)
Dept: CT IMAGING | Facility: CLINIC | Age: 81
End: 2021-06-21
Attending: INTERNAL MEDICINE
Payer: COMMERCIAL

## 2021-06-21 DIAGNOSIS — R93.89 ABNORMAL CT OF THE CHEST: ICD-10-CM

## 2021-06-21 DIAGNOSIS — J41.0 SIMPLE CHRONIC BRONCHITIS (H): ICD-10-CM

## 2021-06-21 PROCEDURE — 71250 CT THORAX DX C-: CPT | Mod: TC | Performed by: RADIOLOGY

## 2021-06-23 ENCOUNTER — OFFICE VISIT (OUTPATIENT)
Dept: PULMONOLOGY | Facility: CLINIC | Age: 81
End: 2021-06-23
Payer: COMMERCIAL

## 2021-06-23 VITALS
RESPIRATION RATE: 18 BRPM | WEIGHT: 128.5 LBS | HEART RATE: 77 BPM | BODY MASS INDEX: 21.72 KG/M2 | DIASTOLIC BLOOD PRESSURE: 69 MMHG | SYSTOLIC BLOOD PRESSURE: 130 MMHG | TEMPERATURE: 98.2 F | OXYGEN SATURATION: 99 %

## 2021-06-23 DIAGNOSIS — J43.1 PANLOBULAR EMPHYSEMA (H): ICD-10-CM

## 2021-06-23 DIAGNOSIS — R91.8 PULMONARY NODULES: Primary | ICD-10-CM

## 2021-06-23 DIAGNOSIS — J44.9 CHRONIC OBSTRUCTIVE PULMONARY DISEASE, UNSPECIFIED COPD TYPE (H): ICD-10-CM

## 2021-06-23 PROCEDURE — 99214 OFFICE O/P EST MOD 30 MIN: CPT | Performed by: INTERNAL MEDICINE

## 2021-06-23 RX ORDER — TIOTROPIUM BROMIDE 18 UG/1
1 CAPSULE ORAL; RESPIRATORY (INHALATION) DAILY
Qty: 90 CAPSULE | Refills: 3 | Status: SHIPPED | OUTPATIENT
Start: 2021-06-23 | End: 2021-11-30

## 2021-06-23 RX ORDER — INHALER, ASSIST DEVICES
SPACER (EA) MISCELLANEOUS
Qty: 1 EACH | Refills: 0 | Status: SHIPPED | OUTPATIENT
Start: 2021-06-23 | End: 2021-11-30

## 2021-06-23 RX ORDER — ALBUTEROL SULFATE 1.25 MG/3ML
1.25 SOLUTION RESPIRATORY (INHALATION) EVERY 6 HOURS PRN
Qty: 90 ML | Refills: 6 | Status: SHIPPED | OUTPATIENT
Start: 2021-06-23 | End: 2021-09-22

## 2021-06-23 RX ORDER — ALBUTEROL SULFATE 90 UG/1
2 AEROSOL, METERED RESPIRATORY (INHALATION) EVERY 4 HOURS PRN
Qty: 8.5 G | Refills: 11 | Status: SHIPPED | OUTPATIENT
Start: 2021-06-23 | End: 2022-03-23

## 2021-06-23 RX ORDER — AZITHROMYCIN 250 MG/1
250 TABLET, FILM COATED ORAL
Qty: 12 TABLET | Refills: 3 | Status: SHIPPED | OUTPATIENT
Start: 2021-06-23 | End: 2021-07-23

## 2021-06-23 RX ORDER — FLUTICASONE PROPIONATE AND SALMETEROL XINAFOATE 230; 21 UG/1; UG/1
2 AEROSOL, METERED RESPIRATORY (INHALATION) 2 TIMES DAILY
Qty: 12 G | Refills: 4 | Status: SHIPPED | OUTPATIENT
Start: 2021-06-23 | End: 2021-09-22

## 2021-06-23 ASSESSMENT — PAIN SCALES - GENERAL: PAINLEVEL: NO PAIN (0)

## 2021-06-23 NOTE — PROGRESS NOTES
Ludy Mckenzie's goals for this visit include: Return  She requests these members of her care team be copied on today's visit information: PCP    PCP: Annmarie Solorzano    Referring Provider:  No referring provider defined for this encounter.    /69   Pulse 77   Temp 98.2  F (36.8  C)   Resp 18   Wt 58.3 kg (128 lb 8 oz)   SpO2 99%   BMI 21.72 kg/m      Do you need any medication refills at today's visit? Y      Leeann Dye LPN  Pulmonary Medicine:  [facility]  Phone: [phone] Fax: [phone]

## 2021-06-23 NOTE — PATIENT INSTRUCTIONS
Patient Education     Discharge Instructions: COPD  You have been diagnosed with chronic obstructive pulmonary disease (COPD). This is a name given to a group of diseases that limit the flow of air in and out of your lungs. This makes it harder to breathe. With COPD, you are also more likely to get lung infections. COPD includes chronic bronchitis and emphysema. COPD is most often caused by heavy, long-term cigarette smoking.  Home care  Quit smoking    If you smoke, get help to quit. It's the best thing you can do for your COPD and your overall health.    Join a stop-smoking program. There are even telephone, text message, and online programs to help you quit.    Ask your healthcare provider about medicines or other methods to help you quit.    Ask family members to quit smoking as well.    Don't allow people to smoke in your home, in your car, or when they are around you.    Don't use e-cigarettes or vaping products because they have harmful side effects.  Protect yourself from infection    Wash your hands often. Do your best to keep your hands away from your face. Most germs are spread from your hands to your mouth.    Get a flu shot every year. Also ask your provider about pneumonia vaccines.    Stay away from crowds. It's especially important to do this in the winter when more people have colds and flu.    To stay healthy, get enough sleep, exercise regularly, and eat a balanced diet. You should:  ? Get about 8 hours of sleep every night.  ? Try to exercise for at least 30 minutes on most days.  ? Have healthy foods, including fruits and vegetables, 100% whole grains, lean meats and fish, and low-fat dairy products. Try to stay away from foods high in fats and sugar.  Eating a healthy, balanced diet is important to staying as healthy as possible. So is trying to stay at your ideal weight. Being overweight or underweight can affect your health.  Take your medicines and use oxygen therapy  Take your medicines  exactly as directed. Don't skip doses.  During each appointment, talk with your healthcare provider about your ability to:       Correctly use inhaler techniques    [place] with other conditions you have and their treatments and if they affect your COPD     If you use oxygen, use it correctly. That means the amount you use and the length of time you use it.       Discuss long-term oxygen therapy with your provider.    Don t allow smoking in your home, in your car, or around you. This is very important if you use oxygen.     Try to stay away from things that may affect your breathing. Stay away from indoor and outdoor pollution. Indoor pollution includes things such as burning wood, smoke from home cooking, and heating fuels. Outdoor pollution includes smoke, dusts, vapors, fumes, gases, and other chemicals. It also includes cold weather, high humidity, and allergens.  Unless your provider has told you otherwise, drink at least 8 glasses of fluid every day to keep mucus thin. Ask about other things that can help.  Ask your provider to show you pursed-lip breathing to help decrease shortness of breath.  Manage your stress  Stress can make COPD worse. Use this stress management method:    Find a quiet place and sit or lie in a comfortable position.    Close your eyes and do breathing exercises for several minutes. Ask your provider about the best way to breathe.  Talk to your healthcare provider about your ability to cope in your normal environment.  Pulmonary rehabilitation    Pulmonary rehab can help you feel better. Community-based and home-based programs work as well as hospital-based programs as long as they are held as often and are at the same intensity. Standard home-based pulmonary rehab programs help with breathing problems in people with COPD. Traditional supervised pulmonary rehab is still the best option for people with COPD. These programs include exercise, breathing methods, information about COPD,  counseling, and help for smokers.    Ask your provider or your local hospital about programs in your area. Also talk to your healthcare provider about a self-management program to help control your symptoms.    When to call your healthcare provider  Call your provider right away if you have any of the following:    More mucus    Yellow, green, bloody, or smelly mucus    Fever or chills    Swollen ankles  Call 911  Call 911 if you have:    Shortness of breath, wheezing, or trouble breathing that doesn't improve with treatment    Tightness in your chest that does not go away with your normal medicines    An irregular heartbeat or feeling that your heart is racing    Trouble talking    Feeling of lightheadedness or dizziness    Feeling of doom    Skin turning blue, gray, or purple in color  [employer] last reviewed this educational content on 10/1/2018    4868-6223 [employer]. All rights reserved. This information is not intended as a substitute for professional medical care. Always follow your healthcare professional's instructions.         Buy incentive spirometer at [facility] or [facility]. Use up to 3 times per day      Patient Education     Using an Inhaler with a Spacer  To control asthma, you need to use your medicines the right way. Some medicines are inhaled using a device called a metered-dose inhaler (MDI). MDIs deliver medicine with a fine spray. Your healthcare provider has prescribed a special chamber or spacer to use with your inhaler. A spacer increases the amount of medicine that goes to your lungs. It may make your medicine work better.   Steps for using an inhaler with a spacer  Step 1:    Wash your hands.    Check the expiration date and the counter of the inhaler, if present.    Remove the cap from the inhaler.    Shake the inhaler well. If the inhaler is being used for the first time or has not been used for a while, prime it as directed by the product maker. Make sure to prime the  inhaler in the air away from your face.    Check to make sure the metal canister is put correctly into the plastic boot, or mendoza of the inhaler. See the package insert for instructions.    Attach the spacer to the inhaler. Then remove the cap from the spacer mouthpiece.  Step 2:    Breathe out normally, away from the spacer.    Put the mouthpiece of the spacer past your teeth and above your tongue. Close your lips tightly around it. If you are using a spacer with a mask, make sure the mask covers your nose and mouth with a good seal against your chin and cheeks. Make sure there is no space between your skin and the mask.    Keep your chin up or level.  Step 3:    Press the canister on the inhaler 1 time to release the medicine    Then breathe in through your mouth as slowly and deeply as you can. This should take about 5 to 10  seconds. If you breathe too quickly, you may hear a whistling sound in certain spacers.  Step 4:    Take the spacer mouthpiece out of your mouth. Close your lips.    Hold your breath up to 10 seconds if you are able.    Then hold your lips together and slowly breathe out through your mouth.    After using your inhaler, rinse your mouth with water by swishing, gargling, and spitting out the water. Never swallow it. Inhaled corticosteroids can cause a fungal infection called thrush in your mouth and throat.        If you re prescribed more than 1 puff of medicine at a time, wait up to 60 seconds, or as directed by your healthcare provider, between puffs. This number may be different for different medicines. Shake the inhaler again. Then repeat steps 2 to 4.   [employer] last reviewed this educational content on 5/1/2019 2000-2021 [employer]. All rights reserved. This information is not intended as a substitute for professional medical care. Always follow your healthcare professional's instructions.
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
## 2021-06-29 ENCOUNTER — OFFICE VISIT (OUTPATIENT)
Dept: FAMILY MEDICINE | Facility: CLINIC | Age: 81
End: 2021-06-29
Payer: COMMERCIAL

## 2021-06-29 VITALS
OXYGEN SATURATION: 97 % | DIASTOLIC BLOOD PRESSURE: 67 MMHG | HEART RATE: 88 BPM | HEIGHT: 65 IN | TEMPERATURE: 97.8 F | RESPIRATION RATE: 16 BRPM | WEIGHT: 128.5 LBS | BODY MASS INDEX: 21.41 KG/M2 | SYSTOLIC BLOOD PRESSURE: 116 MMHG

## 2021-06-29 DIAGNOSIS — R82.90 NONSPECIFIC FINDING ON EXAMINATION OF URINE: ICD-10-CM

## 2021-06-29 DIAGNOSIS — R10.32 LLQ ABDOMINAL PAIN: Primary | ICD-10-CM

## 2021-06-29 DIAGNOSIS — N30.01 ACUTE CYSTITIS WITH HEMATURIA: ICD-10-CM

## 2021-06-29 DIAGNOSIS — R30.0 DYSURIA: ICD-10-CM

## 2021-06-29 LAB
ALBUMIN SERPL-MCNC: 3.6 G/DL (ref 3.4–5)
ALBUMIN UR-MCNC: NEGATIVE MG/DL
ALP SERPL-CCNC: 61 U/L (ref 40–150)
ALT SERPL W P-5'-P-CCNC: 29 U/L (ref 0–50)
AMYLASE SERPL-CCNC: 46 U/L (ref 30–110)
ANION GAP SERPL CALCULATED.3IONS-SCNC: 6 MMOL/L (ref 3–14)
APPEARANCE UR: ABNORMAL
AST SERPL W P-5'-P-CCNC: 40 U/L (ref 0–45)
BACTERIA #/AREA URNS HPF: ABNORMAL /HPF
BASOPHILS # BLD AUTO: 0 10E9/L (ref 0–0.2)
BASOPHILS NFR BLD AUTO: 0.6 %
BILIRUB SERPL-MCNC: 0.6 MG/DL (ref 0.2–1.3)
BILIRUB UR QL STRIP: NEGATIVE
BUN SERPL-MCNC: 13 MG/DL (ref 7–30)
CALCIUM SERPL-MCNC: 8.6 MG/DL (ref 8.5–10.1)
CHLORIDE SERPL-SCNC: 106 MMOL/L (ref 94–109)
CO2 SERPL-SCNC: 25 MMOL/L (ref 20–32)
COLOR UR AUTO: YELLOW
CREAT SERPL-MCNC: 0.5 MG/DL (ref 0.52–1.04)
DIFFERENTIAL METHOD BLD: NORMAL
EOSINOPHIL # BLD AUTO: 0.2 10E9/L (ref 0–0.7)
EOSINOPHIL NFR BLD AUTO: 3.4 %
ERYTHROCYTE [DISTWIDTH] IN BLOOD BY AUTOMATED COUNT: 12.9 % (ref 10–15)
GFR SERPL CREATININE-BSD FRML MDRD: >90 ML/MIN/{1.73_M2}
GLUCOSE SERPL-MCNC: 130 MG/DL (ref 70–99)
GLUCOSE UR STRIP-MCNC: NEGATIVE MG/DL
HCT VFR BLD AUTO: 36.3 % (ref 35–47)
HGB BLD-MCNC: 12 G/DL (ref 11.7–15.7)
HGB UR QL STRIP: ABNORMAL
KETONES UR STRIP-MCNC: NEGATIVE MG/DL
LEUKOCYTE ESTERASE UR QL STRIP: ABNORMAL
LIPASE SERPL-CCNC: 116 U/L (ref 73–393)
LYMPHOCYTES # BLD AUTO: 2.3 10E9/L (ref 0.8–5.3)
LYMPHOCYTES NFR BLD AUTO: 42.3 %
MCH RBC QN AUTO: 29.7 PG (ref 26.5–33)
MCHC RBC AUTO-ENTMCNC: 33.1 G/DL (ref 31.5–36.5)
MCV RBC AUTO: 90 FL (ref 78–100)
MONOCYTES # BLD AUTO: 0.6 10E9/L (ref 0–1.3)
MONOCYTES NFR BLD AUTO: 10.3 %
NEUTROPHILS # BLD AUTO: 2.3 10E9/L (ref 1.6–8.3)
NEUTROPHILS NFR BLD AUTO: 43.4 %
NITRATE UR QL: POSITIVE
NON-SQ EPI CELLS #/AREA URNS LPF: ABNORMAL /LPF
PH UR STRIP: 7.5 PH (ref 5–7)
PLATELET # BLD AUTO: 248 10E9/L (ref 150–450)
POTASSIUM SERPL-SCNC: 4 MMOL/L (ref 3.4–5.3)
PROT SERPL-MCNC: 7.2 G/DL (ref 6.8–8.8)
RBC # BLD AUTO: 4.04 10E12/L (ref 3.8–5.2)
RBC #/AREA URNS AUTO: ABNORMAL /HPF
SODIUM SERPL-SCNC: 137 MMOL/L (ref 133–144)
SOURCE: ABNORMAL
SP GR UR STRIP: 1.01 (ref 1–1.03)
UROBILINOGEN UR STRIP-ACNC: 0.2 EU/DL (ref 0.2–1)
WBC # BLD AUTO: 5.3 10E9/L (ref 4–11)
WBC #/AREA URNS AUTO: >100 /HPF

## 2021-06-29 PROCEDURE — 87086 URINE CULTURE/COLONY COUNT: CPT | Performed by: NURSE PRACTITIONER

## 2021-06-29 PROCEDURE — 87186 SC STD MICRODIL/AGAR DIL: CPT | Mod: 91 | Performed by: NURSE PRACTITIONER

## 2021-06-29 PROCEDURE — 99214 OFFICE O/P EST MOD 30 MIN: CPT | Performed by: NURSE PRACTITIONER

## 2021-06-29 PROCEDURE — 85025 COMPLETE CBC W/AUTO DIFF WBC: CPT | Performed by: NURSE PRACTITIONER

## 2021-06-29 PROCEDURE — 82150 ASSAY OF AMYLASE: CPT | Performed by: NURSE PRACTITIONER

## 2021-06-29 PROCEDURE — 87088 URINE BACTERIA CULTURE: CPT | Performed by: NURSE PRACTITIONER

## 2021-06-29 PROCEDURE — 81001 URINALYSIS AUTO W/SCOPE: CPT | Performed by: NURSE PRACTITIONER

## 2021-06-29 PROCEDURE — 36415 COLL VENOUS BLD VENIPUNCTURE: CPT | Performed by: NURSE PRACTITIONER

## 2021-06-29 PROCEDURE — 83690 ASSAY OF LIPASE: CPT | Performed by: NURSE PRACTITIONER

## 2021-06-29 PROCEDURE — 80053 COMPREHEN METABOLIC PANEL: CPT | Performed by: NURSE PRACTITIONER

## 2021-06-29 RX ORDER — SULFAMETHOXAZOLE/TRIMETHOPRIM 800-160 MG
1 TABLET ORAL 2 TIMES DAILY
Qty: 10 TABLET | Refills: 0 | Status: SHIPPED | OUTPATIENT
Start: 2021-06-29 | End: 2021-07-04

## 2021-06-29 ASSESSMENT — PATIENT HEALTH QUESTIONNAIRE - PHQ9: SUM OF ALL RESPONSES TO PHQ QUESTIONS 1-9: 3

## 2021-06-29 ASSESSMENT — PAIN SCALES - GENERAL: PAINLEVEL: MILD PAIN (2)

## 2021-06-29 ASSESSMENT — MIFFLIN-ST. JEOR: SCORE: 1045.81

## 2021-06-29 NOTE — PATIENT INSTRUCTIONS
Patient Education     Unknown Causes of Abdominal Pain (Female)    The exact cause of your belly (abdominal) pain is not clear. This does not mean that this is something to worry about. Everyone likes to know the exact cause of the problem. But sometimes with belly pain, there is no clear-cut cause, and this could be a good thing. The good news is that your symptoms can be treated, and you will feel better.   Your condition does not seem serious now. But sometimes the signs of a serious problem may take more time to appear. For this reason, it is important for you to watch for any new symptoms, problems, or worsening of your condition.  Over the next few days, the abdominal pain may come and go. Or it may be constant. Other common symptoms can include nausea and vomiting. Sometimes it can be difficult to tell if you feel nauseous. You may just feel bad and not connect that feeling to nausea. Constipation, diarrhea, and a fever may go along with the pain.  The pain may continue even if treated correctly over the following days. Depending on how things go, sometimes the cause can become clear and may need more or different treatment. Additional evaluations, medicines, or tests may also be needed.  Home care  Your healthcare provider may prescribe medicine for pain, symptoms, or an infection.  Follow the healthcare provider's instructions for taking these medicines.  General care    Rest as much as you can until your next exam. No strenuous activities.    Try to find positions that ease discomfort. A small pillow placed on the abdomen may help relieve pain.    Something warm on your abdomen (such as a heating pad) may help, but be careful not to burn yourself.  Diet    Don t force yourself to eat, especially if having cramps, vomiting, or diarrhea.    Water is important so you don't get dehydrated. Soup may also be good. Sports drinks may also help, especially if they are not too acidic. Don't drink sugary drinks as  this can make things worse. Take liquids in small amounts. Don t guzzle them.    Caffeine sometimes makes the pain and cramping worse.    Don t take dairy products if you have vomiting or diarrhea.    Don't eat large amounts at a time. Wait a few minutes between bites.    Eat a diet low in fiber (called a low-residue diet). Foods allowed include refined breads, white rice, fruit and vegetable juices without pulp, tender meats. These foods will pass more easily through the intestine.    Don t have whole-grain foods, whole fruits and vegetables, meats, seeds and nuts, fried or fatty foods, dairy, alcohol and spicy foods until your symptoms go away.  Follow-up care  Follow up with your healthcare provider, or as advised, if your pain does not begin to improve in the next 24 hours.  Call 911  Call 911 if any of these occur:    Trouble breathing    Confusion    Fainting or loss of consciousness    Rapid heart rate    Seizure  When to seek medical advice  Call your healthcare provider right away if any of these occur:    Pain gets worse or moves to the right lower abdomen    New or worsening vomiting or diarrhea    Swelling of the abdomen    Unable to pass stool for more than 3 days    Fever of 100.4 F (38 C) or higher, or as directed by your healthcare provider.    Blood in vomit or bowel movements (dark red or black color)    Yellow color of eyes and skin (jaundice)    Weakness, dizziness    Chest, arm, back, neck, or jaw pain    Unexpected vaginal bleeding or missed period    Can't keep down liquids or water and you are getting dehydrated  Ay[employer] last reviewed this educational content on 6/1/2018 2000-2021 [employer]. All rights reserved. This information is not intended as a substitute for professional medical care. Always follow your healthcare professional's instructions.           Patient Education     Understanding Urinary Tract Infections (UTIs)   Most UTIs are caused by bacteria, but they may  also be caused by viruses or fungi. Bacteria from the bowel are the most common source of infection. The infection may start because of any of the following:     Sexual activity.  During sex, bacteria can travel from the penis, vagina, or rectum into the urethra.     Bacteria outside the rectum getting into the urethra.  Bacteria on the skin outside the rectum may travel into the urethra. This is more common in women since the rectum and urethra are closer to each other than in men. Wiping from front to back after using the toilet and keeping the area clean can help prevent germs from getting to the urethra.    Blocked urine flow through the urinary tract. If urine sits too long, germs may start to grow out of control.  Parts of the urinary tract  The infection can occur in any part of the urinary tract.       The kidneys. These organs collect and store urine.    The ureters. These tubes carry urine from the kidneys to the bladder.    The bladder. This holds urine until you are ready to let it out.    The urethra. This tube carries urine from the bladder out of the body. It is shorter in women, so bacteria can move through it more easily. The urethra is longer in men, so a UTI is less likely to reach the bladder or kidneys in men.  [employer] last reviewed this educational content on 1/1/2020 2000-2021 [employer]. All rights reserved. This information is not intended as a substitute for professional medical care. Always follow your healthcare professional's instructions.

## 2021-06-29 NOTE — PROGRESS NOTES
Assessment & Plan     (R10.32) LLQ abdominal pain  (primary encounter diagnosis)  Comment: Pain for the past year now LLQ dull intermittent pain (8/10) x 1 week. Pt reports pain radiates to left flank. Pt reports pain has been mild for the last year, but over the last week pain has intensified. Denies N/V/D/C and fever. CT to rule out diverticulitis, pancreatic involvement, and renal stones.   Plan: CT Abdomen Pelvis w/o Contrast, CBC with         platelets and differential, Comprehensive         metabolic panel (BMP + Alb, Alk Phos, ALT, AST,        Total. Bili, TP), Lipase, Amylase          (R30.0) Dysuria  Comment: Polyuria and dysuria x 2 days. Positive CVA tenderness. Denies fever and hematuria.   Plan: UA with Microscopic reflex to Culture    (N30.01) Acute cystitis with hematuria  Comment: Positive nitrites, blood, leukocytes, WBC, and RBC in urinalysis.   Plan: sulfamethoxazole-trimethoprim (BACTRIM DS)         800-160 MG tablet      Review of the result(s) of each unique test - UA today and past  Ordering of each unique test  Prescription drug management    See Patient Instructions    Return in about 3 days (around 7/2/2021), or if symptoms worsen or fail to improve.    Susan Jefferson, JENISE-S ([facility])    I very much appreciated the opportunity to see and assess this patient in the clinic with NP student.  I agree with the above note which summarizes my findings and current recommendations. I have reviewed all diagnostics noted and performed physical exam. Changes were made in the body of the note to achieve one comprehensive document.    Annmarie Solorzano, OCTAVIO [facility] GRACY [place] is a 80 year old who presents for the following health issues     HPI     Pt reports urinary frequency, urgency, and dysuria x 2 days. Pt reports pain in her LLQ that radiates to left lumbar region. Reports feeling low grade fevers but did not check temperature.     Pt  "reports intermittent dull pain (3/10) in LLQ that radiates to left lumbar region x about a year, but reports pain has gotten more intense over the last week. LLQ is tender to touch. Denies trauma to side (falls, accidents, etc). Reports having a bland diet. Reports grapes sometimes aggravate the pain.     Abdominal/Flank Pain  Onset/Duration: 1 month   Description:   Character: Sharp  Location:  Left lower back, left upper quadrant, left flank  Radiation: Belly button region   Intensity: mild  Progression of Symptoms:  intermittent  Accompanying Signs & Symptoms:  Fever/Chills: no  Gas/Bloating: no  Nausea: no  Vomitting: no  Diarrhea: no  Constipation: no  Dysuria or Hematuria: YES  History:   Trauma: no  Previous similar pain: no  Previous tests done: none  Precipitating factors:   Does the pain change with:     Food: YES    Bowel Movement: no    Urination: no   Other factors:  no  Therapies tried and outcome: None  No LMP recorded. Patient is postmenopausal.     Currently on chronic Zpack for lung infection at this time.         Review of Systems   Constitutional, HEENT, cardiovascular, pulmonary, gi and gu systems are negative, except as otherwise noted.      Objective    /67 (BP Location: Right arm, Patient Position: Sitting, Cuff Size: Adult Regular)   Pulse 88   Temp 97.8  F (36.6  C) (Oral)   Resp 16   Ht 1.638 m (5' 4.5\")   Wt 58.3 kg (128 lb 8 oz)   SpO2 97%   BMI 21.72 kg/m    Body mass index is 21.72 kg/m .       Physical Exam   GENERAL: healthy, alert and no distress  EYES: Eyes grossly normal to inspection, PERRL and conjunctivae and sclerae normal  RESP: lungs clear to auscultation - no rales, rhonchi or wheezes  CV: regular rate and rhythm, normal S1 S2, no S3 or S4, no murmur, click or rub, no peripheral edema and peripheral pulses strong  ABDOMEN: soft, tender to palpation in LLQ, no hepatosplenomegaly, no masses and bowel sounds normal  MS: no gross musculoskeletal defects noted, no " edema  SKIN: no suspicious lesions or rashes  NEURO: Normal strength and tone, mentation intact and speech normal  PSYCH: mentation appears normal, affect normal/bright    Results for orders placed or performed in visit on 06/29/21 (from the past 24 hour(s))   UA with Microscopic reflex to Culture    Specimen: Midstream Urine   Result Value Ref Range    Color Urine Yellow     Appearance Urine Cloudy     Glucose Urine Negative NEG^Negative mg/dL    Bilirubin Urine Negative NEG^Negative    Ketones Urine Negative NEG^Negative mg/dL    Specific Gravity Urine 1.010 1.003 - 1.035    pH Urine 7.5 (H) 5.0 - 7.0 pH    Protein Albumin Urine Negative NEG^Negative mg/dL    Urobilinogen Urine 0.2 0.2 - 1.0 EU/dL    Nitrite Urine Positive (A) NEG^Negative    Blood Urine Small (A) NEG^Negative    Leukocyte Esterase Urine Moderate (A) NEG^Negative    Source Midstream Urine     WBC Urine >100 (A) OTO5^0 - 5 /HPF    RBC Urine 2-5 (A) OTO2^O - 2 /HPF    Squamous Epithelial /LPF Urine Few FEW^Few /LPF    Bacteria Urine Many (A) NEG^Negative /HPF

## 2021-07-01 ENCOUNTER — ANCILLARY PROCEDURE (OUTPATIENT)
Dept: CT IMAGING | Facility: CLINIC | Age: 81
End: 2021-07-01
Attending: NURSE PRACTITIONER
Payer: COMMERCIAL

## 2021-07-01 PROCEDURE — 74176 CT ABD & PELVIS W/O CONTRAST: CPT | Mod: TC | Performed by: RADIOLOGY

## 2021-07-02 LAB
BACTERIA SPEC CULT: ABNORMAL
BACTERIA SPEC CULT: ABNORMAL
Lab: ABNORMAL
SPECIMEN SOURCE: ABNORMAL

## 2021-07-02 NOTE — RESULT ENCOUNTER NOTE
Dear Ludy,     Your urine culture confirms that you have a bladder infection. The antibiotic that you were prescribed should work well to treat this. Let us know if your symptoms do not improved by the time the antibiotic is finished.     Please let us know if you have any questions or concerns.    Regards,  OCTAVIO Lester CNP

## 2021-07-12 ENCOUNTER — MYC MEDICAL ADVICE (OUTPATIENT)
Dept: FAMILY MEDICINE | Facility: CLINIC | Age: 81
End: 2021-07-12

## 2021-07-12 DIAGNOSIS — N30.01 ACUTE CYSTITIS WITH HEMATURIA: Primary | ICD-10-CM

## 2021-07-13 DIAGNOSIS — F33.0 MAJOR DEPRESSIVE DISORDER, RECURRENT EPISODE, MILD (H): ICD-10-CM

## 2021-07-14 NOTE — TELEPHONE ENCOUNTER
"[employer]-- please notify patient that appointment is needed for medication request and offer to schedule.    \"Patient has been tapered off of sertraline earlier this year by Dr. Cano. If she would like to restart, schedule a virtual (video/phone) or in-person visit to discuss.   Annmarie Solorzano, APRN, CNP\"    "

## 2021-07-14 NOTE — TELEPHONE ENCOUNTER
Routing refill request to provider for review/approval because:  Drug not active on patient's medication list          Pending Prescriptions:                       Disp   Refills    sertraline (ZOLOFT) 50 MG tablet [Pharmacy*90 tab*1        Sig: TAKE 1 TABLET BY MOUTH EVERY DAY        Yuniel Kulkarni RN

## 2021-07-14 NOTE — TELEPHONE ENCOUNTER
Patient has been tapered off of sertraline earlier this year by Dr. Cano. If she would like to restart, schedule a virtual (video/phone) or in-person visit to discuss.   OCTAVIO Herrera, CNP

## 2021-07-15 NOTE — TELEPHONE ENCOUNTER
Pt stated she does not want to start this medication again and not sure why it was requested for a refill

## 2021-08-23 ENCOUNTER — NURSE TRIAGE (OUTPATIENT)
Dept: FAMILY MEDICINE | Facility: CLINIC | Age: 81
End: 2021-08-23

## 2021-08-23 NOTE — TELEPHONE ENCOUNTER
Attempted to ask triage questions to patient, she reported that SOB started this morning.  She then states that she has family that can bring her into the emergency room and declined triage.     Maggi Sharma RN

## 2021-09-22 ENCOUNTER — OFFICE VISIT (OUTPATIENT)
Dept: PULMONOLOGY | Facility: CLINIC | Age: 81
End: 2021-09-22
Payer: COMMERCIAL

## 2021-09-22 ENCOUNTER — ANCILLARY PROCEDURE (OUTPATIENT)
Dept: CT IMAGING | Facility: CLINIC | Age: 81
End: 2021-09-22
Payer: COMMERCIAL

## 2021-09-22 VITALS
OXYGEN SATURATION: 99 % | HEART RATE: 75 BPM | SYSTOLIC BLOOD PRESSURE: 125 MMHG | DIASTOLIC BLOOD PRESSURE: 76 MMHG | HEIGHT: 64 IN | RESPIRATION RATE: 18 BRPM | BODY MASS INDEX: 21.68 KG/M2 | WEIGHT: 127 LBS | TEMPERATURE: 97.1 F

## 2021-09-22 DIAGNOSIS — R91.8 PULMONARY NODULES: ICD-10-CM

## 2021-09-22 DIAGNOSIS — J44.9 CHRONIC OBSTRUCTIVE PULMONARY DISEASE, UNSPECIFIED COPD TYPE (H): ICD-10-CM

## 2021-09-22 DIAGNOSIS — J44.1 COPD EXACERBATION (H): ICD-10-CM

## 2021-09-22 DIAGNOSIS — R91.8 PULMONARY NODULES: Primary | ICD-10-CM

## 2021-09-22 LAB
CREAT BLD-MCNC: 0.6 MG/DL
GFR SERPL CREATININE-BSD FRML MDRD: >60 ML/MIN/1.73M2

## 2021-09-22 PROCEDURE — 99214 OFFICE O/P EST MOD 30 MIN: CPT | Performed by: INTERNAL MEDICINE

## 2021-09-22 PROCEDURE — 82565 ASSAY OF CREATININE: CPT | Performed by: RADIOLOGY

## 2021-09-22 PROCEDURE — 71275 CT ANGIOGRAPHY CHEST: CPT | Mod: GC | Performed by: RADIOLOGY

## 2021-09-22 RX ORDER — ALBUTEROL SULFATE 1.25 MG/3ML
1.25 SOLUTION RESPIRATORY (INHALATION) EVERY 6 HOURS PRN
Qty: 90 ML | Refills: 11 | Status: SHIPPED | OUTPATIENT
Start: 2021-09-22 | End: 2022-09-22

## 2021-09-22 RX ORDER — AZITHROMYCIN 250 MG/1
TABLET, FILM COATED ORAL
COMMUNITY
Start: 2021-08-15 | End: 2021-09-22

## 2021-09-22 RX ORDER — AZITHROMYCIN 250 MG/1
TABLET, FILM COATED ORAL
Qty: 12 TABLET | Refills: 11 | Status: SHIPPED | OUTPATIENT
Start: 2021-09-22 | End: 2022-05-13

## 2021-09-22 RX ORDER — IOPAMIDOL 755 MG/ML
57 INJECTION, SOLUTION INTRAVASCULAR ONCE
Status: COMPLETED | OUTPATIENT
Start: 2021-09-22 | End: 2021-09-22

## 2021-09-22 RX ORDER — FLUTICASONE PROPIONATE AND SALMETEROL XINAFOATE 230; 21 UG/1; UG/1
2 AEROSOL, METERED RESPIRATORY (INHALATION) 2 TIMES DAILY
Qty: 12 G | Refills: 11 | Status: SHIPPED | OUTPATIENT
Start: 2021-09-22 | End: 2022-05-23

## 2021-09-22 RX ADMIN — IOPAMIDOL 57 ML: 755 INJECTION, SOLUTION INTRAVASCULAR at 11:20

## 2021-09-22 ASSESSMENT — MIFFLIN-ST. JEOR: SCORE: 1031.07

## 2021-09-22 ASSESSMENT — PAIN SCALES - GENERAL: PAINLEVEL: NO PAIN (0)

## 2021-09-22 NOTE — PATIENT INSTRUCTIONS
Patient Education     What Is COPD?  COPD stands for chronic obstructive pulmonary disease. It means the airways in your lungs are blocked (obstructed). This makes it hard to breathe. You may have trouble with daily activities because of shortness of breath. Over time the shortness of breath often gets worse. This makes it harder to take care of yourself and take part in activities. Chronic bronchitis and emphysema are 2 common types of COPD.  How did I get COPD?  Most people get COPD from smoking. Cigarette smoke damages lungs. This can develop into COPD over many years.  How COPD affects you  COPD makes you work harder to breathe. Air may get trapped in the lungs. This prevents your lungs from filling completely with fresh oxygen-filled air when you breathe in (inhale). It's harder to take deep breaths, especially when you are active and start breathing faster. Over time your lungs may become enlarged, filled with air that does not transfer oxygen into the blood. These problems lead to shortness of breath (also called dyspnea). Hoarse, whistling breathing (wheezing) and a chronic cough are common. So is feeling tired and worn out (fatigue).   What happens in chronic bronchitis?    The cells in the airways make more mucus than normal. The mucus builds up, narrowing the airways. This means less air travels into and out of the lungs. The lining of the airways may also become swollen (inflamed). This causes the airways to narrow even more.  What happens in emphysema?    The small airways are damaged and lose their stretchiness. The airways collapse when you exhale. This causes air to get trapped in the air sacs. This means that less oxygen enters the blood vessels. And less oxygen is delivered to all of the cells of your body. This makes it hard to breathe.  Damage to cilia    Cilia are small hairs that line and protect the airways. Smoking damages the cilia. Damaged cilia can t sweep mucus and particles away. Some  of the cilia are destroyed. This damage makes COPD worse.  [employer] last reviewed this educational content on 8/1/2018 2000-2021 [employer]. All rights reserved. This information is not intended as a substitute for professional medical care. Always follow your healthcare professional's instructions.

## 2021-09-22 NOTE — ADDENDUM NOTE
Department of Cardiology                                                                  [facility]/[place] [address]                                                            Telephone: [phone]. Fax:[phone]                                                    Post- Procedure Note: Left Heart Cardiac Catheterization       Narrative:  54y  Male Now s/p PERICO Synergy 2.75 X 38mm to dLAD via LIMA graft, procedure performed via LFA closed with angioseal device, procedure performed by Dr. Tavares, received Plavix 300mg po load, heparin for A/C, Fentanyl and Versed IV intraprocedurally, arrived to recovery in NAD and HDS, Post PCI ECG with no acute changes, LFA access site stable, no bleed/hematoma, distal pulse +, overnight admission given complex PCI with long 38mm stent placement and risk for bleeding.           PAST MEDICAL & SURGICAL HISTORY:  Diabetes mellitus, type 2  Hyperlipidemia    No Known Allergies      Objective:  Vital Signs Last 24 Hrs  T(C): 36.7 (22 Sep 2021 11:25), Max: 36.7 (22 Sep 2021 11:25)  T(F): 98 (22 Sep 2021 11:25), Max: 98 (22 Sep 2021 11:25)  HR: 60 (22 Sep 2021 13:30) (60 - 63)  BP: 128/60 (22 Sep 2021 13:30) (128/60 - 177/98)  BP(mean): --  RR: 16 (22 Sep 2021 13:30) (16 - 16)  SpO2: 96% (22 Sep 2021 13:30) (96% - 98%)      PHYSICAL EXAM:  Constitutional: A & O x 3, NAD  HEENT:  Normal oral mucosa, PERRL, EOMI	  Cardiovascular: S1 S2, No murmurs, No JVD  Respiratory: Lungs clear to auscultation	  Gastrointestinal:  Soft, Non-tender, + BS	  Skin: No rashes or cyanosis  Neurologic: No deficit appreciated  Extremities: Normal range of motion, no edema  Vascular: Access site stable, no bleed or hematoma, distal pulses +     Labs:                           11.1   8.64  )-----------( 339      ( 22 Sep 2021 11:38 )             34.5     09-22    142  |  101  |  19.6  ----------------------------<  133<H>  3.9   |  25.0  |  1.46<H>    Ca    9.9      22 Sep 2021 11:38  Mg     1.9     09-22    TPro  7.4  /  Alb  4.6  /  TBili  0.3<L>  /  DBili  x   /  AST  15  /  ALT  12  /  AlkPhos  91  09-22          Assessment: 55yo male with h/o HTN, HLD, DM, CKD 3, CAD with prior 3V CABG 8/2020, endorses IBRAHIM 1 flight and occasional chest tightness, recent [place] [facility] [place] with native 3V CAD, patent SVG to OM and SVG to RPDA, dLAD 90% lesion at LIMA distal anastomosis, LIMA otherwise patent, now presents for stage PCI to dLAD via LIMA, to be performed by Dr. Tavares.     Now s/p PERICO Synergy 2.75 X 38mm to dLAD via LIMA graft, procedure performed via LFA closed with angioseal device, procedure performed by Dr. Tavares, received Plavix 300mg po load, heparin for A/C, Fentanyl and Versed IV intraprocedurally, arrived to recovery in NAD and HDS, Post PCI ECG with no acute changes, LFA access site stable, no bleed/hematoma, distal pulse +, overnight admission given complex PCI with long 38mm stent placement and risk for bleeding.         Plan:  -Formal cath report pending  -Post procedure management/monitoring per protocol  -Access site precautions  -Bedrest x 2hours post procedure  -Labs and EKG in am  -Repeat ECG if any clinical indication or change on tele  -Continue current medical therapy  -Dual anti platelet therapy with aspirin/plavix   -Cont BB with Metoprolol 50mg po q12hr  -Cont statin therapy with Lipitor 80mg po qHS   -Hold metformin X 48hrs  -F/S QID with correction scale coverage while in-pt  -Educated regarding strict adherence with DAPT   -Educated regarding post procedure management and care  -Discussed the importance of RF modification  -Cardiac rehab info provided/referral and communication to cardiac rehab completed  -F/U outpt in 1-2 weeks with Cardiologist Dr. Reyes  -DISPO: Plan for D/C in am if remains HDS, ECG and labs in am stable and without complications     Addended by: BLANCA ARANA on: 8/10/2017 05:45 PM     Modules accepted: Orders                                                                              Department of Cardiology                                                                  [facility]/[place] [address]                                                            Telephone: [phone]. Fax:[phone]                                                    Post- Procedure Note: Left Heart Cardiac Catheterization       Narrative:  54y  Male Now s/p PERICO Synergy 2.75 X 38mm to dLAD via LIMA graft, procedure performed via LFA closed with angioseal device, procedure performed by Dr. Tavares, received Plavix 300mg po load, heparin for A/C, Fentanyl and Versed IV intraprocedurally, arrived to recovery in NAD and HDS, Post PCI ECG with no acute changes, LFA access site stable, no bleed/hematoma, distal pulse +, overnight admission given complex PCI with long 38mm stent placement and risk for bleeding.           PAST MEDICAL & SURGICAL HISTORY:  Diabetes mellitus, type 2  Hyperlipidemia    No Known Allergies      Objective:  Vital Signs Last 24 Hrs  T(C): 36.7 (22 Sep 2021 11:25), Max: 36.7 (22 Sep 2021 11:25)  T(F): 98 (22 Sep 2021 11:25), Max: 98 (22 Sep 2021 11:25)  HR: 60 (22 Sep 2021 13:30) (60 - 63)  BP: 128/60 (22 Sep 2021 13:30) (128/60 - 177/98)  BP(mean): --  RR: 16 (22 Sep 2021 13:30) (16 - 16)  SpO2: 96% (22 Sep 2021 13:30) (96% - 98%)      PHYSICAL EXAM:  Constitutional: A & O x 3, NAD  HEENT:  Normal oral mucosa, PERRL, EOMI	  Cardiovascular: S1 S2, No murmurs, No JVD  Respiratory: Lungs clear to auscultation	  Gastrointestinal:  Soft, Non-tender, + BS	  Skin: No rashes or cyanosis  Neurologic: No deficit appreciated  Extremities: Normal range of motion, no edema  Vascular: Access site stable, no bleed or hematoma, distal pulses +     Labs:                           11.1   8.64  )-----------( 339      ( 22 Sep 2021 11:38 )             34.5     09-22    142  |  101  |  19.6  ----------------------------<  133<H>  3.9   |  25.0  |  1.46<H>    Ca    9.9      22 Sep 2021 11:38  Mg     1.9     09-22    TPro  7.4  /  Alb  4.6  /  TBili  0.3<L>  /  DBili  x   /  AST  15  /  ALT  12  /  AlkPhos  91  09-22          Assessment: 55yo male with h/o HTN, HLD, DM, CKD 3, CAD with prior 3V CABG 8/2020, endorses IBRAHIM 1 flight and occasional chest tightness, recent + stress test and subsequent [place] [facility] [place]16[place]21 with native 3V CAD, patent SVG to OM and SVG to RPDA, dLAD 90% lesion at LIMA distal anastomosis, LIMA otherwise patent, now presents for stage PCI to dLAD via LIMA, to be performed by Dr. Tavares.     Now s/p PERICO Synergy 2.75 X 38mm to dLAD via LIMA graft, procedure performed via LFA closed with angioseal device, procedure performed by Dr. Tavares, received Plavix 300mg po load, heparin for A/C, Fentanyl and Versed IV intraprocedurally, arrived to recovery in NAD and HDS, Post PCI ECG with no acute changes, LFA access site stable, no bleed/hematoma, distal pulse +, overnight admission given complex PCI with long 38mm stent placement and risk for bleeding.         Plan:  -Formal cath report pending  -Post procedure management/monitoring per protocol  -Access site precautions  -Bedrest x 2hours post procedure  -Labs and EKG in am  -Repeat ECG if any clinical indication or change on tele  -Continue current medical therapy  -Dual anti platelet therapy with aspirin/plavix   -Cont BB with Metoprolol 50mg po q12hr  -Cont statin therapy with Lipitor 80mg po qHS   -Start ACE-I with lisinopril 2.5mg po daily  -Hold metformin X 48hrs  -F/S QID with correction scale coverage while in-pt  -Educated regarding strict adherence with DAPT   -Educated regarding post procedure management and care  -Discussed the importance of RF modification  -Cardiac rehab info provided/referral and communication to cardiac rehab completed  -F/U outpt in 1-2 weeks with Cardiologist Dr. Reyes  -DISPO: Plan for D/C in am if remains HDS, ECG and labs in am stable and without complications

## 2021-09-22 NOTE — PROGRESS NOTES
"Ludy Mckenzie's goals for this visit include: Return  She requests these members of her care team be copied on today's visit information: PCP    PCP: Annmarie Solorzano    Referring Provider:  No referring provider defined for this encounter.    /76   Pulse 75   Temp 97.1  F (36.2  C)   Resp 18   Ht 1.626 m (5' 4\")   Wt 57.6 kg (127 lb)   SpO2 99%   BMI 21.80 kg/m      Do you need any medication refills at today's visit? WILBERTO Dye LPN  Pulmonary Medicine:  [facility]  Phone: [phone] Fax: [phone]      "
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
## 2021-09-24 ENCOUNTER — PATIENT OUTREACH (OUTPATIENT)
Dept: GASTROENTEROLOGY | Facility: CLINIC | Age: 81
End: 2021-09-24

## 2021-09-24 ENCOUNTER — PREP FOR PROCEDURE (OUTPATIENT)
Dept: GASTROENTEROLOGY | Facility: CLINIC | Age: 81
End: 2021-09-24

## 2021-09-24 DIAGNOSIS — Z86.0100 HISTORY OF COLONIC POLYPS: Primary | ICD-10-CM

## 2021-09-24 DIAGNOSIS — Z12.11 ENCOUNTER FOR SCREENING COLONOSCOPY: Primary | ICD-10-CM

## 2021-09-24 RX ORDER — BISACODYL 5 MG
TABLET, DELAYED RELEASE (ENTERIC COATED) ORAL
Qty: 4 TABLET | Refills: 0 | Status: SHIPPED | OUTPATIENT
Start: 2021-09-24 | End: 2022-01-04

## 2021-09-24 NOTE — PROGRESS NOTES
Called patient to discuss    Procedure/Imaging/Clinic: Colonoscopy  Physician: Elvis  Timin months  Procedure length: 45 min  Anesthesia: moderate sedation  Dx: colon polyps  Tier: 4  Location: [facility]  Discussed Dec 2nd date, pt in agreement with this date    Explained they can expect a call from  for date and time of procedure, will need a , someone to stay with them for 24 hours and should stay in town for 24 hours (within 45 min of Hospital) post procedure    Patient needs to get pre-op physical completed. If outside  health system will need physical faxed to number [phone]   If you do not get a preop physical, your procedure could be cancelled, patient voiced understanding*    Preop Plan: not needed d/t level of sedation    Med Review    Blood thinner -  none  ASA - none  Diabetic - none    COVID test discussed: yes, pt aware needs to be within 4 days of procedure    Patient Education r/t procedure: requested information be mailed, address confirmed    NPO/Prep: pt preferred the golytly prep, is familiar with it and requested it instead of miralax, mychart education sent, as well as will request mailing to address be sent by     Verbalized understanding of all instructions. All questions answered.     Case request placed, message sent to OR     Ann Marie Greene, RN, BSN,   [facility]  Care coordinator

## 2021-09-24 NOTE — LETTER
Cj [place],    Here is the information about the Golytly prep. Please let us know if you have questions.     Split-Dose Golytely (Colyte, Nulytely)   Prep Instructions for your Colonoscopy     Please read these instructions carefully at least 7 days before your colonoscopy.     You must flush the bowel totally of waste so that the doctor can have a clear view and do a thorough exam. Without your efforts, we may have to repeat the exam.     Getting ready     Fill the prescription as soon as possible to be sure it s in stock.     You must arrange for an adult to drive you home after your exam. Your colonoscopy cannot be done unless you have a ride. If you need to use public transportation, someone must ride with you.     Check with your insurance company to be sure they will cover this exam.     7 days before the exam    Talk to your doctor: If you take blood-thinners (such as Coumadin, Plavix, Xarelto), your prescription or schedule may need to change before the test.     Stop taking fiber supplements, multi-vitamins with iron, and medicines that contain iron.     Continue taking prescribed aspirin; talk to your prescribing doctor with any concerns.     Stop eating nuts and seeds. These can stay in the colon for days.     If you have diabetes: Ask to have your exam early in the morning. Also, ask your doctor if you should change your diet or medicines.     2 days before the exam      Begin a low-fiber diet: No raw fruits or vegetables, whole wheat, seeds, nuts, popcorn or other high-fiber foods (see list on page). No binding agents: (bran, Metamucil, Fibercon) and no Olestra (a fat substitute).     Drink at least 4 to 6 large glasses of sports drink: (not red or purple) each day.     At bedtime, take 2 Dulcolax (bisacodyl) tablets.     One day before the exam    Follow the low-fiber diet until 1 p.m. After that, drink only clear liquids (see the list below.) Drink at least 8 to 10 full glasses of clear liquids during  the day.     Fill the jug that contains the Golytely powder with warm water. Cover and shake until well mixed. Use a full gallon of water. Chill for 3 hours, but do not add ice.     At 3 p.m., take 2 tablets of Dulcolax (bisacodyl).     At 6 p.m. start drinking an 8-ounce glass every 15 minutes until the jug is half empty (about 8 glasses). Store the rest in the refrigerator     Stay near a toilet while using this medicine. Besides diarrhea (watery stools), you may have mild cramping, bloating and nausea.     Day of exam:       Do not chew or swallow anything including water or gum for at least 3 hours before your colonoscopy. This is a safety issue and we may need to cancel your exam if you do not observe this policy.     6 hours before your exam check-in Drink the other half of the jug. You should finish the prep 4 hours before the exam.     You may drink clear liquids until 3 hours before your exam.     If you must take medicine, you may take it with sips of water. Do not take diabetes medicine by mouth until after your exam.     If you have asthma. Bring your inhaler with you.     Please arrive with an adult with you take you home after the test: The medicine used will make you sleepy. If you do not have someone to take you home, we may cancel your test.     Clear liquids   You may have:     Water, tea, coffee (no cream)     Soda pop, Gatorade (not red or purple)     Clear nutrition drinks (Enlive, Resource Breeze)     Jell-O, Popsicles (no milk or fruit pieces) or sorbet (not red or purple)     Fat-free soup broth or bouillon     Plain hard candy, such as clear life-savers (not red or purple)     Clear juices and fruit-flavored drinks such as apple juice, white grape juice, Hi-C and Hilario-Aid (not red or purple)   Do not have:     Milk or milk products such as ice cream, malts or shakes     Red or purple drinks of any kind such as cranberry juice or grape juice. Avoid red or purple Jell-O, Popsicles, Hilario-Aid,  sorbet and candy.     Juices with pulp such as orange, grapefruit, pineapple or tomato juice     Cream soups of any kind     Alcohol     You may have:     White bread, rolls, biscuits, croissants, [place] toast, white flour tortillas, waffles, pancakes, white [place] toast, white rice, noodles, pasta, macaroni, cooked and peeled potatoes, plain crackers, saltines, cooked farina or cream of rice, puffed rice, corn flakes, Rice Krispies, Special K.     Tender cooked and canned vegetables without seeds, carrots, asparagus tips, green beans, wax beans, spinach, vegetable broth. NO raw vegetables.     Strained fruit juice, canned fruit without skin or seeds (no pineapple), applesauce, pear sauce, ripe bananas, melons (no watermelon)     Milk (plain or flavored), cheese, cottage cheese, yogurt (no berries), custard, ice cream (no nuts)     Tender, well-cooked ground beef, lamb, veal, ham, pork, chicken, turkey, fish, or organ meats, eggs, creamy peanut butter     Margarine, butter, mayonnaise, sour cream, salad dressing, plain gravy, spices, cooked herbs, bouillon, sugar, clear jelly, honey, syrup     Snacks, sweets, and drinks: Pretzels, hard candy, plain cakes and cookies (no nuts or seeds), Jell-O, plain pudding, sherbet, Popsicles, coffee, tea, soda, pop.     DO NOT HAVE:       Breads or rolls that contain nuts, seeds or fruit; whole wheat or whole grain breads that contain more than 1 gram of fiber per slice (check the nutrition facts label); cornbread; corn or whole wheat tortillas; potatoes with skin; brown rice, wild rice, kasha (buckwheat)     Any raw or steamed vegetables; vegetables with seeds; corn in any form     Prunes, prune juice, raisins and other dried fruits, berries and other fruits with seeds, canned pineapple; fresh or frozen fruits not listed above     Any yogurt with nuts, seeds or berries     Tough, fibrous meats with gristle; cooked dried beans, peas or lentils; crunchy peanut butter     Pickles,  olives, relish, horseradish; jam, marmalade, preserves     Popcorn, nuts, seeds, granola, coconut, candies made with nuts or seeds; all desserts that contain nuts, seeds, raisins and other dried fruits, coconut, whole grains or bran.

## 2021-10-28 DIAGNOSIS — Z11.59 ENCOUNTER FOR SCREENING FOR OTHER VIRAL DISEASES: ICD-10-CM

## 2021-11-13 ENCOUNTER — HEALTH MAINTENANCE LETTER (OUTPATIENT)
Age: 81
End: 2021-11-13

## 2021-11-16 ENCOUNTER — OFFICE VISIT (OUTPATIENT)
Dept: FAMILY MEDICINE | Facility: CLINIC | Age: 81
End: 2021-11-16
Payer: COMMERCIAL

## 2021-11-16 VITALS
OXYGEN SATURATION: 96 % | DIASTOLIC BLOOD PRESSURE: 68 MMHG | TEMPERATURE: 98 F | HEIGHT: 64 IN | WEIGHT: 134 LBS | BODY MASS INDEX: 22.88 KG/M2 | SYSTOLIC BLOOD PRESSURE: 121 MMHG | RESPIRATION RATE: 20 BRPM | HEART RATE: 85 BPM

## 2021-11-16 DIAGNOSIS — E11.49 DIABETIC NEUROPATHY WITH NEUROLOGIC COMPLICATION (H): ICD-10-CM

## 2021-11-16 DIAGNOSIS — M81.0 AGE-RELATED OSTEOPOROSIS WITHOUT CURRENT PATHOLOGICAL FRACTURE: ICD-10-CM

## 2021-11-16 DIAGNOSIS — J44.9 CHRONIC OBSTRUCTIVE PULMONARY DISEASE, UNSPECIFIED COPD TYPE (H): ICD-10-CM

## 2021-11-16 DIAGNOSIS — E11.49 TYPE 2 DIABETES MELLITUS WITH OTHER DIABETIC NEUROLOGICAL COMPLICATION (H): ICD-10-CM

## 2021-11-16 DIAGNOSIS — F33.0 MAJOR DEPRESSIVE DISORDER, RECURRENT EPISODE, MILD (H): ICD-10-CM

## 2021-11-16 DIAGNOSIS — E11.40 DIABETIC NEUROPATHY WITH NEUROLOGIC COMPLICATION (H): ICD-10-CM

## 2021-11-16 DIAGNOSIS — I63.9 CEREBRAL INFARCTION, UNSPECIFIED MECHANISM (H): ICD-10-CM

## 2021-11-16 DIAGNOSIS — F10.11 ALCOHOL ABUSE, IN REMISSION: ICD-10-CM

## 2021-11-16 DIAGNOSIS — Z01.818 PRE-OP EXAM: Primary | ICD-10-CM

## 2021-11-16 DIAGNOSIS — N18.1 CHRONIC KIDNEY DISEASE (CKD) STAGE G1/A2, GLOMERULAR FILTRATION RATE (GFR) EQUAL TO OR GREATER THAN 90 ML/MIN/1.73 SQUARE METER AND ALBUMINURIA CREATININE RATIO BETWEEN 30-299 MG/G: ICD-10-CM

## 2021-11-16 LAB
FASTING STATUS PATIENT QL REPORTED: YES
GLUCOSE BLD-MCNC: 202 MG/DL (ref 70–99)
HBA1C MFR BLD: 8.7 % (ref 0–5.6)
HGB BLD-MCNC: 12.7 G/DL (ref 11.7–15.7)
POTASSIUM BLD-SCNC: 3.9 MMOL/L (ref 3.4–5.3)

## 2021-11-16 PROCEDURE — 84132 ASSAY OF SERUM POTASSIUM: CPT | Performed by: FAMILY MEDICINE

## 2021-11-16 PROCEDURE — 85018 HEMOGLOBIN: CPT | Performed by: FAMILY MEDICINE

## 2021-11-16 PROCEDURE — 82947 ASSAY GLUCOSE BLOOD QUANT: CPT | Performed by: FAMILY MEDICINE

## 2021-11-16 PROCEDURE — 93000 ELECTROCARDIOGRAM COMPLETE: CPT | Performed by: FAMILY MEDICINE

## 2021-11-16 PROCEDURE — 83036 HEMOGLOBIN GLYCOSYLATED A1C: CPT | Performed by: FAMILY MEDICINE

## 2021-11-16 PROCEDURE — 36415 COLL VENOUS BLD VENIPUNCTURE: CPT | Performed by: FAMILY MEDICINE

## 2021-11-16 PROCEDURE — 99214 OFFICE O/P EST MOD 30 MIN: CPT | Performed by: FAMILY MEDICINE

## 2021-11-16 RX ORDER — METFORMIN HCL 500 MG
500 TABLET, EXTENDED RELEASE 24 HR ORAL
Qty: 30 TABLET | Refills: 0 | Status: SHIPPED | OUTPATIENT
Start: 2021-11-16 | End: 2021-11-30

## 2021-11-16 ASSESSMENT — MIFFLIN-ST. JEOR: SCORE: 1057.82

## 2021-11-16 NOTE — PROGRESS NOTES
[facility]  [address]  [place] [address]  Phone: [phone]  Primary Provider: Annmarie Solorzano  Pre-op Performing Provider: MAGGIE YUAN      PREOPERATIVE EVALUATION:  Today's date: 11/16/2021    Ludy Mckenzie is a 81 year old female who presents for a preoperative evaluation.    Surgical Information:  Surgery/Procedure: Colonoscopy  Surgery Location: [facility]  Surgeon: Dr Leal  Surgery Date: 12/2/21  Time of Surgery: 2:00  Where patient plans to recover: At home with family  Fax number for surgical facility: Note does not need to be faxed, will be available electronically in Epic.    Type of Anesthesia Anticipated: to be determined    Assessment & Plan     The proposed surgical procedure is considered INTERMEDIATE risk.    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)  High  Advised start metformin  mg daily  Send accuchecks to me in- Hemoglobin A1c; Future  - Glucose; Future  - Glucose  - Hemoglobin A1c  - metFORMIN (GLUCOPHAGE-XR) 500 MG 24 hr tablet; Take 1 tablet (500 mg) by mouth daily (with dinner)    Chronic obstructive pulmonary disease, unspecified COPD type (H)  Stable     Chronic kidney disease (CKD) stage G1/A2, glomerular filtration rate (GFR) equal to or greater than 90 mL/min/1.73 square meter and albuminuria creatinine ratio between  mg/g  Stable     Diabetic neuropathy with neurologic complication (H)      Age-related osteoporosis without current pathological fracture      Advised To send me her accuchecks in 1 week  If they are Good-she should be Okay for colonoscopy       Risks and Recommendations:  The  patient has the following additional risks and recommendations for perioperative complications:  Diabetes:  - Patient is not on insulin therapy: regular NPO guidelines can be followed.     Medication Instructions:  Patient is to take all scheduled medications on the day of surgery   - aspirin: Discontinue aspirin 7-10 days prior to procedure to reduce bleeding risk. It should be resumed postoperatively.    - ACE/ARB: May be continued on the day of surgery.    - Statins: Continue taking on the day of surgery.    - metformin: HOLD day of surgery.   - LABA, inhaled corticosteroid, long-acting anticholinergics: Continue without modification.    RECOMMENDATION:  APPROVAL GIVEN to proceed with proposed procedure, without further diagnostic evaluation.      Subjective     HPI related to upcoming procedure: pt scheduled for repeat  Colonoscopy due to colon Polyps      Preop Questions 11/16/2021   1. Have you ever had a heart attack or stroke? YES - 2009   2. Have you ever had surgery on your heart or blood vessels, such as a stent placement, a coronary artery bypass, or surgery on an artery in your head, neck, heart, or legs? No   3. Do you have chest pain with activity? No   4. Do you have a history of  heart failure? No   5. Do you currently have a cold, bronchitis or symptoms of other infection? No   6. Do you have a cough, shortness of breath, or wheezing? No   7. Do you or anyone in your family have previous history of blood clots? No   8. Do you or does anyone in your family have a serious bleeding problem such as prolonged bleeding following surgeries or cuts? No   9. Have you ever had problems with anemia or been told to take iron pills? No   10. Have you had any abnormal blood loss such as black, tarry or bloody stools, or abnormal vaginal bleeding? No   11. Have you ever had a blood transfusion? No   12. Are you willing to have a blood transfusion if it is medically needed before, during, or after your surgery?  Yes   13. Have you or any of your relatives ever had problems with anesthesia? No   14. Do you have sleep apnea, excessive snoring or daytime drowsiness? No   15. Do you have any artifical heart valves or other implanted medical devices like a pacemaker, defibrillator, or continuous glucose monitor? No   16. Do you have artificial joints? No   17. Are you allergic to latex? No     Health Care Directive:  Patient has a Health Care Directive on file      Preoperative Review of :   reviewed - no record of controlled substances prescribed.      Status of Chronic Conditions:  DIABETES - Patient has a longstanding history of DiabetesType Type II . Patient is being treated with diet a and denies significant side effects. Control has been not Good-she does not check her accuchecks. Complicating factors include but are not limited to: hypertension and hyperlipidemia.     HYPERLIPIDEMIA - Patient has a long history of significant Hyperlipidemia requiring medication for treatment with recent good control. Patient reports no problems or side effects with the medication.     HYPERTENSION - Patient has longstanding history of HTN , currently denies any symptoms referable to elevated blood pressure. Specifically denies chest pain, palpitations, dyspnea, orthopnea, PND or peripheral edema. Blood pressure readings have been in normal range. Current medication regimen is as listed below. Patient denies any side effects of medication.     Pt has Known COPD which is at baseline  She has History of stroke many years ago  Takes ASA  No new symptoms  Review of Systems  CONSTITUTIONAL: NEGATIVE for fever, chills, change in weight  INTEGUMENTARY/SKIN: NEGATIVE for worrisome rashes, moles or lesions  EYES: NEGATIVE for vision changes or irritation  ENT/MOUTH: NEGATIVE for ear, mouth and throat problems  RESP: NEGATIVE for significant cough or SOB  CV: NEGATIVE for chest pain, palpitations or peripheral edema  GI: NEGATIVE for nausea,  abdominal pain, heartburn, or change in bowel habits  : NEGATIVE for frequency, dysuria, or hematuria  MUSCULOSKELETAL: NEGATIVE for significant arthralgias or myalgia  NEURO: NEGATIVE for weakness, dizziness or paresthesias  ENDOCRINE: NEGATIVE for temperature intolerance, skin/hair changes  HEME: NEGATIVE for bleeding problems  PSYCHIATRIC: NEGATIVE for changes in mood or affect    Patient Active Problem List    Diagnosis Date Noted     Major depressive disorder, recurrent episode, mild (H) 03/27/2012     Priority: High     She was on Prozac and that evened her out.       Mixed hyperlipidemia 03/22/2012     Priority: High     Cerebral infarction (H) 03/22/2012     Priority: High     No residual effects       2019 novel coronavirus disease (COVID-19) 03/25/2021     Priority: Medium     History of colonic polyps 01/05/2021     Priority: Medium     Added automatically from request for surgery [id number]       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) 07/30/2018     Priority: Medium     Urge incontinence of urine 06/02/2017     Priority: Medium     Cramp of limb 06/02/2017     Priority: Medium     Right sided sciatica 06/02/2017     Priority: Medium     Pulmonary emphysema, unspecified emphysema type (H) 06/02/2017     Priority: Medium     Age-related osteoporosis without current pathological fracture 06/02/2017     Priority: Medium     Pulmonary nodules 06/02/2017     Priority: Medium     Pseudophakia of both eyes 10/24/2016     Priority: Medium     Diabetic neuropathy with neurologic complication (H) 09/19/2016     Priority: Medium     Loss of balance 03/17/2016     Priority: Medium     Chronic obstructive pulmonary disease, unspecified COPD type (H) 03/02/2016     Priority: Medium     Chronic kidney disease (CKD) stage G1/A2, glomerular filtration rate (GFR) equal to or greater than 90 mL/min/1.73 square meter and albuminuria creatinine ratio between  mg/g 03/02/2016     Priority: Medium     Hypopotassemia 10/21/2015      Priority: Medium     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) 10/21/2015     Priority: Medium     Primary open angle glaucoma (POAG) 10/21/2015     Priority: Medium     Bullous pemphigoid 10/01/2013     Priority: Medium     Went off Cellcept in 2017 and has not had recurrence       Health Care Home 09/05/2012     Priority: Medium     Sondra Horta RN-PHN  [facility] / [facility]   [phone]   DX V65.8 REPLACED WITH [id number] (04/08/2013)       Thyroid nodule 03/23/2012     Priority: Medium     Seasonal allergic rhinitis 03/23/2012     Priority: Medium     Alcohol abuse, in remission 10/06/2011     Priority: Medium     Colon polyp 01/24/2009     Priority: Medium      Past Medical History:   Diagnosis Date     Acute ischemic left TREMAYNE stroke (H) 2009     Astigmatism, unspecified      Bullous pemphigoid      COPD (chronic obstructive pulmonary disease) (H)      CVA (cerebral infarction) 8/09     Depressive disorder      Diabetes (H)      Family history of diabetes mellitus      Glaucoma (increased eye pressure)      Goiter 3/12/2007     HTN (hypertension) 8/25/2009     Hypocalcemia      Hypokalemia      Hyponatremia      Mixed hyperlipidemia      Personal history of other diseases of circulatory system      Pneumonia 11/16/2010     Primary open-angle glaucoma(365.11)      Thyroid nodule 3/23/2012     Unspecified cerebral artery occlusion with cerebral infarction      Unspecified cerebral artery occlusion with cerebral infarction 1998     Past Surgical History:   Procedure Laterality Date     BIOPSY       BUNIONECTOMY  1960    JACQUELINE     CATARACT IOL, RT/LT  2010    right     COLONOSCOPY  2/22/2013    Procedure: COLONOSCOPY;  Colonoscopy ;  Surgeon: Hugo Minor MD;  Location:  GI     COLONOSCOPY N/A 11/11/2019    Procedure: Colonoscopy, With Polypectomy And Biopsy;  Surgeon: Selena Escobedo MD;  Location: [facility] OR     COLONOSCOPY WITH CO2  "INSUFFLATION N/A 11/11/2019    Procedure: COLONOSCOPY, WITH CO2 INSUFFLATION;  Surgeon: Selena Escobedo MD;  Location: [facility] OR     Current Outpatient Medications   Medication Sig Dispense Refill     albuterol (ACCUNEB) 1.25 MG/3ML neb solution Take 1 vial (1.25 mg) by nebulization every 6 hours as needed for shortness of breath / dyspnea or wheezing 90 mL 11     albuterol (PROAIR HFA/PROVENTIL HFA/VENTOLIN HFA) 108 (90 Base) MCG/ACT inhaler Inhale 2 puffs into the lungs every 4 hours as needed for shortness of breath / dyspnea or wheezing 8.5 g 11     alcohol swab prep pads Use to swab area of injection/surjit as directed. 100 each 3     aspirin 81 MG tablet Take 1 tablet by mouth daily.       azithromycin (ZITHROMAX) 250 MG tablet TAKE 1 TABLET (250 MG) BY MOUTH EVERY MON, WED, FRI MORNING 12 tablet 11     bisacodyl (DULCOLAX) 5 MG EC tablet Refer to \"Getting Ready for a Colonoscopy\" instruction handout 4 tablet 0     blood glucose (NO BRAND SPECIFIED) test strip Use to test blood sugar 1 times daily or as directed. To accompany: Blood Glucose Monitor Brands: per insurance. 100 strip 6     blood glucose calibration (NO BRAND SPECIFIED) solution To accompany: Blood Glucose Monitor Brands: per insurance. 1 Bottle 3     blood glucose monitoring (NO BRAND SPECIFIED) meter device kit Use to test blood sugar one times daily or as directed. Preferred blood glucose meter OR supplies to accompany: Blood Glucose Monitor Brands: per insurance. 1 kit 0     fluticasone-salmeterol (ADVAIR-HFA) 230-21 MCG/ACT inhaler Inhale 2 puffs into the lungs 2 times daily 12 g 11     latanoprost (XALATAN) 0.005 % ophthalmic solution Place 1 drop into both eyes At Bedtime 7.5 mL 3     losartan (COZAAR) 25 MG tablet TAKE 1/2 TABLET BY MOUTH EVERY DAY (Patient taking differently: every evening TAKE 1/2 TABLET BY MOUTH EVERY DAY) 45 tablet 4     mirabegron (MYRBETRIQ) 25 MG 24 hr tablet Take 1 tablet (25 mg) by mouth daily 30 tablet 3     " potassium chloride ER (KLOR-CON) 20 MEQ CR tablet Take 1 tablet (20 mEq) by mouth daily 90 tablet 4     rosuvastatin (CRESTOR) 5 MG tablet Take 1 tablet (5 mg) by mouth daily 90 tablet 1     spacer (OPTICHAMBER KENNETH) holding chamber Use with fluticasone-salmeterol (ADVAIR) 500-50 MCG/DOSE inhaler 1 each 0     thin (NO BRAND SPECIFIED) lancets Use with lanceting device. To accompany: Blood Glucose Monitor Brands: per insurance. 100 each 6     tiotropium (SPIRIVA HANDIHALER) 18 MCG inhaled capsule Inhale 1 capsule (18 mcg) into the lungs daily Patient can use for now until she gets her Revefenacin Nebs. She knows to switch and not use both Spiriva and Revefenacin Nebs at the same time. 90 capsule 3     [START ON 2021] tiotropium (SPIRIVA RESPIMAT) 2.5 MCG/ACT inhaler Inhale 2 puffs into the lungs daily 4 g 11     vitamin  B complex with vitamin C (VITAMIN  B COMPLEX) TABS Take 1 tablet by mouth daily         Allergies   Allergen Reactions     Amoxicillin GI Disturbance and Nausea     Augmentin Nausea and Vomiting     Doxycycline GI Disturbance     Iodine Itching and Swelling     Pt is ok with ct contrast dye (isovue 370)     Mercury Unknown     Metronidazole GI Disturbance     Penicillins Unknown     Phenylmercuric Nitrate Unknown        Social History     Tobacco Use     Smoking status: Former Smoker     Packs/day: 1.50     Years: 50.00     Pack years: 75.00     Types: Cigarettes     Quit date: 2009     Years since quittin.3     Smokeless tobacco: Never Used   Substance Use Topics     Alcohol use: No     Alcohol/week: 4.0 standard drinks     Family History   Problem Relation Age of Onset     Cancer Maternal Grandfather         bone     Diabetes Mother 50        dx age 50, hip fracture     Glaucoma Mother      Macular Degeneration Mother      C.A.D. Father         pacemaker     Glaucoma Sister      History   Drug Use No         Objective     /68   Pulse 85   Temp 98  F (36.7  C) (Oral)    "Resp 20   Ht 1.626 m (5' 4\")   Wt 60.8 kg (134 lb)   SpO2 96%   BMI 23.00 kg/m      Physical Exam    GENERAL APPEARANCE: healthy, alert and no distress     EYES: EOMI, PERRL     HENT: ear canals and TM's normal and nose and mouth without ulcers or lesions     NECK: no adenopathy, no asymmetry, masses, or scars and thyroid normal to palpation     RESP: lungs clear to auscultation - no rales, rhonchi or wheezes     CV: regular rates and rhythm, normal S1 S2, no S3 or S4 and no murmur, click or rub     ABDOMEN:  soft, nontender, no HSM or masses and bowel sounds normal     MS: extremities normal- no gross deformities noted, no evidence of inflammation in joints, FROM in all extremities.     SKIN: no suspicious lesions or rashes     NEURO: Normal strength and tone, sensory exam grossly normal, mentation intact and speech normal     PSYCH: mentation appears normal. and affect normal/bright     LYMPHATICS: No cervical adenopathy    Recent Labs   Lab Test 09/22/21  1102 06/29/21  1223 05/14/21  1215 05/10/21  1231 01/04/21  0931   HGB  --  12.0  --   --   --    PLT  --  248  --   --   --    INR  --   --  1.00  --   --    NA  --  137  --   --  137   POTASSIUM  --  4.0  --   --  4.0   CR 0.6 0.50*  --   --  0.64   A1C  --   --   --  7.8* 7.5*        Diagnostics:  Labs pending at this time.  Results will be reviewed when available.   EKG: appears normal, NSR, no LVH by voltage criteria, unchanged from previous tracings    Revised Cardiac Risk Index (RCRI):  The patient has the following serious cardiovascular risks for perioperative complications:   - Cerebrovascular Disease (TIA or CVA) = 1 point     RCRI Interpretation: 1 point: Class II (low risk - 0.9% complication rate)           Signed Electronically by: Delisa Moffett MD  Copy of this evaluation report is provided to requesting physician.      "

## 2021-11-16 NOTE — H&P (VIEW-ONLY)
[facility]  [address] [facility]  [place] [address]  Phone: [phone]  Primary Provider: Annmarie Solorzano  Pre-op Performing Provider: MAGGIE YUAN      PREOPERATIVE EVALUATION:  Today's date: 11/16/2021    Ludy Mckenzie is a 81 year old female who presents for a preoperative evaluation.    Surgical Information:  Surgery/Procedure: Colonoscopy  Surgery Location: [facility]  Surgeon: Dr Leal  Surgery Date: 12/2/21  Time of Surgery: 2:00  Where patient plans to recover: At home with family  Fax number for surgical facility: Note does not need to be faxed, will be available electronically in Epic.    Type of Anesthesia Anticipated: to be determined    Assessment & Plan     The proposed surgical procedure is considered INTERMEDIATE risk.    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)  High  Advised start metformin  mg daily  Send accuchecks to me in- Hemoglobin A1c; Future  - Glucose; Future  - Glucose  - Hemoglobin A1c  - metFORMIN (GLUCOPHAGE-XR) 500 MG 24 hr tablet; Take 1 tablet (500 mg) by mouth daily (with dinner)    Chronic obstructive pulmonary disease, unspecified COPD type (H)  Stable     Chronic kidney disease (CKD) stage G1/A2, glomerular filtration rate (GFR) equal to or greater than 90 mL/min/1.73 square meter and albuminuria creatinine ratio between  mg/g  Stable     Diabetic neuropathy with neurologic complication (H)      Age-related osteoporosis without current pathological fracture      Advised To send me her accuchecks in 1 week  If they are Good-she should be Okay for colonoscopy       Risks and Recommendations:  The  patient has the following additional risks and recommendations for perioperative complications:  Diabetes:  - Patient is not on insulin therapy: regular NPO guidelines can be followed.     Medication Instructions:  Patient is to take all scheduled medications on the day of surgery   - aspirin: Discontinue aspirin 7-10 days prior to procedure to reduce bleeding risk. It should be resumed postoperatively.    - ACE/ARB: May be continued on the day of surgery.    - Statins: Continue taking on the day of surgery.    - metformin: HOLD day of surgery.   - LABA, inhaled corticosteroid, long-acting anticholinergics: Continue without modification.    RECOMMENDATION:  APPROVAL GIVEN to proceed with proposed procedure, without further diagnostic evaluation.      Subjective     HPI related to upcoming procedure: pt scheduled for repeat  Colonoscopy due to colon Polyps      Preop Questions 11/16/2021   1. Have you ever had a heart attack or stroke? YES - 2009   2. Have you ever had surgery on your heart or blood vessels, such as a stent placement, a coronary artery bypass, or surgery on an artery in your head, neck, heart, or legs? No   3. Do you have chest pain with activity? No   4. Do you have a history of  heart failure? No   5. Do you currently have a cold, bronchitis or symptoms of other infection? No   6. Do you have a cough, shortness of breath, or wheezing? No   7. Do you or anyone in your family have previous history of blood clots? No   8. Do you or does anyone in your family have a serious bleeding problem such as prolonged bleeding following surgeries or cuts? No   9. Have you ever had problems with anemia or been told to take iron pills? No   10. Have you had any abnormal blood loss such as black, tarry or bloody stools, or abnormal vaginal bleeding? No   11. Have you ever had a blood transfusion? No   12. Are you willing to have a blood transfusion if it is medically needed before, during, or after your surgery?  Yes   13. Have you or any of your relatives ever had problems with anesthesia? No   14. Do you have sleep apnea, excessive snoring or daytime drowsiness? No   15. Do you have any artifical heart valves or other implanted medical devices like a pacemaker, defibrillator, or continuous glucose monitor? No   16. Do you have artificial joints? No   17. Are you allergic to latex? No     Health Care Directive:  Patient has a Health Care Directive on file      Preoperative Review of :   reviewed - no record of controlled substances prescribed.      Status of Chronic Conditions:  DIABETES - Patient has a longstanding history of DiabetesType Type II . Patient is being treated with diet a and denies significant side effects. Control has been not Good-she does not check her accuchecks. Complicating factors include but are not limited to: hypertension and hyperlipidemia.     HYPERLIPIDEMIA - Patient has a long history of significant Hyperlipidemia requiring medication for treatment with recent good control. Patient reports no problems or side effects with the medication.     HYPERTENSION - Patient has longstanding history of HTN , currently denies any symptoms referable to elevated blood pressure. Specifically denies chest pain, palpitations, dyspnea, orthopnea, PND or peripheral edema. Blood pressure readings have been in normal range. Current medication regimen is as listed below. Patient denies any side effects of medication.     Pt has Known COPD which is at baseline  She has History of stroke many years ago  Takes ASA  No new symptoms  Review of Systems  CONSTITUTIONAL: NEGATIVE for fever, chills, change in weight  INTEGUMENTARY/SKIN: NEGATIVE for worrisome rashes, moles or lesions  EYES: NEGATIVE for vision changes or irritation  ENT/MOUTH: NEGATIVE for ear, mouth and throat problems  RESP: NEGATIVE for significant cough or SOB  CV: NEGATIVE for chest pain, palpitations or peripheral edema  GI: NEGATIVE for nausea,  abdominal pain, heartburn, or change in bowel habits  : NEGATIVE for frequency, dysuria, or hematuria  MUSCULOSKELETAL: NEGATIVE for significant arthralgias or myalgia  NEURO: NEGATIVE for weakness, dizziness or paresthesias  ENDOCRINE: NEGATIVE for temperature intolerance, skin/hair changes  HEME: NEGATIVE for bleeding problems  PSYCHIATRIC: NEGATIVE for changes in mood or affect    Patient Active Problem List    Diagnosis Date Noted     Major depressive disorder, recurrent episode, mild (H) 03/27/2012     Priority: High     She was on Prozac and that evened her out.       Mixed hyperlipidemia 03/22/2012     Priority: High     Cerebral infarction (H) 03/22/2012     Priority: High     No residual effects       2019 novel coronavirus disease (COVID-19) 03/25/2021     Priority: Medium     History of colonic polyps 01/05/2021     Priority: Medium     Added automatically from request for surgery [id number]       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) 07/30/2018     Priority: Medium     Urge incontinence of urine 06/02/2017     Priority: Medium     Cramp of limb 06/02/2017     Priority: Medium     Right sided sciatica 06/02/2017     Priority: Medium     Pulmonary emphysema, unspecified emphysema type (H) 06/02/2017     Priority: Medium     Age-related osteoporosis without current pathological fracture 06/02/2017     Priority: Medium     Pulmonary nodules 06/02/2017     Priority: Medium     Pseudophakia of both eyes 10/24/2016     Priority: Medium     Diabetic neuropathy with neurologic complication (H) 09/19/2016     Priority: Medium     Loss of balance 03/17/2016     Priority: Medium     Chronic obstructive pulmonary disease, unspecified COPD type (H) 03/02/2016     Priority: Medium     Chronic kidney disease (CKD) stage G1/A2, glomerular filtration rate (GFR) equal to or greater than 90 mL/min/1.73 square meter and albuminuria creatinine ratio between  mg/g 03/02/2016     Priority: Medium     Hypopotassemia 10/21/2015      Priority: Medium     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) 10/21/2015     Priority: Medium     Primary open angle glaucoma (POAG) 10/21/2015     Priority: Medium     Bullous pemphigoid 10/01/2013     Priority: Medium     Went off Cellcept in 2017 and has not had recurrence       Health Care Home 09/05/2012     Priority: Medium     Sondra Horta RN-PHN  [facility] / [facility]   [phone]   DX V65.8 REPLACED WITH 9[facility] CARE HOME (04/08/2013)       Thyroid nodule 03/23/2012     Priority: Medium     Seasonal allergic rhinitis 03/23/2012     Priority: Medium     Alcohol abuse, in remission 10/06/2011     Priority: Medium     Colon polyp 01/24/2009     Priority: Medium      Past Medical History:   Diagnosis Date     Acute ischemic left TREMAYNE stroke (H) 2009     Astigmatism, unspecified      Bullous pemphigoid      COPD (chronic obstructive pulmonary disease) (H)      CVA (cerebral infarction) 8/09     Depressive disorder      Diabetes (H)      Family history of diabetes mellitus      Glaucoma (increased eye pressure)      Goiter 3/12/2007     HTN (hypertension) 8/25/2009     Hypocalcemia      Hypokalemia      Hyponatremia      Mixed hyperlipidemia      Personal history of other diseases of circulatory system      Pneumonia 11/16/2010     Primary open-angle glaucoma(365.11)      Thyroid nodule 3/23/2012     Unspecified cerebral artery occlusion with cerebral infarction      Unspecified cerebral artery occlusion with cerebral infarction 1998     Past Surgical History:   Procedure Laterality Date     BIOPSY       BUNIONECTOMY  1960    JACQUELINE     CATARACT IOL, RT/LT  2010    right     COLONOSCOPY  2/22/2013    Procedure: COLONOSCOPY;  Colonoscopy ;  Surgeon: Hugo Minor MD;  Location:  GI     COLONOSCOPY N/A 11/11/2019    Procedure: Colonoscopy, With Polypectomy And Biopsy;  Surgeon: Selena Escobedo MD;  Location: [facility] OR     COLONOSCOPY WITH CO2  "INSUFFLATION N/A 11/11/2019    Procedure: COLONOSCOPY, WITH CO2 INSUFFLATION;  Surgeon: Selena Escobedo MD;  Location: [facility] OR     Current Outpatient Medications   Medication Sig Dispense Refill     albuterol (ACCUNEB) 1.25 MG/3ML neb solution Take 1 vial (1.25 mg) by nebulization every 6 hours as needed for shortness of breath / dyspnea or wheezing 90 mL 11     albuterol (PROAIR HFA/PROVENTIL HFA/VENTOLIN HFA) 108 (90 Base) MCG/ACT inhaler Inhale 2 puffs into the lungs every 4 hours as needed for shortness of breath / dyspnea or wheezing 8.5 g 11     alcohol swab prep pads Use to swab area of injection/surjit as directed. 100 each 3     aspirin 81 MG tablet Take 1 tablet by mouth daily.       azithromycin (ZITHROMAX) 250 MG tablet TAKE 1 TABLET (250 MG) BY MOUTH EVERY MON, WED, FRI MORNING 12 tablet 11     bisacodyl (DULCOLAX) 5 MG EC tablet Refer to \"Getting Ready for a Colonoscopy\" instruction handout 4 tablet 0     blood glucose (NO BRAND SPECIFIED) test strip Use to test blood sugar 1 times daily or as directed. To accompany: Blood Glucose Monitor Brands: per insurance. 100 strip 6     blood glucose calibration (NO BRAND SPECIFIED) solution To accompany: Blood Glucose Monitor Brands: per insurance. 1 Bottle 3     blood glucose monitoring (NO BRAND SPECIFIED) meter device kit Use to test blood sugar one times daily or as directed. Preferred blood glucose meter OR supplies to accompany: Blood Glucose Monitor Brands: per insurance. 1 kit 0     fluticasone-salmeterol (ADVAIR-HFA) 230-21 MCG/ACT inhaler Inhale 2 puffs into the lungs 2 times daily 12 g 11     latanoprost (XALATAN) 0.005 % ophthalmic solution Place 1 drop into both eyes At Bedtime 7.5 mL 3     losartan (COZAAR) 25 MG tablet TAKE 1/2 TABLET BY MOUTH EVERY DAY (Patient taking differently: every evening TAKE 1/2 TABLET BY MOUTH EVERY DAY) 45 tablet 4     mirabegron (MYRBETRIQ) 25 MG 24 hr tablet Take 1 tablet (25 mg) by mouth daily 30 tablet 3     " potassium chloride ER (KLOR-CON) 20 MEQ CR tablet Take 1 tablet (20 mEq) by mouth daily 90 tablet 4     rosuvastatin (CRESTOR) 5 MG tablet Take 1 tablet (5 mg) by mouth daily 90 tablet 1     spacer (OPTICHAMBER KENNETH) holding chamber Use with fluticasone-salmeterol (ADVAIR) 500-50 MCG/DOSE inhaler 1 each 0     thin (NO BRAND SPECIFIED) lancets Use with lanceting device. To accompany: Blood Glucose Monitor Brands: per insurance. 100 each 6     tiotropium (SPIRIVA HANDIHALER) 18 MCG inhaled capsule Inhale 1 capsule (18 mcg) into the lungs daily Patient can use for now until she gets her Revefenacin Nebs. She knows to switch and not use both Spiriva and Revefenacin Nebs at the same time. 90 capsule 3     [START ON 2021] tiotropium (SPIRIVA RESPIMAT) 2.5 MCG/ACT inhaler Inhale 2 puffs into the lungs daily 4 g 11     vitamin  B complex with vitamin C (VITAMIN  B COMPLEX) TABS Take 1 tablet by mouth daily         Allergies   Allergen Reactions     Amoxicillin GI Disturbance and Nausea     Augmentin Nausea and Vomiting     Doxycycline GI Disturbance     Iodine Itching and Swelling     Pt is ok with ct contrast dye (isovue 370)     Mercury Unknown     Metronidazole GI Disturbance     Penicillins Unknown     Phenylmercuric Nitrate Unknown        Social History     Tobacco Use     Smoking status: Former Smoker     Packs/day: 1.50     Years: 50.00     Pack years: 75.00     Types: Cigarettes     Quit date: 2009     Years since quittin.3     Smokeless tobacco: Never Used   Substance Use Topics     Alcohol use: No     Alcohol/week: 4.0 standard drinks     Family History   Problem Relation Age of Onset     Cancer Maternal Grandfather         bone     Diabetes Mother 50        dx age 50, hip fracture     Glaucoma Mother      Macular Degeneration Mother      C.A.D. Father         pacemaker     Glaucoma Sister      History   Drug Use No         Objective     /68   Pulse 85   Temp 98  F (36.7  C) (Oral)    "Resp 20   Ht 1.626 m (5' 4\")   Wt 60.8 kg (134 lb)   SpO2 96%   BMI 23.00 kg/m      Physical Exam    GENERAL APPEARANCE: healthy, alert and no distress     EYES: EOMI, PERRL     HENT: ear canals and TM's normal and nose and mouth without ulcers or lesions     NECK: no adenopathy, no asymmetry, masses, or scars and thyroid normal to palpation     RESP: lungs clear to auscultation - no rales, rhonchi or wheezes     CV: regular rates and rhythm, normal S1 S2, no S3 or S4 and no murmur, click or rub     ABDOMEN:  soft, nontender, no HSM or masses and bowel sounds normal     MS: extremities normal- no gross deformities noted, no evidence of inflammation in joints, FROM in all extremities.     SKIN: no suspicious lesions or rashes     NEURO: Normal strength and tone, sensory exam grossly normal, mentation intact and speech normal     PSYCH: mentation appears normal. and affect normal/bright     LYMPHATICS: No cervical adenopathy    Recent Labs   Lab Test 09/22/21  1102 06/29/21  1223 05/14/21  1215 05/10/21  1231 01/04/21  0931   HGB  --  12.0  --   --   --    PLT  --  248  --   --   --    INR  --   --  1.00  --   --    NA  --  137  --   --  137   POTASSIUM  --  4.0  --   --  4.0   CR 0.6 0.50*  --   --  0.64   A1C  --   --   --  7.8* 7.5*        Diagnostics:  Labs pending at this time.  Results will be reviewed when available.   EKG: appears normal, NSR, no LVH by voltage criteria, unchanged from previous tracings    Revised Cardiac Risk Index (RCRI):  The patient has the following serious cardiovascular risks for perioperative complications:   - Cerebrovascular Disease (TIA or CVA) = 1 point     RCRI Interpretation: 1 point: Class II (low risk - 0.9% complication rate)           Signed Electronically by: Delisa Moffett MD  Copy of this evaluation report is provided to requesting physician.      "

## 2021-11-16 NOTE — PATIENT INSTRUCTIONS
Stop aspirin 10 days before before Procedure  Start Metformin -1 tablet before meals  Do not take Metformin on the day of colonoscopy  Sincerely,  Delisa Moffett MD

## 2021-11-24 RX ORDER — ONDANSETRON 2 MG/ML
4 INJECTION INTRAMUSCULAR; INTRAVENOUS
Status: CANCELLED | OUTPATIENT
Start: 2021-11-24

## 2021-11-24 RX ORDER — LIDOCAINE 40 MG/G
CREAM TOPICAL
Status: CANCELLED | OUTPATIENT
Start: 2021-11-24

## 2021-11-25 ENCOUNTER — MYC MEDICAL ADVICE (OUTPATIENT)
Dept: FAMILY MEDICINE | Facility: CLINIC | Age: 81
End: 2021-11-25
Payer: COMMERCIAL

## 2021-11-25 DIAGNOSIS — E11.49 TYPE 2 DIABETES MELLITUS WITH OTHER DIABETIC NEUROLOGICAL COMPLICATION (H): Primary | ICD-10-CM

## 2021-11-30 ENCOUNTER — LAB (OUTPATIENT)
Dept: LAB | Facility: CLINIC | Age: 81
End: 2021-11-30
Payer: COMMERCIAL

## 2021-11-30 DIAGNOSIS — Z11.59 ENCOUNTER FOR SCREENING FOR OTHER VIRAL DISEASES: ICD-10-CM

## 2021-11-30 DIAGNOSIS — E11.49 TYPE 2 DIABETES MELLITUS WITH OTHER DIABETIC NEUROLOGICAL COMPLICATION (H): ICD-10-CM

## 2021-11-30 PROCEDURE — U0005 INFEC AGEN DETEC AMPLI PROBE: HCPCS

## 2021-11-30 PROCEDURE — U0003 INFECTIOUS AGENT DETECTION BY NUCLEIC ACID (DNA OR RNA); SEVERE ACUTE RESPIRATORY SYNDROME CORONAVIRUS 2 (SARS-COV-2) (CORONAVIRUS DISEASE [COVID-19]), AMPLIFIED PROBE TECHNIQUE, MAKING USE OF HIGH THROUGHPUT TECHNOLOGIES AS DESCRIBED BY CMS-2020-01-R: HCPCS

## 2021-11-30 RX ORDER — METFORMIN HCL 500 MG
500 TABLET, EXTENDED RELEASE 24 HR ORAL 2 TIMES DAILY WITH MEALS
Qty: 60 TABLET | Refills: 0 | Status: SHIPPED | OUTPATIENT
Start: 2021-11-30 | End: 2021-12-09

## 2021-12-01 ENCOUNTER — TELEPHONE (OUTPATIENT)
Dept: FAMILY MEDICINE | Facility: CLINIC | Age: 81
End: 2021-12-01
Payer: COMMERCIAL

## 2021-12-01 LAB — SARS-COV-2 RNA RESP QL NAA+PROBE: NEGATIVE

## 2021-12-01 NOTE — TELEPHONE ENCOUNTER
Diabetes Education Scheduling Outreach #1:    Call to patient to schedule. Left message with phone number to call to schedule.    Plan for 2nd outreach attempt within 1 week.    Norma Sofia  [place] [facility]  Diabetes and Nutrition Scheduling

## 2021-12-02 ENCOUNTER — ANESTHESIA (OUTPATIENT)
Dept: GASTROENTEROLOGY | Facility: CLINIC | Age: 81
End: 2021-12-02
Payer: COMMERCIAL

## 2021-12-02 ENCOUNTER — ANESTHESIA EVENT (OUTPATIENT)
Dept: GASTROENTEROLOGY | Facility: CLINIC | Age: 81
End: 2021-12-02
Payer: COMMERCIAL

## 2021-12-02 ENCOUNTER — HOSPITAL ENCOUNTER (OUTPATIENT)
Facility: CLINIC | Age: 81
Discharge: HOME | End: 2021-12-02
Attending: INTERNAL MEDICINE | Admitting: INTERNAL MEDICINE
Payer: COMMERCIAL

## 2021-12-02 VITALS
HEART RATE: 81 BPM | SYSTOLIC BLOOD PRESSURE: 115 MMHG | OXYGEN SATURATION: 96 % | DIASTOLIC BLOOD PRESSURE: 55 MMHG | RESPIRATION RATE: 25 BRPM

## 2021-12-02 LAB
COLONOSCOPY: NORMAL
GLUCOSE BLDC GLUCOMTR-MCNC: 131 MG/DL (ref 70–99)

## 2021-12-02 PROCEDURE — 999N000010 HC STATISTIC ANES STAT CODE-CRNA PER MINUTE: Performed by: INTERNAL MEDICINE

## 2021-12-02 PROCEDURE — 45380 COLONOSCOPY AND BIOPSY: CPT | Performed by: INTERNAL MEDICINE

## 2021-12-02 PROCEDURE — 88305 TISSUE EXAM BY PATHOLOGIST: CPT | Mod: TC | Performed by: INTERNAL MEDICINE

## 2021-12-02 PROCEDURE — 370N000017 HC ANESTHESIA TECHNICAL FEE, PER MIN: Performed by: INTERNAL MEDICINE

## 2021-12-02 PROCEDURE — 250N000011 HC RX IP 250 OP 636: Performed by: NURSE ANESTHETIST, CERTIFIED REGISTERED

## 2021-12-02 PROCEDURE — 250N000009 HC RX 250: Performed by: NURSE ANESTHETIST, CERTIFIED REGISTERED

## 2021-12-02 PROCEDURE — 88305 TISSUE EXAM BY PATHOLOGIST: CPT | Mod: 26 | Performed by: PATHOLOGY

## 2021-12-02 PROCEDURE — 258N000003 HC RX IP 258 OP 636: Performed by: NURSE ANESTHETIST, CERTIFIED REGISTERED

## 2021-12-02 PROCEDURE — 82962 GLUCOSE BLOOD TEST: CPT

## 2021-12-02 RX ORDER — PROPOFOL 10 MG/ML
INJECTION, EMULSION INTRAVENOUS PRN
Status: DISCONTINUED | OUTPATIENT
Start: 2021-12-02 | End: 2021-12-02

## 2021-12-02 RX ORDER — LIDOCAINE HYDROCHLORIDE 20 MG/ML
INJECTION, SOLUTION INFILTRATION; PERINEURAL PRN
Status: DISCONTINUED | OUTPATIENT
Start: 2021-12-02 | End: 2021-12-02

## 2021-12-02 RX ORDER — ONDANSETRON 2 MG/ML
INJECTION INTRAMUSCULAR; INTRAVENOUS PRN
Status: DISCONTINUED | OUTPATIENT
Start: 2021-12-02 | End: 2021-12-02

## 2021-12-02 RX ORDER — ONDANSETRON 2 MG/ML
4 INJECTION INTRAMUSCULAR; INTRAVENOUS EVERY 6 HOURS PRN
Status: CANCELLED | OUTPATIENT
Start: 2021-12-02

## 2021-12-02 RX ORDER — NALOXONE HYDROCHLORIDE 0.4 MG/ML
0.4 INJECTION, SOLUTION INTRAMUSCULAR; INTRAVENOUS; SUBCUTANEOUS
Status: CANCELLED | OUTPATIENT
Start: 2021-12-02

## 2021-12-02 RX ORDER — NALOXONE HYDROCHLORIDE 0.4 MG/ML
0.2 INJECTION, SOLUTION INTRAMUSCULAR; INTRAVENOUS; SUBCUTANEOUS
Status: CANCELLED | OUTPATIENT
Start: 2021-12-02

## 2021-12-02 RX ORDER — FLUMAZENIL 0.1 MG/ML
0.2 INJECTION, SOLUTION INTRAVENOUS
Status: CANCELLED | OUTPATIENT
Start: 2021-12-02 | End: 2021-12-03

## 2021-12-02 RX ORDER — SODIUM CHLORIDE, SODIUM LACTATE, POTASSIUM CHLORIDE, CALCIUM CHLORIDE 600; 310; 30; 20 MG/100ML; MG/100ML; MG/100ML; MG/100ML
INJECTION, SOLUTION INTRAVENOUS CONTINUOUS PRN
Status: DISCONTINUED | OUTPATIENT
Start: 2021-12-02 | End: 2021-12-02

## 2021-12-02 RX ORDER — PROCHLORPERAZINE MALEATE 5 MG
5 TABLET ORAL EVERY 6 HOURS PRN
Status: CANCELLED | OUTPATIENT
Start: 2021-12-02

## 2021-12-02 RX ORDER — ONDANSETRON 4 MG/1
4 TABLET, ORALLY DISINTEGRATING ORAL EVERY 6 HOURS PRN
Status: CANCELLED | OUTPATIENT
Start: 2021-12-02

## 2021-12-02 RX ORDER — PROPOFOL 10 MG/ML
INJECTION, EMULSION INTRAVENOUS CONTINUOUS PRN
Status: DISCONTINUED | OUTPATIENT
Start: 2021-12-02 | End: 2021-12-02

## 2021-12-02 RX ADMIN — PROPOFOL 20 MG: 10 INJECTION, EMULSION INTRAVENOUS at 14:37

## 2021-12-02 RX ADMIN — LIDOCAINE HYDROCHLORIDE 50 MG: 20 INJECTION, SOLUTION INFILTRATION; PERINEURAL at 14:36

## 2021-12-02 RX ADMIN — Medication 8 MCG: at 14:48

## 2021-12-02 RX ADMIN — Medication 12 MCG: at 14:38

## 2021-12-02 RX ADMIN — PROPOFOL 20 MG: 10 INJECTION, EMULSION INTRAVENOUS at 14:48

## 2021-12-02 RX ADMIN — PROPOFOL 125 MCG/KG/MIN: 10 INJECTION, EMULSION INTRAVENOUS at 14:36

## 2021-12-02 RX ADMIN — PROPOFOL 20 MG: 10 INJECTION, EMULSION INTRAVENOUS at 14:47

## 2021-12-02 RX ADMIN — PROPOFOL 10 MG: 10 INJECTION, EMULSION INTRAVENOUS at 14:39

## 2021-12-02 RX ADMIN — ONDANSETRON 4 MG: 2 INJECTION INTRAMUSCULAR; INTRAVENOUS at 14:47

## 2021-12-02 RX ADMIN — SODIUM CHLORIDE, POTASSIUM CHLORIDE, SODIUM LACTATE AND CALCIUM CHLORIDE: 600; 310; 30; 20 INJECTION, SOLUTION INTRAVENOUS at 14:35

## 2021-12-02 RX ADMIN — PROPOFOL 20 MG: 10 INJECTION, EMULSION INTRAVENOUS at 14:49

## 2021-12-02 ASSESSMENT — ENCOUNTER SYMPTOMS: DYSRHYTHMIAS: 0

## 2021-12-02 ASSESSMENT — LIFESTYLE VARIABLES: TOBACCO_USE: 1

## 2021-12-02 ASSESSMENT — COPD QUESTIONNAIRES: COPD: 1

## 2021-12-02 NOTE — ANESTHESIA CARE TRANSFER NOTE
Patient: Ludy Mckenzie    Procedure: Procedure(s):  COLONOSCOPY, WITH POLYPECTOMY AND BIOPSY       Diagnosis: History of colonic polyps [Z86.010]  Diagnosis Additional Information: No value filed.    Anesthesia Type:   MAC     Note:    Oropharynx: oropharynx clear of all foreign objects and spontaneously breathing  Level of Consciousness: drowsy  Oxygen Supplementation: face mask  Level of Supplemental Oxygen (L/min / FiO2): 7  Independent Airway: airway patency satisfactory and stable  Dentition: dentition unchanged  Vital Signs Stable: post-procedure vital signs reviewed and stable  Report to RN Given: handoff report given  Patient transferred to: Phase II    Handoff Report: Identifed the Patient, Identified the Reponsible Provider, Reviewed the pertinent medical history, Discussed the surgical course, Reviewed Intra-OP anesthesia mangement and issues during anesthesia, Set expectations for post-procedure period and Allowed opportunity for questions and acknowledgement of understanding      Vitals:  Vitals Value Taken Time   BP     Temp     Pulse     Resp 32 12/02/21 1512   SpO2 99 % 12/02/21 1512   Vitals shown include unvalidated device data.    Electronically Signed By: OCTAVIO Ocampo CRNA  December 2, 2021  3:14 PM

## 2021-12-02 NOTE — LETTER
December 7, 2021      Ludy Mckenzie  [address]   [facility] [place] [address]        Dear ,    The polyps that were removed during your colonoscopy returned as benign. The polyps were what we call adenomas, meaning that if left there they had the potential to turn into a cancer in 10-20 years but they were removed so there is nothing to worry about. The scar tissue from where we had removed the large polyp from before came back as normal scar tissue, meaning there was no left over polyp. As I said after you colonoscopy, you do not need another colonoscopy.      Resulted Orders   Surgical Pathology Exam   Result Value Ref Range    Case Report       Surgical Pathology Report                         Case: [id number]                                  Authorizing Provider:  Shai Leal MD          Collected:           12/02/2021 03:04 PM          Ordering Location:     [facility]          Received:            12/02/2021 04:05 PM                                 [facility]                                                          Pathologist:           Josue Lopez MD                                                           Specimens:   A) - Large Intestine, Colon, Cecum, polyp x1                                                        B) - Large Intestine, Colon, Ascending, polyp x2                                                    C) - Large Intestine, Colon, Ascending, EMR scar tissue                                    Final Diagnosis       A.  Colon, cecum: Polypectomy:  - Tubular adenoma  - No evidence of high-grade dysplasia or invasive malignancy     B.  Colon, ascending: Polypectomy:  - Tubular adenoma x2  - No evidence of high-grade dysplasia or invasive malignancy     C.  Colon, ascending, EMR scar tissue: Biopsy:  - Benign colonic mucosa with mild architectural disarray, negative for dysplasia      Clinical Information       Surveillance, history of piecemeal polyp  "removal      Gross Description       A(1). Large Intestine, Colon, Cecum, polyp x1:  The specimen is received in formalin, labeled with the patient's name, medical record number and other identifying information designated \"cecum polyp x1\". It consists of tan polypoid structure measuring up to 0.4 cm.  The specimen is entirely submitted in 1 cassette.      B(2). Large Intestine, Colon, Ascending, polyp x2:  The specimen is received in formalin, labeled with the patient's name, medical record number and other identifying information designated \"ascending colon polyps x2\". It consists of 3 tan polypoid structures ranging from 0.2 cm up to 0.4 cm.  The specimens are entirely submitted in 1 cassette.      C(3). Large Intestine, Colon, Ascending, EMR scar tissue:  The specimen is received in formalin, labeled with the patient's name, medical record number and other identifying information designated \"ascending colon\". It consists of 2 tan polypoid structures ranging from 0.3 cm up to 0.4 cm.  The specimens are entirely submitted in 1 cassette.    ([facility])        Microscopic Description       The microscopic findings substantiates the final diagnosis.       Performing Labs       The technical component of this testing was completed at [facility]      Case Images         If you have any questions or concerns, please call the clinic at the number listed above.       Sincerely,      Shai Leal MD          "

## 2021-12-02 NOTE — ANESTHESIA POSTPROCEDURE EVALUATION
Patient: Ludy Mckenzie    Procedure: Procedure(s):  COLONOSCOPY, WITH POLYPECTOMY AND BIOPSY       Diagnosis:History of colonic polyps [Z86.010]  Diagnosis Additional Information: No value filed.    Anesthesia Type:  MAC    Note:  Disposition: Outpatient   Postop Pain Control: Uneventful            Sign Out: Well controlled pain   PONV: No   Neuro/Psych: Uneventful            Sign Out: Acceptable/Baseline neuro status   Airway/Respiratory: Uneventful            Sign Out: Acceptable/Baseline resp. status   CV/Hemodynamics: Uneventful            Sign Out: Acceptable CV status   Other NRE: NONE   DID A NON-ROUTINE EVENT OCCUR? No           Last vitals:  Vitals Value Taken Time   /55 12/02/21 1540   Temp     Pulse 69 12/02/21 1543   Resp 13 12/02/21 1543   SpO2 97 % 12/02/21 1543   Vitals shown include unvalidated device data.    Electronically Signed By: Ross Pacheco MD  December 2, 2021  3:44 PM

## 2021-12-02 NOTE — ANESTHESIA PREPROCEDURE EVALUATION
Anesthesia Pre-Procedure Evaluation    Patient: Ludy Mckenzie   MRN: [id number] : 1940        Preoperative Diagnosis: History of colonic polyps [Z86.010]    Procedure : Procedure(s):  COLONOSCOPY          Past Medical History:   Diagnosis Date     Acute ischemic left TREMAYNE stroke (H)      Astigmatism, unspecified      Bullous pemphigoid      COPD (chronic obstructive pulmonary disease) (H)      CVA (cerebral infarction) 2009     Depressive disorder      Diabetes (H)      Glaucoma (increased eye pressure)      Goiter 2007     HTN (hypertension) 2009     Hypocalcemia      Hypokalemia      Hyponatremia      Mixed hyperlipidemia      Personal history of other diseases of circulatory system      Pneumonia 2010     Primary open-angle glaucoma(365.11)      Thyroid nodule 2012     Unspecified cerebral artery occlusion with cerebral infarction      Unspecified cerebral artery occlusion with cerebral infarction       Past Surgical History:   Procedure Laterality Date     BIOPSY       BUNIONECTOMY  1960    JACQUELINE     CATARACT IOL, RT/LT      right     COLONOSCOPY  2013    Procedure: COLONOSCOPY;  Colonoscopy ;  Surgeon: Hugo Minor MD;  Location: [facility]     COLONOSCOPY N/A 2019    Procedure: Colonoscopy, With Polypectomy And Biopsy;  Surgeon: Sleena Escobedo MD;  Location: [facility]     COLONOSCOPY WITH CO2 INSUFFLATION N/A 2019    Procedure: COLONOSCOPY, WITH CO2 INSUFFLATION;  Surgeon: Selena Escobedo MD;  Location: [facility]      Allergies   Allergen Reactions     Amoxicillin GI Disturbance and Nausea     Augmentin Nausea and Vomiting     Doxycycline GI Disturbance     Iodine Itching and Swelling     Pt is ok with ct contrast dye (isovue 370)     Mercury Unknown     Metronidazole GI Disturbance     Penicillins Unknown     Phenylmercuric Nitrate Unknown      Social History     Tobacco Use     Smoking status: Former Smoker     Packs/day: 1.50     Years: 50.00      Pack years: 75.00     Types: Cigarettes     Quit date: 2009     Years since quittin.3     Smokeless tobacco: Never Used   Substance Use Topics     Alcohol use: No     Alcohol/week: 4.0 standard drinks      Wt Readings from Last 1 Encounters:   21 60.8 kg (134 lb)        Anesthesia Evaluation   Pt has had prior anesthetic.     No history of anesthetic complications       ROS/MED HX  ENT/Pulmonary: Comment: History of COVID 19 in the past    (+) tobacco use, COPD,     Neurologic:     (+) peripheral neuropathy, CVA, without deficits,     Cardiovascular: Comment: GALLITO Jo MD  [phone] 2/10/2020     Narrative & Impression  [id number]  [id number]  [id number]  [id number]^MAMIE^GABRIELLA           [facility]  Echocardiography Laboratory  [address] [place] [address]        Name: ALLEGRA LLOYD  MRN: [id number]  : 1940  Study Date: 02/10/2020 10:26 AM  Age: 79 yrs  Gender: Female  Patient Location: [facility]  Reason For Study: Dyspnea on exertion, Abnormal CT of the chest, Centrilobular  emp  Ordering Physician: GABRIELLA HATCH  Referring Physician: GABRIELLA HATCH  Performed By: Bryce Nix RDCS     BSA: 1.6 m2  Height: 66 in  Weight: 127 lb  BP: 105/52 mmHg  _____________________________________________________________________________  __        Procedure  Echocardiogram with two-dimensional, color and spectral Doppler performed.  _____________________________________________________________________________  __        Interpretation Summary     Global and regional left ventricular function is normal with an EF of 60-65%.  Right ventricular function, chamber size, wall motion, and thickness are  normal.  Pulmonary artery systolic pressure is normal.  The inferior vena cava is normal.  No pericardial effusion is present.  No cardiac cause for dyspnea identified.  _____________________________________________________________________________  __         Left Ventricle  Global and regional left ventricular function is normal with an EF of 60-65%.  Left ventricular wall thickness is normal. Left ventricular size is normal.  Left ventricular diastolic function is normal. No regional wall motion  abnormalities are seen.     Right Ventricle  Right ventricular function, chamber size, wall motion, and thickness are  normal.     Atria  Both atria appear normal. The atrial septum is intact as assessed by color  Doppler .     Mitral Valve  The mitral valve is normal.        Aortic Valve  Trileaflet aortic sclerosis without stenosis. Trace aortic insufficiency is  present.     Tricuspid Valve  The tricuspid valve is normal. Mild tricuspid insufficiency is present. The  right ventricular systolic pressure is approximated at 22.1 mmHg plus the  right atrial pressure. Pulmonary artery systolic pressure is normal.     Pulmonic Valve  The pulmonic valve is normal.     Vessels  The pulmonary artery and bifurcation cannot be assessed. The inferior vena  cava is normal. Ascending aorta 3.2 cm.     Pericardium  No pericardial effusion is present.     _____________________________________________________________________________  __  MMode/2D Measurements & Calculations  IVSd: 0.43 cm  LVIDd: 4.2 cm  LVIDs: 2.1 cm  LVPWd: 0.51 cm  FS: 49.6 %     LV mass(C)d: 52.1 grams  LV mass(C)dI: 31.6 grams/m2  Ao root diam: 3.0 cm  asc Aorta Diam: 3.2 cm  LVOT diam: 2.0 cm  LVOT area: 3.2 cm2  LA Volume (BP): 21.0 ml  LA Volume Index (BP): 12.7 ml/m2  RWT: 0.24        Doppler Measurements & Calculations  MV E max etelvina: 50.0 cm/sec  MV A max etelvina: 77.5 cm/sec  MV E/A: 0.65     MV dec time: 0.23 sec  Ao V2 max: 130.3 cm/sec  Ao max P.0 mmHg  CINDY(V,D): 1.9 cm2  LV V1 max P.5 mmHg  LV V1 max: 79.2 cm/sec  LV V1 VTI: 18.3 cm  CO(LVOT): 3.9 l/min  CI(LVOT): 2.3 l/min/m2  SV(LVOT): 57.8 ml  SI(LVOT): 35.1 ml/m2  PA V2 max: 92.3 cm/sec  PA max PG: 3.4 mmHg  PA acc time: 0.10 sec  TR max etelvina:  235.3 cm/sec  TR max P.1 mmHg  Pulm Sys Gurdeep: 51.4 cm/sec  Pulm Ramirze Gurdeep: 31.2 cm/sec  Pulm S/D: 1.6  AV Gurdeep Ratio (DI): 0.61  E/E' av.8  Lateral E/e': 6.0  Medial E/e': 5.7         (+) Dyslipidemia hypertension----- (-) CAD, CHF and arrhythmias   METS/Exercise Tolerance:     Hematologic:       Musculoskeletal: Comment: Bullous pemphigoid, off immunosuppression since 2017    Chronic LBP with sciatica  (+) arthritis,     GI/Hepatic: Comment: History of colon polyps   (-) GERD   Renal/Genitourinary:     (+) renal disease, type: CRI,     Endo:     (+) type II DM, thyroid problem, hypothyroidism,     Psychiatric/Substance Use:       Infectious Disease:       Malignancy:       Other:            Physical Exam    Airway        Mallampati: II   TM distance: > 3 FB   Neck ROM: full   Mouth opening: > 3 cm    Respiratory Devices and Support         Dental  no notable dental history         Cardiovascular             Pulmonary                   OUTSIDE LABS:  CBC:   Lab Results   Component Value Date    WBC 5.3 2021    WBC 7.6 2019    HGB 12.7 2021    HGB 12.0 2021    HCT 36.3 2021    HCT 40.7 2019     2021     2019     BMP:   Lab Results   Component Value Date     2021     2021    POTASSIUM 3.9 2021    POTASSIUM 4.0 2021    CHLORIDE 106 2021    CHLORIDE 105 2021    CO2 25 2021    CO2 23 2021    BUN 13 2021    BUN 10 2021    CR 0.6 2021    CR 0.50 (L) 2021     (H) 2021     (H) 2021     COAGS:   Lab Results   Component Value Date    PTT 31 11/15/2010    INR 1.00 2021     POC:   Lab Results   Component Value Date     (H) 2021     HEPATIC:   Lab Results   Component Value Date    ALBUMIN 3.6 2021    PROTTOTAL 7.2 2021    ALT 29 2021    AST 40 2021    GGT 42 (H) 2013    ALKPHOS 61 2021    BILITOTAL  0.6 06/29/2021     OTHER:   Lab Results   Component Value Date    LACT 1.5 04/06/2014    A1C 8.7 (H) 11/16/2021    IAN 8.6 06/29/2021    PHOS 2.6 11/18/2015    MAG 2.1 09/13/2019    LIPASE 116 06/29/2021    AMYLASE 46 06/29/2021    TSH 3.44 03/27/2018    T4 0.91 04/01/2008    CRP 3.0 08/24/2015    SED 9 08/24/2015       Anesthesia Plan    ASA Status:  3      Anesthesia Type: MAC.              Consents    Anesthesia Plan(s) and associated risks, benefits, and realistic alternatives discussed. Questions answered and patient/representative(s) expressed understanding.    - Discussed:     - Discussed with:  Patient         Postoperative Care            Comments:                Cesar Mejias MD

## 2021-12-03 LAB
PATH REPORT.COMMENTS IMP SPEC: NORMAL
PATH REPORT.COMMENTS IMP SPEC: NORMAL
PATH REPORT.FINAL DX SPEC: NORMAL
PATH REPORT.GROSS SPEC: NORMAL
PATH REPORT.MICROSCOPIC SPEC OTHER STN: NORMAL
PATH REPORT.RELEVANT HX SPEC: NORMAL
PHOTO IMAGE: NORMAL

## 2021-12-06 NOTE — TELEPHONE ENCOUNTER
Diabetes Education Scheduling Outreach #2:    Call to patient to schedule. Left message with phone number to call to schedule.    .    Ora Stovall  [facility]  Diabetes and Nutrition Scheduling

## 2021-12-08 DIAGNOSIS — E11.49 TYPE 2 DIABETES MELLITUS WITH OTHER DIABETIC NEUROLOGICAL COMPLICATION (H): ICD-10-CM

## 2021-12-09 RX ORDER — METFORMIN HCL 500 MG
TABLET, EXTENDED RELEASE 24 HR ORAL
Qty: 90 TABLET | Refills: 0 | Status: SHIPPED | OUTPATIENT
Start: 2021-12-09 | End: 2021-12-31

## 2021-12-09 NOTE — TELEPHONE ENCOUNTER
"Prescription approved per [facility] Refill Protocol.  Requested Prescriptions   Pending Prescriptions Disp Refills     metFORMIN (GLUCOPHAGE-XR) 500 MG 24 hr tablet [Pharmacy Med Name: METFORMIN HCL  MG TABLET] 30 tablet      Sig: TAKE 1 TABLET BY MOUTH EVERY DAY WITH DINNER       Biguanide Agents Passed - 12/8/2021  7:30 AM        Passed - Patient is age 10 or older        Passed - Patient has documented A1c within the specified period of time.     If HgbA1C is 8 or greater, it needs to be on file within the past 3 months.  If less than 8, must be on file within the past 6 months.     Recent Labs   Lab Test 11/16/21  1105   A1C 8.7*             Passed - Patient's CR is NOT>1.4 OR Patient's EGFR is NOT<45 within past 12 mos.     Recent Labs   Lab Test 09/22/21  1102 06/29/21  1223   GFRESTIMATED >60 >90   GFRESTBLACK  --  >90       Recent Labs   Lab Test 09/22/21  1102   CR 0.6             Passed - Patient does NOT have a diagnosis of CHF.        Passed - Medication is active on med list        Passed - Patient is not pregnant        Passed - Patient has not had a positive pregnancy test within the past 12 mos.         Passed - Recent (6 mo) or future (30 days) visit within the authorizing provider's specialty     Patient had office visit in the last 6 months or has a visit in the next 30 days with authorizing provider or within the authorizing provider's specialty.  See \"Patient Info\" tab in inbasket, or \"Choose Columns\" in Meds & Orders section of the refill encounter.               Norma Pearson RN on 12/9/2021 at 4:17 PM    "

## 2021-12-22 ENCOUNTER — ANCILLARY PROCEDURE (OUTPATIENT)
Dept: CT IMAGING | Facility: CLINIC | Age: 81
End: 2021-12-22
Payer: COMMERCIAL

## 2021-12-22 DIAGNOSIS — R91.8 PULMONARY NODULES: ICD-10-CM

## 2021-12-22 PROCEDURE — 71250 CT THORAX DX C-: CPT | Performed by: RADIOLOGY

## 2021-12-30 DIAGNOSIS — E11.49 TYPE 2 DIABETES MELLITUS WITH OTHER DIABETIC NEUROLOGICAL COMPLICATION (H): ICD-10-CM

## 2021-12-31 RX ORDER — METFORMIN HCL 500 MG
TABLET, EXTENDED RELEASE 24 HR ORAL
Qty: 180 TABLET | Refills: 0 | Status: SHIPPED | OUTPATIENT
Start: 2021-12-31 | End: 2022-01-04

## 2022-01-01 ENCOUNTER — TELEPHONE (OUTPATIENT)
Dept: OPHTHALMOLOGY | Facility: CLINIC | Age: 82
End: 2022-01-01

## 2022-01-01 ENCOUNTER — MYC MEDICAL ADVICE (OUTPATIENT)
Dept: OPHTHALMOLOGY | Facility: CLINIC | Age: 82
End: 2022-01-01

## 2022-01-01 DIAGNOSIS — H40.1131 PRIMARY OPEN ANGLE GLAUCOMA (POAG) OF BOTH EYES, MILD STAGE: ICD-10-CM

## 2022-01-01 RX ORDER — LATANOPROST 50 UG/ML
1 SOLUTION/ DROPS OPHTHALMIC AT BEDTIME
Qty: 7.5 ML | Refills: 3 | Status: SHIPPED | OUTPATIENT
Start: 2022-01-01 | End: 2023-01-01

## 2022-01-04 ENCOUNTER — TELEPHONE (OUTPATIENT)
Dept: FAMILY MEDICINE | Facility: CLINIC | Age: 82
End: 2022-01-04

## 2022-01-04 ENCOUNTER — OFFICE VISIT (OUTPATIENT)
Dept: FAMILY MEDICINE | Facility: CLINIC | Age: 82
End: 2022-01-04
Payer: COMMERCIAL

## 2022-01-04 VITALS
TEMPERATURE: 99 F | OXYGEN SATURATION: 93 % | HEIGHT: 66 IN | HEART RATE: 91 BPM | WEIGHT: 130.4 LBS | SYSTOLIC BLOOD PRESSURE: 120 MMHG | BODY MASS INDEX: 20.96 KG/M2 | DIASTOLIC BLOOD PRESSURE: 64 MMHG

## 2022-01-04 DIAGNOSIS — I63.9 CEREBRAL INFARCTION, UNSPECIFIED MECHANISM (H): ICD-10-CM

## 2022-01-04 DIAGNOSIS — Z00.00 ENCOUNTER FOR MEDICARE ANNUAL WELLNESS EXAM: Primary | ICD-10-CM

## 2022-01-04 DIAGNOSIS — E11.49 TYPE 2 DIABETES MELLITUS WITH OTHER DIABETIC NEUROLOGICAL COMPLICATION (H): ICD-10-CM

## 2022-01-04 DIAGNOSIS — E78.2 MIXED HYPERLIPIDEMIA: ICD-10-CM

## 2022-01-04 DIAGNOSIS — H61.21 IMPACTED CERUMEN OF RIGHT EAR: ICD-10-CM

## 2022-01-04 DIAGNOSIS — Z12.31 ENCOUNTER FOR SCREENING MAMMOGRAM FOR BREAST CANCER: ICD-10-CM

## 2022-01-04 DIAGNOSIS — J44.9 CHRONIC OBSTRUCTIVE PULMONARY DISEASE, UNSPECIFIED COPD TYPE (H): ICD-10-CM

## 2022-01-04 DIAGNOSIS — F33.0 MAJOR DEPRESSIVE DISORDER, RECURRENT EPISODE, MILD (H): ICD-10-CM

## 2022-01-04 DIAGNOSIS — F10.21 ALCOHOLISM IN REMISSION (H): ICD-10-CM

## 2022-01-04 DIAGNOSIS — E87.6 HYPOPOTASSEMIA: ICD-10-CM

## 2022-01-04 DIAGNOSIS — N32.81 OVERACTIVE BLADDER: ICD-10-CM

## 2022-01-04 DIAGNOSIS — R30.0 DYSURIA: ICD-10-CM

## 2022-01-04 DIAGNOSIS — Z78.0 ASYMPTOMATIC POSTMENOPAUSAL STATUS: ICD-10-CM

## 2022-01-04 DIAGNOSIS — R80.9 MICROALBUMINURIA: ICD-10-CM

## 2022-01-04 LAB
ALBUMIN UR-MCNC: NEGATIVE MG/DL
APPEARANCE UR: ABNORMAL
BACTERIA #/AREA URNS HPF: ABNORMAL /HPF
BILIRUB UR QL STRIP: NEGATIVE
CHOLEST SERPL-MCNC: 114 MG/DL
COLOR UR AUTO: YELLOW
FASTING STATUS PATIENT QL REPORTED: NO
GLUCOSE UR STRIP-MCNC: NEGATIVE MG/DL
HDLC SERPL-MCNC: 39 MG/DL
HGB UR QL STRIP: ABNORMAL
KETONES UR STRIP-MCNC: NEGATIVE MG/DL
LDLC SERPL CALC-MCNC: 50 MG/DL
LEUKOCYTE ESTERASE UR QL STRIP: ABNORMAL
MUCOUS THREADS #/AREA URNS LPF: PRESENT /LPF
NITRATE UR QL: POSITIVE
NONHDLC SERPL-MCNC: 75 MG/DL
PH UR STRIP: 6 [PH] (ref 5–7)
RBC #/AREA URNS AUTO: ABNORMAL /HPF
SP GR UR STRIP: 1.02 (ref 1–1.03)
SQUAMOUS #/AREA URNS AUTO: ABNORMAL /LPF
TRIGL SERPL-MCNC: 124 MG/DL
UROBILINOGEN UR STRIP-ACNC: 0.2 E.U./DL
WBC #/AREA URNS AUTO: >100 /HPF

## 2022-01-04 PROCEDURE — 69209 REMOVE IMPACTED EAR WAX UNI: CPT | Mod: RT | Performed by: NURSE PRACTITIONER

## 2022-01-04 PROCEDURE — 81001 URINALYSIS AUTO W/SCOPE: CPT | Performed by: NURSE PRACTITIONER

## 2022-01-04 PROCEDURE — 80061 LIPID PANEL: CPT | Performed by: NURSE PRACTITIONER

## 2022-01-04 PROCEDURE — 69210 REMOVE IMPACTED EAR WAX UNI: CPT | Mod: LT | Performed by: NURSE PRACTITIONER

## 2022-01-04 PROCEDURE — 36415 COLL VENOUS BLD VENIPUNCTURE: CPT | Performed by: NURSE PRACTITIONER

## 2022-01-04 PROCEDURE — 99397 PER PM REEVAL EST PAT 65+ YR: CPT | Mod: 25 | Performed by: NURSE PRACTITIONER

## 2022-01-04 RX ORDER — MIRABEGRON 25 MG/1
25 TABLET, FILM COATED, EXTENDED RELEASE ORAL DAILY
Qty: 30 TABLET | Refills: 3 | Status: CANCELLED | OUTPATIENT
Start: 2022-01-04

## 2022-01-04 RX ORDER — LOSARTAN POTASSIUM 25 MG/1
TABLET ORAL
Qty: 45 TABLET | Refills: 4 | Status: SHIPPED | OUTPATIENT
Start: 2022-01-04 | End: 2023-01-01

## 2022-01-04 RX ORDER — ROSUVASTATIN CALCIUM 5 MG/1
5 TABLET, COATED ORAL DAILY
Qty: 90 TABLET | Refills: 3 | Status: SHIPPED | OUTPATIENT
Start: 2022-01-04 | End: 2023-01-01

## 2022-01-04 RX ORDER — POTASSIUM CHLORIDE 1500 MG/1
20 TABLET, EXTENDED RELEASE ORAL DAILY
Qty: 90 TABLET | Refills: 4 | Status: SHIPPED | OUTPATIENT
Start: 2022-01-04

## 2022-01-04 RX ORDER — METFORMIN HCL 500 MG
500 TABLET, EXTENDED RELEASE 24 HR ORAL 2 TIMES DAILY WITH MEALS
Qty: 180 TABLET | Refills: 0 | Status: SHIPPED | OUTPATIENT
Start: 2022-01-04 | End: 2022-03-15

## 2022-01-04 ASSESSMENT — ACTIVITIES OF DAILY LIVING (ADL): CURRENT_FUNCTION: NO ASSISTANCE NEEDED

## 2022-01-04 ASSESSMENT — MIFFLIN-ST. JEOR: SCORE: 1068.62

## 2022-01-04 ASSESSMENT — PATIENT HEALTH QUESTIONNAIRE - PHQ9: SUM OF ALL RESPONSES TO PHQ QUESTIONS 1-9: 1

## 2022-01-04 ASSESSMENT — PAIN SCALES - GENERAL: PAINLEVEL: NO PAIN (0)

## 2022-01-04 NOTE — PROGRESS NOTES
"SUBJECTIVE:   Ludy Mckenzie is a 81 year old female who presents for Preventive Visit.      Patient has been advised of split billing requirements and indicates understanding: Yes   Are you in the first 12 months of your Medicare coverage?  No    Healthy Habits:     In general, how would you rate your overall health?  Fair    Frequency of exercise:  2-3 days/week    Duration of exercise:  Less than 15 minutes    Do you usually eat at least 4 servings of fruit and vegetables a day, include whole grains    & fiber and avoid regularly eating high fat or \"junk\" foods?  No    Taking medications regularly:  Yes    Barriers to taking medications:  None    Medication side effects:  None    Ability to successfully perform activities of daily living:  No assistance needed    Home Safety:  No safety concerns identified    Hearing Impairment:  No hearing concerns    In the past 6 months, have you been bothered by leaking of urine? Yes    In general, how would you rate your overall mental or emotional health?  Good      PHQ-2 Total Score: 0    Additional concerns today:  Yes (Possible UTI dysuria)    Do you feel safe in your environment? Yes    Have you ever done Advance Care Planning? (For example, a Health Directive, POLST, or a discussion with a medical provider or your loved ones about your wishes): Yes, advance care planning is on file.       Fall risk  Fallen 2 or more times in the past year?: No  Any fall with injury in the past year?: No    Cognitive Screening   1) Repeat 3 items (Leader, Season, Table)    2) Clock draw: NORMAL  3) 3 item recall: Recalls 2 objects   Results: NORMAL clock, 1-2 items recalled: COGNITIVE IMPAIRMENT LESS LIKELY    Mini-CogTM Copyright [employer]. Licensed by the author for use in [facility]; reprinted with permission (soco@.[facility]). All rights reserved.      Do you have sleep apnea, excessive snoring or daytime drowsiness?: no    Reviewed and updated as needed this visit by " clinical staff  Tobacco  Allergies  Meds  Problems  Med Hx  Surg Hx  Fam Hx         Reviewed and updated as needed this visit by Provider  Tobacco  Allergies  Meds  Problems  Med Hx  Surg Hx  Fam Hx        Social History     Tobacco Use     Smoking status: Former Smoker     Packs/day: 1.50     Years: 50.00     Pack years: 75.00     Types: Cigarettes     Quit date: 2009     Years since quittin.4     Smokeless tobacco: Never Used   Substance Use Topics     Alcohol use: No     Alcohol/week: 4.0 standard drinks         No flowsheet data found.        Diabetes Follow-up    How often are you checking your blood sugar? A few times a month- patient notes difficulty with finger pricks, wonders about Freestyle Ahmet  What time of day are you checking your blood sugars (select all that apply)?  Before meals  Have you had any blood sugars above 200?  No  Have you had any blood sugars below 70?  No    What symptoms do you notice when your blood sugar is low?  Not applicable    What concerns do you have today about your diabetes? None     Do you have any of these symptoms? (Select all that apply)  No numbness or tingling in feet.  No redness, sores or blisters on feet.  No complaints of excessive thirst.  No reports of blurry vision.  No significant changes to weight.              Hyperlipidemia Follow-Up      Are you regularly taking any medication or supplement to lower your cholesterol?   Yes- as prescribed    Are you having muscle aches or other side effects that you think could be caused by your cholesterol lowering medication?  No    Hypertension Follow-up      Do you check your blood pressure regularly outside of the clinic? No     Are you following a low salt diet? Yes    Are your blood pressures ever more than 140 on the top number (systolic) OR more   than 90 on the bottom number (diastolic), for example 140/90? No    BP Readings from Last 2 Encounters:   22 120/64   21 115/55      Hemoglobin A1C POCT (%)   Date Value   05/10/2021 7.8 (H)   2021 7.5 (H)     Hemoglobin A1C (%)   Date Value   2021 8.7 (H)     LDL Cholesterol Calculated (mg/dL)   Date Value   2022 50   2021 72   2019 79       Depression Followup    How are you doing with your depression since your last visit? No change    Are you having other symptoms that might be associated with depression? No    Have you had a significant life event?  No     Are you feeling anxious or having panic attacks?   No    Do you have any concerns with your use of alcohol or other drugs? No    Social History     Tobacco Use     Smoking status: Former Smoker     Packs/day: 1.50     Years: 50.00     Pack years: 75.00     Types: Cigarettes     Quit date: 2009     Years since quittin.4     Smokeless tobacco: Never Used   Substance Use Topics     Alcohol use: No     Alcohol/week: 4.0 standard drinks     Drug use: No     PHQ 2021   PHQ-9 Total Score 0 3 1   Q9: Thoughts of better off dead/self-harm past 2 weeks Not at all Not at all Not at all     EMILY-7 SCORE 2016   Total Score - - -   Total Score 0 0 5         Suicide Assessment Five-step Evaluation and Treatment (SAFE-T)    COPD Follow-Up    Overall, how are your COPD symptoms since your last clinic visit?  No change    How much fatigue or shortness of breath do you have when you are walking?  Same as usual    How much shortness of breath do you have when you are resting?  Same as usual    How often do you cough? Rarely    Have you noticed any change in your sputum/phlegm?  No    Have you experienced a recent fever? No        History   Smoking Status     Former Smoker     Packs/day: 1.50     Years: 50.00     Types: Cigarettes     Quit date: 2009   Smokeless Tobacco     Never Used     No results found for: FEV1, EZT2YEO      Current providers sharing in care for this patient include:   Patient Care Team:  Tj  Sarahi Yepez APRN CNP as PCP - General (Internal Medicine)  Kareen Roberts MD as MD (Dermatology)  Arjun Valenzuela MD as Assigned Surgical Provider  Annmarie Solorzano APRN CNP as Assigned PCP  Kenny Gr MD as Assigned Pulmonology Provider  Ross Greenwood MD as MD (Urology)  Sadie Mandujano RD as Diabetes Educator (Dietitian, Registered)    The following health maintenance items are reviewed in Epic and correct as of today:  Health Maintenance Due   Topic Date Due     DEXA  06/02/2020     MICROALBUMIN  01/04/2022     DIABETIC FOOT EXAM  01/04/2022     Lab work is in process  BP Readings from Last 3 Encounters:   01/04/22 120/64   12/02/21 115/55   11/16/21 121/68    Wt Readings from Last 3 Encounters:   01/04/22 59.1 kg (130 lb 6.4 oz)   11/16/21 60.8 kg (134 lb)   09/22/21 57.6 kg (127 lb)                  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)     Primary open angle glaucoma (POAG)     Chronic obstructive pulmonary disease, unspecified COPD type (H)     Chronic kidney disease (CKD) stage G1/A2, glomerular filtration rate (GFR) equal to or greater than 90 mL/min/1.73 square meter and albuminuria creatinine ratio between  mg/g     Loss of balance     Diabetic neuropathy with neurologic complication (H)     Pseudophakia of both eyes     Urge incontinence of urine     Cramp of limb     Right sided sciatica     Pulmonary emphysema, unspecified emphysema type (H)     Age-related osteoporosis without current pathological fracture     Pulmonary nodules     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)     History of colonic polyps     2019 novel  coronavirus disease (COVID-19)     Past Surgical History:   Procedure Laterality Date     BIOPSY       BUNIONECTOMY  1960    JACQUELINE     CATARACT IOL, RT/LT      right     COLONOSCOPY  2013    Procedure: COLONOSCOPY;  Colonoscopy ;  Surgeon: Hugo Minor MD;  Location:  GI     COLONOSCOPY N/A 2019    Procedure: Colonoscopy, With Polypectomy And Biopsy;  Surgeon: Selena Escobedo MD;  Location: [facility] OR     COLONOSCOPY N/A 2021    Procedure: COLONOSCOPY, WITH POLYPECTOMY AND BIOPSY;  Surgeon: Shai Leal MD;  Location:  GI     COLONOSCOPY WITH CO2 INSUFFLATION N/A 2019    Procedure: COLONOSCOPY, WITH CO2 INSUFFLATION;  Surgeon: Selena Escobedo MD;  Location: [facility] OR       Social History     Tobacco Use     Smoking status: Former Smoker     Packs/day: 1.50     Years: 50.00     Pack years: 75.00     Types: Cigarettes     Quit date: 2009     Years since quittin.4     Smokeless tobacco: Never Used   Substance Use Topics     Alcohol use: No     Alcohol/week: 4.0 standard drinks     Family History   Problem Relation Age of Onset     Cancer Maternal Grandfather         bone     Diabetes Mother 50        dx age 50, hip fracture     Glaucoma Mother      Macular Degeneration Mother      C.A.D. Father         pacemaker     Glaucoma Sister          Current Outpatient Medications   Medication Sig Dispense Refill     albuterol (ACCUNEB) 1.25 MG/3ML neb solution Take 1 vial (1.25 mg) by nebulization every 6 hours as needed for shortness of breath / dyspnea or wheezing 90 mL 11     albuterol (PROAIR HFA/PROVENTIL HFA/VENTOLIN HFA) 108 (90 Base) MCG/ACT inhaler Inhale 2 puffs into the lungs every 4 hours as needed for shortness of breath / dyspnea or wheezing 8.5 g 11     aspirin 81 MG tablet Take 1 tablet by mouth daily.       azithromycin (ZITHROMAX) 250 MG tablet TAKE 1 TABLET (250 MG) BY MOUTH EVERY MON, WED, FRI MORNING 12 tablet 11     blood glucose (NO BRAND SPECIFIED) test strip  Use to test blood sugar 1 times daily or as directed. To accompany: Blood Glucose Monitor Brands: per insurance. 100 strip 6     blood glucose calibration (NO BRAND SPECIFIED) solution To accompany: Blood Glucose Monitor Brands: per insurance. 1 Bottle 3     blood glucose monitoring (NO BRAND SPECIFIED) meter device kit Use to test blood sugar one times daily or as directed. Preferred blood glucose meter OR supplies to accompany: Blood Glucose Monitor Brands: per insurance. 1 kit 0     Continuous Blood Gluc Sensor (FREESTYLE EPI 2 SENSOR) [facility] 1 each every 14 days 1 each every 14 days. Change every 14 days. 2 each 5     losartan (COZAAR) 25 MG tablet TAKE 1/2 TABLET BY MOUTH EVERY DAY 45 tablet 4     metFORMIN (GLUCOPHAGE-XR) 500 MG 24 hr tablet Take 1 tablet (500 mg) by mouth 2 times daily (with meals) 180 tablet 0     potassium chloride ER (KLOR-CON) 20 MEQ CR tablet Take 1 tablet (20 mEq) by mouth daily 90 tablet 4     rosuvastatin (CRESTOR) 5 MG tablet Take 1 tablet (5 mg) by mouth daily 90 tablet 3     thin (NO BRAND SPECIFIED) lancets Use with lanceting device. To accompany: Blood Glucose Monitor Brands: per insurance. 100 each 6     tiotropium (SPIRIVA RESPIMAT) 2.5 MCG/ACT inhaler Inhale 2 puffs into the lungs daily 4 g 11     vitamin  B complex with vitamin C (VITAMIN  B COMPLEX) TABS Take 1 tablet by mouth daily       fluticasone-salmeterol (ADVAIR-HFA) 230-21 MCG/ACT inhaler Inhale 2 puffs into the lungs 2 times daily 12 g 11     latanoprost (XALATAN) 0.005 % ophthalmic solution Place 1 drop into both eyes At Bedtime 7.5 mL 3     Allergies   Allergen Reactions     Amoxicillin GI Disturbance and Nausea     Augmentin Nausea and Vomiting     Doxycycline GI Disturbance     Iodine Itching and Swelling     Pt is ok with ct contrast dye (isovue 370)     Mercury Unknown     Metronidazole GI Disturbance     Penicillins Unknown     Phenylmercuric Nitrate Unknown     Mammogram Screening: Mammogram Screening -  "Patient over age 75, has elected to continue with screening.      Pertinent mammograms are reviewed under the imaging tab.    Review of Systems  Constitutional, HEENT, cardiovascular, pulmonary, GI, , musculoskeletal, neuro, skin, endocrine and psych systems are negative, except as otherwise noted.    OBJECTIVE:   /64   Pulse 91   Temp 99  F (37.2  C) (Oral)   Ht 1.669 m (5' 5.71\")   Wt 59.1 kg (130 lb 6.4 oz)   SpO2 93%   BMI 21.23 kg/m   Estimated body mass index is 21.23 kg/m  as calculated from the following:    Height as of this encounter: 1.669 m (5' 5.71\").    Weight as of this encounter: 59.1 kg (130 lb 6.4 oz).  Physical Exam  GENERAL: healthy, alert and no distress  EYES: Eyes grossly normal to inspection, PERRL and conjunctivae and sclerae normal  HENT: normal cephalic/atraumatic, both ears: occluded with wax, nose and mouth without ulcers or lesions, oropharynx clear and oral mucous membranes moist  NECK: no adenopathy, no asymmetry, masses, or scars and thyroid normal to palpation  RESP: lungs clear to auscultation - no rales, rhonchi or wheezes  BREAST: normal without masses, tenderness or nipple discharge and no palpable axillary masses or adenopathy  CV: regular rate and rhythm, normal S1 S2, no S3 or S4, no murmur, click or rub, no peripheral edema and peripheral pulses strong  ABDOMEN: soft, nontender, no hepatosplenomegaly, no masses and bowel sounds normal  MS: no gross musculoskeletal defects noted, no edema  NEURO: Normal strength and tone, mentation intact and speech normal  PSYCH: mentation appears normal, affect normal/bright    Diagnostic Test Results:  pending    ASSESSMENT / PLAN:   (Z00.00) Encounter for Medicare annual wellness exam  (primary encounter diagnosis)  Comment:   Plan:     (E11.49) 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  Continue current treatment plan and medications.   Plan: Albumin Random Urine " Quantitative with Creat         Ratio, metFORMIN (GLUCOPHAGE-XR) 500 MG 24 hr         tablet, Continuous Blood Gluc          (FREESTYLE EPI 2 READER) YOLANDA, Continuous         Blood Gluc Sensor (FREESTYLE EPI 2 SENSOR)         MISC            (R30.0) Dysuria  Comment: Evaluate for UTI.  Plan: UA Macro with Reflex to Micro and Culture - lab        collect, Urine Microscopic Exam, Urine Culture            (J44.9) Chronic obstructive pulmonary disease, unspecified COPD type (H)  Comment:   Plan: Stable.  Continue current treatment plan and medications.     (F10.21) 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)  Comment:   Plan: Stable.  Continue current treatment plan and medications.     (F33.0) Major depressive disorder, recurrent episode, mild (H)  Comment:   Plan: Stable.  Continue current treatment plan and medications.     (R80.9) Microalbuminuria  Comment:   Plan: losartan (COZAAR) 25 MG tablet            (I63.9) Cerebral infarction, unspecified mechanism (H)  Comment:   Plan: Stable.  Continue current treatment plan and medications.     (E78.2) Mixed hyperlipidemia  Comment: Stable.  Continue current treatment plan and medications.   Plan: Lipid panel reflex to direct LDL Fasting,         rosuvastatin (CRESTOR) 5 MG tablet            (E87.6) Hypopotassemia  Comment: Stable.  Continue current treatment plan and medications.   Plan: potassium chloride ER (KLOR-CON) 20 MEQ CR         tablet            (N32.81) Overactive bladder  Comment: Does not tolerate mirabetriq, feels like she's retaining urine.  Follow-up with urology.  Plan: Adult Urology Referral            (Z78.0) Asymptomatic postmenopausal status  Comment:   Plan: DEXA HIP/PELVIS/SPINE - Future            (Z12.31) Encounter for screening mammogram for breast cancer  Comment:   Plan: *MA Screening Digital Bilateral            (H61.21) Impacted cerumen of right ear  Comment:   Plan: ear wash performed by staff.  Wax removed with currette from left  "ear.    Patient has been advised of split billing requirements and indicates understanding: Yes  COUNSELING:  Reviewed preventive health counseling, as reflected in patient instructions       Regular exercise       Healthy diet/nutrition    Estimated body mass index is 21.23 kg/m  as calculated from the following:    Height as of this encounter: 1.669 m (5' 5.71\").    Weight as of this encounter: 59.1 kg (130 lb 6.4 oz).        She reports that she quit smoking about 12 years ago. Her smoking use included cigarettes. She has a 75.00 pack-year smoking history. She has never used smokeless tobacco.      Appropriate preventive services were discussed with this patient, including applicable screening as appropriate for cardiovascular disease, diabetes, osteopenia/osteoporosis, and glaucoma.  As appropriate for age/gender, discussed screening for colorectal cancer, prostate cancer, breast cancer, and cervical cancer. Checklist reviewing preventive services available has been given to the patient.    Reviewed patients plan of care and provided an AVS. The Basic Care Plan (routine screening as documented in Health Maintenance) for Ludy meets the Care Plan requirement. This Care Plan has been established and reviewed with the Patient.    Counseling Resources:  ATP IV Guidelines  Pooled Cohorts Equation Calculator  Breast Cancer Risk Calculator  Breast Cancer: Medication to Reduce Risk  FRAX Risk Assessment  ICSI Preventive Guidelines  Dietary Guidelines for Americans, 2010  USDA's MyPlate  ASA Prophylaxis  Lung CA Screening    [facility]    Identified Health Risks:    The patient was provided with suggestions to help her develop a healthy physical lifestyle.  The patient was counseled and encouraged to consider modifying their diet and eating habits. She was provided with information on recommended healthy diet options.  Information on urinary incontinence and treatment " options given to patient.

## 2022-01-04 NOTE — LETTER
2022    INSURER: [facility] / Plan: [facility] MEDICARE / Product Type: HMO /   ATTN:   Re: Prior Authorization Request  Patient: Ludy Mckenzie  Policy ID#:  [id number]  : 1940      To Whom it May Concern:    I am writing to formally request a prior authorization of coverage for my patient,  Ludy Mckenzie, for treatment using Freestyle Ahmet 2 sensor and reader.  I am requesting authorization for applicable provider professional and facility services associated with this therapy.    The therapy involves using the Freestyle Ahmet 2 sensor and reader to monitor blood sugars..      The benefits of the therapy include improved blood sugar monitoring..      I have treated Ludy Mckenzie since 3/21/2016 and I have determined that it is medically appropriate for  this patient to receive be treated with Freestyle Ahmet sensor and monitor for the reason(s) stated below:      Patient has difficulty monitoring her blood sugars with a traditional meter due to tremor and difficulty with finger sticks.  I believe that her blood sugar monitoring will be improved and there her blood sugars with the ability to monitor.    I have included medical records pertaining to the patient s medical history, current condition and treatment plan.  In addition, the following billing codes will be used for therapy and follow-up: E11.49        I firmly believe that this therapy is clinically appropriate and that Ludy Mckenzie would benefit from improved [clinical outcomes, quality of life] if allowed the opportunity to receive this treatment.  Please contact me at Dept: [phone] if you require additional information to ensure the prompt approval for coverage.    Please send your written decision to me at this address:  [facility]  [address] [facility] [id number] [phone]  Dept: [phone]  E-mail:       Sincerely,      CAROL Constantino

## 2022-01-05 NOTE — TELEPHONE ENCOUNTER
Central Prior Authorization Team   Phone: [phone]      PA Initiation    Medication: Continuous Blood Gluc  (FREESTYLE EPI 2 READER) YOLANDA - INITIATED  Insurance Company: [employer] - Phone [phone] Fax [phone]  Pharmacy Filling the Rx: [id number] - [address].  Filling Pharmacy Phone: [phone]  Filling Pharmacy Fax: [phone]  Start Date: 1/5/2022

## 2022-01-05 NOTE — TELEPHONE ENCOUNTER
Central Prior Authorization Team   Phone: [phone]      PA Initiation    Medication: Continuous Blood Gluc Sensor (FREESTYLE EPI 2 SENSOR) MISC - INITIATED  Insurance Company: [employer] - Phone [phone] Fax [phone]  Pharmacy Filling the Rx: [id number] - [address].  Filling Pharmacy Phone: [phone]  Filling Pharmacy Fax: [phone]  Start Date: 1/5/2022

## 2022-01-05 NOTE — RESULT ENCOUNTER NOTE
Dear Ludy,    Your recent test results are attached.      It looks like you may have a urinary tract infection.  I'll await your your culture an contact you if treatment is needed.    If you have any questions please feel free to contact [phone] or myself via [employer].    Sincerely,  Sarahi Stafford, CNP

## 2022-01-05 NOTE — RESULT ENCOUNTER NOTE
Dear Ludy,    Your recent test results are attached.      Good cholesterol.    If you have any questions please feel free to contact [phone] or myself via [employer].    Sincerely,  Sarahi Stafford, CNP

## 2022-01-06 ENCOUNTER — VIRTUAL VISIT (OUTPATIENT)
Dept: EDUCATION SERVICES | Facility: CLINIC | Age: 82
End: 2022-01-06
Payer: COMMERCIAL

## 2022-01-06 DIAGNOSIS — E11.49 TYPE 2 DIABETES MELLITUS WITH OTHER DIABETIC NEUROLOGICAL COMPLICATION (H): ICD-10-CM

## 2022-01-06 PROCEDURE — G0108 DIAB MANAGE TRN  PER INDIV: HCPCS | Mod: 95 | Performed by: DIETITIAN, REGISTERED

## 2022-01-06 NOTE — TELEPHONE ENCOUNTER
PRIOR AUTHORIZATION DENIED    Medication: Continuous Blood Gluc  (FREESTYLE EPI 2 READER) YOLANDA - DENIED    Denial Date: 1/5/2022    Denial Rational: PATIENT DID NOT MEET ALL CRITERIA FOR COVERAGE - INSULIN USE**       Appeal Information:

## 2022-01-06 NOTE — LETTER
1/6/2022         RE: Ludy Mckenzie  [address]  [place] [address]        Dear Colleague,    Thank you for referring your patient, Ludy Mckenzie, to the [facility]. Please see a copy of my visit note below.    Diabetes Self-Management Education & Support    Presents for: Individual review    Type of Visit: Telephone Visit    How would patient like to obtain AVS? MyChart    ASSESSMENT:  Ludy states that she has had diabetes for a while, she had some education when she was first diagnosed and remembered getting some education materials, but states that it was quite a while ago. Ludy recently started Metformin after her A1C took a little jump. She was prescribed a Ahmet 2 device since she is unable to test her BG with a fingerstick because of a tremor. SHe is waiting on PA for Ahmet 2.  Recommended that Ludy schedule an appointment in clinic to help get Ahmet set up if the PA is approved.      Patient's most recent   Lab Results   Component Value Date    A1C 8.7 11/16/2021    A1C 7.8 05/10/2021    is not meeting goal of <7.0    Diabetes knowledge and skills assessment:   Patient is knowledgeable in diabetes management concepts related to: Healthy Eating, Being Active, Monitoring, Taking Medication, Problem Solving, Reducing Risks and Healthy Coping    Continue education with the following diabetes management concepts: Healthy Eating, Being Active and Monitoring    Based on learning assessment above, most appropriate setting for further diabetes education would be: Individual setting.    INTERVENTIONS:    Education provided today on:  AADE Self-Care Behaviors:  Diabetes Pathophysiology  Healthy Eating: carbohydrate counting, consistency in amount, composition, and timing of food intake, weight reduction, portion control, plate planning method and label reading  Being Active: relationship to blood glucose, describe appropriate activity program and precautions to  take  Monitoring: purpose and discussed CGM vs BG testing  Taking Medication: action of prescribed medication, side effects of prescribed medications and when to take medications    Opportunities for ongoing education and support in diabetes-self management were discussed. Pt verbalized understanding of concepts discussed and recommendations provided today.       Education Materials Provided:  [employer] Healthy Living with Diabetes Book, Carbohydrate Counting and My Plate Planner      PLAN    Balance plate with carbohydrates, protein and non-starchy vegetables  Topics to cover at upcoming visits: Healthy Eating, Being Active and Monitoring  Follow-up: Ludy to reach out to schedule in clinic appointment if PA is approved for Ahmet 2    See Goals Section for co-developed, patient-stated behavior change goals.  AVS provided to patient today.          SUBJECTIVE / OBJECTIVE:  Presents for: Individual review  Accompanied by: Self  Diabetes education in the past 24mo: No  Focus of Visit: Healthy Eating  Diabetes type: Type 2  Date of diagnosis: Unsure, several years  Disease course: Worsening  Diabetes management related comments/concerns: wondering what she should eat for diabetes. She got the BG testing supplies, but she isn't able to test because her hands shake.  Difficulty affording diabetes medication?: No  Difficulty affording diabetes testing supplies?: No  Cultural Influences/Ethnic Background:  Other    Diabetes Symptoms & Complications:  Fatigue: No  Neuropathy: Yes (toes)  Polydipsia: No  Polyphagia: No  Polyuria: Sometimes  Visual change: No  Slow healing wounds: No  Weight trend: Stable  Complications assessed today?: Yes    Patient Problem List and Family Medical History reviewed for relevant medical history, current medical status, and diabetes risk factors.    Vitals:  There were no vitals taken for this visit.  Estimated body mass index is 21.23 kg/m  as calculated from the following:    Height  "as of 1/4/22: 1.669 m (5' 5.71\").    Weight as of 1/4/22: 59.1 kg (130 lb 6.4 oz).   Last 3 BP:   BP Readings from Last 3 Encounters:   01/04/22 120/64   12/02/21 115/55   11/16/21 121/68       History   Smoking Status     Former Smoker     Packs/day: 1.50     Years: 50.00     Types: Cigarettes     Quit date: 7/22/2009   Smokeless Tobacco     Never Used       Labs:  Lab Results   Component Value Date    A1C 8.7 11/16/2021    A1C 7.8 05/10/2021     Lab Results   Component Value Date     12/02/2021     11/16/2021     06/29/2021     Lab Results   Component Value Date    LDL 50 01/04/2022    LDL 72 01/04/2021     HDL Cholesterol   Date Value Ref Range Status   01/04/2021 36 (L) >49 mg/dL Final     Direct Measure HDL   Date Value Ref Range Status   01/04/2022 39 (L) >=50 mg/dL Final   ]  GFR Estimate   Date Value Ref Range Status   06/29/2021 >90 >60 mL/min/[1.73_m2] Final     Comment:     Non  GFR Calc  Starting 12/18/2018, serum creatinine based estimated GFR (eGFR) will be   calculated using the Chronic Kidney Disease Epidemiology Collaboration   (CKD-EPI) equation.       GFR, ESTIMATED POCT   Date Value Ref Range Status   09/22/2021 >60 >60 mL/min/1.73m2 Final     GFR Estimate If Black   Date Value Ref Range Status   06/29/2021 >90 >60 mL/min/[1.73_m2] Final     Comment:      GFR Calc  Starting 12/18/2018, serum creatinine based estimated GFR (eGFR) will be   calculated using the Chronic Kidney Disease Epidemiology Collaboration   (CKD-EPI) equation.       Lab Results   Component Value Date    CR 0.6 09/22/2021    CR 0.50 06/29/2021     No results found for: MICROALBUMIN    Healthy Eating:  Healthy Eating Assessed Today: Yes  Meals include: Breakfast,Lunch,Dinner  Breakfast: used to skip breakfast, since starting Metformin has been having:  Oatmeal - plain, from the packet  OR cereal - Rice Krispies OR eggs sometimes with toast- with some coffee  Lunch: Soup - " packet of soup- broccoli and cheese,  Or sandwich - peanut butter , sometimes with fruit  Dinner: Hotdish with rice, hamburger, tomato sauce, peas (will prepare and eat for 1 week) OR Tuna casserole OR Egg salad  Snacks: Fruit, ice cream occasionally, rice pudding)  Beverages: Water,Coffee  Has patient met with a dietitian in the past?: Yes (in the past, long ago)    Being Active:  Being Active Assessed Today: Yes  Exercise:: Currently not exercising  Barrier to exercise: Physical limitation (walking throughout the building)    Monitoring:  Monitoring Assessed Today: Yes (not able to test due to shakiness, waiting on PA approval for CGM)    Taking Medications:  Diabetes Medication(s)     Biguanides       metFORMIN (GLUCOPHAGE-XR) 500 MG 24 hr tablet    Take 1 tablet (500 mg) by mouth 2 times daily (with meals)          Taking Medication Assessed Today: Yes  Current Treatments: Oral Medication (taken by mouth)  Problems taking diabetes medications regularly?: No  Diabetes medication side effects?: No    Problem Solving:  Problem Solving Assessed Today: Yes  Is the patient at risk for hypoglycemia?: No  Is the patient at risk for DKA?: No    Reducing Risks:  Reducing Risks Assessed Today: Yes  Diabetes Risks: Age over 45 years,Sedentary Lifestyle  CAD Risks: Post-menopausal,Diabetes Mellitus,Sedentary lifestyle  Has dilated eye exam at least once a year?: Yes  Sees dentist every 6 months?: Yes    Healthy Coping:  Healthy Coping Assessed Today: Yes  Patient Activation Measure Survey Score:  CHERRI Score (Last Two) 3/23/2012 6/10/2016   CHERRI Raw Score 39 46   Activation Score 56.4 75.3   CHERRI Level 3 4     Sadie Mandujano RD, LD, CDE  Time Spent: 30 minutes  Encounter Type: Individual      Any diabetes medication dose changes were made via the Certified Diabetes Care & Education Protocol in collaboration with the patient's referring provider. A copy of this encounter was shared with the provider.

## 2022-01-06 NOTE — PATIENT INSTRUCTIONS
Patient Education   Personalized Prevention Plan  You are due for the preventive services outlined below.  Your care team is available to assist you in scheduling these services.  If you have already completed any of these items, please share that information with your care team to update in your medical record.  Health Maintenance Due   Topic Date Due     Osteoporosis Screening  06/02/2020     Kidney Microalbumin Urine Test  01/04/2022     Diabetic Foot Exam  01/04/2022     Your Health Risk Assessment indicates you feel you are not in good health    A healthy lifestyle helps keep the body fit and the mind alert. It helps protect you from disease, helps you fight disease, and helps prevent chronic disease (disease that doesn't go away) from getting worse. This is important as you get older and begin to notice twinges in muscles and joints and a decline in the strength and stamina you once took for granted. A healthy lifestyle includes good healthcare, good nutrition, weight control, recreation, and regular exercise. Avoid harmful substances and do what you can to keep safe. Another part of a healthy lifestyle is stay mentally active and socially involved.    Good healthcare     Have a wellness visit every year.     If you have new symptoms, let us know right away. Don't wait until the next checkup.     Take medicines exactly as prescribed and keep your medicines in a safe place. Tell us if your medicine causes problems.   Healthy diet and weight control     Eat 3 or 4 small, nutritious, low-fat, high-fiber meals a day. Include a variety of fruits, vegetables, and whole-grain foods.     Make sure you get enough calcium in your diet. Calcium, vitamin D, and exercise help prevent osteoporosis (bone thinning).     If you live alone, try eating with others when you can. That way you get a good meal and have company while you eat it.     Try to keep a healthy weight. If you eat more calories than your body uses for  energy, it will be stored as fat and you will gain weight.     Recreation   Recreation is not limited to sports and team events. It includes any activity that provides relaxation, interest, enjoyment, and exercise. Recreation provides an outlet for physical, mental, and social energy. It can give a sense of worth and achievement. It can help you stay healthy.    Mental Exercise and Social Involvement  Mental and emotional health is as important as physical health. Keep in touch with friends and family. Stay as active as possible. Continue to learn and challenge yourself.   Things you can do to stay mentally active are:    Learn something new, like a foreign language or musical instrument.     Play SCRABBLE or do crossword puzzles. If you cannot find people to play these games with you at home, you can play them with others on your computer through the Internet.     Join a games club--anything from card games to chess or checkers or lawn bowling.     Start a new hobby.     Go back to school.     Volunteer.     Read.   Keep up with world events.    Understanding USDA MyPlate  The USDA has guidelines to help you make healthy food choices. These are called MyPlate. MyPlate shows the food groups that make up healthy meals using the image of a place setting. Before you eat, think about the healthiest choices for what to put on your plate or in your cup or bowl. To learn more about building a healthy plate, visit www.choosemyplate.gov.    The food groups    Fruits. Any fruit or 100% fruit juice counts as part of the Fruit Group. Fruits may be fresh, canned, frozen, or dried, and may be whole, cut-up, or pureed. Make 1/2 of your plate fruits and vegetables.    Vegetables. Any vegetable or 100% vegetable juice counts as a member of the Vegetable Group. Vegetables may be fresh, frozen, canned, or dried. They can be served raw or cooked and may be whole, cut-up, or mashed. Make 1/2 of your plate fruits and  vegetables.    Grains. All foods made from grains are part of the Grains Group. These include wheat, rice, oats, cornmeal, and barley. Grains are often used to make foods such as bread, pasta, oatmeal, cereal, tortillas, and grits. Grains should be no more than 1/4 of your plate. At least half of your grains should be whole grains.    Protein. This group includes meat, poultry, seafood, beans and peas, eggs, processed soy products (such as tofu), nuts (including nut butters), and seeds. Make protein choices no more than 1/4 of your plate. Meat and poultry choices should be lean or low fat.    Dairy. The Dairy Group includes all fluid milk products and foods made from milk that contain calcium, such as yogurt and cheese. (Foods that have little calcium, such as cream, butter, and cream cheese, are not part of this group.) Most dairy choices should be low-fat or fat-free.    Oils. Oils aren't a food group, but they do contain essential nutrients. However it's important to watch your intake of oils. These are fats that are liquid at room temperature. They include canola, corn, olive, soybean, vegetable, and sunflower oil. Foods that are mainly oil include mayonnaise, certain salad dressings, and soft margarines. You likely already get your daily oil allowance from the foods you eat.  Things to limit  Eating healthy also means limiting these things in your diet:       Salt (sodium). Many processed foods have a lot of sodium. To keep sodium intake down, eat fresh vegetables, meats, poultry, and seafood when possible. Purchase low-sodium, reduced-sodium, or no-salt-added food products at the store. And don't add salt to your meals at home. Instead, season them with herbs and spices such as dill, oregano, cumin, and paprika. Or try adding flavor with lemon or lime zest and juice.    Saturated fat. Saturated fats are most often found in animal products such as beef, pork, and chicken. They are often solid at room  temperature, such as butter. To reduce your saturated fat intake, choose leaner cuts of meat and poultry. And try healthier cooking methods such as grilling, broiling, roasting, or baking. For a simple lower-fat swap, use plain nonfat yogurt instead of mayonnaise when making potato salad or macaroni salad.    Added sugars. These are sugars added to foods. They are in foods such as ice cream, candy, soda, fruit drinks, sports drinks, energy drinks, cookies, pastries, jams, and syrups. Cut down on added sugars by sharing sweet treats with a family member or friend. You can also choose fruit for dessert, and drink water or other unsweetened beverages.     [employer] last reviewed this educational content on 6/1/2020 2000-2021 [employer]. All rights reserved. This information is not intended as a substitute for professional medical care. Always follow your healthcare professional's instructions.          Urinary Incontinence, Female (Adult)   Urinary incontinence means loss of bladder control. This problem affects many women, especially as they get older. If you have incontinence, you may be embarrassed to ask for help. But know that this problem can be treated.   Types of Incontinence  There are different types of incontinence. Two of the main types are described here. You can have more than one type.     Stress incontinence. With this type, urine leaks when pressure (stress) is put on the bladder. This may happen when you cough, sneeze, or laugh. Stress incontinence most often occurs because the pelvic floor muscles that support the bladder and urethra are weak. This can happen after pregnancy and vaginal childbirth or a hysterectomy. It can also be due to excess body weight or hormone changes.    Urge incontinence (also called overactive bladder). With this type, a sudden urge to urinate is felt often. This may happen even though there may not be much urine in the bladder. The need to urinate often  during the night is common. Urge incontinence most often occurs because of bladder spasms. This may be due to bladder irritation or infection. Damage to bladder nerves or pelvic muscles, constipation, and certain medicines can also lead to urge incontinence.  Treatment depends on the cause. Further evaluation is needed to find the type you have. This will likely include an exam and certain tests. Based on the results, you and your healthcare provider can then plan treatment. Until a diagnosis is made, the home care tips below can help ease symptoms.   Home care    Do pelvic floor muscle exercises, if they are prescribed. The pelvic floor muscles help support the bladder and urethra. Many women find that their symptoms improve when doing special exercises that strengthen these muscles. To do the exercises, contract the muscles you would use to stop your stream of urine. But do this when you re not urinating. Hold for 10 seconds, then relax. Repeat 10 to 20 times in a row, at least 3 times a day. Your healthcare provider may give you other instructions for how to do the exercises and how often.    Keep a bladder diary. This helps track how often and how much you urinate over a set period of time. Bring this diary with you to your next visit with the provider. The information can help your provider learn more about your bladder problem.    Lose weight, if advised to by your provider. Extra weight puts pressure on the bladder. Your provider can help you create a weight-loss plan that s right for you. This may include exercising more and making certain diet changes.    Don't have foods and drinks that may irritate the bladder. These can include alcohol and caffeinated drinks.    Quit smoking. Smoking and other tobacco use can lead to a long-term (chronic) cough that strains the pelvic floor muscles. Smoking may also damage the bladder and urethra. Talk with your provider about treatments or methods you can use to quit  smoking.    If drinking large amounts of fluid makes you have symptoms, you may be advised to limit your fluid intake. You may also be advised to drink most of your fluids during the day and to limit fluids at night.    If you re worried about urine leakage or accidents, you may wear absorbent pads to catch urine. Change the pads often. This helps reduce discomfort. It may also reduce the risk of skin or bladder infections.    Follow-up care  Follow up with your healthcare provider, or as directed. It may take some to find the right treatment for your problem. But healthy lifestyle changes can be made right away. These include such things as exercising on a regular basis, eating a healthy diet, losing weight (if needed), and quitting smoking. Your treatment plan may include special therapies or medicines. Certain procedures or surgery may also be options. Talk about any questions you have with your provider.   When to seek medical advice  Call the healthcare provider right away if any of these occur:    Fever of 100.4 F (38 C) or higher, or as directed by your provider    Bladder pain or fullness    Belly swelling    Nausea or vomiting    Back pain    Weakness, dizziness, or fainting  [employer] last reviewed this educational content on 1/1/2020 2000-2021 [employer]. All rights reserved. This information is not intended as a substitute for professional medical care. Always follow your healthcare professional's instructions.

## 2022-01-06 NOTE — TELEPHONE ENCOUNTER
PRIOR AUTHORIZATION DENIED    Medication: Continuous Blood Gluc Sensor (FREESTYLE EPI 2 SENSOR) MISC - DENIED    Denial Date: 1/5/2022    Denial Rational: patient does not meet criteria for coverage - Insulin use    Appeal Information:

## 2022-01-07 NOTE — PROGRESS NOTES
Diabetes Self-Management Education & Support    Presents for: Individual review    Type of Visit: Telephone Visit    How would patient like to obtain AVS? Pipo    ASSESSMENT:  Ludy states that she has had diabetes for a while, she had some education when she was first diagnosed and remembered getting some education materials, but states that it was quite a while ago. Ludy recently started Metformin after her A1C took a little jump. She was prescribed a Ahmet 2 device since she is unable to test her BG with a fingerstick because of a tremor. SHe is waiting on PA for Ahmet 2.  Recommended that Ludy schedule an appointment in clinic to help get Ahmet set up if the PA is approved.      Patient's most recent   Lab Results   Component Value Date    A1C 8.7 11/16/2021    A1C 7.8 05/10/2021    is not meeting goal of <7.0    Diabetes knowledge and skills assessment:   Patient is knowledgeable in diabetes management concepts related to: Healthy Eating, Being Active, Monitoring, Taking Medication, Problem Solving, Reducing Risks and Healthy Coping    Continue education with the following diabetes management concepts: Healthy Eating, Being Active and Monitoring    Based on learning assessment above, most appropriate setting for further diabetes education would be: Individual setting.    INTERVENTIONS:    Education provided today on:  AADE Self-Care Behaviors:  Diabetes Pathophysiology  Healthy Eating: carbohydrate counting, consistency in amount, composition, and timing of food intake, weight reduction, portion control, plate planning method and label reading  Being Active: relationship to blood glucose, describe appropriate activity program and precautions to take  Monitoring: purpose and discussed CGM vs BG testing  Taking Medication: action of prescribed medication, side effects of prescribed medications and when to take medications    Opportunities for ongoing education and support in diabetes-self management were  "discussed. Pt verbalized understanding of concepts discussed and recommendations provided today.       Education Materials Provided:  [facility] Healthy Living with Diabetes Book, Carbohydrate Counting and My Plate Planner      PLAN    Balance plate with carbohydrates, protein and non-starchy vegetables  Topics to cover at upcoming visits: Healthy Eating, Being Active and Monitoring  Follow-up: Ludy to reach out to schedule in clinic appointment if PA is approved for Ahmet 2    See Goals Section for co-developed, patient-stated behavior change goals.  AVS provided to patient today.          SUBJECTIVE / OBJECTIVE:  Presents for: Individual review  Accompanied by: Self  Diabetes education in the past 24mo: No  Focus of Visit: Healthy Eating  Diabetes type: Type 2  Date of diagnosis: Unsure, several years  Disease course: Worsening  Diabetes management related comments/concerns: wondering what she should eat for diabetes. She got the BG testing supplies, but she isn't able to test because her hands shake.  Difficulty affording diabetes medication?: No  Difficulty affording diabetes testing supplies?: No  Cultural Influences/Ethnic Background:  Other    Diabetes Symptoms & Complications:  Fatigue: No  Neuropathy: Yes (toes)  Polydipsia: No  Polyphagia: No  Polyuria: Sometimes  Visual change: No  Slow healing wounds: No  Weight trend: Stable  Complications assessed today?: Yes    Patient Problem List and Family Medical History reviewed for relevant medical history, current medical status, and diabetes risk factors.    Vitals:  There were no vitals taken for this visit.  Estimated body mass index is 21.23 kg/m  as calculated from the following:    Height as of 1/4/22: 1.669 m (5' 5.71\").    Weight as of 1/4/22: 59.1 kg (130 lb 6.4 oz).   Last 3 BP:   BP Readings from Last 3 Encounters:   01/04/22 120/64   12/02/21 115/55   11/16/21 121/68       History   Smoking Status     Former Smoker     Packs/day: 1.50     " Years: 50.00     Types: Cigarettes     Quit date: 7/22/2009   Smokeless Tobacco     Never Used       Labs:  Lab Results   Component Value Date    A1C 8.7 11/16/2021    A1C 7.8 05/10/2021     Lab Results   Component Value Date     12/02/2021     11/16/2021     06/29/2021     Lab Results   Component Value Date    LDL 50 01/04/2022    LDL 72 01/04/2021     HDL Cholesterol   Date Value Ref Range Status   01/04/2021 36 (L) >49 mg/dL Final     Direct Measure HDL   Date Value Ref Range Status   01/04/2022 39 (L) >=50 mg/dL Final   ]  GFR Estimate   Date Value Ref Range Status   06/29/2021 >90 >60 mL/min/[1.73_m2] Final     Comment:     Non  GFR Calc  Starting 12/18/2018, serum creatinine based estimated GFR (eGFR) will be   calculated using the Chronic Kidney Disease Epidemiology Collaboration   (CKD-EPI) equation.       GFR, ESTIMATED POCT   Date Value Ref Range Status   09/22/2021 >60 >60 mL/min/1.73m2 Final     GFR Estimate If Black   Date Value Ref Range Status   06/29/2021 >90 >60 mL/min/[1.73_m2] Final     Comment:      GFR Calc  Starting 12/18/2018, serum creatinine based estimated GFR (eGFR) will be   calculated using the Chronic Kidney Disease Epidemiology Collaboration   (CKD-EPI) equation.       Lab Results   Component Value Date    CR 0.6 09/22/2021    CR 0.50 06/29/2021     No results found for: MICROALBUMIN    Healthy Eating:  Healthy Eating Assessed Today: Yes  Meals include: Breakfast,Lunch,Dinner  Breakfast: used to skip breakfast, since starting Metformin has been having:  Oatmeal - plain, from the packet  OR cereal - Rice Krispies OR eggs sometimes with toast- with some coffee  Lunch: Soup - packet of soup- broccoli and cheese,  Or sandwich - peanut butter , sometimes with fruit  Dinner: Hotdish with rice, hamburger, tomato sauce, peas (will prepare and eat for 1 week) OR Tuna casserole OR Egg salad  Snacks: Fruit, ice cream occasionally, rice  pudding)  Beverages: Water,Coffee  Has patient met with a dietitian in the past?: Yes (in the past, long ago)    Being Active:  Being Active Assessed Today: Yes  Exercise:: Currently not exercising  Barrier to exercise: Physical limitation (walking throughout the building)    Monitoring:  Monitoring Assessed Today: Yes (not able to test due to shakiness, waiting on PA approval for CGM)    Taking Medications:  Diabetes Medication(s)     Biguanides       metFORMIN (GLUCOPHAGE-XR) 500 MG 24 hr tablet    Take 1 tablet (500 mg) by mouth 2 times daily (with meals)          Taking Medication Assessed Today: Yes  Current Treatments: Oral Medication (taken by mouth)  Problems taking diabetes medications regularly?: No  Diabetes medication side effects?: No    Problem Solving:  Problem Solving Assessed Today: Yes  Is the patient at risk for hypoglycemia?: No  Is the patient at risk for DKA?: No    Reducing Risks:  Reducing Risks Assessed Today: Yes  Diabetes Risks: Age over 45 years,Sedentary Lifestyle  CAD Risks: Post-menopausal,Diabetes Mellitus,Sedentary lifestyle  Has dilated eye exam at least once a year?: Yes  Sees dentist every 6 months?: Yes    Healthy Coping:  Healthy Coping Assessed Today: Yes  Patient Activation Measure Survey Score:  CHERRI Score (Last Two) 3/23/2012 6/10/2016   CHERRI Raw Score 39 46   Activation Score 56.4 75.3   CHERRI Level 3 4     Sadie Mandujano RD, LD, CDE  Time Spent: 30 minutes  Encounter Type: Individual      Any diabetes medication dose changes were made via the Certified Diabetes Care & Education Protocol in collaboration with the patient's referring provider. A copy of this encounter was shared with the provider.

## 2022-01-10 NOTE — TELEPHONE ENCOUNTER
Central Prior Authorization Team   Phone: [phone]      Medication Appeal Initiation    We have initiated an appeal for the requested medication:  Medication: Continuous Blood Gluc Sensor (FREESTYLE EPI 2 SENSOR) MISC - APPEAL INITIATED  Appeal Start Date:  1/10/2022  Insurance Company: [employer] - Phone [phone] Fax [phone]  Comments:  Appeal initiated with letter of medical necessity.    Manually faxed to insurance @ [phone].

## 2022-01-10 NOTE — TELEPHONE ENCOUNTER
Central Prior Authorization Team   Phone: [phone]      Medication Appeal Initiation    We have initiated an appeal for the requested medication:  Medication: Continuous Blood Gluc  (FREESTYLE EPI 2 READER) YOLANDA - APPEAL INITIATED  Appeal Start Date:  1/10/2022  Insurance Company: [employer] - Phone [phone] Fax [phone]  Comments:  Appeal initiated with letter of medical necessity.    Manually faxed to insurance @ [phone].

## 2022-01-11 NOTE — TELEPHONE ENCOUNTER
Received call from [employer] Appeals Dept requesting additional information whether patient has used insulin in the past. Provided information to Rep [employer] and [employer] sated that another Team will work on the outcome of the Appeal. An outcome of Appeal will be sent via fax to provider.

## 2022-01-17 NOTE — TELEPHONE ENCOUNTER
Central Prior Authorization Team   Phone: [phone]      MEDICATION APPEAL DENIED    Medication: Continuous Blood Gluc Sensor (FREESTYLE EPI 2 SENSOR) MISC - APPEAL DENIED    Denial Date: 1/13/2022    Denial Rational:       Second Level Appeal Information:         Second level appeals will be managed by the clinic staff and provider. Please contact the [employer] Prior Authorization Team if additional information about the denial is needed.

## 2022-01-17 NOTE — TELEPHONE ENCOUNTER
Central Prior Authorization Team   Phone: [phone]      MEDICATION APPEAL DENIED    Medication: Continuous Blood Gluc  (FREESTYLE EPI 2 READER) YOLANDA - APPEAL DENIED    Denial Date: 1/13/2022    Denial Rational:       Second Level Appeal Information:         Second level appeals will be managed by the clinic staff and provider. Please contact the [employer] Prior Authorization Team if additional information about the denial is needed.

## 2022-01-20 DIAGNOSIS — E78.2 MIXED HYPERLIPIDEMIA: ICD-10-CM

## 2022-01-24 RX ORDER — ROSUVASTATIN CALCIUM 5 MG/1
5 TABLET, COATED ORAL DAILY
Qty: 90 TABLET | Refills: 3 | OUTPATIENT
Start: 2022-01-24

## 2022-01-25 DIAGNOSIS — H40.1131 PRIMARY OPEN ANGLE GLAUCOMA (POAG) OF BOTH EYES, MILD STAGE: ICD-10-CM

## 2022-01-25 RX ORDER — LATANOPROST 50 UG/ML
1 SOLUTION/ DROPS OPHTHALMIC AT BEDTIME
Qty: 7.5 ML | Refills: 3 | Status: SHIPPED | OUTPATIENT
Start: 2022-01-25 | End: 2022-01-01

## 2022-02-15 ENCOUNTER — ANCILLARY PROCEDURE (OUTPATIENT)
Dept: MAMMOGRAPHY | Facility: CLINIC | Age: 82
End: 2022-02-15
Attending: NURSE PRACTITIONER
Payer: COMMERCIAL

## 2022-02-15 ENCOUNTER — OFFICE VISIT (OUTPATIENT)
Dept: UROLOGY | Facility: CLINIC | Age: 82
End: 2022-02-15
Attending: NURSE PRACTITIONER
Payer: COMMERCIAL

## 2022-02-15 VITALS
TEMPERATURE: 98.1 F | HEART RATE: 95 BPM | DIASTOLIC BLOOD PRESSURE: 71 MMHG | OXYGEN SATURATION: 95 % | SYSTOLIC BLOOD PRESSURE: 132 MMHG

## 2022-02-15 DIAGNOSIS — N32.81 OVERACTIVE BLADDER: ICD-10-CM

## 2022-02-15 DIAGNOSIS — Z12.31 ENCOUNTER FOR SCREENING MAMMOGRAM FOR BREAST CANCER: ICD-10-CM

## 2022-02-15 DIAGNOSIS — N39.0 RECURRENT UTI: Primary | ICD-10-CM

## 2022-02-15 PROCEDURE — 51798 US URINE CAPACITY MEASURE: CPT | Performed by: UROLOGY

## 2022-02-15 PROCEDURE — 77067 SCR MAMMO BI INCL CAD: CPT | Performed by: RADIOLOGY

## 2022-02-15 PROCEDURE — 99204 OFFICE O/P NEW MOD 45 MIN: CPT | Mod: 25 | Performed by: UROLOGY

## 2022-02-15 RX ORDER — OXYBUTYNIN CHLORIDE 5 MG/1
5 TABLET, EXTENDED RELEASE ORAL DAILY
Qty: 30 TABLET | Refills: 1 | Status: SHIPPED | OUTPATIENT
Start: 2022-02-15 | End: 2022-03-10

## 2022-02-15 RX ORDER — ESTRADIOL 0.1 MG/G
1 CREAM VAGINAL
Qty: 42.5 G | Refills: 11 | Status: SHIPPED | OUTPATIENT
Start: 2022-02-17 | End: 2023-01-01
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
## 2022-02-15 NOTE — RESULT ENCOUNTER NOTE
Dear Ludy,    Your recent test results are attached.      Normal mammogram.    If you have any questions please feel free to contact [phone] or myself via "[employer]"t.    Sincerely,  Sarahi Stafford, CNP

## 2022-02-15 NOTE — PATIENT INSTRUCTIONS
Please contact our office if your symptoms do not improve in 1-2 months.  Please call our office if you have questions or need to report any information, [phone].

## 2022-03-04 ENCOUNTER — TELEPHONE (OUTPATIENT)
Dept: FAMILY MEDICINE | Facility: CLINIC | Age: 82
End: 2022-03-04
Payer: COMMERCIAL

## 2022-03-04 DIAGNOSIS — F33.0 MAJOR DEPRESSIVE DISORDER, RECURRENT EPISODE, MILD (H): ICD-10-CM

## 2022-03-05 ENCOUNTER — HEALTH MAINTENANCE LETTER (OUTPATIENT)
Age: 82
End: 2022-03-05

## 2022-03-07 RX ORDER — SERTRALINE HYDROCHLORIDE 100 MG/1
100 TABLET, FILM COATED ORAL DAILY
Qty: 90 TABLET | Refills: 1 | Status: CANCELLED | OUTPATIENT
Start: 2022-03-07

## 2022-03-07 NOTE — TELEPHONE ENCOUNTER
"Called patient to verify whether or not she is still taking sertraline.  Per patient, \"I think Dr. Cano had wanted me off it at one point but I still want to be on it.\"  She checked her prescription bottle and stated she has been taking Sertraline 50 mg daily for a while now.  She feels this has been helping with her mood and depression.  Please refill.  No return call to patient is needed unless there are any issues/instructions    PHQ 1/4/2021 6/29/2021 1/4/2022   PHQ-9 Total Score 0 3 1   Q9: Thoughts of better off dead/self-harm past 2 weeks Not at all Not at all Not at all     Dmitriy Torres RN  [facility] [place]    "
"Routing refill request to provider for review/approval because:  Drug not active on patient's medication list    Requested Prescriptions   Pending Prescriptions Disp Refills     sertraline (ZOLOFT) 100 MG tablet 90 tablet 1     Sig: Take 1 tablet (100 mg) by mouth daily       SSRIs Protocol Failed - 3/4/2022 10:59 AM        Failed - Medication is active on med list        Failed - Recent (6 mo) or future (30 days) visit within the authorizing provider's specialty     Patient had office visit in the last 6 months or has a visit in the next 30 days with authorizing provider or within the authorizing provider's specialty.  See \"Patient Info\" tab in inbasket, or \"Choose Columns\" in Meds & Orders section of the refill encounter.            Passed - PHQ-9 score less than 5 in past 6 months     Please review last PHQ-9 score.           Passed - Patient is age 18 or older        Passed - No active pregnancy on record        Passed - No positive pregnancy test in last 12 months           "
Please verify if patient taking this medication.    Sarahi Stafford, CNP    
Script sent.    Sarahi Stafford, CNP    
sertraline      Last Written Prescription Date:    Last Fill Quantity: ,   # refills:   Last Office Visit:   Future Office visit:    Next 5 appointments (look out 90 days)    Mar 23, 2022 10:30 AM  Return Visit with Kenny Gr MD  [facility] ([facility] [place]) [address] [address] [place] [id number] [phone]           Routing refill request to provider for review/approval because:  Drug not active on patient's medication list    
within normal limits

## 2022-03-08 ENCOUNTER — OFFICE VISIT (OUTPATIENT)
Dept: URGENT CARE | Facility: URGENT CARE | Age: 82
End: 2022-03-08
Payer: COMMERCIAL

## 2022-03-08 VITALS
HEART RATE: 89 BPM | SYSTOLIC BLOOD PRESSURE: 136 MMHG | DIASTOLIC BLOOD PRESSURE: 76 MMHG | WEIGHT: 123.4 LBS | RESPIRATION RATE: 18 BRPM | OXYGEN SATURATION: 95 % | BODY MASS INDEX: 20.09 KG/M2 | TEMPERATURE: 98.1 F

## 2022-03-08 DIAGNOSIS — E11.9 TYPE 2 DIABETES MELLITUS WITHOUT COMPLICATION, UNSPECIFIED WHETHER LONG TERM INSULIN USE (H): ICD-10-CM

## 2022-03-08 DIAGNOSIS — J44.9 CHRONIC OBSTRUCTIVE PULMONARY DISEASE, UNSPECIFIED COPD TYPE (H): ICD-10-CM

## 2022-03-08 DIAGNOSIS — R14.0 BLOATING: ICD-10-CM

## 2022-03-08 DIAGNOSIS — K44.9 HIATAL HERNIA: ICD-10-CM

## 2022-03-08 DIAGNOSIS — K59.00 CONSTIPATION, UNSPECIFIED CONSTIPATION TYPE: ICD-10-CM

## 2022-03-08 DIAGNOSIS — Z86.73 HISTORY OF STROKE: ICD-10-CM

## 2022-03-08 DIAGNOSIS — R10.11 RUQ ABDOMINAL PAIN: Primary | ICD-10-CM

## 2022-03-08 PROCEDURE — 99214 OFFICE O/P EST MOD 30 MIN: CPT | Performed by: PHYSICIAN ASSISTANT

## 2022-03-08 NOTE — PATIENT INSTRUCTIONS
RUQ pain, bloating, constipation x 4 days. Limited on labs and imaging. To ER. Consider obstruction, appendicitis, hepatitis, gallstones, pancreatitis.

## 2022-03-08 NOTE — PROGRESS NOTES
81-year-old female presents for right upper quadrant pain for 4 to 5 days.  Unable to have a bowel movement unless she takes a laxative.  No blood in her stools.  No nausea or vomiting or fever.  Rates the pain a 10 out of 10.  Has a history of recurrent UTIs.  Has felt very bloated.  Does have a history of hiatal hernia.  Diabetic and COPD for past medical history.  No chest pain or shortness of breath.      Allergies   Allergen Reactions     Amoxicillin GI Disturbance and Nausea     Augmentin Nausea and Vomiting     Doxycycline GI Disturbance     Iodine Itching and Swelling     Pt is ok with ct contrast dye (isovue 370)     Mercury Unknown     Metronidazole GI Disturbance     Penicillins Unknown     Phenylmercuric Nitrate Unknown       Past Medical History:   Diagnosis Date     Acute ischemic left TREMAYNE stroke (H) 2009     Astigmatism, unspecified      Bullous pemphigoid      COPD (chronic obstructive pulmonary disease) (H)      CVA (cerebral infarction) 08/2009     Depressive disorder      Diabetes (H)      Glaucoma (increased eye pressure)      Goiter 03/12/2007     HTN (hypertension) 08/25/2009     Hypocalcemia      Hypokalemia      Hyponatremia      Mixed hyperlipidemia      Personal history of other diseases of circulatory system      Pneumonia 11/16/2010     Primary open-angle glaucoma(365.11)      Thyroid nodule 03/23/2012     Unspecified cerebral artery occlusion with cerebral infarction      Unspecified cerebral artery occlusion with cerebral infarction 1998       albuterol (ACCUNEB) 1.25 MG/3ML neb solution, Take 1 vial (1.25 mg) by nebulization every 6 hours as needed for shortness of breath / dyspnea or wheezing  albuterol (PROAIR HFA/PROVENTIL HFA/VENTOLIN HFA) 108 (90 Base) MCG/ACT inhaler, Inhale 2 puffs into the lungs every 4 hours as needed for shortness of breath / dyspnea or wheezing  aspirin 81 MG tablet, Take 1 tablet by mouth daily.  azithromycin (ZITHROMAX) 250 MG tablet, TAKE 1 TABLET (250  MG) BY MOUTH EVERY MON, WED, FRI MORNING  blood glucose (NO BRAND SPECIFIED) test strip, Use to test blood sugar 1 times daily or as directed. To accompany: Blood Glucose Monitor Brands: per insurance.  blood glucose calibration (NO BRAND SPECIFIED) solution, To accompany: Blood Glucose Monitor Brands: per insurance.  blood glucose monitoring (NO BRAND SPECIFIED) meter device kit, Use to test blood sugar one times daily or as directed. Preferred blood glucose meter OR supplies to accompany: Blood Glucose Monitor Brands: per insurance.  Continuous Blood Gluc Sensor (FREESTYLE EPI 2 SENSOR) [facility], 1 each every 14 days 1 each every 14 days. Change every 14 days.  estradiol (ESTRACE) 0.1 MG/GM vaginal cream, Place 1 g vaginally twice a week  fluticasone-salmeterol (ADVAIR-HFA) 230-21 MCG/ACT inhaler, Inhale 2 puffs into the lungs 2 times daily  latanoprost (XALATAN) 0.005 % ophthalmic solution, Place 1 drop into both eyes At Bedtime  losartan (COZAAR) 25 MG tablet, TAKE 1/2 TABLET BY MOUTH EVERY DAY  metFORMIN (GLUCOPHAGE-XR) 500 MG 24 hr tablet, Take 1 tablet (500 mg) by mouth 2 times daily (with meals)  oxybutynin ER (DITROPAN-XL) 5 MG 24 hr tablet, Take 1 tablet (5 mg) by mouth daily  potassium chloride ER (KLOR-CON) 20 MEQ CR tablet, Take 1 tablet (20 mEq) by mouth daily  rosuvastatin (CRESTOR) 5 MG tablet, Take 1 tablet (5 mg) by mouth daily  sertraline (ZOLOFT) 50 MG tablet, Take 1 tablet (50 mg) by mouth daily  thin (NO BRAND SPECIFIED) lancets, Use with lanceting device. To accompany: Blood Glucose Monitor Brands: per insurance.  vitamin  B complex with vitamin C (VITAMIN  B COMPLEX) TABS, Take 1 tablet by mouth daily  tiotropium (SPIRIVA RESPIMAT) 2.5 MCG/ACT inhaler, Inhale 2 puffs into the lungs daily    No current facility-administered medications on file prior to visit.      Social History     Tobacco Use     Smoking status: Former Smoker     Packs/day: 1.50     Years: 50.00     Pack years: 75.00      Types: Cigarettes     Quit date: 2009     Years since quittin.6     Smokeless tobacco: Never Used   Substance Use Topics     Alcohol use: No     Alcohol/week: 4.0 standard drinks     Drug use: No       ROS:  General: negative for fever  Resp: negative for chest pain   CV: negative for chest pain  ABD: as above  : negative for dysuria  Neurologic:negative for Headache    OBJECTIVE:  /76   Pulse 89   Temp 98.1  F (36.7  C) (Tympanic)   Resp 18   Wt 56 kg (123 lb 6.4 oz)   SpO2 95%   Breastfeeding No   BMI 20.09 kg/m     General:   awake, alert, and cooperative.  In obvious discomfort.     Head: Normocephalic, atraumatic.  Eyes: Conjunctiva clear, non icteric.   Heart: Regular rate and rhythm. No murmur.  Lungs: Chest is clear; no wheezes or rales.  ABD: Distended, right upper quadrant tenderness to palpation.  No hepatosplenomegaly.  Hypoactive bowel sounds.  No rigidity, guarding, rebound.  No CVA tenderness.    Neuro: Alert and oriented - normal speech.     ASSESSMENT:    ICD-10-CM    1. RUQ abdominal pain  R10.11    2. Bloating  R14.0    3. Constipation, unspecified constipation type  K59.00    4. Type 2 diabetes mellitus without complication, unspecified whether long term insulin use (H)  E11.9    5. Chronic obstructive pulmonary disease, unspecified COPD type (H)  J44.9    6. Hiatal hernia  K44.9    7. History of stroke  Z86.73            PLAN: Vital signs stable other than elevated blood pressure likely secondary to pain.     RUQ pain, bloating, constipation x 4 days. Limited on labs and imaging. To ER. Consider obstruction, appendicitis, hepatitis, gallstones, pancreatitis.  History of hiatal hernia, diabetes, COPD CVA  Advised about symptoms which might h,erald more serious problems.        Kareen Rico PA-C

## 2022-03-10 ENCOUNTER — TELEPHONE (OUTPATIENT)
Dept: FAMILY MEDICINE | Facility: CLINIC | Age: 82
End: 2022-03-10
Payer: COMMERCIAL

## 2022-03-10 ENCOUNTER — VIRTUAL VISIT (OUTPATIENT)
Dept: FAMILY MEDICINE | Facility: CLINIC | Age: 82
End: 2022-03-10
Payer: COMMERCIAL

## 2022-03-10 DIAGNOSIS — N32.81 OVERACTIVE BLADDER: ICD-10-CM

## 2022-03-10 DIAGNOSIS — K59.00 CONSTIPATION, UNSPECIFIED CONSTIPATION TYPE: ICD-10-CM

## 2022-03-10 DIAGNOSIS — R10.11 RUQ ABDOMINAL PAIN: Primary | ICD-10-CM

## 2022-03-10 PROCEDURE — 99214 OFFICE O/P EST MOD 30 MIN: CPT | Mod: 95 | Performed by: PHYSICIAN ASSISTANT

## 2022-03-10 RX ORDER — SODIUM PHOSPHATE, DIBASIC AND SODIUM PHOSPHATE, MONOBASIC 3.5; 9.5 G/66ML; G/66ML
1 ENEMA RECTAL ONCE
Qty: 1 ENEMA | Refills: 0 | Status: SHIPPED | OUTPATIENT
Start: 2022-03-10 | End: 2022-03-10

## 2022-03-10 RX ORDER — OXYBUTYNIN CHLORIDE 5 MG/1
5 TABLET, EXTENDED RELEASE ORAL DAILY
Qty: 90 TABLET | Refills: 3 | Status: SHIPPED | OUTPATIENT
Start: 2022-03-10 | End: 2023-01-01

## 2022-03-10 RX ORDER — POLYETHYLENE GLYCOL 3350 17 G/17G
1 POWDER, FOR SOLUTION ORAL DAILY
Qty: 578 G | Refills: 1 | Status: SHIPPED | OUTPATIENT
Start: 2022-03-10 | End: 2023-01-01

## 2022-03-10 NOTE — PROGRESS NOTES
abbie is a 81 year old who is being evaluated via a billable telephone visit.      What phone number would you like to be contacted at? [phone]  How would you like to obtain your AVS? MyChart    Assessment & Plan     RUQ abdominal pain  - omeprazole (PRILOSEC) 20 MG DR capsule; Take 1 capsule (20 mg) by mouth daily    Constipation, unspecified constipation type  - polyethylene glycol (MIRALAX) 17 GM/Dose powder; Take 17 g (1 capful) by mouth daily  - sodium phosphate (FLEET PEDS) 3.5-9.5 GM/59ML enema; Place 1 enema rectally once for 1 dose        Return in about 1 week (around 3/17/2022) for follow up if symptoms persist, change or worsen.    Aida Crowder PA-C  [facility] FRIALFREDOWILBERTO leiva is a 81 year old who presents for the following health issues  accompanied by her sister.    HPI     ED/UC Followup:    Facility:  [facility]   Date of visit: 03/08/2022  Reason for visit: Constipation   Current Status: stills the same        ED notes reviewed.  Patient notes that she didn't have prescriptions to take home.  Things were recommended to her.  She hasn't had a BM in > 5 days.  Did review that the ED clinician believes that if her constipation resolves she should notice relief of right upper quadrant pain.  She is amenable to the addition of Fleets enema today as well as Miralax.  She will use Prilosec as recommended by a previous clinician as well.      Review of Systems   Constitutional, HEENT, cardiovascular, pulmonary, gi and gu systems are negative, except as otherwise noted.      Objective           Vitals:  No vitals were obtained today due to virtual visit.    Physical Exam   healthy, alert and no distress  PSYCH: Alert and oriented times 3; coherent speech, normal   rate and volume, able to articulate logical thoughts, able   to abstract reason, no tangential thoughts, no hallucinations   or delusions  Her affect is normal  RESP: No cough, no audible  wheezing, able to talk in full sentences  Remainder of exam unable to be completed due to telephone visits                Phone call duration: 15 minutes

## 2022-03-10 NOTE — TELEPHONE ENCOUNTER
Refilled Oxybutynin. 90 with 3 refills. Last OV= 2/15/22.    Chelsi EMMANUEL RN Urology 3/10/2022 10:50 AM

## 2022-03-10 NOTE — TELEPHONE ENCOUNTER
Ghislainet sent to patient to verify current dose of Prednisone.     Chastity Rodriguez, BSN, RN  Medical Specialty Care Coordinator  [facility]     Per MD note OK to take med if helping/improving condition.

## 2022-03-10 NOTE — TELEPHONE ENCOUNTER
Pt called to follow up on an ER visit she had on 3/8/22 for abdominal pain and constipation. Pt was not given any medications at discharge from the ER and pt is still continuing to experience some abdominal pain and constipation. Pt stated she took stool softeners/laxatives last night and was concerned about coming to the clinic for an appointment.     Pt scheduled for telephone appointment tonight with Aida Crowder. Pt was not comfortable navigating a virtual visit.    Kecia KENNEDY RN, BSN  [facility]

## 2022-03-14 DIAGNOSIS — E11.49 TYPE 2 DIABETES MELLITUS WITH OTHER DIABETIC NEUROLOGICAL COMPLICATION (H): ICD-10-CM

## 2022-03-15 RX ORDER — METFORMIN HCL 500 MG
TABLET, EXTENDED RELEASE 24 HR ORAL
Qty: 90 TABLET | Refills: 0 | Status: SHIPPED | OUTPATIENT
Start: 2022-03-15 | End: 2022-07-06

## 2022-03-15 NOTE — TELEPHONE ENCOUNTER
"Routing refill request to provider for review/approval because:  Labs out of range:  A1C  Labs not current:  A1C    Requested Prescriptions   Pending Prescriptions Disp Refills     metFORMIN (GLUCOPHAGE-XR) 500 MG 24 hr tablet [Pharmacy Med Name: METFORMIN HCL  MG TABLET] 90 tablet      Sig: TAKE 1 TABLET BY MOUTH EVERY DAY WITH DINNER       Biguanide Agents Failed - 3/14/2022 12:11 AM        Failed - Patient has documented A1c within the specified period of time.     If HgbA1C is 8 or greater, it needs to be on file within the past 3 months.  If less than 8, must be on file within the past 6 months.     Recent Labs   Lab Test 11/16/21  1105   A1C 8.7*             Passed - Patient is age 10 or older        Passed - Patient's CR is NOT>1.4 OR Patient's EGFR is NOT<45 within past 12 mos.     Recent Labs   Lab Test 09/22/21  1102 06/29/21  1223   GFRESTIMATED >60 >90   GFRESTBLACK  --  >90       Recent Labs   Lab Test 09/22/21  1102   CR 0.6             Passed - Patient does NOT have a diagnosis of CHF.        Passed - Medication is active on med list        Passed - Patient is not pregnant        Passed - Patient has not had a positive pregnancy test within the past 12 mos.         Passed - Recent (6 mo) or future (30 days) visit within the authorizing provider's specialty     Patient had office visit in the last 6 months or has a visit in the next 30 days with authorizing provider or within the authorizing provider's specialty.  See \"Patient Info\" tab in inbasket, or \"Choose Columns\" in Meds & Orders section of the refill encounter.               Norma CASTELLON RN on 3/15/2022 at 12:12 PM      "

## 2022-03-23 ENCOUNTER — OFFICE VISIT (OUTPATIENT)
Dept: PULMONOLOGY | Facility: CLINIC | Age: 82
End: 2022-03-23
Payer: COMMERCIAL

## 2022-03-23 VITALS
WEIGHT: 123 LBS | DIASTOLIC BLOOD PRESSURE: 65 MMHG | BODY MASS INDEX: 20.49 KG/M2 | RESPIRATION RATE: 18 BRPM | HEIGHT: 65 IN | SYSTOLIC BLOOD PRESSURE: 126 MMHG | OXYGEN SATURATION: 96 % | HEART RATE: 97 BPM

## 2022-03-23 DIAGNOSIS — J43.2 CENTRILOBULAR EMPHYSEMA (H): ICD-10-CM

## 2022-03-23 DIAGNOSIS — J44.9 CHRONIC OBSTRUCTIVE PULMONARY DISEASE, UNSPECIFIED COPD TYPE (H): Primary | ICD-10-CM

## 2022-03-23 PROCEDURE — 99215 OFFICE O/P EST HI 40 MIN: CPT | Performed by: INTERNAL MEDICINE

## 2022-03-23 RX ORDER — ALBUTEROL SULFATE 90 UG/1
2 AEROSOL, METERED RESPIRATORY (INHALATION) EVERY 4 HOURS PRN
Qty: 8.5 G | Refills: 11 | Status: SHIPPED | OUTPATIENT
Start: 2022-03-23 | End: 2022-05-23

## 2022-03-23 RX ORDER — AZITHROMYCIN 250 MG/1
500 TABLET, FILM COATED ORAL
Qty: 24 TABLET | Refills: 0 | Status: SHIPPED | OUTPATIENT
Start: 2022-03-23 | End: 2022-04-22

## 2022-03-23 ASSESSMENT — PAIN SCALES - GENERAL: PAINLEVEL: NO PAIN (0)

## 2022-03-23 NOTE — PROGRESS NOTES
"Ludy Mckenzie's goals for this visit include: Return  She requests these members of her care team be copied on today's visit information: PCP    PCP: Sarahi Stafford    Referring Provider:  No referring provider defined for this encounter.    /65   Pulse 97   Resp 18   Ht 1.651 m (5' 5\")   Wt 55.8 kg (123 lb)   SpO2 96%   BMI 20.47 kg/m      Do you need any medication refills at today's visit? N    Leeann Dye LPN  Pulmonary Medicine:  [facility]  Phone: [phone] Fax: [phone]      "
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
## 2022-04-01 DIAGNOSIS — R10.11 RUQ ABDOMINAL PAIN: ICD-10-CM

## 2022-04-01 NOTE — TELEPHONE ENCOUNTER
Duplicate medication request. Medication was filled 3/10/22 with one refill and Receipt confirmed by pharmacy (3/10/2022  2:09 PM CST).     Kasey Proctor RN   [facility]

## 2022-04-21 ENCOUNTER — OFFICE VISIT (OUTPATIENT)
Dept: OPHTHALMOLOGY | Facility: CLINIC | Age: 82
End: 2022-04-21
Payer: COMMERCIAL

## 2022-04-21 DIAGNOSIS — Z96.1 PSEUDOPHAKIA OF BOTH EYES: ICD-10-CM

## 2022-04-21 DIAGNOSIS — Z01.00 EXAMINATION OF EYES AND VISION: ICD-10-CM

## 2022-04-21 DIAGNOSIS — H35.3131 EARLY DRY STAGE NONEXUDATIVE AGE-RELATED MACULAR DEGENERATION OF BOTH EYES: ICD-10-CM

## 2022-04-21 DIAGNOSIS — H52.223 MYOPIA OF BOTH EYES WITH REGULAR ASTIGMATISM AND PRESBYOPIA: ICD-10-CM

## 2022-04-21 DIAGNOSIS — H52.13 MYOPIA OF BOTH EYES WITH REGULAR ASTIGMATISM AND PRESBYOPIA: ICD-10-CM

## 2022-04-21 DIAGNOSIS — H43.813 POSTERIOR VITREOUS DETACHMENT OF BOTH EYES: ICD-10-CM

## 2022-04-21 DIAGNOSIS — E11.69 TYPE 2 DIABETES MELLITUS WITH OTHER SPECIFIED COMPLICATION, WITHOUT LONG-TERM CURRENT USE OF INSULIN (H): Primary | ICD-10-CM

## 2022-04-21 DIAGNOSIS — H52.4 MYOPIA OF BOTH EYES WITH REGULAR ASTIGMATISM AND PRESBYOPIA: ICD-10-CM

## 2022-04-21 DIAGNOSIS — H40.1131 PRIMARY OPEN ANGLE GLAUCOMA (POAG) OF BOTH EYES, MILD STAGE: ICD-10-CM

## 2022-04-21 PROCEDURE — 92015 DETERMINE REFRACTIVE STATE: CPT | Performed by: STUDENT IN AN ORGANIZED HEALTH CARE EDUCATION/TRAINING PROGRAM

## 2022-04-21 PROCEDURE — 92014 COMPRE OPH EXAM EST PT 1/>: CPT | Performed by: STUDENT IN AN ORGANIZED HEALTH CARE EDUCATION/TRAINING PROGRAM

## 2022-04-21 ASSESSMENT — REFRACTION_MANIFEST
OD_CYLINDER: +1.25
OS_CYLINDER: +0.75
OS_AXIS: 180
OD_SPHERE: -1.25
OS_ADD: +2.50
OS_SPHERE: -0.75
OD_AXIS: 175
OD_ADD: +2.50

## 2022-04-21 ASSESSMENT — REFRACTION_WEARINGRX
OD_AXIS: 002
OS_AXIS: 180
OD_SPHERE: PLANO
OS_CYLINDER: +0.50
OS_SPHERE: -0.25
SPECS_TYPE: SVL
OD_SPHERE: -1.00
OD_CYLINDER: +1.50
OD_CYLINDER: +1.50
OS_CYLINDER: +0.50
OS_SPHERE: +1.25
OD_AXIS: 180
OS_AXIS: 002

## 2022-04-21 ASSESSMENT — CUP TO DISC RATIO
OD_RATIO: 0.45
OS_RATIO: 0.5

## 2022-04-21 ASSESSMENT — SLIT LAMP EXAM - LIDS
COMMENTS: 1+ MEIBOMIAN GLAND DYSFUNCTION
COMMENTS: 1+ MEIBOMIAN GLAND DYSFUNCTION

## 2022-04-21 ASSESSMENT — CONF VISUAL FIELD
OS_NORMAL: 1
OD_NORMAL: 1
METHOD: COUNTING FINGERS

## 2022-04-21 ASSESSMENT — TONOMETRY
OS_IOP_MMHG: 14
OD_IOP_MMHG: 13
IOP_METHOD: APPLANATION

## 2022-04-21 ASSESSMENT — EXTERNAL EXAM - RIGHT EYE: OD_EXAM: NORMAL

## 2022-04-21 ASSESSMENT — VISUAL ACUITY
OD_CC+: -2
METHOD: SNELLEN - LINEAR
OS_CC: 20/40
OD_CC: 20/30
CORRECTION_TYPE: GLASSES

## 2022-04-21 ASSESSMENT — EXTERNAL EXAM - LEFT EYE: OS_EXAM: NORMAL

## 2022-04-21 NOTE — LETTER
"    4/21/2022         RE: Ludy Mckenzie  [address]  [place] [address]        Dear Colleague,    Thank you for referring your patient, Ludy Mckenzie, to the [facility]. Please see a copy of my visit note below.     Current Eye Medications:  Latanoprost at bedtime both eyes, last took at 9 pm. Taking AREDS2.     Subjective:  Complete eye exam. Vision is doing OK both eyes in the distance, uses SV distance glasses. Patient states \"Has trouble with Computer/reading glasses, can see OK on computer, but not when holding things close\". (after reading glasses, explained Single vision computer glasses like she has will only be clear at 24-30\" not at 16-18\") No eye pain or discomfort in either eye.     Type 2 diabetic, blood sugar has been a little high.  Lab Results   Component Value Date    A1C 8.7 11/16/2021    A1C 7.8 05/10/2021    A1C 7.5 01/04/2021    A1C 6.6 09/13/2019    A1C 7.0 03/08/2019    A1C 6.6 07/27/2018      Objective:  See Ophthalmology Exam.       Assessment:  Ludy Mckenzie is a 81 year old female who presents with:   Encounter Diagnoses   Name Primary?     Examination of eyes and vision        Type 2 diabetes mellitus with other specified complication, without long-term current use of insulin (H) Yes    Negative diabetic retinopathy        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, mild stage Intraocular pressure 13/14 today on latanoprost.      Early dry stage nonexudative age-related macular degeneration of both eyes      Pseudophakia with Yag Caps ou       Posterior vitreous detachment of both eyes        Myopia of both eyes with regular astigmatism and presbyopia      Stable eye exam.      Plan:  Continue Latanoprost (green top) at bedtime both eyes     Glasses prescription given     Keep blood sugars and blood pressure under good control.    Arjun Valenzuela MD  [phone]    Patient Education   Diabetes weakens the blood vessels all over the " body, including the eyes. Damage to the blood vessels in the eyes can cause swelling or bleeding into part of the eye (called the retina). This is called diabetic retinopathy (KEVEN-tin-AH-puh-thee). If not treated, this disease can cause vision loss or blindness.   Symptoms may include blurred or distorted vision, but many people have no symptoms. It's important to see your eye doctor regularly to check for problems.   Early treatment and good control can help protect your vision. Here are the things you can do to help prevent vision loss:      1. Keep your blood sugar levels under tight control.      2. Bring high blood pressure under control.      3. No smoking.      4. Have yearly dilated eye exams.             Again, thank you for allowing me to participate in the care of your patient.        Sincerely,        Arjun Valenzuela MD

## 2022-04-21 NOTE — PROGRESS NOTES
" Current Eye Medications:  Latanoprost at bedtime both eyes, last took at 9 pm. Taking AREDS2.     Subjective:  Complete eye exam. Vision is doing OK both eyes in the distance, uses SV distance glasses. Patient states \"Has trouble with Computer/reading glasses, can see OK on computer, but not when holding things close\". (after reading glasses, explained Single vision computer glasses like she has will only be clear at 24-30\" not at 16-18\") No eye pain or discomfort in either eye.     Type 2 diabetic, blood sugar has been a little high.  Lab Results   Component Value Date    A1C 8.7 11/16/2021    A1C 7.8 05/10/2021    A1C 7.5 01/04/2021    A1C 6.6 09/13/2019    A1C 7.0 03/08/2019    A1C 6.6 07/27/2018      Objective:  See Ophthalmology Exam.       Assessment:  Ludy Mckenzie is a 81 year old female who presents with:   Encounter Diagnoses   Name Primary?     Examination of eyes and vision        Type 2 diabetes mellitus with other specified complication, without long-term current use of insulin (H) Yes    Negative diabetic retinopathy        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, mild stage Intraocular pressure 13/14 today on latanoprost.      Early dry stage nonexudative age-related macular degeneration of both eyes      Pseudophakia with Yag Caps ou       Posterior vitreous detachment of both eyes        Myopia of both eyes with regular astigmatism and presbyopia      Stable eye exam.      Plan:  Continue Latanoprost (green top) at bedtime both eyes     Glasses prescription given     Keep blood sugars and blood pressure under good control.    Arjun Valenzuela MD  [phone]    Patient Education   Diabetes weakens the blood vessels all over the body, including the eyes. Damage to the blood vessels in the eyes can cause swelling or bleeding into part of the eye (called the retina). This is called diabetic retinopathy (KEVEN-tin-AH-puh-thee). If not treated, this disease can cause vision loss or " blindness.   Symptoms may include blurred or distorted vision, but many people have no symptoms. It's important to see your eye doctor regularly to check for problems.   Early treatment and good control can help protect your vision. Here are the things you can do to help prevent vision loss:      1. Keep your blood sugar levels under tight control.      2. Bring high blood pressure under control.      3. No smoking.      4. Have yearly dilated eye exams.

## 2022-04-21 NOTE — PATIENT INSTRUCTIONS
Continue Latanoprost (green top) at bedtime both eyes     Glasses prescription given     Keep blood sugars and blood pressure under good control.    Arjun Valenzuela MD  [phone]    Patient Education   Diabetes weakens the blood vessels all over the body, including the eyes. Damage to the blood vessels in the eyes can cause swelling or bleeding into part of the eye (called the retina). This is called diabetic retinopathy ([facility]-tin--puh-thee). If not treated, this disease can cause vision loss or blindness.   Symptoms may include blurred or distorted vision, but many people have no symptoms. It's important to see your eye doctor regularly to check for problems.   Early treatment and good control can help protect your vision. Here are the things you can do to help prevent vision loss:      1. Keep your blood sugar levels under tight control.      2. Bring high blood pressure under control.      3. No smoking.      4. Have yearly dilated eye exams.

## 2022-05-12 DIAGNOSIS — J44.9 CHRONIC OBSTRUCTIVE PULMONARY DISEASE, UNSPECIFIED COPD TYPE (H): ICD-10-CM

## 2022-05-13 RX ORDER — AZITHROMYCIN 250 MG/1
TABLET, FILM COATED ORAL
Qty: 36 TABLET | Refills: 3 | Status: SHIPPED | OUTPATIENT
Start: 2022-05-13 | End: 2022-05-23

## 2022-05-13 NOTE — TELEPHONE ENCOUNTER
Medication:     azithromycin (ZITHROMAX) 250 MG tablet 12 tablet 11 9/22/2021  No   Sig: TAKE 1 TABLET (250 MG) BY MOUTH EVERY MON, WED, FRI MORNING     Date last written: 09/22/2021  Dispensed amount: 12  Refills: 11      Pt's last office visit: 03/23/2022  Next scheduled office visit: 09/22/2022      Per the RN/LPN medication refill protocol, writer is unable to refill this request.

## 2022-05-23 ENCOUNTER — TELEPHONE (OUTPATIENT)
Dept: PULMONOLOGY | Facility: CLINIC | Age: 82
End: 2022-05-23
Payer: COMMERCIAL

## 2022-05-23 DIAGNOSIS — J44.9 CHRONIC OBSTRUCTIVE PULMONARY DISEASE, UNSPECIFIED COPD TYPE (H): ICD-10-CM

## 2022-05-23 DIAGNOSIS — J43.2 CENTRILOBULAR EMPHYSEMA (H): ICD-10-CM

## 2022-05-23 RX ORDER — FLUTICASONE PROPIONATE AND SALMETEROL XINAFOATE 230; 21 UG/1; UG/1
2 AEROSOL, METERED RESPIRATORY (INHALATION) 2 TIMES DAILY
Qty: 36 G | Refills: 3 | Status: SHIPPED | OUTPATIENT
Start: 2022-05-23 | End: 2022-09-30

## 2022-05-23 RX ORDER — AZITHROMYCIN 250 MG/1
TABLET, FILM COATED ORAL
Qty: 36 TABLET | Refills: 3 | Status: SHIPPED | OUTPATIENT
Start: 2022-05-23 | End: 2023-01-01

## 2022-05-23 RX ORDER — ALBUTEROL SULFATE 90 UG/1
2 AEROSOL, METERED RESPIRATORY (INHALATION) EVERY 4 HOURS PRN
Qty: 25.5 G | Refills: 3 | Status: SHIPPED | OUTPATIENT
Start: 2022-05-23 | End: 2023-01-01

## 2022-05-23 NOTE — TELEPHONE ENCOUNTER
Received [employer] patient assistance enrollment forms. Patient has completed her portion of the forms. Rxs for Fluticasone-Umeclidin-Vilanterol (TRELEGY ELLIPTA) 200-62.5-25 MCG/INH oral inhaler, fluticasone-salmeterol (ADVAIR-HFA) 230-21 MCG/ACT inhaler, azithromycin (ZITHROMAX) 250 MG tablet and albuterol (PROAIR HFA/PROVENTIL HFA/VENTOLIN HFA) 108 (90 Base) MCG/ACT inhaler have been signed by Dr. Gr and faxed to [employer] @ [phone] in hopes of obtaining pulmonary medicines free of charge or at a discounted rate. Patient informed via my chart.    Leeann Dye LPN  Pulmonary Medicine:  [facility]  Phone: [phone] Fax: [phone]

## 2022-06-08 NOTE — TELEPHONE ENCOUNTER
Central Prior Authorization Team   Phone: [phone]      Received a call from [employer] stating the appeal for the Freestyle [employer]met 2 [place] and [employer]ensor was overturned on 01/28/2022. After appeals have been denied they are automatically sent out for a second level appeal. Patient would have received a letter in the mail with notification of this approval. Pharmacy received a paid claim for both products and will notify the patient when ready for .

## 2022-06-25 ENCOUNTER — HEALTH MAINTENANCE LETTER (OUTPATIENT)
Age: 82
End: 2022-06-25

## 2022-06-28 ENCOUNTER — TELEPHONE (OUTPATIENT)
Dept: PULMONOLOGY | Facility: CLINIC | Age: 82
End: 2022-06-28

## 2022-06-28 NOTE — TELEPHONE ENCOUNTER
[facility] Call Center    Phone Message    May a detailed message be left on voicemail: yes     Reason for Call: Ellen from [email or web address] stated they received medication requests for Trilegy and Advair HFA .  Requesting a call to clarify the patient's treatment plan.  Thank you.     Action Taken: Message routed to:  Adult Clinics: Pulmonology p [id number]    Travel Screening: Not Applicable

## 2022-06-29 NOTE — TELEPHONE ENCOUNTER
Contacted patient inquiring about current inhaler use. Per patient, she is no longer using fluticasone-salmeterol (ADVAIR-HFA) 230-21 MCG/ACT inhaler. Patient is using the Fluticasone-Umeclidin-Vilanterol (TRELEGY ELLIPTA) 200-62.5-25 MCG/INH oral inhaler.    Contacted [employer] patient assistance @ [phone] regarding inhaler clarification. Informed the [facility] pharmacy representative that patient is no longer using Advair. [employer] rep to send Trelegy to patient. Requested call back to pulmonary clinic with any issues.    Leeann Dye LPN  Pulmonary Medicine:  [facility]  Phone: [phone] Fax: [phone]

## 2022-07-01 ENCOUNTER — TELEPHONE (OUTPATIENT)
Dept: PULMONOLOGY | Facility: CLINIC | Age: 82
End: 2022-07-01

## 2022-07-01 NOTE — TELEPHONE ENCOUNTER
Health Call Center    Phone Message    May a detailed message be left on voicemail: yes     Reason for Call: Medication Question or concern regarding medication   Prescription Clarification  Name of Medication: Fluticasone-Umeclidin-Vilanterol (TRELEGY ELLIPTA) 200-62.5-25 MCG/INH oral inhaler and fluticasone-salmeterol (ADVAIR-HFA) 230-21 MCG/ACT inhaler  Prescribing Provider: Dr. Gr   Pharmacy: [facility] the [employer] [facility] - [phone] fax: [phone]   What on the order needs clarification? Tracey calling to clarify that the Advair is no longer necessary.  She states they have a note stating this but it is not noted who gave that information.  Please call them back to discuss          Action Taken: Message routed to:  Other: Cardiology    Travel Screening: Not Applicable

## 2022-07-01 NOTE — TELEPHONE ENCOUNTER
Contacted [employer] for a 2nd time clarifying that patient is not using Advair. Explained to the [employer] Pharmacist that patient is only using Trelegy and that Trelegy replaces Advair. Pharmacist expressed understanding.    Leeann Dye LPN  Pulmonary Medicine:  [facility]  Phone: [phone] Fax: [phone]

## 2022-07-05 DIAGNOSIS — E11.49 TYPE 2 DIABETES MELLITUS WITH OTHER DIABETIC NEUROLOGICAL COMPLICATION (H): ICD-10-CM

## 2022-07-06 RX ORDER — METFORMIN HCL 500 MG
500 TABLET, EXTENDED RELEASE 24 HR ORAL
Qty: 90 TABLET | Refills: 0 | Status: SHIPPED | OUTPATIENT
Start: 2022-07-06 | End: 2022-08-01

## 2022-07-06 NOTE — TELEPHONE ENCOUNTER
"Requested Prescriptions   Pending Prescriptions Disp Refills     metFORMIN (GLUCOPHAGE XR) 500 MG 24 hr tablet 90 tablet 0       Biguanide Agents Failed - 7/5/2022  8:28 AM        Failed - Patient has documented A1c within the specified period of time.     If HgbA1C is 8 or greater, it needs to be on file within the past 3 months.  If less than 8, must be on file within the past 6 months.     Recent Labs   Lab Test 11/16/21  1105   A1C 8.7*             Passed - Patient is age 10 or older        Passed - Patient's CR is NOT>1.4 OR Patient's EGFR is NOT<45 within past 12 mos.     Recent Labs   Lab Test 09/22/21  1102 06/29/21  1223   GFRESTIMATED >60 >90   GFRESTBLACK  --  >90       Recent Labs   Lab Test 09/22/21  1102   CR 0.6             Passed - Patient does NOT have a diagnosis of CHF.        Passed - Medication is active on med list        Passed - Patient is not pregnant        Passed - Patient has not had a positive pregnancy test within the past 12 mos.         Passed - Recent (6 mo) or future (30 days) visit within the authorizing provider's specialty     Patient had office visit in the last 6 months or has a visit in the next 30 days with authorizing provider or within the authorizing provider's specialty.  See \"Patient Info\" tab in inbasket, or \"Choose Columns\" in Meds & Orders section of the refill encounter.               Routing refill request to provider for review/approval because:  Labs not current:  A1C    Joya Wolff RN  [facility]           "

## 2022-07-07 ENCOUNTER — TELEPHONE (OUTPATIENT)
Dept: FAMILY MEDICINE | Facility: CLINIC | Age: 82
End: 2022-07-07

## 2022-07-07 NOTE — TELEPHONE ENCOUNTER
Patient calling, requesting order for A1C recheck. Advised patient that per Sarahi Stafford CNP's last note she was due for a diabetes recheck in April 2022. Assisted patient in scheduling appointment with Annmarie Scott CNP for 7/25/22.     Kasey Proctor RN   [facility]

## 2022-07-19 ENCOUNTER — TELEPHONE (OUTPATIENT)
Dept: UROLOGY | Facility: CLINIC | Age: 82
End: 2022-07-19

## 2022-07-19 NOTE — TELEPHONE ENCOUNTER
"Prescriber Medication Action plan fax received stating \"Pt reports lack of efficacy with oxybutynin at this time. We discussed other covered options including tolterodine, trospium, solifenacin, and Myrbetriw. Please consider an alternative option such as Myrbetriq or Solifenacin.\"    My chart message sent to italo EMMANUEL RN Urology 7/19/2022 2:43 PM        "

## 2022-07-28 ENCOUNTER — OFFICE VISIT (OUTPATIENT)
Dept: OPHTHALMOLOGY | Facility: CLINIC | Age: 82
End: 2022-07-28
Payer: COMMERCIAL

## 2022-07-28 DIAGNOSIS — Z96.1 PSEUDOPHAKIA OF BOTH EYES: ICD-10-CM

## 2022-07-28 DIAGNOSIS — H43.813 POSTERIOR VITREOUS DETACHMENT OF BOTH EYES: ICD-10-CM

## 2022-07-28 DIAGNOSIS — H40.1131 PRIMARY OPEN ANGLE GLAUCOMA (POAG) OF BOTH EYES, MILD STAGE: Primary | ICD-10-CM

## 2022-07-28 DIAGNOSIS — H35.3131 EARLY DRY STAGE NONEXUDATIVE AGE-RELATED MACULAR DEGENERATION OF BOTH EYES: ICD-10-CM

## 2022-07-28 PROCEDURE — 92133 CPTRZD OPH DX IMG PST SGM ON: CPT | Performed by: STUDENT IN AN ORGANIZED HEALTH CARE EDUCATION/TRAINING PROGRAM

## 2022-07-28 PROCEDURE — 92083 EXTENDED VISUAL FIELD XM: CPT | Performed by: STUDENT IN AN ORGANIZED HEALTH CARE EDUCATION/TRAINING PROGRAM

## 2022-07-28 PROCEDURE — 92012 INTRM OPH EXAM EST PATIENT: CPT | Performed by: STUDENT IN AN ORGANIZED HEALTH CARE EDUCATION/TRAINING PROGRAM

## 2022-07-28 ASSESSMENT — REFRACTION_MANIFEST
OS_SPHERE: -0.75
OS_CYLINDER: +0.75
OD_CYLINDER: +1.25
OD_ADD: +1.50
OS_AXIS: 180
OS_ADD: +1.50
OD_SPHERE: -1.25
OD_AXIS: 175

## 2022-07-28 ASSESSMENT — VISUAL ACUITY
CORRECTION_TYPE: GLASSES
METHOD: SNELLEN - LINEAR
OD_CC: 20/50
OS_CC: 20/30
OD_CC: J2

## 2022-07-28 ASSESSMENT — REFRACTION_WEARINGRX
OD_SPHERE: +1.00
OD_AXIS: 180
OD_CYLINDER: +1.50
OS_SPHERE: -0.25
OS_AXIS: 180
SPECS_TYPE: SVL
OS_AXIS: 002
OD_CYLINDER: +1.50
OS_CYLINDER: +0.50
OS_CYLINDER: +0.50
OD_SPHERE: -1.00
OD_AXIS: 002
OS_SPHERE: +1.75

## 2022-07-28 ASSESSMENT — SLIT LAMP EXAM - LIDS
COMMENTS: 1+ MEIBOMIAN GLAND DYSFUNCTION
COMMENTS: 1+ MEIBOMIAN GLAND DYSFUNCTION

## 2022-07-28 ASSESSMENT — CUP TO DISC RATIO
OD_RATIO: 0.45
OS_RATIO: 0.5

## 2022-07-28 ASSESSMENT — TONOMETRY
IOP_METHOD: APPLANATION
OS_IOP_MMHG: 17
OD_IOP_MMHG: 13

## 2022-07-28 ASSESSMENT — EXTERNAL EXAM - RIGHT EYE: OD_EXAM: NORMAL

## 2022-07-28 ASSESSMENT — EXTERNAL EXAM - LEFT EYE: OS_EXAM: NORMAL

## 2022-07-28 NOTE — PATIENT INSTRUCTIONS
Continue Latanoprost (green top) at bedtime both eyes     Glasses prescription given     Arjun Valenzuela MD  [phone]

## 2022-07-28 NOTE — LETTER
7/28/2022         RE: Ludy Mckenzie  [address]  [place] [address]        Dear Colleague,    Thank you for referring your patient, Ludy Mckenzie, to the [facility]. Please see a copy of my visit note below.    Current Eye Medications:  Latanoprost 1 drop into both eyes at bedtime.    Subjective:  Here for Glaucoma follow up with testing.  She complains of not being able to see her TV with her computer glasses. While working on the computer she likes to watch TV as well.   Discussed different options and patient decided she wants a bifocal for distance computer.    AMERICO Fang  8:13 AM 07/28/2022    Objective:  See Ophthalmology Exam.      Assessment:  Ludy Mckenzie is a 81 year old female who presents with:   Encounter Diagnoses   Name Primary?     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, mild stage Yes    Intraocular pressure 13/17 today. Stable OCT and visual field. Continue same medication.     OCT optic nerve: avg retinal nerve fiber layer 88/92; within normal limits and stable both eyes.     [place]ose[place] visual field (HVF) 24-2: scattered loss both eyes      Early dry stage nonexudative age-related macular degeneration of both eyes      Pseudophakia with Yag Caps ou       Posterior vitreous detachment of both eyes        Plan:  Continue Latanoprost (green top) at bedtime both eyes     Glasses prescription given     Arjun Valenzuela MD  [phone]          Again, thank you for allowing me to participate in the care of your patient.        Sincerely,        Arjun Valenuzela MD

## 2022-07-28 NOTE — PROGRESS NOTES
Current Eye Medications:  Latanoprost 1 drop into both eyes at bedtime.    Subjective:  Here for Glaucoma follow up with testing.  She complains of not being able to see her TV with her computer glasses. While working on the computer she likes to watch TV as well.   Discussed different options and patient decided she wants a bifocal for distance computer.    AMERICO Fang  8:13 AM 07/28/2022    Objective:  See Ophthalmology Exam.      Assessment:  Ludy Mckenzie is a 81 year old female who presents with:   Encounter Diagnoses   Name Primary?     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, mild stage Yes    Intraocular pressure 13/17 today. Stable OCT and visual field. Continue same medication.     OCT optic nerve: avg retinal nerve fiber layer 88/92; within normal limits and stable both eyes.     [place] visual field (HVF) 24-2: scattered loss both eyes      Early dry stage nonexudative age-related macular degeneration of both eyes      Pseudophakia with Yag Caps ou       Posterior vitreous detachment of both eyes        Plan:  Continue Latanoprost (green top) at bedtime both eyes     Glasses prescription given     Arjun Valenzuela MD  [phone]

## 2022-08-01 ENCOUNTER — OFFICE VISIT (OUTPATIENT)
Dept: FAMILY MEDICINE | Facility: CLINIC | Age: 82
End: 2022-08-01
Payer: COMMERCIAL

## 2022-08-01 VITALS
RESPIRATION RATE: 14 BRPM | SYSTOLIC BLOOD PRESSURE: 90 MMHG | HEART RATE: 83 BPM | OXYGEN SATURATION: 99 % | TEMPERATURE: 97.9 F | DIASTOLIC BLOOD PRESSURE: 58 MMHG | WEIGHT: 122 LBS | HEIGHT: 65 IN | BODY MASS INDEX: 20.33 KG/M2

## 2022-08-01 DIAGNOSIS — Z78.0 ASYMPTOMATIC MENOPAUSAL STATE: ICD-10-CM

## 2022-08-01 DIAGNOSIS — E11.49 TYPE 2 DIABETES MELLITUS WITH OTHER DIABETIC NEUROLOGICAL COMPLICATION (H): ICD-10-CM

## 2022-08-01 DIAGNOSIS — E11.49 DIABETIC NEUROPATHY WITH NEUROLOGIC COMPLICATION (H): Primary | ICD-10-CM

## 2022-08-01 DIAGNOSIS — R26.81 UNSTEADY GAIT WHEN WALKING: ICD-10-CM

## 2022-08-01 DIAGNOSIS — E11.40 DIABETIC NEUROPATHY WITH NEUROLOGIC COMPLICATION (H): Primary | ICD-10-CM

## 2022-08-01 DIAGNOSIS — Z86.73 HISTORY OF STROKE: ICD-10-CM

## 2022-08-01 LAB
ANION GAP SERPL CALCULATED.3IONS-SCNC: 5 MMOL/L (ref 3–14)
BUN SERPL-MCNC: 20 MG/DL (ref 7–30)
CALCIUM SERPL-MCNC: 9 MG/DL (ref 8.5–10.1)
CHLORIDE BLD-SCNC: 104 MMOL/L (ref 94–109)
CO2 SERPL-SCNC: 27 MMOL/L (ref 20–32)
CREAT SERPL-MCNC: 0.58 MG/DL (ref 0.52–1.04)
CREAT UR-MCNC: 201 MG/DL
GFR SERPL CREATININE-BSD FRML MDRD: 90 ML/MIN/1.73M2
GLUCOSE BLD-MCNC: 148 MG/DL (ref 70–99)
HBA1C MFR BLD: 6.6 % (ref 0–5.6)
MICROALBUMIN UR-MCNC: 22 MG/L
MICROALBUMIN/CREAT UR: 10.95 MG/G CR (ref 0–25)
POTASSIUM BLD-SCNC: 4.1 MMOL/L (ref 3.4–5.3)
SODIUM SERPL-SCNC: 136 MMOL/L (ref 133–144)

## 2022-08-01 PROCEDURE — 82043 UR ALBUMIN QUANTITATIVE: CPT | Performed by: NURSE PRACTITIONER

## 2022-08-01 PROCEDURE — 83036 HEMOGLOBIN GLYCOSYLATED A1C: CPT | Performed by: NURSE PRACTITIONER

## 2022-08-01 PROCEDURE — 36415 COLL VENOUS BLD VENIPUNCTURE: CPT | Performed by: NURSE PRACTITIONER

## 2022-08-01 PROCEDURE — 99207 PR FOOT EXAM NO CHARGE: CPT | Performed by: NURSE PRACTITIONER

## 2022-08-01 PROCEDURE — 80048 BASIC METABOLIC PNL TOTAL CA: CPT | Performed by: NURSE PRACTITIONER

## 2022-08-01 PROCEDURE — 99214 OFFICE O/P EST MOD 30 MIN: CPT | Performed by: NURSE PRACTITIONER

## 2022-08-01 RX ORDER — METFORMIN HCL 500 MG
500 TABLET, EXTENDED RELEASE 24 HR ORAL
Qty: 90 TABLET | Refills: 0 | Status: SHIPPED | OUTPATIENT
Start: 2022-08-01 | End: 2023-01-01

## 2022-08-01 ASSESSMENT — PATIENT HEALTH QUESTIONNAIRE - PHQ9: SUM OF ALL RESPONSES TO PHQ QUESTIONS 1-9: 0

## 2022-08-01 NOTE — PATIENT INSTRUCTIONS
You may schedule at the desk on the 2nd floor to schedule follow up  with urology Dr. Greenwood.   Imaging Services- Scheduling Line [phone] to schedule your DEXA bone scan.   Patient Education

## 2022-08-01 NOTE — PROGRESS NOTES
Assessment & Plan     1. 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   - FOOT EXAM  - [facility] Adult Diabetes Educator Referral; Future  - metFORMIN (GLUCOPHAGE XR) 500 MG 24 hr tablet; Take 1 tablet (500 mg) by mouth daily (with dinner)  Dispense: 90 tablet; Refill: 0  - HEMOGLOBIN A1C  - BASIC METABOLIC PANEL  - Albumin Random Urine Quantitative with Creat Ratio    2. Diabetic neuropathy with neurologic complication (H)  3. Unsteady gait when walking  4. History of stroke  Patient notes gradually worsening difficulty with walking since her stroke in 2009. She reports more weakness in bilateral legs and stumbling the past year or two. States that she uses a cane or walker most of the time when she leaves her home.   - Physical Therapy Referral; Future    5. Asymptomatic menopausal state  - DX Hip/Pelvis/Spine; Future      Review of the result(s) of each unique test - a1c  Ordering of each unique test  Prescription drug management         Return in about 6 months (around 2/1/2023) for Annual wellness visit, diabetes check.    OCTAVIO Lester [facility]  2  Britni leiva is a 81 year old accompanied by her none, presenting for the following health issues:  Diabetes      HPI     Diabetes Follow-up      How often are you checking your blood sugar? Not at all.     What concerns do you have today about your diabetes? Patient needs help using her glucose monitor      Do you have any of these symptoms? (Select all that apply) Patient has neuropathy.     States that she has trouble with walking, uses a cane or walker most of the time. Started after her stroke several years ago, gradually worsening. Weakness in both legs.   BP Readings from Last 2 Encounters:   08/01/22 90/58   03/23/22 126/65     Hemoglobin A1C POCT (%)   Date Value   05/10/2021 7.8 (H)   01/04/2021 7.5 (H)  "    Hemoglobin A1C (%)   Date Value   08/01/2022 6.6 (H)   11/16/2021 8.7 (H)     LDL Cholesterol Calculated (mg/dL)   Date Value   01/04/2022 50   01/04/2021 72   09/13/2019 79       Review of Systems   Constitutional, HEENT, cardiovascular, pulmonary, GI, , musculoskeletal, neuro, skin, endocrine and psych systems are negative, except as otherwise noted.      Objective    BP 90/58   Pulse 83   Temp 97.9  F (36.6  C) (Oral)   Resp 14   Ht 1.651 m (5' 5\")   Wt 55.3 kg (122 lb)   SpO2 99%   Breastfeeding No   BMI 20.30 kg/m    Body mass index is 20.3 kg/m .  Physical Exam   GENERAL: healthy, alert and no distress  EYES: Eyes grossly normal to inspection, PERRL and conjunctivae and sclerae normal  RESP: lungs clear to auscultation - no rales, rhonchi or wheezes  CV: regular rate and rhythm, normal S1 S2, no S3 or S4, no murmur, click or rub, no peripheral edema and peripheral pulses strong  PSYCH: mentation appears normal, affect normal/bright  Diabetic foot exam: normal DP and PT pulses, no trophic changes or ulcerative lesions, normal sensory exam and normal monofilament exam, except for findings noted on diabetic foot graphical image.  Bilateral #9, 6, 4.                       .  ..  "

## 2022-08-19 ENCOUNTER — ANCILLARY PROCEDURE (OUTPATIENT)
Dept: BONE DENSITY | Facility: CLINIC | Age: 82
End: 2022-08-19
Attending: NURSE PRACTITIONER
Payer: COMMERCIAL

## 2022-08-19 DIAGNOSIS — Z78.0 ASYMPTOMATIC MENOPAUSAL STATE: ICD-10-CM

## 2022-08-19 PROCEDURE — 77085 DXA BONE DENSITY AXL VRT FX: CPT | Performed by: INTERNAL MEDICINE

## 2022-08-20 NOTE — RESULT ENCOUNTER NOTE
Ludy,    You have thin bones (osteoporosis). Make an appointment with your new provider to discuss treatment options.     We can continue to follow this by repeating the test in 2 to 3 years. I recommend taking calcium 1200 mg daily (split into 2 doses) and vitamin D 1000 units daily, exercising regularly, and preventing falls.      Beth Strickland MD

## 2022-08-30 ENCOUNTER — HOSPITAL ENCOUNTER (OUTPATIENT)
Dept: PHYSICAL THERAPY | Facility: CLINIC | Age: 82
Setting detail: THERAPIES SERIES
Discharge: HOME | End: 2022-08-30
Attending: NURSE PRACTITIONER
Payer: COMMERCIAL

## 2022-08-30 DIAGNOSIS — E11.49 DIABETIC NEUROPATHY WITH NEUROLOGIC COMPLICATION (H): ICD-10-CM

## 2022-08-30 DIAGNOSIS — E11.40 DIABETIC NEUROPATHY WITH NEUROLOGIC COMPLICATION (H): ICD-10-CM

## 2022-08-30 DIAGNOSIS — Z86.73 HISTORY OF STROKE: ICD-10-CM

## 2022-08-30 DIAGNOSIS — R26.81 UNSTEADY GAIT WHEN WALKING: ICD-10-CM

## 2022-08-30 PROCEDURE — 97161 PT EVAL LOW COMPLEX 20 MIN: CPT | Mod: GP

## 2022-08-30 PROCEDURE — 97110 THERAPEUTIC EXERCISES: CPT | Mod: GP

## 2022-08-30 NOTE — PROGRESS NOTES
"   08/30/22 1400   Quick Adds   Quick Adds Certification   Type of Visit Initial OP PT Evaluation   General Information   Start of Care Date 08/30/22   Referring Physician Annmarie Solorzano MD   Orders Evaluate and Treat as Indicated   Order Date 08/01/22   Medical Diagnosis Diabetic neuropathy with neurologic complication, unsteady gait when walking, hx of stroke   Onset of illness/injury or Date of Surgery 08/01/22  (order date; hx of stroke in 2009)   Precautions/Limitations fall precautions   Surgical/Medical history reviewed Yes   Pertinent history of current problem (include personal factors and/or comorbidities that impact the POC) Ludy comes with sister and states her balance is significantly off, easier to walk without shoes on than with shoes. Can walk better if looking down and watching feet. If out of the house, uses cane, if inside the house, furniture walks/wall walks. Would like to use her walker and feels more stable with it but is not strong enough to lift in/out of car for use outside of home. Per chart review: \"Patient notes gradually worsening difficulty with walking since her stroke in 2009. She reports more weakness in bilateral legs and stumbling the past year or two. States that she uses a cane or walker most of the time when she leaves her home.\"   Living environment Apartment/condo  (Lives by self, 1 level apartment)   Home/Community Accessibility Comments Able to use elevator to get up to apartment   Current Assistive Devices Standard Cane   ADL Devices Shower/Tub Grab Bar   Assistive Devices Comments Has a 4WW at home but only uses it to take the garbage out- would like to use it more often   Patient/Family Goals Statement Improve balance, strengthen legs   Fall Risk Screen   Fall screen completed by PT   Have you fallen 2 or more times in the past year? No   Have you fallen and had an injury in the past year? No   Timed Up and Go score (seconds) 12.63   Is patient a fall risk? Yes "   Fall screen comments Per DGI and other balance testing   Abuse Screen (yes response referral indicated)   Physical Signs of Abuse Present no   Pain   Patient currently in pain No   Cognitive Status Examination   Cognitive Comment Somewhat hard of hearing   Range of Motion (ROM)   ROM Comment Gross ROM screen of LEs WNL bilaterally   Strength   Strength Comments Gross LE strength screen 4/5 B   Transfer Skills   Transfer Comments Performs STS with use of UEs pushing off, and does not use forward weight shift when going up and down   Gait   Gait Comments Gait with wide KENNEY, limited knee and hip flexion during swing phase, slow speed, instabilities noted throughout. Walks with arms in extension and guarded at side.   Gait Special Tests   Gait Special Tests 25 FOOT TIMED WALK;DYNAMIC GAIT INDEX   Gait Special Tests 25 Foot Timed Walk   Seconds 14.72 seconds   Comments 0.52 m/s gait speed- without cane   Gait Special Tests Dynamic Gait Index   Score out of 24 11/24   Comments Without use of cane   Balance   Balance Comments Poor balance throughout, both static and dynamic. Cane does not provide improvements in balance.   Balance Special Tests   Balance Special Tests Timed up and go;Sit to stand reps   Balance Special Tests Timed Up and Go   Seconds 12.63 Seconds   Comments Without cane- uncontrolled descent into chair and mild instabilities with turn   Balance Special Tests Sit to Stand Reps in 30 Seconds   Reps in 30 seconds 9   Height standard chair   Comments used UEs to push off   Planned Therapy Interventions   Planned Therapy Interventions balance training;gait training;neuromuscular re-education;strengthening   Clinical Impression   Criteria for Skilled Therapeutic Interventions Met yes, treatment indicated   PT Diagnosis Impaired gait and ambulation, impaired balance, muscle weakness   Influenced by the following impairments Hx of stroke, impaired balance and muscle weakness, diabetic neuropathy   Functional  limitations due to impairments Increased risk for falls, decreased safety with daily tasks, decreased independence   Clinical Presentation Stable/Uncomplicated   Clinical Presentation Rationale Symptom report, clinical judgment   Clinical Decision Making (Complexity) Low complexity   Therapy Frequency 1 time/week   Predicted Duration of Therapy Intervention (days/wks) 90 days   Risk & Benefits of therapy have been explained Yes   Patient, Family & other staff in agreement with plan of care Yes   Clinical Impression Comments Ludy is a 81 year old female who presents to PT for evaluation. She demonstrates very unsteady gait and ambulation with poor static and dynamic balance reactions. She will benefit from skilled PT services targeting her impairments to reduce risk for falls and improve overall balance.   GOALS   PT Eval Goals 1;2;3   Goal 1   Goal Identifier DGI   Goal Description Ludy will score at least 19/24 on the DGI with or without AD in order to improve dynamic balance and safety.   Target Date 11/27/22   Goal 2   Goal Identifier 30 second STS   Goal Description Ludy will complete at least 12 STS with good form and posture in 30 seconds in order to improve LE strength and balance.   Target Date 11/27/22   Goal 3   Goal Identifier Gait speed   Goal Description Ludy will increase gait speed by 10% of initial speed without overt instabilities or LOB using an AD in order to improve stability with gait and decrease risk for falls.   Target Date 11/27/22   Total Evaluation Time   PT Eval, Low Complexity Minutes (25080) 32   Therapy Certification   Certification date from 08/30/22   Certification date to 11/27/22   Medical Diagnosis Diabetic neuropathy with neurologic complication, unsteady gait when walking, history of stroke   Certification I certify the need for these services furnished under this plan of treatment and while under my care.  (Physician co-signature of this document indicates review and  certification of the therapy plan).       Thank you for referring Ludy to outpatient physical therapy. Please do not hesitate to contact me with any questions via email at [email or web address].     Saroj Hoffmann PT, DPT  [employer]   [email or web address]

## 2022-08-30 NOTE — PROGRESS NOTES
[facility]                                                                                   OUTPATIENT PHYSICAL THERAPY FUNCTIONAL EVALUATION  PLAN OF TREATMENT FOR OUTPATIENT REHABILITATION  (COMPLETE FOR INITIAL CLAIMS ONLY)  Patient's Last Name, First Name, M.I.  YOB: 1940  Ludy Mckenzie     Provider's Name   [facility]   Medical Record No.  [id number]     Start of Care Date:  08/30/22   Onset Date:  08/01/22 (order date; hx of stroke in 2009)   Type:     _X__PT   ____OT  ____SLP Medical Diagnosis:  Diabetic neuropathy with neurologic complication, unsteady gait when walking, history of stroke     PT Diagnosis:  Impaired gait and ambulation, impaired balance, muscle weakness Visits from SOC:  1                              __________________________________________________________________________________  Plan of Treatment/Functional Goals:  balance training, gait training, neuromuscular re-education, strengthening           GOALS  DGI  Hamer will score at least 19/24 on the DGI with or without AD in order to improve dynamic balance and safety.  11/27/22    30 second STS  Hamer will complete at least 12 STS with good form and posture in 30 seconds in order to improve LE strength and balance.  11/27/22    Gait speed  Hamer will increase gait speed by 10% of initial speed without overt instabilities or LOB using an AD in order to improve stability with gait and decrease risk for falls.  11/27/22            Therapy Frequency:  1 time/week   Predicted Duration of Therapy Intervention:  90 days    RORO JORGENSEN, PT                                    I CERTIFY THE NEED FOR THESE SERVICES FURNISHED UNDER        THIS PLAN OF TREATMENT AND WHILE UNDER MY CARE .             Physician Signature               Date    X_____________________________________________________                       Certification Date From:  08/30/22   Certification Date To:  11/27/22    Referring Provider:  Annmarie Solorzano MD    Initial Assessment  See Epic Evaluation- Start of Care Date: 08/30/22

## 2022-09-06 DIAGNOSIS — F33.0 MAJOR DEPRESSIVE DISORDER, RECURRENT EPISODE, MILD (H): ICD-10-CM

## 2022-09-10 ENCOUNTER — MYC MEDICAL ADVICE (OUTPATIENT)
Dept: OPHTHALMOLOGY | Facility: CLINIC | Age: 82
End: 2022-09-10

## 2022-09-22 ENCOUNTER — OFFICE VISIT (OUTPATIENT)
Dept: NURSING | Facility: CLINIC | Age: 82
End: 2022-09-22
Payer: COMMERCIAL

## 2022-09-22 ENCOUNTER — OFFICE VISIT (OUTPATIENT)
Dept: PULMONOLOGY | Facility: CLINIC | Age: 82
End: 2022-09-22
Payer: COMMERCIAL

## 2022-09-22 VITALS
DIASTOLIC BLOOD PRESSURE: 81 MMHG | WEIGHT: 122 LBS | SYSTOLIC BLOOD PRESSURE: 124 MMHG | BODY MASS INDEX: 20.3 KG/M2 | OXYGEN SATURATION: 96 % | HEART RATE: 81 BPM

## 2022-09-22 VITALS — WEIGHT: 123.8 LBS | OXYGEN SATURATION: 97 % | BODY MASS INDEX: 20.6 KG/M2 | HEART RATE: 87 BPM

## 2022-09-22 DIAGNOSIS — J44.9 CHRONIC OBSTRUCTIVE PULMONARY DISEASE, UNSPECIFIED COPD TYPE (H): ICD-10-CM

## 2022-09-22 DIAGNOSIS — J43.2 CENTRILOBULAR EMPHYSEMA (H): ICD-10-CM

## 2022-09-22 PROCEDURE — 94375 RESPIRATORY FLOW VOLUME LOOP: CPT | Performed by: INTERNAL MEDICINE

## 2022-09-22 PROCEDURE — 99215 OFFICE O/P EST HI 40 MIN: CPT | Mod: 25 | Performed by: INTERNAL MEDICINE

## 2022-09-22 PROCEDURE — 94729 DIFFUSING CAPACITY: CPT | Performed by: INTERNAL MEDICINE

## 2022-09-22 RX ORDER — ALBUTEROL SULFATE 1.25 MG/3ML
1.25 SOLUTION RESPIRATORY (INHALATION) EVERY 6 HOURS PRN
Qty: 90 ML | Refills: 11 | Status: SHIPPED | OUTPATIENT
Start: 2022-09-22 | End: 2023-01-01

## 2022-09-22 ASSESSMENT — PAIN SCALES - GENERAL: PAINLEVEL: NO PAIN (0)
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
## 2022-09-22 NOTE — NURSING NOTE
Ludy Mckenzie's goals for this visit include: NO  She requests these members of her care team be copied on today's visit information: YES    PCP: Aida Crowder    Referring Provider:  No referring provider defined for this encounter.    /81 (BP Location: Left arm, Patient Position: Sitting, Cuff Size: Adult Regular)   Pulse 81   Wt 55.3 kg (122 lb)   SpO2 96%   BMI 20.30 kg/m      Do you need any medication refills at today's visit? YES    Efrain Gusman, EMT

## 2022-09-23 ENCOUNTER — THERAPY VISIT (OUTPATIENT)
Dept: PHYSICAL THERAPY | Facility: CLINIC | Age: 82
End: 2022-09-23
Payer: COMMERCIAL

## 2022-09-23 DIAGNOSIS — R26.9 ABNORMAL GAIT: ICD-10-CM

## 2022-09-23 DIAGNOSIS — Z86.73 HISTORY OF STROKE: ICD-10-CM

## 2022-09-23 DIAGNOSIS — R26.81 UNSTEADY GAIT WHEN WALKING: ICD-10-CM

## 2022-09-23 LAB
DLCOUNC-%PRED-PRE: 54 %
DLCOUNC-PRE: 10.83 ML/MIN/MMHG
DLCOUNC-PRED: 19.86 ML/MIN/MMHG
ERV-%PRED-PRE: 83 %
ERV-PRE: 0.73 L
ERV-PRED: 0.87 L
EXPTIME-PRE: 9.43 SEC
FEF2575-%PRED-PRE: 33 %
FEF2575-PRE: 0.53 L/SEC
FEF2575-PRED: 1.59 L/SEC
FEFMAX-%PRED-PRE: 60 %
FEFMAX-PRE: 3.01 L/SEC
FEFMAX-PRED: 4.97 L/SEC
FEV1-%PRED-PRE: 66 %
FEV1-PRE: 1.35 L
FEV1FEV6-PRE: 47 %
FEV1FEV6-PRED: 77 %
FEV1FVC-PRE: 43 %
FEV1FVC-PRED: 77 %
FEV1SVC-PRE: 45 %
FEV1SVC-PRED: 66 %
FIFMAX-PRE: 3.71 L/SEC
FVC-%PRED-PRE: 116 %
FVC-PRE: 3.12 L
FVC-PRED: 2.67 L
IC-%PRED-PRE: 102 %
IC-PRE: 2.24 L
IC-PRED: 2.18 L
VA-%PRED-PRE: 92 %
VA-PRE: 4.59 L
VC-%PRED-PRE: 97 %
VC-PRE: 2.97 L
VC-PRED: 3.05 L

## 2022-09-23 PROCEDURE — 97161 PT EVAL LOW COMPLEX 20 MIN: CPT | Mod: GP | Performed by: PHYSICAL THERAPIST

## 2022-09-23 PROCEDURE — 97112 NEUROMUSCULAR REEDUCATION: CPT | Mod: GP | Performed by: PHYSICAL THERAPIST

## 2022-09-23 NOTE — PROGRESS NOTES
[facility] Physical Therapy Initial Evaluation  9/23/2022     Patient's Name: Ludy Mckenzie  Referring Provider: Aida Crowder  Visit Diagnosis:   1. Unsteady gait when walking    2. History of stroke      Payor: [facility] / Plan: [employer] MEDICARE / Product Type: HMO /     Precautions/Restrictions/MD instructions: 9/16/22 evaluate and treat - was edilma'd at [facility], but due to difference in pay structure (hospital base vs free standing) eval'd today using Dr. Strickland's order.    Therapist ASSESSMENT  Ludy Mckenzie is a 81 year old female patient presenting to Physical Therapy with gait abnormalities and balance deficits in the setting of stroke in 2009, diabetic neuropathy, and deconditioning. Patient demonstrates decreased LE strength and balance consistent with fall risk.. These impairments limit their ability to ambulate in the home and community safely, dress, care for self. Skilled PT services are necessary in order to reduce impairments and improve independent function.      SUBJECTIVE   Ludy Mckenzie is a 81 year old female who presents to outpatient physical therapy for evaluation. Her symptoms consist of:  1. Gait abnormalities and balance difficulties      Patient Comments (HPI): She reports that her symptoms began several years ago. Has a hx of stroke in 2009 but no deficits. She has diabetic neuropathy in her toes. Notices her balance and strength have gotten worse and worse in the last several years. She lives in a [address]+ apartment and uses the elevator. Uses a cane in the community and 4WW at home; difficult to get the walker out in the community via car.    She denies red flags, including none.  She reports the following red flags: none.    General health as reported by patient: good.    PMH/Review of Systems: Osteoporosis. I briefly reviewed the review of systems as noted on the health history form.  I am only responding to those symptoms which are directly relevant the specific  indication for my consultation. I recommended the patient follow up with their primary care referring provider to pursue any other symptoms which may be of concern.     Surgical history/trauma: See EMR. She denies any significant current illness or recent hospital admissions. She denies any regional implanted devices.  Imaging:  Results for orders placed or performed in visit on 22   DX Hip/Pelvis/Spine w Lat Fraction Sonya    Narrative    BONE DENSITOMETRY  [facility]  [address] [place] [id number]  2022      PATIENT: Ludy Mckenzie  CHART: [id number]   :  1940  AGE:  81 year old  SEX:  female   REFERRING PROVIDER:  Annmarie Solorzano CNP     PROCEDURE:  Bone density scanning was performed using DXA technology of   the lumbar spine and hip.  Scanning was performed on a Lunar Prodigy   scanner.  Reporting is completed in the form of a T-score.  The T-score   represents the standard deviation from peak bone mass based on a young   healthy adult.     REFERENCE T-SCORES:       Normal                -1.0 and greater                                 Osteopenia         Between -1.0 and -2.5                                             Osteoporosis     -2.5 and less                                       RISK FACTORS:  Post-menopausal, Parent history of osteoporosis with a hip   fracture, long term corticosteroid treatment, follow up osteoporosis    CURRENT TREATMENT:  Vitamin D     FINDINGS:               Lumbar Spine (L1-L2)      T-score:  -2.4, marked degenerative   changes present at L3,4, so only L1,2 are evaluated.                Left Femoral Neck            T-score:  -3.0               Right Femoral Neck          T-score:  -3.0                              Lumbar (L1-L2) BMD: 0.882   Previous: 0.733                             Total Hip Mean BMD: 0.643    Previous: 0.696    Comparison is made to another DXA performed on a different [employer] machine   on 2014.    LATERAL  "VERTEBRAL ASSESSMENT  Procedure:  Vertebral fracture assessment was performed in the lateral   decubitus position using a Lunar Prodigy  densitometer.  Indications for VFA: T-score of -1.0 or worse, age (female>69) and history   of vertebral fracture  Confounding factors for VFA: None.  The LVA scan is interpretable from T7   to L5.    VFA Findings: Using the semi-quantitative analysis of Harriet there was   evidence of no spinal deformity  VFA Impression: Ludy Mckenzie has no vertebral fractures identified on   the VFA.       IMPRESSION  Osteoporosis., Degenerative changes of the lumbar spine which may falsely   elevate results.    Comparisons from different scanners that have not been cross calibrated,   are not necessarily valid. Such a comparison has been performed here; one   should interpret with caution.   There has been probable significant increase in bone density of the lumbar   spine. There has been probable significant decrease in bone density of the   hip(s).     Recommendations include ensuring adequate Calcium and Vitamin D.    The current NOF Guidelines recommend treatment for patients with prior hip   or vertebral fracture, T-score -2.5 or below, or 10 year risk of any major   osteoporotic fracture 20% or greater, or 10 year risk of hip fracture 3%   or greater as calculated using the FRAX calculator (www.shef.ac.uk/FRAX or   you can google \"FRAX\").      Based on these guidelines, treatment (in addition to calcium and vitamin   D) is recommended for this patient, after ruling out other causes of   osteoporosis.  This is meant as an aid to clinical decision making; one must still use   clinical judgement.    Follow up can be considered in 2 years.   ___________________  Aubrie Alejo M.D.  Electronically signed             *Note: Due to a large number of results and/or encounters for the requested time period, some results have not been displayed. A complete set of results can be found in Results " Review.     Medications: See EMR    PERTINENT MEDICAL / SURGICAL HISTORY:   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)     Primary open angle glaucoma (POAG)     Chronic obstructive pulmonary disease, unspecified COPD type (H)     Chronic kidney disease (CKD) stage G1/A2, glomerular filtration rate (GFR) equal to or greater than 90 mL/min/1.73 square meter and albuminuria creatinine ratio between  mg/g     Loss of balance     Diabetic neuropathy with neurologic complication (H)     Pseudophakia of both eyes     Urge incontinence of urine     Cramp of limb     Right sided sciatica     Pulmonary emphysema, unspecified emphysema type (H)     Age-related osteoporosis without current pathological fracture     Pulmonary nodules     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)     History of colonic polyps     2019 novel coronavirus disease (COVID-19)     Past Surgical History:   Procedure Laterality Date     BIOPSY       BUNIONECTOMY  1960    JACQUELINE     CATARACT IOL, RT/LT  2010    right     COLONOSCOPY  2/22/2013    Procedure: COLONOSCOPY;  Colonoscopy ;  Surgeon: Hugo Minor MD;  Location:  GI     COLONOSCOPY N/A 11/11/2019    Procedure: Colonoscopy, With Polypectomy And Biopsy;  Surgeon: Selena Escobedo MD;  Location:  OR     COLONOSCOPY N/A 12/2/2021    Procedure: COLONOSCOPY, WITH POLYPECTOMY AND BIOPSY;  Surgeon: Shai Leal MD;  Location:  GI     COLONOSCOPY WITH CO2 INSUFFLATION N/A 11/11/2019    Procedure: COLONOSCOPY, WITH CO2 INSUFFLATION;  Surgeon: Selena Escobedo MD;  Location:  OR       Occupation: retired  Possible barriers impacting outcomes: low  baseline physical ability    Patient Self-identified Goal: Ludy Mckenzie would like to strengthen her legs and improve her balance.        OBJECTIVE  Observation: forward head, rounded shoulders and increased thoracic kyphosis  Gait: shuffling, crouched at hips, cautious, decreased lumbopelvic dissociation, decreased arm swing and mid guard ambulation, tends to weight shift posteriorly (diabetic neuropathy in toes). Uses SPC  Transfers: sit to stand with bilateral UE assist  Screening (regional interdependence): not assessed  Range of Motion: WFL for transfers/gait observed today  Neuro Screen:    Myotomes: L3-S1 intact but hip flexion 4/5   Dermatomes: not assessed   Reflexes: not tested  Other: n/a  Strength: see myotomes/functional strength  WFL for transfers and gait observed today  Functional movement: Balance: 30s Sit to Stand test No reps without UE assist and coaching.   Timed up and go: 14s SPC, 11s no gait aid but minor LOB for which pt corrected       ASSESSMENT/PLAN  Patient is a 81 year old female with mobility and safety, balance complaints.    Patient has the following significant findings with corresponding treatment plan.                Diagnosis 1:  Abnormal gait and fall risk    Decreased strength - therapeutic exercise, therapeutic activities and home program  Impaired balance - neuro re-education, therapeutic activities and home program  Decreased proprioception - neuro re-education, gait training, therapeutic activities and home program  Impaired gait - gait training, assistive devices and home program    Therapy Evaluation Codes:   Cumulative Therapy Evaluation is: Low complexity.   Previous and current functional limitations:  (See Goal Flow Sheet for this information)    Short term and Long term goals: (See Goal Flow Sheet for this information)     Communication ability:  Patient appears to be able to clearly communicate and understand verbal and written communication and follow directions  correctly.    Treatment Explanation - The following has been discussed with the patient:   RX ordered/plan of care  Anticipated outcomes  Possible risks and side effects    This patient would benefit from PT intervention to resume normal activities.   Rehab potential is excellent.    Frequency:  1 X week, once daily  Duration:  for 3 months  Discharge Plan:  Achieve all LTG.  Independent in home treatment program.  Reach maximal therapeutic benefit.    Please refer to the daily flowsheet for treatment today, total treatment time and time spent performing 1:1 timed codes.     *Text entry may be via Dragon Dictation software in real-time. Efforts are made to edit for clarity.     Renee Humphrey, PT, DPT, [facility], [place]

## 2022-09-23 NOTE — PROGRESS NOTES
[facility]    OUTPATIENT Physical Therapy ORTHOPEDIC EVALUATION  PLAN OF TREATMENT FOR OUTPATIENT REHABILITATION  (COMPLETE FOR INITIAL CLAIMS ONLY)  Patient's Last Name, First Name, M.I.  YOB: 1940  Ludy Mckenzie  JESSICA    Provider s Name:  [facility]   Medical Record No.  [id number]   Start of Care Date:  09/23/22   Onset Date:   09/16/22 (provider referral)   Type:     _X__PT   ___OT Medical Diagnosis:    Encounter Diagnoses   Name Primary?    Unsteady gait when walking     History of stroke         Treatment Diagnosis:  mobility deficits        Goals:     09/23/22 0500   Body Part   Goals listed below are for mobility/balance   Goal #1   Goal #1 balance   Previous Functional Level Number of near falls or episodes of unsteadiness per day   Performance level >5   Current Functional Level Timed Up and Go test score   Performance level 14s   STG Target Performance Increase   Performance level DGI score to 14 /24   Rationale for safe community ambulation   Due date 10/14/22   LTG Target Performance Increase   Performance Level DGI Score to >18/24   Rationale for safe community ambulation   Due date 12/16/22       Therapy Frequency:  1x/week  Predicted Duration of Therapy Intervention:  12 weeks    ANDRES COWAN, PT                 I CERTIFY THE NEED FOR THESE SERVICES FURNISHED UNDER        THIS PLAN OF TREATMENT AND WHILE UNDER MY CARE     (Physician attestation of this document indicates review and certification of the therapy plan).                     Certification Date From:  09/23/22   Certification Date To:  12/16/22    Referring Provider:  Aida Crowder    Initial Assessment        See Epic Evaluation SOC Date: 09/23/22

## 2022-09-26 DIAGNOSIS — E78.2 MIXED HYPERLIPIDEMIA: ICD-10-CM

## 2022-09-26 DIAGNOSIS — R80.9 MICROALBUMINURIA: ICD-10-CM

## 2022-09-26 DIAGNOSIS — I63.9 CEREBRAL INFARCTION, UNSPECIFIED MECHANISM (H): ICD-10-CM

## 2022-09-26 DIAGNOSIS — E87.6 HYPOPOTASSEMIA: ICD-10-CM

## 2022-09-26 DIAGNOSIS — E11.49 TYPE 2 DIABETES MELLITUS WITH OTHER DIABETIC NEUROLOGICAL COMPLICATION (H): ICD-10-CM

## 2022-09-27 ENCOUNTER — TELEPHONE (OUTPATIENT)
Dept: FAMILY MEDICINE | Facility: CLINIC | Age: 82
End: 2022-09-27

## 2022-09-27 DIAGNOSIS — E11.49 TYPE 2 DIABETES MELLITUS WITH OTHER DIABETIC NEUROLOGICAL COMPLICATION (H): ICD-10-CM

## 2022-09-27 DIAGNOSIS — E87.6 HYPOPOTASSEMIA: ICD-10-CM

## 2022-09-27 DIAGNOSIS — E78.2 MIXED HYPERLIPIDEMIA: ICD-10-CM

## 2022-09-27 DIAGNOSIS — R80.9 MICROALBUMINURIA: ICD-10-CM

## 2022-09-27 DIAGNOSIS — I63.9 CEREBRAL INFARCTION, UNSPECIFIED MECHANISM (H): ICD-10-CM

## 2022-09-28 NOTE — TELEPHONE ENCOUNTER
Spoke with pharmacy staff. Patient requesting lancets instead of strips. Lancets order placed. Pharmacy to call patient.      Lakeshia SIDHU  [facility]

## 2022-09-28 NOTE — TELEPHONE ENCOUNTER
Please call pharmacy to ask what they need.     Last sent to requesting pharmacy on 9/26/22.     Magdalene Mohan RN BSN  [facility]

## 2022-09-30 ENCOUNTER — OFFICE VISIT (OUTPATIENT)
Dept: FAMILY MEDICINE | Facility: CLINIC | Age: 82
End: 2022-09-30
Payer: COMMERCIAL

## 2022-09-30 VITALS
SYSTOLIC BLOOD PRESSURE: 124 MMHG | HEART RATE: 81 BPM | HEIGHT: 65 IN | TEMPERATURE: 98.2 F | OXYGEN SATURATION: 95 % | RESPIRATION RATE: 18 BRPM | BODY MASS INDEX: 21.13 KG/M2 | DIASTOLIC BLOOD PRESSURE: 60 MMHG | WEIGHT: 126.8 LBS

## 2022-09-30 DIAGNOSIS — R30.0 DYSURIA: ICD-10-CM

## 2022-09-30 DIAGNOSIS — E11.49 TYPE 2 DIABETES MELLITUS WITH OTHER DIABETIC NEUROLOGICAL COMPLICATION (H): ICD-10-CM

## 2022-09-30 DIAGNOSIS — R30.0 BURNING WITH URINATION: ICD-10-CM

## 2022-09-30 DIAGNOSIS — M54.50 ACUTE RIGHT-SIDED LOW BACK PAIN WITHOUT SCIATICA: Primary | ICD-10-CM

## 2022-09-30 LAB
ALBUMIN UR-MCNC: NEGATIVE MG/DL
ANION GAP SERPL CALCULATED.3IONS-SCNC: 8 MMOL/L (ref 3–14)
APPEARANCE UR: CLEAR
BACTERIA #/AREA URNS HPF: ABNORMAL /HPF
BILIRUB UR QL STRIP: NEGATIVE
BUN SERPL-MCNC: 20 MG/DL (ref 7–30)
CALCIUM SERPL-MCNC: 8.9 MG/DL (ref 8.5–10.1)
CHLORIDE BLD-SCNC: 108 MMOL/L (ref 94–109)
CO2 SERPL-SCNC: 21 MMOL/L (ref 20–32)
COLOR UR AUTO: YELLOW
CREAT SERPL-MCNC: 0.55 MG/DL (ref 0.52–1.04)
CREAT UR-MCNC: 90 MG/DL
ERYTHROCYTE [DISTWIDTH] IN BLOOD BY AUTOMATED COUNT: 12.6 % (ref 10–15)
GFR SERPL CREATININE-BSD FRML MDRD: >90 ML/MIN/1.73M2
GLUCOSE BLD-MCNC: 150 MG/DL (ref 70–99)
GLUCOSE UR STRIP-MCNC: NEGATIVE MG/DL
HCT VFR BLD AUTO: 35.1 % (ref 35–47)
HGB BLD-MCNC: 11.7 G/DL (ref 11.7–15.7)
HGB UR QL STRIP: ABNORMAL
KETONES UR STRIP-MCNC: NEGATIVE MG/DL
LEUKOCYTE ESTERASE UR QL STRIP: ABNORMAL
MCH RBC QN AUTO: 29.7 PG (ref 26.5–33)
MCHC RBC AUTO-ENTMCNC: 33.3 G/DL (ref 31.5–36.5)
MCV RBC AUTO: 89 FL (ref 78–100)
MICROALBUMIN UR-MCNC: 33 MG/L
MICROALBUMIN/CREAT UR: 36.67 MG/G CR (ref 0–25)
NITRATE UR QL: NEGATIVE
PH UR STRIP: 7.5 [PH] (ref 5–7)
PLATELET # BLD AUTO: 243 10E3/UL (ref 150–450)
POTASSIUM BLD-SCNC: 4 MMOL/L (ref 3.4–5.3)
RBC # BLD AUTO: 3.94 10E6/UL (ref 3.8–5.2)
RBC #/AREA URNS AUTO: ABNORMAL /HPF
SODIUM SERPL-SCNC: 137 MMOL/L (ref 133–144)
SP GR UR STRIP: 1.02 (ref 1–1.03)
SQUAMOUS #/AREA URNS AUTO: ABNORMAL /LPF
UROBILINOGEN UR STRIP-ACNC: 0.2 E.U./DL
WBC # BLD AUTO: 7.7 10E3/UL (ref 4–11)
WBC #/AREA URNS AUTO: ABNORMAL /HPF

## 2022-09-30 PROCEDURE — 80048 BASIC METABOLIC PNL TOTAL CA: CPT | Performed by: FAMILY MEDICINE

## 2022-09-30 PROCEDURE — 85027 COMPLETE CBC AUTOMATED: CPT | Performed by: FAMILY MEDICINE

## 2022-09-30 PROCEDURE — 82043 UR ALBUMIN QUANTITATIVE: CPT | Performed by: FAMILY MEDICINE

## 2022-09-30 PROCEDURE — 36415 COLL VENOUS BLD VENIPUNCTURE: CPT | Performed by: FAMILY MEDICINE

## 2022-09-30 PROCEDURE — 99214 OFFICE O/P EST MOD 30 MIN: CPT | Performed by: FAMILY MEDICINE

## 2022-09-30 PROCEDURE — 91313 COVID-19,PF,MODERNA BIVALENT: CPT | Performed by: FAMILY MEDICINE

## 2022-09-30 PROCEDURE — 0134A COVID-19,PF,MODERNA BIVALENT: CPT | Performed by: FAMILY MEDICINE

## 2022-09-30 PROCEDURE — 81001 URINALYSIS AUTO W/SCOPE: CPT | Performed by: FAMILY MEDICINE

## 2022-09-30 ASSESSMENT — PAIN SCALES - GENERAL: PAINLEVEL: WORST PAIN (10)

## 2022-09-30 NOTE — NURSING NOTE
Prior to immunization administration, verified patients identity using patient s name and date of birth. Please see Immunization Activity for additional information.     Screening Questionnaire for Adult Immunization    Are you sick today?   No   Do you have allergies to medications, food, a vaccine component or latex?   No   Have you ever had a serious reaction after receiving a vaccination?   No   Do you have a long-term health problem with heart, lung, kidney, or metabolic disease (e.g., diabetes), asthma, a blood disorder, no spleen, complement component deficiency, a cochlear implant, or a spinal fluid leak?  Are you on long-term aspirin therapy?   No   Do you have cancer, leukemia, HIV/AIDS, or any other immune system problem?   No   Do you have a parent, brother, or sister with an immune system problem?   No   In the past 3 months, have you taken medications that affect  your immune system, such as prednisone, other steroids, or anticancer drugs; drugs for the treatment of rheumatoid arthritis, Crohn s disease, or psoriasis; or have you had radiation treatments?   No   Have you had a seizure, or a brain or other nervous system problem?   No   During the past year, have you received a transfusion of blood or blood    products, or been given immune (gamma) globulin or antiviral drug?   No   For women: Are you pregnant or is there a chance you could become       pregnant during the next month?   No   Have you received any vaccinations in the past 4 weeks?   No     Immunization questionnaire answers were all negative.        Per orders of Dr. Mccollum, injection of Bivalent Moderna given by Lisa Daniels MA. Patient instructed to remain in clinic for 15 minutes afterwards, and to report any adverse reaction to me immediately.       Screening performed by Lisa Daniels MA on 9/30/2022 at 9:26 AM.

## 2022-09-30 NOTE — PATIENT INSTRUCTIONS
At [facility], we strive to deliver an exceptional experience to you, every time we see you. If you receive a survey, please complete it as we do value your feedback.  If you have MyChart, you can expect to receive results automatically within 24 hours of their completion.  Your provider will send a note interpreting your results as well.   If you do not have MyChart, you should receive your results in about a week by mail.    Your care team:                            Family Medicine Internal Medicine   MD Zana Dickson MD Shantel Branch-Fleming, MD Srinivasa Vaka, MD Katya Belousova, OCTAVIO King CNP, MD ([facility]   Param Bates, MD Caridad Dow MD Amelia Massimini APRN CNP Kim Thein, APRN CNP Bethany Templen, MD             Clinic hours: Monday - Thursday 7 am-6 pm; Fridays 7 am-5 pm.   Urgent care: Monday - Friday 10 am- 8 pm; Saturday and Sunday 9 am-5 pm.    Clinic: [phone]       [facility]: Monday - Thursday 8 am - 7 pm; Friday 8 am - 6 pm  [facility]: [phone]

## 2022-09-30 NOTE — PROGRESS NOTES
Assessment & Plan     Acute right-sided low back pain without sciatica  -Right low back pain for 3 days.  Bothersome with position change.  Not related to urination.  -Ibuprofen and topical gel makes pain feel better.  -Encouraged to try Tylenol/ibuprofen with warm compresses.   -Likely musculoskeletal, holding imaging for now.    -Follow-up in a week to reassess pain.  If still having pain, will plan for CT scan to rule out kidney stones since she has associated urinary issues as well.      Burning with urination  -History of burning urination for several months.  No obvious hematuria noted.  -She has history of recurrent UTIs, following with urology.  -Previous results shows E. coli, Proteus sensitive to ciprofloxacin, cephalosporins-resistant to nitrofurantoin.  -CT abdomen pelvis without contrast from July 2021 is normal.  -Urologist recommended Estrace cream to help with recurrent UTI,Ludy not on them anymore.  -We will check CBC, BMP, urinalysis with culture and prescribe antibiotics if necessary.  -Right low back pain looks musculoskeletal as opposed to nephrolithiasis/  Pyelonephritis.  Follow-up CBC and urinalysis to decide on imaging.  -Encouraged hydration at least 1 to 1-1/2 L of water a day.    - UA Macro with Reflex to Micro and Culture - lab collect; Future  - CBC with platelets; Future  - Basic metabolic panel  (Ca, Cl, CO2, Creat, Gluc, K, Na, BUN); Future      Return in about 1 week (around 10/7/2022), or if symptoms worsen or fail to improve, for Follow up.    Luis Miguel Yanes MD  [facility] SHALONDA Boston is a 82 year old , presenting for the following health issues:  UTI  Right lower back pain for 3 days , ibuprofen helps   -Pain ok when sitting straight, hurts when changing position  -Burning urination for more than 2 months, no fever  -not drinking enough water.      History of Present Illness       Back Pain:  She presents for follow up of  back pain. Patient's back pain is a new problem.    Original cause of back pain: not sure  First noticed back pain: in the last week  Patient feels back pain: comes and goesLocation of back pain:  Right lower back  Description of back pain: sharp  Back pain spreads: right buttocks    Since patient first noticed back pain, pain is: unchanged  Does back pain interfere with her job:  Not applicable  On a scale of 1-10 (10 being the worst), patient describes pain as:  10  What makes back pain worse: other  Acupuncture: not tried  Acetaminophen: not tried  Activity or exercise: not tried  Chiropractor:  Not tried  Cold: not tried  Heat: not tried  Massage: not helpful  Muscle relaxants: not tried  NSAIDS: not tried  Opioids: not tried  Physical Therapy: not tried  Rest: helpful  Steroid Injection: not tried  Stretching: not tried  Surgery: not tried  TENS unit: not tried  Topical pain relievers: helpful  Other healthcare providers patient is seeing for back pain: None    She eats 2-3 servings of fruits and vegetables daily.She consumes 0 sweetened beverage(s) daily.She exercises with enough effort to increase her heart rate 9 or less minutes per day.    She is taking medications regularly.       Genitourinary - Female  Onset/Duration: A couple days ago  Description:   Painful urination (Dysuria): No           Frequency: YES  Blood in urine (Hematuria): No  Delay in urine (Hesitency): No  Intensity: moderate  Progression of Symptoms:  same  Accompanying Signs & Symptoms:  Fever/chills: No  Flank pain: YES  Nausea and vomiting: No  Vaginal symptoms: none  Abdominal/Pelvic Pain: No  History:   History of frequent UTI s: YES  History of kidney stones: No  Sexually Active: No  Possibility of pregnancy: No  Precipitating or alleviating factors: None  Therapies tried and outcome:  Tries to to drink a lot of water.         Review of Systems   Constitutional, HEENT, cardiovascular, pulmonary, gi and gu systems are negative,  except as otherwise noted.      Objective    There were no vitals taken for this visit.  There is no height or weight on file to calculate BMI.  Physical Exam   GENERAL: healthy, alert and no distress  NECK: no adenopathy, no asymmetry, masses, or scars and thyroid normal to palpation  RESP: lungs clear to auscultation - no rales, rhonchi or wheezes  CV: regular rate and rhythm, normal S1 S2, no S3 or S4, no murmur, click or rub, no peripheral edema and peripheral pulses strong  ABDOMEN: soft, nontender, no hepatosplenomegaly, no masses and bowel sounds normal  MS: no gross musculoskeletal defects noted, no edema.  Right low back pain with changing posture and bending down.  Does not hurt when sitting still.

## 2022-10-03 ENCOUNTER — TELEPHONE (OUTPATIENT)
Dept: FAMILY MEDICINE | Facility: CLINIC | Age: 82
End: 2022-10-03

## 2022-10-03 ENCOUNTER — MYC MEDICAL ADVICE (OUTPATIENT)
Dept: FAMILY MEDICINE | Facility: CLINIC | Age: 82
End: 2022-10-03

## 2022-10-03 ENCOUNTER — TELEPHONE (OUTPATIENT)
Dept: UROLOGY | Facility: CLINIC | Age: 82
End: 2022-10-03

## 2022-10-03 DIAGNOSIS — N30.00 ACUTE CYSTITIS WITHOUT HEMATURIA: Primary | ICD-10-CM

## 2022-10-03 PROBLEM — U07.1 2019 NOVEL CORONAVIRUS DISEASE (COVID-19): Status: RESOLVED | Noted: 2021-03-25 | Resolved: 2022-10-03

## 2022-10-03 RX ORDER — SULFAMETHOXAZOLE/TRIMETHOPRIM 800-160 MG
1 TABLET ORAL 2 TIMES DAILY
Qty: 6 TABLET | Refills: 0 | Status: SHIPPED | OUTPATIENT
Start: 2022-10-03 | End: 2022-10-06

## 2022-10-03 NOTE — RESULT ENCOUNTER NOTE
Ludy,    You continue to have a small amount of protein in your urine due to Chronic Kidney Disease.     Beth Strickland MD

## 2022-10-03 NOTE — TELEPHONE ENCOUNTER
[facility] Call Center    Phone Message    May a detailed message be left on voicemail: yes     Reason for Call: The patients niece called regarding the patients urine testing. She works in a urology clinic and ran the patients results by a urologist, who said the patients urologist should be aware of this rare bacteria growing. Please contact Annmarie at [phone] to review. Thank you.    Action Taken: Message routed to:  Other: [facility]    Travel Screening: Not Applicable              Clear

## 2022-10-03 NOTE — TELEPHONE ENCOUNTER
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    Chelsi EMMANUEL RN Urology 10/3/2022 1:30 PM

## 2022-10-03 NOTE — TELEPHONE ENCOUNTER
Called and left message for Annmarie (niece) at [phone]. No need to follow up at this time.     Chelsi EMMANUEL RN Urology 10/3/2022 1:06 PM

## 2022-10-03 NOTE — TELEPHONE ENCOUNTER
You have a bladder infection. Please take bactrim twice daily as sent to your [facility] pharmacy.  Call or return to clinic as needed if these symptoms worsen or fail to improve as anticipated.    ...   Written by Beth Strickland MD on 10/3/2022 11:10 AM CDT View Full Comments  Seen by patient Ludy Mckenzie on 10/3/2022 11:16 AM      Niece calling. Would like this information forwarded to urologist, Dr Greenwood and wondering of urology follow up would be recommended.

## 2022-10-04 NOTE — TELEPHONE ENCOUNTER
Left message on number provided that Dr. Greenwood does not need to see patient at this time and to call our office with questions,   Rhoda Buenrostro, CMA

## 2022-10-27 ENCOUNTER — THERAPY VISIT (OUTPATIENT)
Dept: PHYSICAL THERAPY | Facility: CLINIC | Age: 82
End: 2022-10-27
Payer: COMMERCIAL

## 2022-10-27 DIAGNOSIS — R26.9 ABNORMAL GAIT: Primary | ICD-10-CM

## 2022-10-27 DIAGNOSIS — R26.89 LOSS OF BALANCE: ICD-10-CM

## 2022-10-27 PROCEDURE — 97112 NEUROMUSCULAR REEDUCATION: CPT | Mod: GP | Performed by: PHYSICAL THERAPIST

## 2022-10-27 PROCEDURE — 97110 THERAPEUTIC EXERCISES: CPT | Mod: GP | Performed by: PHYSICAL THERAPIST

## 2022-11-03 ENCOUNTER — THERAPY VISIT (OUTPATIENT)
Dept: PHYSICAL THERAPY | Facility: CLINIC | Age: 82
End: 2022-11-03
Payer: COMMERCIAL

## 2022-11-03 DIAGNOSIS — R26.9 ABNORMAL GAIT: Primary | ICD-10-CM

## 2022-11-03 DIAGNOSIS — R26.89 LOSS OF BALANCE: ICD-10-CM

## 2022-11-03 PROCEDURE — 97110 THERAPEUTIC EXERCISES: CPT | Mod: GP | Performed by: PHYSICAL THERAPIST

## 2022-11-03 PROCEDURE — 97112 NEUROMUSCULAR REEDUCATION: CPT | Mod: GP | Performed by: PHYSICAL THERAPIST

## 2022-11-09 ENCOUNTER — THERAPY VISIT (OUTPATIENT)
Dept: PHYSICAL THERAPY | Facility: CLINIC | Age: 82
End: 2022-11-09
Payer: COMMERCIAL

## 2022-11-09 DIAGNOSIS — R26.89 LOSS OF BALANCE: ICD-10-CM

## 2022-11-09 DIAGNOSIS — R26.9 ABNORMAL GAIT: Primary | ICD-10-CM

## 2022-11-09 PROCEDURE — 97112 NEUROMUSCULAR REEDUCATION: CPT | Mod: GP | Performed by: PHYSICAL THERAPIST

## 2022-11-20 ENCOUNTER — HEALTH MAINTENANCE LETTER (OUTPATIENT)
Age: 82
End: 2022-11-20

## 2022-12-08 NOTE — TELEPHONE ENCOUNTER
Refilling drop per Dr. Valenzuela's last visit note 07/28/22. Continue Latanoprost (green top) at bedtime both eyes. Has follow up scheduled 4/25/22 with Dr. Valenzuela.

## 2022-12-08 NOTE — TELEPHONE ENCOUNTER
[facility] Call Center    Phone Message    May a detailed message be left on voicemail: yes     Reason for Call: Medication Refill Request    Has the patient contacted the pharmacy for the refill? Yes   Name of medication being requested:   latanoprost (XALATAN) 0.005 % ophthalmic solution 7.5 mL        Provider who prescribed the medication: Nicolas   Pharmacy: [id number] - [facility], [place] - [address] [facility].  Date medication is needed: asap     Action Taken: Message routed to:  [facility]    Travel Screening: Not Applicable

## 2023-01-01 ENCOUNTER — APPOINTMENT (OUTPATIENT)
Dept: GENERAL RADIOLOGY | Facility: CLINIC | Age: 83
DRG: 522 | End: 2023-01-01
Attending: ORTHOPAEDIC SURGERY
Payer: COMMERCIAL

## 2023-01-01 ENCOUNTER — MYC MEDICAL ADVICE (OUTPATIENT)
Dept: FAMILY MEDICINE | Facility: CLINIC | Age: 83
End: 2023-01-01
Payer: COMMERCIAL

## 2023-01-01 ENCOUNTER — PATIENT OUTREACH (OUTPATIENT)
Dept: CARE COORDINATION | Facility: CLINIC | Age: 83
End: 2023-01-01
Payer: COMMERCIAL

## 2023-01-01 ENCOUNTER — ANESTHESIA (OUTPATIENT)
Dept: GENERAL RADIOLOGY | Facility: CLINIC | Age: 83
DRG: 522 | End: 2023-01-01
Payer: COMMERCIAL

## 2023-01-01 ENCOUNTER — HEALTH MAINTENANCE LETTER (OUTPATIENT)
Age: 83
End: 2023-01-01

## 2023-01-01 ENCOUNTER — LAB REQUISITION (OUTPATIENT)
Dept: LAB | Facility: CLINIC | Age: 83
End: 2023-01-01
Payer: COMMERCIAL

## 2023-01-01 ENCOUNTER — TRANSFERRED RECORDS (OUTPATIENT)
Dept: HEALTH INFORMATION MANAGEMENT | Facility: CLINIC | Age: 83
End: 2023-01-01

## 2023-01-01 ENCOUNTER — TELEPHONE (OUTPATIENT)
Dept: UROLOGY | Facility: CLINIC | Age: 83
End: 2023-01-01
Payer: COMMERCIAL

## 2023-01-01 ENCOUNTER — OFFICE VISIT (OUTPATIENT)
Dept: FAMILY MEDICINE | Facility: CLINIC | Age: 83
End: 2023-01-01
Payer: COMMERCIAL

## 2023-01-01 ENCOUNTER — OFFICE VISIT (OUTPATIENT)
Dept: PULMONOLOGY | Facility: CLINIC | Age: 83
End: 2023-01-01
Payer: COMMERCIAL

## 2023-01-01 ENCOUNTER — APPOINTMENT (OUTPATIENT)
Dept: OCCUPATIONAL THERAPY | Facility: CLINIC | Age: 83
DRG: 522 | End: 2023-01-01
Attending: ORTHOPAEDIC SURGERY
Payer: COMMERCIAL

## 2023-01-01 ENCOUNTER — TRANSFERRED RECORDS (OUTPATIENT)
Dept: HEALTH INFORMATION MANAGEMENT | Facility: CLINIC | Age: 83
End: 2023-01-01
Payer: COMMERCIAL

## 2023-01-01 ENCOUNTER — TELEPHONE (OUTPATIENT)
Dept: PULMONOLOGY | Facility: CLINIC | Age: 83
End: 2023-01-01
Payer: COMMERCIAL

## 2023-01-01 ENCOUNTER — OFFICE VISIT (OUTPATIENT)
Dept: NEUROLOGY | Facility: CLINIC | Age: 83
End: 2023-01-01
Attending: FAMILY MEDICINE
Payer: COMMERCIAL

## 2023-01-01 ENCOUNTER — ANESTHESIA EVENT (OUTPATIENT)
Dept: GENERAL RADIOLOGY | Facility: CLINIC | Age: 83
DRG: 522 | End: 2023-01-01
Payer: COMMERCIAL

## 2023-01-01 ENCOUNTER — APPOINTMENT (OUTPATIENT)
Dept: PHYSICAL THERAPY | Facility: CLINIC | Age: 83
DRG: 522 | End: 2023-01-01
Payer: COMMERCIAL

## 2023-01-01 ENCOUNTER — ANESTHESIA (OUTPATIENT)
Dept: SURGERY | Facility: CLINIC | Age: 83
DRG: 522 | End: 2023-01-01
Payer: COMMERCIAL

## 2023-01-01 ENCOUNTER — TELEPHONE (OUTPATIENT)
Dept: FAMILY MEDICINE | Facility: CLINIC | Age: 83
End: 2023-01-01
Payer: COMMERCIAL

## 2023-01-01 ENCOUNTER — APPOINTMENT (OUTPATIENT)
Dept: GENERAL RADIOLOGY | Facility: CLINIC | Age: 83
DRG: 522 | End: 2023-01-01
Attending: PHYSICIAN ASSISTANT
Payer: COMMERCIAL

## 2023-01-01 ENCOUNTER — APPOINTMENT (OUTPATIENT)
Dept: GENERAL RADIOLOGY | Facility: CLINIC | Age: 83
DRG: 522 | End: 2023-01-01
Attending: EMERGENCY MEDICINE
Payer: COMMERCIAL

## 2023-01-01 ENCOUNTER — HOSPITAL ENCOUNTER (INPATIENT)
Facility: CLINIC | Age: 83
LOS: 3 days | Discharge: NURSING, REHAB OR LONG-TERM CARE | DRG: 522 | End: 2023-10-21
Attending: EMERGENCY MEDICINE | Admitting: INTERNAL MEDICINE
Payer: COMMERCIAL

## 2023-01-01 ENCOUNTER — PRE VISIT (OUTPATIENT)
Dept: NEUROLOGY | Facility: CLINIC | Age: 83
End: 2023-01-01

## 2023-01-01 ENCOUNTER — OFFICE VISIT (OUTPATIENT)
Dept: OPHTHALMOLOGY | Facility: CLINIC | Age: 83
End: 2023-01-01
Payer: COMMERCIAL

## 2023-01-01 ENCOUNTER — MEDICAL CORRESPONDENCE (OUTPATIENT)
Dept: HEALTH INFORMATION MANAGEMENT | Facility: CLINIC | Age: 83
End: 2023-01-01

## 2023-01-01 ENCOUNTER — TELEPHONE (OUTPATIENT)
Dept: FAMILY MEDICINE | Facility: CLINIC | Age: 83
End: 2023-01-01

## 2023-01-01 ENCOUNTER — ANESTHESIA EVENT (OUTPATIENT)
Dept: SURGERY | Facility: CLINIC | Age: 83
DRG: 522 | End: 2023-01-01
Payer: COMMERCIAL

## 2023-01-01 VITALS
HEIGHT: 65 IN | WEIGHT: 126 LBS | SYSTOLIC BLOOD PRESSURE: 139 MMHG | DIASTOLIC BLOOD PRESSURE: 78 MMHG | OXYGEN SATURATION: 98 % | HEART RATE: 74 BPM | RESPIRATION RATE: 18 BRPM | BODY MASS INDEX: 20.99 KG/M2

## 2023-01-01 VITALS
SYSTOLIC BLOOD PRESSURE: 98 MMHG | RESPIRATION RATE: 16 BRPM | TEMPERATURE: 98.2 F | HEART RATE: 86 BPM | DIASTOLIC BLOOD PRESSURE: 52 MMHG | BODY MASS INDEX: 19.77 KG/M2 | WEIGHT: 123 LBS | HEIGHT: 66 IN | OXYGEN SATURATION: 94 %

## 2023-01-01 VITALS
OXYGEN SATURATION: 96 % | BODY MASS INDEX: 21.33 KG/M2 | TEMPERATURE: 98.2 F | HEIGHT: 65 IN | DIASTOLIC BLOOD PRESSURE: 65 MMHG | HEART RATE: 104 BPM | RESPIRATION RATE: 18 BRPM | SYSTOLIC BLOOD PRESSURE: 97 MMHG | WEIGHT: 128 LBS

## 2023-01-01 VITALS
DIASTOLIC BLOOD PRESSURE: 68 MMHG | WEIGHT: 122 LBS | HEART RATE: 86 BPM | TEMPERATURE: 98.1 F | OXYGEN SATURATION: 97 % | RESPIRATION RATE: 18 BRPM | BODY MASS INDEX: 19.61 KG/M2 | HEIGHT: 66 IN | SYSTOLIC BLOOD PRESSURE: 137 MMHG

## 2023-01-01 VITALS
HEART RATE: 87 BPM | SYSTOLIC BLOOD PRESSURE: 113 MMHG | BODY MASS INDEX: 18.4 KG/M2 | WEIGHT: 114 LBS | DIASTOLIC BLOOD PRESSURE: 62 MMHG

## 2023-01-01 VITALS
BODY MASS INDEX: 20.97 KG/M2 | WEIGHT: 126 LBS | TEMPERATURE: 98.6 F | SYSTOLIC BLOOD PRESSURE: 128 MMHG | DIASTOLIC BLOOD PRESSURE: 72 MMHG | OXYGEN SATURATION: 97 % | HEART RATE: 75 BPM

## 2023-01-01 VITALS
HEIGHT: 65 IN | DIASTOLIC BLOOD PRESSURE: 69 MMHG | RESPIRATION RATE: 18 BRPM | WEIGHT: 120 LBS | HEART RATE: 74 BPM | TEMPERATURE: 97.7 F | OXYGEN SATURATION: 100 % | BODY MASS INDEX: 19.99 KG/M2 | SYSTOLIC BLOOD PRESSURE: 125 MMHG

## 2023-01-01 VITALS
WEIGHT: 128 LBS | HEART RATE: 88 BPM | RESPIRATION RATE: 20 BRPM | BODY MASS INDEX: 21.33 KG/M2 | OXYGEN SATURATION: 97 % | TEMPERATURE: 97 F | DIASTOLIC BLOOD PRESSURE: 57 MMHG | SYSTOLIC BLOOD PRESSURE: 120 MMHG | HEIGHT: 65 IN

## 2023-01-01 VITALS
WEIGHT: 122 LBS | TEMPERATURE: 98.1 F | BODY MASS INDEX: 20.33 KG/M2 | HEART RATE: 82 BPM | OXYGEN SATURATION: 98 % | HEIGHT: 65 IN | DIASTOLIC BLOOD PRESSURE: 75 MMHG | SYSTOLIC BLOOD PRESSURE: 134 MMHG

## 2023-01-01 DIAGNOSIS — Z96.641 HISTORY OF TOTAL HIP REPLACEMENT, RIGHT: Primary | ICD-10-CM

## 2023-01-01 DIAGNOSIS — Z53.9 DIAGNOSIS NOT YET DEFINED: Primary | ICD-10-CM

## 2023-01-01 DIAGNOSIS — R26.81 UNSTEADY GAIT WHEN WALKING: ICD-10-CM

## 2023-01-01 DIAGNOSIS — H43.813 POSTERIOR VITREOUS DETACHMENT OF BOTH EYES: ICD-10-CM

## 2023-01-01 DIAGNOSIS — L29.9 ITCHING: Primary | ICD-10-CM

## 2023-01-01 DIAGNOSIS — Z96.643 PRESENCE OF ARTIFICIAL HIP JOINT, BILATERAL: ICD-10-CM

## 2023-01-01 DIAGNOSIS — J44.9 CHRONIC OBSTRUCTIVE PULMONARY DISEASE, UNSPECIFIED COPD TYPE (H): ICD-10-CM

## 2023-01-01 DIAGNOSIS — G62.9 NEUROPATHY: Primary | ICD-10-CM

## 2023-01-01 DIAGNOSIS — S72.001A CLOSED FRACTURE OF NECK OF RIGHT FEMUR, INITIAL ENCOUNTER (H): ICD-10-CM

## 2023-01-01 DIAGNOSIS — E11.49 DIABETIC NEUROPATHY WITH NEUROLOGIC COMPLICATION (H): ICD-10-CM

## 2023-01-01 DIAGNOSIS — E11.40 DIABETIC NEUROPATHY WITH NEUROLOGIC COMPLICATION (H): ICD-10-CM

## 2023-01-01 DIAGNOSIS — T78.40XA ALLERGIC REACTION, INITIAL ENCOUNTER: ICD-10-CM

## 2023-01-01 DIAGNOSIS — F33.0 MAJOR DEPRESSIVE DISORDER, RECURRENT EPISODE, MILD (H): ICD-10-CM

## 2023-01-01 DIAGNOSIS — E11.69 TYPE 2 DIABETES MELLITUS WITH OTHER SPECIFIED COMPLICATION, WITHOUT LONG-TERM CURRENT USE OF INSULIN (H): ICD-10-CM

## 2023-01-01 DIAGNOSIS — E11.49 TYPE 2 DIABETES MELLITUS WITH OTHER DIABETIC NEUROLOGICAL COMPLICATION (H): ICD-10-CM

## 2023-01-01 DIAGNOSIS — Z00.00 ENCOUNTER FOR MEDICARE ANNUAL WELLNESS EXAM: Primary | ICD-10-CM

## 2023-01-01 DIAGNOSIS — R80.9 MICROALBUMINURIA: ICD-10-CM

## 2023-01-01 DIAGNOSIS — D49.2 SKIN NEOPLASM: Primary | ICD-10-CM

## 2023-01-01 DIAGNOSIS — L03.312 CELLULITIS OF BACK EXCEPT BUTTOCK: Primary | ICD-10-CM

## 2023-01-01 DIAGNOSIS — N30.00 ACUTE CYSTITIS WITHOUT HEMATURIA: Primary | ICD-10-CM

## 2023-01-01 DIAGNOSIS — R30.9 PAINFUL URINATION: ICD-10-CM

## 2023-01-01 DIAGNOSIS — E78.2 MIXED HYPERLIPIDEMIA: ICD-10-CM

## 2023-01-01 DIAGNOSIS — J43.2 CENTRILOBULAR EMPHYSEMA (H): Primary | ICD-10-CM

## 2023-01-01 DIAGNOSIS — J44.1 COPD EXACERBATION (H): ICD-10-CM

## 2023-01-01 DIAGNOSIS — Z48.02 VISIT FOR SUTURE REMOVAL: ICD-10-CM

## 2023-01-01 DIAGNOSIS — H35.3131 EARLY DRY STAGE NONEXUDATIVE AGE-RELATED MACULAR DEGENERATION OF BOTH EYES: ICD-10-CM

## 2023-01-01 DIAGNOSIS — Z86.73 HISTORY OF STROKE: ICD-10-CM

## 2023-01-01 DIAGNOSIS — H40.1131 PRIMARY OPEN ANGLE GLAUCOMA (POAG) OF BOTH EYES, MILD STAGE: ICD-10-CM

## 2023-01-01 DIAGNOSIS — R30.0 DYSURIA: ICD-10-CM

## 2023-01-01 DIAGNOSIS — J44.1 COPD EXACERBATION (H): Primary | ICD-10-CM

## 2023-01-01 DIAGNOSIS — H52.4 MYOPIA OF BOTH EYES WITH REGULAR ASTIGMATISM AND PRESBYOPIA: ICD-10-CM

## 2023-01-01 DIAGNOSIS — F10.21 ALCOHOLISM IN REMISSION (H): ICD-10-CM

## 2023-01-01 DIAGNOSIS — Z01.00 EXAMINATION OF EYES AND VISION: Primary | ICD-10-CM

## 2023-01-01 DIAGNOSIS — Z23 NEED FOR DIPHTHERIA-TETANUS-PERTUSSIS (TDAP) VACCINE: ICD-10-CM

## 2023-01-01 DIAGNOSIS — H52.13 MYOPIA OF BOTH EYES WITH REGULAR ASTIGMATISM AND PRESBYOPIA: ICD-10-CM

## 2023-01-01 DIAGNOSIS — H52.223 MYOPIA OF BOTH EYES WITH REGULAR ASTIGMATISM AND PRESBYOPIA: ICD-10-CM

## 2023-01-01 DIAGNOSIS — Z96.1 PSEUDOPHAKIA OF BOTH EYES: ICD-10-CM

## 2023-01-01 DIAGNOSIS — Z23 NEED FOR VACCINE FOR DT (DIPHTHERIA-TETANUS): ICD-10-CM

## 2023-01-01 LAB
ABO/RH(D): NORMAL
ALBUMIN SERPL BCG-MCNC: 3.3 G/DL (ref 3.5–5.2)
ALBUMIN SERPL ELPH-MCNC: 4.2 G/DL (ref 3.7–5.1)
ALBUMIN UR-MCNC: ABNORMAL MG/DL
ALBUMIN UR-MCNC: NEGATIVE MG/DL
ALP SERPL-CCNC: 77 U/L (ref 35–104)
ALPHA1 GLOB SERPL ELPH-MCNC: 0.3 G/DL (ref 0.2–0.4)
ALPHA2 GLOB SERPL ELPH-MCNC: 0.7 G/DL (ref 0.5–0.9)
ALT SERPL W P-5'-P-CCNC: 19 U/L (ref 0–50)
ANION GAP SERPL CALCULATED.3IONS-SCNC: 11 MMOL/L (ref 7–15)
ANION GAP SERPL CALCULATED.3IONS-SCNC: 13 MMOL/L (ref 7–15)
ANION GAP SERPL CALCULATED.3IONS-SCNC: 13 MMOL/L (ref 7–15)
ANION GAP SERPL CALCULATED.3IONS-SCNC: 15 MMOL/L (ref 7–15)
ANION GAP SERPL CALCULATED.3IONS-SCNC: 5 MMOL/L (ref 3–14)
ANION GAP SERPL CALCULATED.3IONS-SCNC: 9 MMOL/L (ref 7–15)
ANTIBODY SCREEN: NEGATIVE
APPEARANCE UR: ABNORMAL
APPEARANCE UR: CLEAR
AST SERPL W P-5'-P-CCNC: 46 U/L (ref 0–45)
B-GLOBULIN SERPL ELPH-MCNC: 0.7 G/DL (ref 0.6–1)
BACTERIA #/AREA URNS HPF: ABNORMAL /HPF
BACTERIA UR CULT: ABNORMAL
BACTERIA UR CULT: NO GROWTH
BASO+EOS+MONOS # BLD AUTO: ABNORMAL 10*3/UL
BASO+EOS+MONOS NFR BLD AUTO: ABNORMAL %
BASOPHILS # BLD AUTO: 0 10E3/UL (ref 0–0.2)
BASOPHILS NFR BLD AUTO: 0 %
BILIRUB SERPL-MCNC: 0.5 MG/DL
BILIRUB UR QL STRIP: NEGATIVE
BILIRUB UR QL STRIP: NEGATIVE
BUN SERPL-MCNC: 11.7 MG/DL (ref 8–23)
BUN SERPL-MCNC: 14.9 MG/DL (ref 8–23)
BUN SERPL-MCNC: 16.8 MG/DL (ref 8–23)
BUN SERPL-MCNC: 17 MG/DL (ref 7–30)
BUN SERPL-MCNC: 19.4 MG/DL (ref 8–23)
BUN SERPL-MCNC: 22.5 MG/DL (ref 8–23)
CALCIUM SERPL-MCNC: 8 MG/DL (ref 8.8–10.2)
CALCIUM SERPL-MCNC: 8.4 MG/DL (ref 8.8–10.2)
CALCIUM SERPL-MCNC: 8.5 MG/DL (ref 8.8–10.2)
CALCIUM SERPL-MCNC: 8.5 MG/DL (ref 8.8–10.2)
CALCIUM SERPL-MCNC: 8.9 MG/DL (ref 8.5–10.1)
CALCIUM SERPL-MCNC: 9.6 MG/DL (ref 8.8–10.2)
CHLORIDE BLD-SCNC: 106 MMOL/L (ref 94–109)
CHLORIDE SERPL-SCNC: 101 MMOL/L (ref 98–107)
CHLORIDE SERPL-SCNC: 103 MMOL/L (ref 98–107)
CHLORIDE SERPL-SCNC: 104 MMOL/L (ref 98–107)
CHLORIDE SERPL-SCNC: 98 MMOL/L (ref 98–107)
CHLORIDE SERPL-SCNC: 99 MMOL/L (ref 98–107)
CHOLEST SERPL-MCNC: 148 MG/DL
CK SERPL-CCNC: 34 U/L (ref 26–192)
CO2 SERPL-SCNC: 25 MMOL/L (ref 20–32)
COLOR UR AUTO: ABNORMAL
COLOR UR AUTO: YELLOW
CREAT SERPL-MCNC: 0.43 MG/DL (ref 0.51–0.95)
CREAT SERPL-MCNC: 0.49 MG/DL (ref 0.51–0.95)
CREAT SERPL-MCNC: 0.53 MG/DL (ref 0.51–0.95)
CREAT SERPL-MCNC: 0.58 MG/DL (ref 0.51–0.95)
CREAT SERPL-MCNC: 0.6 MG/DL (ref 0.51–0.95)
CREAT SERPL-MCNC: 0.6 MG/DL (ref 0.52–1.04)
DEPRECATED HCO3 PLAS-SCNC: 17 MMOL/L (ref 22–29)
DEPRECATED HCO3 PLAS-SCNC: 19 MMOL/L (ref 22–29)
DEPRECATED HCO3 PLAS-SCNC: 19 MMOL/L (ref 22–29)
DEPRECATED HCO3 PLAS-SCNC: 22 MMOL/L (ref 22–29)
DEPRECATED HCO3 PLAS-SCNC: 24 MMOL/L (ref 22–29)
EGFRCR SERPLBLD CKD-EPI 2021: 89 ML/MIN/1.73M2
EGFRCR SERPLBLD CKD-EPI 2021: 89 ML/MIN/1.73M2
EGFRCR SERPLBLD CKD-EPI 2021: >90 ML/MIN/1.73M2
EOSINOPHIL # BLD AUTO: 0.1 10E3/UL (ref 0–0.7)
EOSINOPHIL NFR BLD AUTO: 1 %
ERYTHROCYTE [DISTWIDTH] IN BLOOD BY AUTOMATED COUNT: 11.9 % (ref 10–15)
ERYTHROCYTE [DISTWIDTH] IN BLOOD BY AUTOMATED COUNT: 11.9 % (ref 10–15)
ERYTHROCYTE [DISTWIDTH] IN BLOOD BY AUTOMATED COUNT: 12.4 % (ref 10–15)
ERYTHROCYTE [DISTWIDTH] IN BLOOD BY AUTOMATED COUNT: 12.9 % (ref 10–15)
ERYTHROCYTE [DISTWIDTH] IN BLOOD BY AUTOMATED COUNT: 14.1 % (ref 10–15)
FASTING STATUS PATIENT QL REPORTED: YES
GAMMA GLOB SERPL ELPH-MCNC: 1 G/DL (ref 0.7–1.6)
GFR SERPL CREATININE-BSD FRML MDRD: 89 ML/MIN/1.73M2
GLUCOSE BLD-MCNC: 133 MG/DL (ref 70–99)
GLUCOSE BLDC GLUCOMTR-MCNC: 125 MG/DL (ref 70–99)
GLUCOSE BLDC GLUCOMTR-MCNC: 140 MG/DL (ref 70–99)
GLUCOSE BLDC GLUCOMTR-MCNC: 143 MG/DL (ref 70–99)
GLUCOSE BLDC GLUCOMTR-MCNC: 146 MG/DL (ref 70–99)
GLUCOSE BLDC GLUCOMTR-MCNC: 152 MG/DL (ref 70–99)
GLUCOSE BLDC GLUCOMTR-MCNC: 157 MG/DL (ref 70–99)
GLUCOSE BLDC GLUCOMTR-MCNC: 157 MG/DL (ref 70–99)
GLUCOSE BLDC GLUCOMTR-MCNC: 169 MG/DL (ref 70–99)
GLUCOSE BLDC GLUCOMTR-MCNC: 171 MG/DL (ref 70–99)
GLUCOSE BLDC GLUCOMTR-MCNC: 184 MG/DL (ref 70–99)
GLUCOSE BLDC GLUCOMTR-MCNC: 187 MG/DL (ref 70–99)
GLUCOSE BLDC GLUCOMTR-MCNC: 191 MG/DL (ref 70–99)
GLUCOSE BLDC GLUCOMTR-MCNC: 192 MG/DL (ref 70–99)
GLUCOSE BLDC GLUCOMTR-MCNC: 197 MG/DL (ref 70–99)
GLUCOSE SERPL-MCNC: 141 MG/DL (ref 70–99)
GLUCOSE SERPL-MCNC: 147 MG/DL (ref 70–99)
GLUCOSE SERPL-MCNC: 166 MG/DL (ref 70–99)
GLUCOSE SERPL-MCNC: 181 MG/DL (ref 70–99)
GLUCOSE SERPL-MCNC: 190 MG/DL (ref 70–99)
GLUCOSE UR STRIP-MCNC: NEGATIVE MG/DL
GLUCOSE UR STRIP-MCNC: NEGATIVE MG/DL
HBA1C MFR BLD: 6.4 %
HBA1C MFR BLD: 7.2 % (ref 0–5.6)
HCT VFR BLD AUTO: 23.6 % (ref 35–47)
HCT VFR BLD AUTO: 27.6 % (ref 35–47)
HCT VFR BLD AUTO: 31.8 % (ref 35–47)
HCT VFR BLD AUTO: 32.1 % (ref 35–47)
HCT VFR BLD AUTO: 34.5 % (ref 35–47)
HDLC SERPL-MCNC: 42 MG/DL
HGB BLD-MCNC: 10.8 G/DL (ref 11.7–15.7)
HGB BLD-MCNC: 10.9 G/DL (ref 11.7–15.7)
HGB BLD-MCNC: 10.9 G/DL (ref 11.7–15.7)
HGB BLD-MCNC: 8.1 G/DL (ref 11.7–15.7)
HGB BLD-MCNC: 9.1 G/DL (ref 11.7–15.7)
HGB UR QL STRIP: ABNORMAL
HGB UR QL STRIP: NEGATIVE
IMM GRANULOCYTES # BLD: 0.1 10E3/UL
IMM GRANULOCYTES NFR BLD: 1 %
INR PPP: 1.03 (ref 0.85–1.15)
KETONES UR STRIP-MCNC: NEGATIVE MG/DL
KETONES UR STRIP-MCNC: NEGATIVE MG/DL
LDLC SERPL CALC-MCNC: 81 MG/DL
LEUKOCYTE ESTERASE UR QL STRIP: ABNORMAL
LEUKOCYTE ESTERASE UR QL STRIP: NEGATIVE
LYMPHOCYTES # BLD AUTO: 1.6 10E3/UL (ref 0.8–5.3)
LYMPHOCYTES NFR BLD AUTO: 13 %
M PROTEIN SERPL ELPH-MCNC: 0.4 G/DL
MCH RBC QN AUTO: 29.7 PG (ref 26.5–33)
MCH RBC QN AUTO: 29.9 PG (ref 26.5–33)
MCH RBC QN AUTO: 30 PG (ref 26.5–33)
MCH RBC QN AUTO: 30.2 PG (ref 26.5–33)
MCH RBC QN AUTO: 30.6 PG (ref 26.5–33)
MCHC RBC AUTO-ENTMCNC: 31.6 G/DL (ref 31.5–36.5)
MCHC RBC AUTO-ENTMCNC: 33 G/DL (ref 31.5–36.5)
MCHC RBC AUTO-ENTMCNC: 33.6 G/DL (ref 31.5–36.5)
MCHC RBC AUTO-ENTMCNC: 34.3 G/DL (ref 31.5–36.5)
MCHC RBC AUTO-ENTMCNC: 34.3 G/DL (ref 31.5–36.5)
MCV RBC AUTO: 88 FL (ref 78–100)
MCV RBC AUTO: 89 FL (ref 78–100)
MCV RBC AUTO: 90 FL (ref 78–100)
MCV RBC AUTO: 90 FL (ref 78–100)
MCV RBC AUTO: 95 FL (ref 78–100)
MONOCYTES # BLD AUTO: 0.7 10E3/UL (ref 0–1.3)
MONOCYTES NFR BLD AUTO: 5 %
MUCOUS THREADS #/AREA URNS LPF: PRESENT /LPF
NEUTROPHILS # BLD AUTO: 10.1 10E3/UL (ref 1.6–8.3)
NEUTROPHILS NFR BLD AUTO: 80 %
NITRATE UR QL: NEGATIVE
NITRATE UR QL: NEGATIVE
NONHDLC SERPL-MCNC: 106 MG/DL
NRBC # BLD AUTO: 0 10E3/UL
NRBC BLD AUTO-RTO: 0 /100
PATH REPORT.COMMENTS IMP SPEC: NORMAL
PATH REPORT.COMMENTS IMP SPEC: NORMAL
PATH REPORT.FINAL DX SPEC: NORMAL
PATH REPORT.GROSS SPEC: NORMAL
PATH REPORT.MICROSCOPIC SPEC OTHER STN: NORMAL
PATH REPORT.RELEVANT HX SPEC: NORMAL
PH UR STRIP: 6 [PH] (ref 5–7)
PH UR STRIP: 8 [PH] (ref 5–7)
PHOTO IMAGE: NORMAL
PLATELET # BLD AUTO: 146 10E3/UL (ref 150–450)
PLATELET # BLD AUTO: 153 10E3/UL (ref 150–450)
PLATELET # BLD AUTO: 188 10E3/UL (ref 150–450)
PLATELET # BLD AUTO: 221 10E3/UL (ref 150–450)
PLATELET # BLD AUTO: 507 10E3/UL (ref 150–450)
POTASSIUM BLD-SCNC: 3.8 MMOL/L (ref 3.4–5.3)
POTASSIUM SERPL-SCNC: 4 MMOL/L (ref 3.4–5.3)
POTASSIUM SERPL-SCNC: 4.1 MMOL/L (ref 3.4–5.3)
POTASSIUM SERPL-SCNC: 4.2 MMOL/L (ref 3.4–5.3)
POTASSIUM SERPL-SCNC: 4.3 MMOL/L (ref 3.4–5.3)
POTASSIUM SERPL-SCNC: 4.3 MMOL/L (ref 3.4–5.3)
PROT PATTERN SERPL ELPH-IMP: ABNORMAL
PROT PATTERN SERPL IFE-IMP: NORMAL
PROT SERPL-MCNC: 5.9 G/DL (ref 6.4–8.3)
PYRIDOXAL PHOS SERPL-SCNC: 89.1 NMOL/L
RBC # BLD AUTO: 2.65 10E6/UL (ref 3.8–5.2)
RBC # BLD AUTO: 3.06 10E6/UL (ref 3.8–5.2)
RBC # BLD AUTO: 3.58 10E6/UL (ref 3.8–5.2)
RBC # BLD AUTO: 3.63 10E6/UL (ref 3.8–5.2)
RBC # BLD AUTO: 3.64 10E6/UL (ref 3.8–5.2)
RBC #/AREA URNS AUTO: ABNORMAL /HPF
RBC URINE: <1 /HPF
SODIUM SERPL-SCNC: 131 MMOL/L (ref 135–145)
SODIUM SERPL-SCNC: 132 MMOL/L (ref 135–145)
SODIUM SERPL-SCNC: 133 MMOL/L (ref 135–145)
SODIUM SERPL-SCNC: 134 MMOL/L (ref 135–145)
SODIUM SERPL-SCNC: 136 MMOL/L (ref 133–144)
SODIUM SERPL-SCNC: 137 MMOL/L (ref 135–145)
SP GR UR STRIP: 1.01 (ref 1–1.03)
SP GR UR STRIP: 1.02 (ref 1–1.03)
SPECIMEN EXPIRATION DATE: NORMAL
SQUAMOUS #/AREA URNS AUTO: ABNORMAL /LPF
SQUAMOUS EPITHELIAL: 8 /HPF
TOTAL PROTEIN SERUM FOR ELP: 6.8 G/DL (ref 6.4–8.3)
TRANSITIONAL EPI: <1 /HPF
TRIGL SERPL-MCNC: 124 MG/DL
UROBILINOGEN UR STRIP-ACNC: 0.2 E.U./DL
UROBILINOGEN UR STRIP-MCNC: 4 MG/DL
VIT B1 PYROPHOSHATE BLD-SCNC: 156 NMOL/L
VIT B12 SERPL-MCNC: 359 PG/ML (ref 232–1245)
WBC # BLD AUTO: 10 10E3/UL (ref 4–11)
WBC # BLD AUTO: 10.5 10E3/UL (ref 4–11)
WBC # BLD AUTO: 12.6 10E3/UL (ref 4–11)
WBC # BLD AUTO: 8.4 10E3/UL (ref 4–11)
WBC # BLD AUTO: 9.5 10E3/UL (ref 4–11)
WBC #/AREA URNS AUTO: >100 /HPF
WBC URINE: 1 /HPF

## 2023-01-01 PROCEDURE — 250N000011 HC RX IP 250 OP 636

## 2023-01-01 PROCEDURE — 99215 OFFICE O/P EST HI 40 MIN: CPT | Performed by: INTERNAL MEDICINE

## 2023-01-01 PROCEDURE — 99213 OFFICE O/P EST LOW 20 MIN: CPT | Performed by: FAMILY MEDICINE

## 2023-01-01 PROCEDURE — 99285 EMERGENCY DEPT VISIT HI MDM: CPT | Performed by: EMERGENCY MEDICINE

## 2023-01-01 PROCEDURE — 80048 BASIC METABOLIC PNL TOTAL CA: CPT | Performed by: FAMILY MEDICINE

## 2023-01-01 PROCEDURE — 12032 INTMD RPR S/A/T/EXT 2.6-7.5: CPT | Performed by: FAMILY MEDICINE

## 2023-01-01 PROCEDURE — 73502 X-RAY EXAM HIP UNI 2-3 VIEWS: CPT

## 2023-01-01 PROCEDURE — 11403 EXC TR-EXT B9+MARG 2.1-3CM: CPT | Performed by: FAMILY MEDICINE

## 2023-01-01 PROCEDURE — 87088 URINE BACTERIA CULTURE: CPT | Performed by: FAMILY MEDICINE

## 2023-01-01 PROCEDURE — 84207 ASSAY OF VITAMIN B-6: CPT | Mod: 90 | Performed by: INTERNAL MEDICINE

## 2023-01-01 PROCEDURE — 250N000013 HC RX MED GY IP 250 OP 250 PS 637: Performed by: ORTHOPAEDIC SURGERY

## 2023-01-01 PROCEDURE — 250N000009 HC RX 250: Performed by: STUDENT IN AN ORGANIZED HEALTH CARE EDUCATION/TRAINING PROGRAM

## 2023-01-01 PROCEDURE — 370N000017 HC ANESTHESIA TECHNICAL FEE, PER MIN: Performed by: ORTHOPAEDIC SURGERY

## 2023-01-01 PROCEDURE — 250N000011 HC RX IP 250 OP 636: Mod: JZ | Performed by: NURSE ANESTHETIST, CERTIFIED REGISTERED

## 2023-01-01 PROCEDURE — C1776 JOINT DEVICE (IMPLANTABLE): HCPCS | Performed by: ORTHOPAEDIC SURGERY

## 2023-01-01 PROCEDURE — G0180 MD CERTIFICATION HHA PATIENT: HCPCS | Performed by: FAMILY MEDICINE

## 2023-01-01 PROCEDURE — 258N000003 HC RX IP 258 OP 636

## 2023-01-01 PROCEDURE — 250N000013 HC RX MED GY IP 250 OP 250 PS 637

## 2023-01-01 PROCEDURE — 83036 HEMOGLOBIN GLYCOSYLATED A1C: CPT

## 2023-01-01 PROCEDURE — 258N000003 HC RX IP 258 OP 636: Performed by: EMERGENCY MEDICINE

## 2023-01-01 PROCEDURE — 86334 IMMUNOFIX E-PHORESIS SERUM: CPT | Performed by: PATHOLOGY

## 2023-01-01 PROCEDURE — 99223 1ST HOSP IP/OBS HIGH 75: CPT

## 2023-01-01 PROCEDURE — 258N000003 HC RX IP 258 OP 636: Performed by: NURSE ANESTHETIST, CERTIFIED REGISTERED

## 2023-01-01 PROCEDURE — 72170 X-RAY EXAM OF PELVIS: CPT

## 2023-01-01 PROCEDURE — 250N000011 HC RX IP 250 OP 636: Performed by: NURSE ANESTHETIST, CERTIFIED REGISTERED

## 2023-01-01 PROCEDURE — 99233 SBSQ HOSP IP/OBS HIGH 50: CPT

## 2023-01-01 PROCEDURE — 99000 SPECIMEN HANDLING OFFICE-LAB: CPT | Performed by: INTERNAL MEDICINE

## 2023-01-01 PROCEDURE — 36415 COLL VENOUS BLD VENIPUNCTURE: CPT | Mod: ORL | Performed by: NURSE PRACTITIONER

## 2023-01-01 PROCEDURE — 82607 VITAMIN B-12: CPT | Performed by: INTERNAL MEDICINE

## 2023-01-01 PROCEDURE — 99212 OFFICE O/P EST SF 10 MIN: CPT | Mod: 24 | Performed by: FAMILY MEDICINE

## 2023-01-01 PROCEDURE — 36415 COLL VENOUS BLD VENIPUNCTURE: CPT | Performed by: FAMILY MEDICINE

## 2023-01-01 PROCEDURE — 250N000011 HC RX IP 250 OP 636: Mod: JZ

## 2023-01-01 PROCEDURE — 250N000009 HC RX 250: Performed by: ORTHOPAEDIC SURGERY

## 2023-01-01 PROCEDURE — G0439 PPPS, SUBSEQ VISIT: HCPCS | Performed by: FAMILY MEDICINE

## 2023-01-01 PROCEDURE — 999N000157 HC STATISTIC RCP TIME EA 10 MIN

## 2023-01-01 PROCEDURE — 84155 ASSAY OF PROTEIN SERUM: CPT | Performed by: INTERNAL MEDICINE

## 2023-01-01 PROCEDURE — P9604 ONE-WAY ALLOW PRORATED TRIP: HCPCS | Mod: ORL | Performed by: NURSE PRACTITIONER

## 2023-01-01 PROCEDURE — 99024 POSTOP FOLLOW-UP VISIT: CPT | Performed by: FAMILY MEDICINE

## 2023-01-01 PROCEDURE — 250N000009 HC RX 250

## 2023-01-01 PROCEDURE — 85027 COMPLETE CBC AUTOMATED: CPT

## 2023-01-01 PROCEDURE — 710N000009 HC RECOVERY PHASE 1, LEVEL 1, PER MIN: Performed by: ORTHOPAEDIC SURGERY

## 2023-01-01 PROCEDURE — 81001 URINALYSIS AUTO W/SCOPE: CPT | Performed by: FAMILY MEDICINE

## 2023-01-01 PROCEDURE — 99214 OFFICE O/P EST MOD 30 MIN: CPT | Performed by: FAMILY MEDICINE

## 2023-01-01 PROCEDURE — 250N000011 HC RX IP 250 OP 636: Performed by: ORTHOPAEDIC SURGERY

## 2023-01-01 PROCEDURE — 0SR9049 REPLACEMENT OF RIGHT HIP JOINT WITH CERAMIC ON POLYETHYLENE SYNTHETIC SUBSTITUTE, CEMENTED, OPEN APPROACH: ICD-10-PCS | Performed by: ORTHOPAEDIC SURGERY

## 2023-01-01 PROCEDURE — 87086 URINE CULTURE/COLONY COUNT: CPT | Mod: ORL | Performed by: NURSE PRACTITIONER

## 2023-01-01 PROCEDURE — 85027 COMPLETE CBC AUTOMATED: CPT | Mod: ORL | Performed by: NURSE PRACTITIONER

## 2023-01-01 PROCEDURE — 999N000065 XR PELVIS PORT 1/2 VIEWS

## 2023-01-01 PROCEDURE — 94640 AIRWAY INHALATION TREATMENT: CPT

## 2023-01-01 PROCEDURE — 36415 COLL VENOUS BLD VENIPUNCTURE: CPT | Performed by: EMERGENCY MEDICINE

## 2023-01-01 PROCEDURE — 97530 THERAPEUTIC ACTIVITIES: CPT | Mod: GP

## 2023-01-01 PROCEDURE — 120N000001 HC R&B MED SURG/OB

## 2023-01-01 PROCEDURE — 80048 BASIC METABOLIC PNL TOTAL CA: CPT

## 2023-01-01 PROCEDURE — C1713 ANCHOR/SCREW BN/BN,TIS/BN: HCPCS | Performed by: ORTHOPAEDIC SURGERY

## 2023-01-01 PROCEDURE — 36415 COLL VENOUS BLD VENIPUNCTURE: CPT | Performed by: INTERNAL MEDICINE

## 2023-01-01 PROCEDURE — 258N000001 HC RX 258: Performed by: ORTHOPAEDIC SURGERY

## 2023-01-01 PROCEDURE — 36415 COLL VENOUS BLD VENIPUNCTURE: CPT | Performed by: STUDENT IN AN ORGANIZED HEALTH CARE EDUCATION/TRAINING PROGRAM

## 2023-01-01 PROCEDURE — 97165 OT EVAL LOW COMPLEX 30 MIN: CPT | Mod: GO | Performed by: OCCUPATIONAL THERAPIST

## 2023-01-01 PROCEDURE — 84425 ASSAY OF VITAMIN B-1: CPT | Mod: 90 | Performed by: INTERNAL MEDICINE

## 2023-01-01 PROCEDURE — 81001 URINALYSIS AUTO W/SCOPE: CPT | Mod: ORL | Performed by: NURSE PRACTITIONER

## 2023-01-01 PROCEDURE — 86901 BLOOD TYPING SEROLOGIC RH(D): CPT | Performed by: EMERGENCY MEDICINE

## 2023-01-01 PROCEDURE — 97161 PT EVAL LOW COMPLEX 20 MIN: CPT | Mod: GP

## 2023-01-01 PROCEDURE — 250N000011 HC RX IP 250 OP 636: Mod: JZ | Performed by: EMERGENCY MEDICINE

## 2023-01-01 PROCEDURE — 90471 IMMUNIZATION ADMIN: CPT | Performed by: FAMILY MEDICINE

## 2023-01-01 PROCEDURE — 36415 COLL VENOUS BLD VENIPUNCTURE: CPT

## 2023-01-01 PROCEDURE — 96374 THER/PROPH/DIAG INJ IV PUSH: CPT | Performed by: EMERGENCY MEDICINE

## 2023-01-01 PROCEDURE — 92015 DETERMINE REFRACTIVE STATE: CPT | Performed by: STUDENT IN AN ORGANIZED HEALTH CARE EDUCATION/TRAINING PROGRAM

## 2023-01-01 PROCEDURE — 272N000001 HC OR GENERAL SUPPLY STERILE: Performed by: ORTHOPAEDIC SURGERY

## 2023-01-01 PROCEDURE — 99204 OFFICE O/P NEW MOD 45 MIN: CPT | Performed by: INTERNAL MEDICINE

## 2023-01-01 PROCEDURE — 94640 AIRWAY INHALATION TREATMENT: CPT | Mod: 76

## 2023-01-01 PROCEDURE — 88304 TISSUE EXAM BY PATHOLOGIST: CPT | Performed by: PATHOLOGY

## 2023-01-01 PROCEDURE — 99232 SBSQ HOSP IP/OBS MODERATE 35: CPT

## 2023-01-01 PROCEDURE — 83036 HEMOGLOBIN GLYCOSYLATED A1C: CPT | Performed by: FAMILY MEDICINE

## 2023-01-01 PROCEDURE — 84165 PROTEIN E-PHORESIS SERUM: CPT | Performed by: PATHOLOGY

## 2023-01-01 PROCEDURE — 90714 TD VACC NO PRESV 7 YRS+ IM: CPT | Performed by: FAMILY MEDICINE

## 2023-01-01 PROCEDURE — 999N000141 HC STATISTIC PRE-PROCEDURE NURSING ASSESSMENT: Performed by: ORTHOPAEDIC SURGERY

## 2023-01-01 PROCEDURE — 85610 PROTHROMBIN TIME: CPT | Performed by: EMERGENCY MEDICINE

## 2023-01-01 PROCEDURE — 92014 COMPRE OPH EXAM EST PT 1/>: CPT | Performed by: STUDENT IN AN ORGANIZED HEALTH CARE EDUCATION/TRAINING PROGRAM

## 2023-01-01 PROCEDURE — 73552 X-RAY EXAM OF FEMUR 2/>: CPT | Mod: RT

## 2023-01-01 PROCEDURE — 250N000012 HC RX MED GY IP 250 OP 636 PS 637

## 2023-01-01 PROCEDURE — 82550 ASSAY OF CK (CPK): CPT | Performed by: INTERNAL MEDICINE

## 2023-01-01 PROCEDURE — 80048 BASIC METABOLIC PNL TOTAL CA: CPT | Performed by: STUDENT IN AN ORGANIZED HEALTH CARE EDUCATION/TRAINING PROGRAM

## 2023-01-01 PROCEDURE — 97535 SELF CARE MNGMENT TRAINING: CPT | Mod: GO | Performed by: OCCUPATIONAL THERAPIST

## 2023-01-01 PROCEDURE — 80061 LIPID PANEL: CPT | Performed by: FAMILY MEDICINE

## 2023-01-01 PROCEDURE — 87186 SC STD MICRODIL/AGAR DIL: CPT | Performed by: FAMILY MEDICINE

## 2023-01-01 PROCEDURE — 360N000077 HC SURGERY LEVEL 4, PER MIN: Performed by: ORTHOPAEDIC SURGERY

## 2023-01-01 PROCEDURE — 80048 BASIC METABOLIC PNL TOTAL CA: CPT | Mod: ORL | Performed by: NURSE PRACTITIONER

## 2023-01-01 PROCEDURE — 80053 COMPREHEN METABOLIC PANEL: CPT | Performed by: EMERGENCY MEDICINE

## 2023-01-01 PROCEDURE — 250N000013 HC RX MED GY IP 250 OP 250 PS 637: Performed by: NURSE ANESTHETIST, CERTIFIED REGISTERED

## 2023-01-01 PROCEDURE — 999N000063 XR HIP PORTABLE RIGHT 2-3 VIEWS: Mod: TC

## 2023-01-01 PROCEDURE — 87086 URINE CULTURE/COLONY COUNT: CPT | Performed by: FAMILY MEDICINE

## 2023-01-01 PROCEDURE — 85027 COMPLETE CBC AUTOMATED: CPT | Performed by: STUDENT IN AN ORGANIZED HEALTH CARE EDUCATION/TRAINING PROGRAM

## 2023-01-01 PROCEDURE — 99285 EMERGENCY DEPT VISIT HI MDM: CPT | Mod: 25 | Performed by: EMERGENCY MEDICINE

## 2023-01-01 PROCEDURE — 85025 COMPLETE CBC W/AUTO DIFF WBC: CPT | Performed by: EMERGENCY MEDICINE

## 2023-01-01 PROCEDURE — 82310 ASSAY OF CALCIUM: CPT

## 2023-01-01 PROCEDURE — 99239 HOSP IP/OBS DSCHRG MGMT >30: CPT | Performed by: STUDENT IN AN ORGANIZED HEALTH CARE EDUCATION/TRAINING PROGRAM

## 2023-01-01 DEVICE — 6.5MM LOW PROFILE HEX SCREW 15MM
Type: IMPLANTABLE DEVICE | Site: HIP | Status: FUNCTIONAL
Brand: TRIDENT II

## 2023-01-01 DEVICE — BONE CEMENT SIMPLEX W/TOBRAMYCIN 6197-9-001: Type: IMPLANTABLE DEVICE | Site: HIP | Status: FUNCTIONAL

## 2023-01-01 DEVICE — 6.5MM LOW PROFILE HEX SCREW 30MM
Type: IMPLANTABLE DEVICE | Site: HIP | Status: FUNCTIONAL
Brand: TRIDENT II

## 2023-01-01 DEVICE — UNIVERSAL DISTAL CEMENT SPACER
Type: IMPLANTABLE DEVICE | Site: HIP | Status: FUNCTIONAL
Brand: OMNIFIT

## 2023-01-01 DEVICE — TRIDENT X3 0 DEGREE POLYETHYLENE INSERT
Type: IMPLANTABLE DEVICE | Site: HIP | Status: FUNCTIONAL
Brand: TRIDENT X3 INSERT

## 2023-01-01 DEVICE — 127 DEGREE CEMENTED HIP STEM
Type: IMPLANTABLE DEVICE | Site: HIP | Status: FUNCTIONAL
Brand: OMNIFIT

## 2023-01-01 DEVICE — TRIDENT II PSL CLUSTERHOLE HA ACETABULAR SHELL 50D
Type: IMPLANTABLE DEVICE | Site: HIP | Status: FUNCTIONAL
Brand: TRIDENT II

## 2023-01-01 DEVICE — 6.5MM LOW PROFILE HEX SCREW 25MM
Type: IMPLANTABLE DEVICE | Site: HIP | Status: FUNCTIONAL
Brand: TRIDENT II

## 2023-01-01 DEVICE — CERAMIC C-TAPER FEMORAL HEAD
Type: IMPLANTABLE DEVICE | Site: HIP | Status: FUNCTIONAL
Brand: BIOLOX

## 2023-01-01 RX ORDER — AMOXICILLIN 250 MG
1-2 CAPSULE ORAL 2 TIMES DAILY
Start: 2023-01-01

## 2023-01-01 RX ORDER — SODIUM CHLORIDE, SODIUM LACTATE, POTASSIUM CHLORIDE, CALCIUM CHLORIDE 600; 310; 30; 20 MG/100ML; MG/100ML; MG/100ML; MG/100ML
INJECTION, SOLUTION INTRAVENOUS CONTINUOUS
Status: DISCONTINUED | OUTPATIENT
Start: 2023-01-01 | End: 2023-01-01

## 2023-01-01 RX ORDER — BISACODYL 10 MG
10 SUPPOSITORY, RECTAL RECTAL DAILY PRN
Status: DISCONTINUED | OUTPATIENT
Start: 2023-01-01 | End: 2023-01-01 | Stop reason: HOSPADM

## 2023-01-01 RX ORDER — CEFAZOLIN SODIUM 2 G/100ML
2 INJECTION, SOLUTION INTRAVENOUS
Status: DISCONTINUED | OUTPATIENT
Start: 2023-01-01 | End: 2023-01-01

## 2023-01-01 RX ORDER — ACETAMINOPHEN 325 MG/1
650 TABLET ORAL EVERY 4 HOURS PRN
Status: DISCONTINUED | OUTPATIENT
Start: 2023-01-01 | End: 2023-01-01

## 2023-01-01 RX ORDER — IPRATROPIUM BROMIDE AND ALBUTEROL SULFATE 2.5; .5 MG/3ML; MG/3ML
3 SOLUTION RESPIRATORY (INHALATION) ONCE
Status: COMPLETED | OUTPATIENT
Start: 2023-01-01 | End: 2023-01-01

## 2023-01-01 RX ORDER — AZITHROMYCIN 250 MG/1
TABLET, FILM COATED ORAL
Qty: 24 TABLET | Refills: 5 | Status: SHIPPED | OUTPATIENT
Start: 2023-01-01

## 2023-01-01 RX ORDER — ALBUTEROL SULFATE 90 UG/1
2 AEROSOL, METERED RESPIRATORY (INHALATION) EVERY 4 HOURS PRN
Qty: 25.5 G | Refills: 3 | Status: SHIPPED | OUTPATIENT
Start: 2023-01-01

## 2023-01-01 RX ORDER — ASPIRIN 325 MG
325 TABLET, DELAYED RELEASE (ENTERIC COATED) ORAL DAILY
Start: 2023-01-01 | End: 2023-01-01

## 2023-01-01 RX ORDER — HYDROMORPHONE HCL IN WATER/PF 6 MG/30 ML
0.4 PATIENT CONTROLLED ANALGESIA SYRINGE INTRAVENOUS EVERY 5 MIN PRN
Status: DISCONTINUED | OUTPATIENT
Start: 2023-01-01 | End: 2023-01-01 | Stop reason: HOSPADM

## 2023-01-01 RX ORDER — MAGNESIUM HYDROXIDE 1200 MG/15ML
LIQUID ORAL PRN
Status: DISCONTINUED | OUTPATIENT
Start: 2023-01-01 | End: 2023-01-01 | Stop reason: HOSPADM

## 2023-01-01 RX ORDER — HYDROMORPHONE HCL IN WATER/PF 6 MG/30 ML
0.2 PATIENT CONTROLLED ANALGESIA SYRINGE INTRAVENOUS
Status: DISCONTINUED | OUTPATIENT
Start: 2023-01-01 | End: 2023-01-01 | Stop reason: HOSPADM

## 2023-01-01 RX ORDER — ONDANSETRON 4 MG/1
4 TABLET, ORALLY DISINTEGRATING ORAL EVERY 30 MIN PRN
Status: CANCELLED | OUTPATIENT
Start: 2023-01-01

## 2023-01-01 RX ORDER — CEFAZOLIN SODIUM 2 G/100ML
2 INJECTION, SOLUTION INTRAVENOUS SEE ADMIN INSTRUCTIONS
Status: DISCONTINUED | OUTPATIENT
Start: 2023-01-01 | End: 2023-01-01 | Stop reason: HOSPADM

## 2023-01-01 RX ORDER — NALOXONE HYDROCHLORIDE 0.4 MG/ML
0.2 INJECTION, SOLUTION INTRAMUSCULAR; INTRAVENOUS; SUBCUTANEOUS
Status: DISCONTINUED | OUTPATIENT
Start: 2023-01-01 | End: 2023-01-01 | Stop reason: HOSPADM

## 2023-01-01 RX ORDER — LATANOPROST 50 UG/ML
1 SOLUTION/ DROPS OPHTHALMIC AT BEDTIME
Qty: 7.5 ML | Refills: 3 | Status: SHIPPED | OUTPATIENT
Start: 2023-01-01

## 2023-01-01 RX ORDER — METHOCARBAMOL 500 MG/1
500 TABLET, FILM COATED ORAL EVERY 6 HOURS PRN
Status: DISCONTINUED | OUTPATIENT
Start: 2023-01-01 | End: 2023-01-01 | Stop reason: HOSPADM

## 2023-01-01 RX ORDER — FLUTICASONE PROPIONATE 50 MCG
SPRAY, SUSPENSION (ML) NASAL
Qty: 16 ML | Refills: 11 | Status: SHIPPED | OUTPATIENT
Start: 2023-01-01

## 2023-01-01 RX ORDER — NITROFURANTOIN 25; 75 MG/1; MG/1
100 CAPSULE ORAL 2 TIMES DAILY
Qty: 10 CAPSULE | Refills: 0 | Status: SHIPPED | OUTPATIENT
Start: 2023-01-01 | End: 2023-01-01

## 2023-01-01 RX ORDER — AMOXICILLIN 250 MG
1 CAPSULE ORAL 2 TIMES DAILY
Status: DISCONTINUED | OUTPATIENT
Start: 2023-01-01 | End: 2023-01-01

## 2023-01-01 RX ORDER — AMOXICILLIN 250 MG
1-2 CAPSULE ORAL 2 TIMES DAILY
Status: DISCONTINUED | OUTPATIENT
Start: 2023-01-01 | End: 2023-01-01 | Stop reason: HOSPADM

## 2023-01-01 RX ORDER — ROSUVASTATIN CALCIUM 5 MG/1
5 TABLET, COATED ORAL DAILY
Qty: 90 TABLET | Refills: 3 | Status: SHIPPED | OUTPATIENT
Start: 2023-01-01

## 2023-01-01 RX ORDER — IPRATROPIUM BROMIDE AND ALBUTEROL SULFATE 2.5; .5 MG/3ML; MG/3ML
3 SOLUTION RESPIRATORY (INHALATION)
Status: DISCONTINUED | OUTPATIENT
Start: 2023-01-01 | End: 2023-01-01 | Stop reason: HOSPADM

## 2023-01-01 RX ORDER — CALCIUM CARBONATE/VITAMIN D3 500MG-5MCG
TABLET ORAL
COMMUNITY

## 2023-01-01 RX ORDER — NALOXONE HYDROCHLORIDE 0.4 MG/ML
0.4 INJECTION, SOLUTION INTRAMUSCULAR; INTRAVENOUS; SUBCUTANEOUS
Status: DISCONTINUED | OUTPATIENT
Start: 2023-01-01 | End: 2023-01-01 | Stop reason: HOSPADM

## 2023-01-01 RX ORDER — ONDANSETRON 2 MG/ML
4 INJECTION INTRAMUSCULAR; INTRAVENOUS EVERY 30 MIN PRN
Status: DISCONTINUED | OUTPATIENT
Start: 2023-01-01 | End: 2023-01-01 | Stop reason: HOSPADM

## 2023-01-01 RX ORDER — ROSUVASTATIN CALCIUM 5 MG/1
TABLET, COATED ORAL
Qty: 90 TABLET | Refills: 0 | Status: SHIPPED | OUTPATIENT
Start: 2023-01-01 | End: 2023-01-01

## 2023-01-01 RX ORDER — HYDROXYZINE HYDROCHLORIDE 25 MG/1
25 TABLET, FILM COATED ORAL EVERY 8 HOURS PRN
Qty: 20 TABLET | Refills: 1 | Status: SHIPPED | OUTPATIENT
Start: 2023-01-01 | End: 2023-01-01

## 2023-01-01 RX ORDER — SODIUM CHLORIDE, SODIUM LACTATE, POTASSIUM CHLORIDE, CALCIUM CHLORIDE 600; 310; 30; 20 MG/100ML; MG/100ML; MG/100ML; MG/100ML
INJECTION, SOLUTION INTRAVENOUS CONTINUOUS
Status: DISCONTINUED | OUTPATIENT
Start: 2023-01-01 | End: 2023-01-01 | Stop reason: HOSPADM

## 2023-01-01 RX ORDER — CEFAZOLIN SODIUM/WATER 2 G/20 ML
2 SYRINGE (ML) INTRAVENOUS
Status: DISCONTINUED | OUTPATIENT
Start: 2023-01-01 | End: 2023-01-01

## 2023-01-01 RX ORDER — FENTANYL CITRATE 50 UG/ML
25 INJECTION, SOLUTION INTRAMUSCULAR; INTRAVENOUS EVERY 5 MIN PRN
Status: DISCONTINUED | OUTPATIENT
Start: 2023-01-01 | End: 2023-01-01 | Stop reason: HOSPADM

## 2023-01-01 RX ORDER — AZITHROMYCIN 250 MG/1
500 TABLET, FILM COATED ORAL
Status: DISCONTINUED | OUTPATIENT
Start: 2023-01-01 | End: 2023-01-01 | Stop reason: HOSPADM

## 2023-01-01 RX ORDER — PROCHLORPERAZINE 25 MG
12.5 SUPPOSITORY, RECTAL RECTAL EVERY 12 HOURS PRN
Status: DISCONTINUED | OUTPATIENT
Start: 2023-01-01 | End: 2023-01-01 | Stop reason: HOSPADM

## 2023-01-01 RX ORDER — LOSARTAN POTASSIUM 25 MG/1
TABLET ORAL
Qty: 45 TABLET | Refills: 3 | Status: SHIPPED | OUTPATIENT
Start: 2023-01-01

## 2023-01-01 RX ORDER — SODIUM CHLORIDE 9 MG/ML
INJECTION, SOLUTION INTRAVENOUS CONTINUOUS
Status: DISCONTINUED | OUTPATIENT
Start: 2023-01-01 | End: 2023-01-01

## 2023-01-01 RX ORDER — ASPIRIN 325 MG
325 TABLET, DELAYED RELEASE (ENTERIC COATED) ORAL AT BEDTIME
Status: DISCONTINUED | OUTPATIENT
Start: 2023-01-01 | End: 2023-01-01 | Stop reason: HOSPADM

## 2023-01-01 RX ORDER — ONDANSETRON 4 MG/1
4 TABLET, ORALLY DISINTEGRATING ORAL EVERY 6 HOURS PRN
Status: DISCONTINUED | OUTPATIENT
Start: 2023-01-01 | End: 2023-01-01

## 2023-01-01 RX ORDER — OXYCODONE HYDROCHLORIDE 5 MG/1
10 TABLET ORAL
Status: CANCELLED | OUTPATIENT
Start: 2023-01-01

## 2023-01-01 RX ORDER — TRAMADOL HYDROCHLORIDE 50 MG/1
50 TABLET ORAL EVERY 6 HOURS PRN
Status: DISCONTINUED | OUTPATIENT
Start: 2023-01-01 | End: 2023-01-01 | Stop reason: HOSPADM

## 2023-01-01 RX ORDER — ONDANSETRON 2 MG/ML
4 INJECTION INTRAMUSCULAR; INTRAVENOUS EVERY 30 MIN PRN
Status: CANCELLED | OUTPATIENT
Start: 2023-01-01

## 2023-01-01 RX ORDER — NICOTINE POLACRILEX 4 MG
15-30 LOZENGE BUCCAL
Status: DISCONTINUED | OUTPATIENT
Start: 2023-01-01 | End: 2023-01-01 | Stop reason: HOSPADM

## 2023-01-01 RX ORDER — ACETAMINOPHEN 325 MG/1
650 TABLET ORAL EVERY 4 HOURS PRN
Start: 2023-01-01

## 2023-01-01 RX ORDER — FENTANYL CITRATE 50 UG/ML
25-100 INJECTION, SOLUTION INTRAMUSCULAR; INTRAVENOUS
Status: DISCONTINUED | OUTPATIENT
Start: 2023-01-01 | End: 2023-01-01 | Stop reason: HOSPADM

## 2023-01-01 RX ORDER — FENTANYL CITRATE 50 UG/ML
50 INJECTION, SOLUTION INTRAMUSCULAR; INTRAVENOUS EVERY 5 MIN PRN
Status: DISCONTINUED | OUTPATIENT
Start: 2023-01-01 | End: 2023-01-01 | Stop reason: HOSPADM

## 2023-01-01 RX ORDER — PROPOFOL 10 MG/ML
INJECTION, EMULSION INTRAVENOUS CONTINUOUS PRN
Status: DISCONTINUED | OUTPATIENT
Start: 2023-01-01 | End: 2023-01-01

## 2023-01-01 RX ORDER — POLYETHYLENE GLYCOL 3350 17 G/17G
17 POWDER, FOR SOLUTION ORAL DAILY PRN
Status: DISCONTINUED | OUTPATIENT
Start: 2023-01-01 | End: 2023-01-01 | Stop reason: HOSPADM

## 2023-01-01 RX ORDER — ONDANSETRON 4 MG/1
4 TABLET, ORALLY DISINTEGRATING ORAL EVERY 30 MIN PRN
Status: DISCONTINUED | OUTPATIENT
Start: 2023-01-01 | End: 2023-01-01 | Stop reason: HOSPADM

## 2023-01-01 RX ORDER — LORATADINE 10 MG/1
10 TABLET ORAL DAILY PRN
Qty: 30 TABLET | Refills: 0 | Status: SHIPPED | OUTPATIENT
Start: 2023-01-01

## 2023-01-01 RX ORDER — METFORMIN HCL 500 MG
500 TABLET, EXTENDED RELEASE 24 HR ORAL
Status: DISCONTINUED | OUTPATIENT
Start: 2023-01-01 | End: 2023-01-01 | Stop reason: HOSPADM

## 2023-01-01 RX ORDER — ACETAMINOPHEN 325 MG/1
975 TABLET ORAL ONCE
Status: COMPLETED | OUTPATIENT
Start: 2023-01-01 | End: 2023-01-01

## 2023-01-01 RX ORDER — METFORMIN HCL 500 MG
500 TABLET, EXTENDED RELEASE 24 HR ORAL
Qty: 90 TABLET | Refills: 1 | Status: SHIPPED | OUTPATIENT
Start: 2023-01-01

## 2023-01-01 RX ORDER — OXYCODONE HYDROCHLORIDE 5 MG/1
2.5-5 TABLET ORAL EVERY 4 HOURS PRN
Qty: 10 TABLET | Refills: 0 | Status: SHIPPED | OUTPATIENT
Start: 2023-01-01

## 2023-01-01 RX ORDER — HYDROMORPHONE HCL IN WATER/PF 6 MG/30 ML
0.2 PATIENT CONTROLLED ANALGESIA SYRINGE INTRAVENOUS EVERY 5 MIN PRN
Status: DISCONTINUED | OUTPATIENT
Start: 2023-01-01 | End: 2023-01-01 | Stop reason: HOSPADM

## 2023-01-01 RX ORDER — PROCHLORPERAZINE MALEATE 5 MG
5 TABLET ORAL EVERY 6 HOURS PRN
Status: DISCONTINUED | OUTPATIENT
Start: 2023-01-01 | End: 2023-01-01 | Stop reason: HOSPADM

## 2023-01-01 RX ORDER — ROSUVASTATIN CALCIUM 5 MG/1
5 TABLET, COATED ORAL DAILY
Status: DISCONTINUED | OUTPATIENT
Start: 2023-01-01 | End: 2023-01-01 | Stop reason: HOSPADM

## 2023-01-01 RX ORDER — ACETAMINOPHEN 325 MG/1
650 TABLET ORAL EVERY 4 HOURS PRN
Status: DISCONTINUED | OUTPATIENT
Start: 2023-01-01 | End: 2023-01-01 | Stop reason: HOSPADM

## 2023-01-01 RX ORDER — PROCHLORPERAZINE MALEATE 5 MG
5 TABLET ORAL EVERY 6 HOURS PRN
Status: DISCONTINUED | OUTPATIENT
Start: 2023-01-01 | End: 2023-01-01

## 2023-01-01 RX ORDER — LIDOCAINE 40 MG/G
CREAM TOPICAL
Status: DISCONTINUED | OUTPATIENT
Start: 2023-01-01 | End: 2023-01-01 | Stop reason: HOSPADM

## 2023-01-01 RX ORDER — ONDANSETRON 2 MG/ML
4 INJECTION INTRAMUSCULAR; INTRAVENOUS EVERY 6 HOURS PRN
Status: DISCONTINUED | OUTPATIENT
Start: 2023-01-01 | End: 2023-01-01

## 2023-01-01 RX ORDER — KETAMINE HYDROCHLORIDE 10 MG/ML
INJECTION INTRAMUSCULAR; INTRAVENOUS PRN
Status: DISCONTINUED | OUTPATIENT
Start: 2023-01-01 | End: 2023-01-01

## 2023-01-01 RX ORDER — FLUTICASONE PROPIONATE 50 MCG
1 SPRAY, SUSPENSION (ML) NASAL DAILY
Qty: 16 G | Refills: 11 | Status: SHIPPED | OUTPATIENT
Start: 2023-01-01 | End: 2023-01-01

## 2023-01-01 RX ORDER — HYDROMORPHONE HYDROCHLORIDE 1 MG/ML
0.5 INJECTION, SOLUTION INTRAMUSCULAR; INTRAVENOUS; SUBCUTANEOUS
Status: DISCONTINUED | OUTPATIENT
Start: 2023-01-01 | End: 2023-01-01

## 2023-01-01 RX ORDER — SODIUM CHLORIDE, SODIUM LACTATE, POTASSIUM CHLORIDE, CALCIUM CHLORIDE 600; 310; 30; 20 MG/100ML; MG/100ML; MG/100ML; MG/100ML
1000 INJECTION, SOLUTION INTRAVENOUS CONTINUOUS
Status: DISCONTINUED | OUTPATIENT
Start: 2023-01-01 | End: 2023-01-01

## 2023-01-01 RX ORDER — ASPIRIN 325 MG
325 TABLET, DELAYED RELEASE (ENTERIC COATED) ORAL DAILY
Start: 2023-01-01

## 2023-01-01 RX ORDER — TRAMADOL HYDROCHLORIDE 50 MG/1
50 TABLET ORAL EVERY 6 HOURS PRN
Qty: 10 TABLET | Refills: 0 | Status: SHIPPED | OUTPATIENT
Start: 2023-01-01

## 2023-01-01 RX ORDER — CEFAZOLIN SODIUM 2 G/100ML
2 INJECTION, SOLUTION INTRAVENOUS EVERY 8 HOURS
Status: DISCONTINUED | OUTPATIENT
Start: 2023-01-01 | End: 2023-01-01 | Stop reason: HOSPADM

## 2023-01-01 RX ORDER — ONDANSETRON 2 MG/ML
4 INJECTION INTRAMUSCULAR; INTRAVENOUS EVERY 6 HOURS PRN
Status: DISCONTINUED | OUTPATIENT
Start: 2023-01-01 | End: 2023-01-01 | Stop reason: HOSPADM

## 2023-01-01 RX ORDER — POLYETHYLENE GLYCOL 3350 17 G/17G
17 POWDER, FOR SOLUTION ORAL DAILY
Status: DISCONTINUED | OUTPATIENT
Start: 2023-01-01 | End: 2023-01-01 | Stop reason: HOSPADM

## 2023-01-01 RX ORDER — FENTANYL CITRATE-0.9 % NACL/PF 10 MCG/ML
PLASTIC BAG, INJECTION (ML) INTRAVENOUS CONTINUOUS PRN
Status: DISCONTINUED | OUTPATIENT
Start: 2023-01-01 | End: 2023-01-01

## 2023-01-01 RX ORDER — BUPIVACAINE HYDROCHLORIDE 7.5 MG/ML
INJECTION, SOLUTION INTRASPINAL PRN
Status: DISCONTINUED | OUTPATIENT
Start: 2023-01-01 | End: 2023-01-01

## 2023-01-01 RX ORDER — CEFAZOLIN SODIUM 1 G/3ML
1 INJECTION, POWDER, FOR SOLUTION INTRAMUSCULAR; INTRAVENOUS EVERY 8 HOURS
Qty: 10 ML | Refills: 0 | Status: COMPLETED | OUTPATIENT
Start: 2023-01-01 | End: 2023-01-01

## 2023-01-01 RX ORDER — BUPIVACAINE HYDROCHLORIDE 5 MG/ML
INJECTION, SOLUTION EPIDURAL; INTRACAUDAL
Status: COMPLETED | OUTPATIENT
Start: 2023-01-01 | End: 2023-01-01

## 2023-01-01 RX ORDER — CEPHALEXIN 500 MG/1
500 CAPSULE ORAL 3 TIMES DAILY
Qty: 30 CAPSULE | Refills: 0 | Status: SHIPPED | OUTPATIENT
Start: 2023-01-01 | End: 2023-01-01 | Stop reason: ALTCHOICE

## 2023-01-01 RX ORDER — EPINEPHRINE 0.1 MG/ML
INJECTION INTRAVENOUS PRN
Status: DISCONTINUED | OUTPATIENT
Start: 2023-01-01 | End: 2023-01-01

## 2023-01-01 RX ORDER — FAMOTIDINE 20 MG/1
20 TABLET, FILM COATED ORAL 2 TIMES DAILY
Status: DISCONTINUED | OUTPATIENT
Start: 2023-01-01 | End: 2023-01-01 | Stop reason: HOSPADM

## 2023-01-01 RX ORDER — HYDROMORPHONE HCL IN WATER/PF 6 MG/30 ML
0.4 PATIENT CONTROLLED ANALGESIA SYRINGE INTRAVENOUS
Status: DISCONTINUED | OUTPATIENT
Start: 2023-01-01 | End: 2023-01-01 | Stop reason: HOSPADM

## 2023-01-01 RX ORDER — DEXTROSE MONOHYDRATE 25 G/50ML
25-50 INJECTION, SOLUTION INTRAVENOUS
Status: DISCONTINUED | OUTPATIENT
Start: 2023-01-01 | End: 2023-01-01 | Stop reason: HOSPADM

## 2023-01-01 RX ORDER — ONDANSETRON 4 MG/1
4 TABLET, ORALLY DISINTEGRATING ORAL EVERY 6 HOURS PRN
Status: DISCONTINUED | OUTPATIENT
Start: 2023-01-01 | End: 2023-01-01 | Stop reason: HOSPADM

## 2023-01-01 RX ORDER — HYDROMORPHONE HYDROCHLORIDE 1 MG/ML
0.3 INJECTION, SOLUTION INTRAMUSCULAR; INTRAVENOUS; SUBCUTANEOUS
Status: DISCONTINUED | OUTPATIENT
Start: 2023-01-01 | End: 2023-01-01

## 2023-01-01 RX ORDER — ALBUTEROL SULFATE 1.25 MG/3ML
1.25 SOLUTION RESPIRATORY (INHALATION) EVERY 6 HOURS PRN
Qty: 75 ML | Refills: 11 | Status: SHIPPED | OUTPATIENT
Start: 2023-01-01

## 2023-01-01 RX ORDER — SULFAMETHOXAZOLE/TRIMETHOPRIM 800-160 MG
1 TABLET ORAL 2 TIMES DAILY
Qty: 20 TABLET | Refills: 0 | Status: SHIPPED | OUTPATIENT
Start: 2023-01-01 | End: 2023-01-01

## 2023-01-01 RX ORDER — TRANEXAMIC ACID 650 MG/1
1950 TABLET ORAL ONCE
Status: DISCONTINUED | OUTPATIENT
Start: 2023-01-01 | End: 2023-01-01 | Stop reason: HOSPADM

## 2023-01-01 RX ORDER — EPHEDRINE SULFATE 50 MG/ML
INJECTION, SOLUTION INTRAMUSCULAR; INTRAVENOUS; SUBCUTANEOUS PRN
Status: DISCONTINUED | OUTPATIENT
Start: 2023-01-01 | End: 2023-01-01

## 2023-01-01 RX ORDER — AZITHROMYCIN 250 MG/1
TABLET, FILM COATED ORAL
Qty: 36 TABLET | Refills: 3 | Status: SHIPPED | OUTPATIENT
Start: 2023-01-01 | End: 2023-01-01

## 2023-01-01 RX ORDER — DEXAMETHASONE SODIUM PHOSPHATE 4 MG/ML
INJECTION, SOLUTION INTRA-ARTICULAR; INTRALESIONAL; INTRAMUSCULAR; INTRAVENOUS; SOFT TISSUE PRN
Status: DISCONTINUED | OUTPATIENT
Start: 2023-01-01 | End: 2023-01-01

## 2023-01-01 RX ORDER — HYDROMORPHONE HCL IN WATER/PF 6 MG/30 ML
0.2 PATIENT CONTROLLED ANALGESIA SYRINGE INTRAVENOUS ONCE
Status: COMPLETED | OUTPATIENT
Start: 2023-01-01 | End: 2023-01-01

## 2023-01-01 RX ORDER — ACETAMINOPHEN 325 MG/1
975 TABLET ORAL EVERY 8 HOURS
Qty: 27 TABLET | Refills: 0 | Status: DISCONTINUED | OUTPATIENT
Start: 2023-01-01 | End: 2023-01-01 | Stop reason: HOSPADM

## 2023-01-01 RX ORDER — ONDANSETRON 2 MG/ML
INJECTION INTRAMUSCULAR; INTRAVENOUS PRN
Status: DISCONTINUED | OUTPATIENT
Start: 2023-01-01 | End: 2023-01-01

## 2023-01-01 RX ORDER — FLUTICASONE FUROATE, UMECLIDINIUM BROMIDE AND VILANTEROL TRIFENATATE 200; 62.5; 25 UG/1; UG/1; UG/1
1 POWDER RESPIRATORY (INHALATION) DAILY
Qty: 60 EACH | Refills: 11 | Status: SHIPPED | OUTPATIENT
Start: 2023-01-01 | End: 2023-01-01

## 2023-01-01 RX ORDER — OXYCODONE HYDROCHLORIDE 5 MG/1
5 TABLET ORAL
Status: CANCELLED | OUTPATIENT
Start: 2023-01-01

## 2023-01-01 RX ORDER — DIPHENHYDRAMINE HCL 12.5 MG/5ML
12.5 SOLUTION ORAL EVERY 6 HOURS PRN
Status: DISCONTINUED | OUTPATIENT
Start: 2023-01-01 | End: 2023-01-01 | Stop reason: HOSPADM

## 2023-01-01 RX ADMIN — ASPIRIN 325 MG: 325 TABLET ORAL at 22:15

## 2023-01-01 RX ADMIN — Medication 5 MG: at 10:51

## 2023-01-01 RX ADMIN — SENNOSIDES AND DOCUSATE SODIUM 1 TABLET: 8.6; 5 TABLET ORAL at 19:52

## 2023-01-01 RX ADMIN — OXYCODONE HYDROCHLORIDE 2.5 MG: 5 TABLET ORAL at 11:40

## 2023-01-01 RX ADMIN — FAMOTIDINE 20 MG: 20 TABLET, FILM COATED ORAL at 08:36

## 2023-01-01 RX ADMIN — FAMOTIDINE 20 MG: 20 TABLET, FILM COATED ORAL at 19:53

## 2023-01-01 RX ADMIN — ACETAMINOPHEN 975 MG: 325 TABLET, FILM COATED ORAL at 17:24

## 2023-01-01 RX ADMIN — LOSARTAN POTASSIUM 12.5 MG: 25 TABLET, FILM COATED ORAL at 09:32

## 2023-01-01 RX ADMIN — IPRATROPIUM BROMIDE AND ALBUTEROL SULFATE 3 ML: 2.5; .5 SOLUTION RESPIRATORY (INHALATION) at 18:03

## 2023-01-01 RX ADMIN — ACETAMINOPHEN 975 MG: 325 TABLET, FILM COATED ORAL at 22:48

## 2023-01-01 RX ADMIN — METFORMIN HYDROCHLORIDE 500 MG: 500 TABLET, EXTENDED RELEASE ORAL at 17:24

## 2023-01-01 RX ADMIN — DEXAMETHASONE SODIUM PHOSPHATE 4 MG: 4 INJECTION, SOLUTION INTRA-ARTICULAR; INTRALESIONAL; INTRAMUSCULAR; INTRAVENOUS; SOFT TISSUE at 11:11

## 2023-01-01 RX ADMIN — SENNOSIDES AND DOCUSATE SODIUM 1 TABLET: 8.6; 5 TABLET ORAL at 22:48

## 2023-01-01 RX ADMIN — SODIUM CHLORIDE: 9 INJECTION, SOLUTION INTRAVENOUS at 18:56

## 2023-01-01 RX ADMIN — IPRATROPIUM BROMIDE AND ALBUTEROL SULFATE 3 ML: 2.5; .5 SOLUTION RESPIRATORY (INHALATION) at 12:14

## 2023-01-01 RX ADMIN — IPRATROPIUM BROMIDE AND ALBUTEROL SULFATE 3 ML: 2.5; .5 SOLUTION RESPIRATORY (INHALATION) at 20:22

## 2023-01-01 RX ADMIN — PHENYLEPHRINE HYDROCHLORIDE 100 MCG: 10 INJECTION INTRAVENOUS at 10:32

## 2023-01-01 RX ADMIN — PHENYLEPHRINE HYDROCHLORIDE 100 MCG: 10 INJECTION INTRAVENOUS at 10:42

## 2023-01-01 RX ADMIN — FAMOTIDINE 20 MG: 20 TABLET, FILM COATED ORAL at 08:56

## 2023-01-01 RX ADMIN — INSULIN ASPART 1 UNITS: 100 INJECTION, SOLUTION INTRAVENOUS; SUBCUTANEOUS at 17:23

## 2023-01-01 RX ADMIN — CEFAZOLIN SODIUM 2 G: 2 INJECTION, SOLUTION INTRAVENOUS at 10:07

## 2023-01-01 RX ADMIN — EPINEPHRINE 200 MCG: 0.1 INJECTION INTRACARDIAC; INTRAVENOUS at 10:19

## 2023-01-01 RX ADMIN — ACETAMINOPHEN 975 MG: 325 TABLET, FILM COATED ORAL at 09:13

## 2023-01-01 RX ADMIN — MIDAZOLAM 0.5 MG: 1 INJECTION INTRAMUSCULAR; INTRAVENOUS at 10:09

## 2023-01-01 RX ADMIN — PHENYLEPHRINE HYDROCHLORIDE 100 MCG: 10 INJECTION INTRAVENOUS at 11:13

## 2023-01-01 RX ADMIN — SODIUM CHLORIDE, POTASSIUM CHLORIDE, SODIUM LACTATE AND CALCIUM CHLORIDE 1000 ML: 600; 310; 30; 20 INJECTION, SOLUTION INTRAVENOUS at 13:28

## 2023-01-01 RX ADMIN — CEFAZOLIN 1 G: 1 INJECTION, POWDER, FOR SOLUTION INTRAMUSCULAR; INTRAVENOUS at 01:57

## 2023-01-01 RX ADMIN — SENNOSIDES AND DOCUSATE SODIUM 1 TABLET: 8.6; 5 TABLET ORAL at 08:56

## 2023-01-01 RX ADMIN — Medication 5 MG: at 10:47

## 2023-01-01 RX ADMIN — SERTRALINE HYDROCHLORIDE 50 MG: 50 TABLET ORAL at 08:56

## 2023-01-01 RX ADMIN — SODIUM CHLORIDE, POTASSIUM CHLORIDE, SODIUM LACTATE AND CALCIUM CHLORIDE 1000 ML: 600; 310; 30; 20 INJECTION, SOLUTION INTRAVENOUS at 01:12

## 2023-01-01 RX ADMIN — Medication 1 MG: at 22:16

## 2023-01-01 RX ADMIN — Medication 1 MG: at 22:48

## 2023-01-01 RX ADMIN — PROPOFOL 50 MCG/KG/MIN: 10 INJECTION, EMULSION INTRAVENOUS at 10:26

## 2023-01-01 RX ADMIN — POLYETHYLENE GLYCOL 3350 17 G: 17 POWDER, FOR SOLUTION ORAL at 08:57

## 2023-01-01 RX ADMIN — ONDANSETRON 4 MG: 2 INJECTION INTRAMUSCULAR; INTRAVENOUS at 11:40

## 2023-01-01 RX ADMIN — SENNOSIDES AND DOCUSATE SODIUM 1 TABLET: 8.6; 5 TABLET ORAL at 08:36

## 2023-01-01 RX ADMIN — ACETAMINOPHEN 975 MG: 325 TABLET, FILM COATED ORAL at 17:23

## 2023-01-01 RX ADMIN — SODIUM CHLORIDE, POTASSIUM CHLORIDE, SODIUM LACTATE AND CALCIUM CHLORIDE: 600; 310; 30; 20 INJECTION, SOLUTION INTRAVENOUS at 09:12

## 2023-01-01 RX ADMIN — SODIUM CHLORIDE: 9 INJECTION, SOLUTION INTRAVENOUS at 00:15

## 2023-01-01 RX ADMIN — SODIUM CHLORIDE: 9 INJECTION, SOLUTION INTRAVENOUS at 14:08

## 2023-01-01 RX ADMIN — CEFAZOLIN 1 G: 1 INJECTION, POWDER, FOR SOLUTION INTRAMUSCULAR; INTRAVENOUS at 17:24

## 2023-01-01 RX ADMIN — POLYETHYLENE GLYCOL 3350 17 G: 17 POWDER, FOR SOLUTION ORAL at 08:36

## 2023-01-01 RX ADMIN — HYDROMORPHONE HYDROCHLORIDE 0.5 MG: 1 INJECTION, SOLUTION INTRAMUSCULAR; INTRAVENOUS; SUBCUTANEOUS at 00:39

## 2023-01-01 RX ADMIN — DIPHENHYDRAMINE HYDROCHLORIDE 12.5 MG: 12.5 LIQUID ORAL at 22:16

## 2023-01-01 RX ADMIN — IPRATROPIUM BROMIDE AND ALBUTEROL SULFATE 3 ML: 2.5; .5 SOLUTION RESPIRATORY (INHALATION) at 08:11

## 2023-01-01 RX ADMIN — BUPIVACAINE HYDROCHLORIDE 20 ML: 5 INJECTION, SOLUTION EPIDURAL; INTRACAUDAL; PERINEURAL at 18:24

## 2023-01-01 RX ADMIN — AZITHROMYCIN DIHYDRATE 500 MG: 250 TABLET, FILM COATED ORAL at 19:52

## 2023-01-01 RX ADMIN — Medication 5 MG: at 11:13

## 2023-01-01 RX ADMIN — INSULIN ASPART 1 UNITS: 100 INJECTION, SOLUTION INTRAVENOUS; SUBCUTANEOUS at 08:57

## 2023-01-01 RX ADMIN — PHENYLEPHRINE HYDROCHLORIDE 100 MCG: 10 INJECTION INTRAVENOUS at 10:51

## 2023-01-01 RX ADMIN — SENNOSIDES AND DOCUSATE SODIUM 1 TABLET: 8.6; 5 TABLET ORAL at 19:53

## 2023-01-01 RX ADMIN — HYDROMORPHONE HYDROCHLORIDE 0.2 MG: 0.2 INJECTION, SOLUTION INTRAMUSCULAR; INTRAVENOUS; SUBCUTANEOUS at 16:12

## 2023-01-01 RX ADMIN — AZITHROMYCIN DIHYDRATE 500 MG: 250 TABLET, FILM COATED ORAL at 22:48

## 2023-01-01 RX ADMIN — BUPIVACAINE HYDROCHLORIDE IN DEXTROSE 1.6 ML: 7.5 INJECTION, SOLUTION SUBARACHNOID at 10:19

## 2023-01-01 RX ADMIN — ROSUVASTATIN CALCIUM 5 MG: 5 TABLET, FILM COATED ORAL at 08:36

## 2023-01-01 RX ADMIN — SERTRALINE HYDROCHLORIDE 50 MG: 50 TABLET ORAL at 08:36

## 2023-01-01 RX ADMIN — IPRATROPIUM BROMIDE AND ALBUTEROL SULFATE 3 ML: 2.5; .5 SOLUTION RESPIRATORY (INHALATION) at 11:27

## 2023-01-01 RX ADMIN — LOSARTAN POTASSIUM 12.5 MG: 25 TABLET, FILM COATED ORAL at 08:40

## 2023-01-01 RX ADMIN — TRANEXAMIC ACID 1 G: 1 INJECTION, SOLUTION INTRAVENOUS at 10:25

## 2023-01-01 RX ADMIN — ACETAMINOPHEN 975 MG: 325 TABLET, FILM COATED ORAL at 00:20

## 2023-01-01 RX ADMIN — KETAMINE HYDROCHLORIDE 10 MG: 10 INJECTION INTRAMUSCULAR; INTRAVENOUS at 11:00

## 2023-01-01 RX ADMIN — HYDROMORPHONE HYDROCHLORIDE 0.5 MG: 1 INJECTION, SOLUTION INTRAMUSCULAR; INTRAVENOUS; SUBCUTANEOUS at 08:37

## 2023-01-01 RX ADMIN — KETAMINE HYDROCHLORIDE 40 MG: 10 INJECTION INTRAMUSCULAR; INTRAVENOUS at 10:10

## 2023-01-01 RX ADMIN — FAMOTIDINE 20 MG: 20 TABLET, FILM COATED ORAL at 19:52

## 2023-01-01 RX ADMIN — OXYCODONE HYDROCHLORIDE 5 MG: 5 TABLET ORAL at 19:52

## 2023-01-01 RX ADMIN — ROSUVASTATIN CALCIUM 5 MG: 5 TABLET, FILM COATED ORAL at 08:57

## 2023-01-01 RX ADMIN — ACETAMINOPHEN 975 MG: 325 TABLET, FILM COATED ORAL at 08:56

## 2023-01-01 RX ADMIN — INSULIN ASPART 1 UNITS: 100 INJECTION, SOLUTION INTRAVENOUS; SUBCUTANEOUS at 17:25

## 2023-01-01 RX ADMIN — PHENYLEPHRINE HYDROCHLORIDE 100 MCG: 10 INJECTION INTRAVENOUS at 11:04

## 2023-01-01 RX ADMIN — INSULIN ASPART 1 UNITS: 100 INJECTION, SOLUTION INTRAVENOUS; SUBCUTANEOUS at 12:54

## 2023-01-01 RX ADMIN — ASPIRIN 325 MG: 325 TABLET ORAL at 21:58

## 2023-01-01 RX ADMIN — ACETAMINOPHEN 975 MG: 325 TABLET, FILM COATED ORAL at 08:36

## 2023-01-01 RX ADMIN — Medication 40 MCG/MIN: at 11:26

## 2023-01-01 RX ADMIN — ACETAMINOPHEN 975 MG: 325 TABLET, FILM COATED ORAL at 01:09

## 2023-01-01 ASSESSMENT — ANXIETY QUESTIONNAIRES
2. NOT BEING ABLE TO STOP OR CONTROL WORRYING: NOT AT ALL
GAD7 TOTAL SCORE: 0
GAD7 TOTAL SCORE: 0
5. BEING SO RESTLESS THAT IT IS HARD TO SIT STILL: NOT AT ALL
6. BECOMING EASILY ANNOYED OR IRRITABLE: NOT AT ALL
7. FEELING AFRAID AS IF SOMETHING AWFUL MIGHT HAPPEN: NOT AT ALL
3. WORRYING TOO MUCH ABOUT DIFFERENT THINGS: NOT AT ALL
1. FEELING NERVOUS, ANXIOUS, OR ON EDGE: NOT AT ALL

## 2023-01-01 ASSESSMENT — ACTIVITIES OF DAILY LIVING (ADL)
ADLS_ACUITY_SCORE: 31
ADLS_ACUITY_SCORE: 27
ADLS_ACUITY_SCORE: 28
DOING_ERRANDS_INDEPENDENTLY_DIFFICULTY: NO
ADLS_ACUITY_SCORE: 31
ADLS_ACUITY_SCORE: 27
PATIENT'S_PREFERRED_MEANS_OF_COMMUNICATION: ENGLISH SPEAKER WITH HEARING LOSS, NO SPEECH PROBLEMS.
NUMBER_OF_TIMES_PATIENT_HAS_FALLEN_WITHIN_LAST_SIX_MONTHS: 1
DIFFICULTY_EATING/SWALLOWING: NO
ADLS_ACUITY_SCORE: 28
ADLS_ACUITY_SCORE: 27
TOILETING_ISSUES: YES
ADLS_ACUITY_SCORE: 27
CHANGE_IN_FUNCTIONAL_STATUS_SINCE_ONSET_OF_CURRENT_ILLNESS/INJURY: NO
ADLS_ACUITY_SCORE: 35
ADLS_ACUITY_SCORE: 27
VISION_MANAGEMENT: GLASSES
ADLS_ACUITY_SCORE: 27
ADLS_ACUITY_SCORE: 35
ADLS_ACUITY_SCORE: 28
ADLS_ACUITY_SCORE: 31
DEPENDENT_IADLS:: INDEPENDENT
ADLS_ACUITY_SCORE: 27
FALL_HISTORY_WITHIN_LAST_SIX_MONTHS: YES
ADLS_ACUITY_SCORE: 28
CONCENTRATING,_REMEMBERING_OR_MAKING_DECISIONS_DIFFICULTY: NO
ADLS_ACUITY_SCORE: 27
WALKING_OR_CLIMBING_STAIRS_DIFFICULTY: YES
ADLS_ACUITY_SCORE: 27
ADLS_ACUITY_SCORE: 35
ADLS_ACUITY_SCORE: 31
DIFFICULTY_COMMUNICATING: NO
WEAR_GLASSES_OR_BLIND: YES
TOILETING_MANAGEMENT: OCCASIONAL INCONTINENCE
ADLS_ACUITY_SCORE: 28
CURRENT_FUNCTION: NO ASSISTANCE NEEDED
ADLS_ACUITY_SCORE: 27
EQUIPMENT_CURRENTLY_USED_AT_HOME: WALKER, ROLLING;CANE, QUAD
ADLS_ACUITY_SCORE: 27
ADLS_ACUITY_SCORE: 27
WALKING_OR_CLIMBING_STAIRS: AMBULATION DIFFICULTY, REQUIRES EQUIPMENT
THE_FOLLOWING_AIDS_WERE_PROVIDED;: POCKET TALKER
WERE_AUXILIARY_AIDS_OFFERED?: YES
HEARING_DIFFICULTY_OR_DEAF: YES
DEPENDENT_IADLS:: INDEPENDENT
DRESSING/BATHING_DIFFICULTY: NO
ADLS_ACUITY_SCORE: 31
ADLS_ACUITY_SCORE: 35
ADLS_ACUITY_SCORE: 27
ADLS_ACUITY_SCORE: 27
DESCRIBE_HEARING_LOSS: BILATERAL HEARING LOSS
ADLS_ACUITY_SCORE: 28
ADLS_ACUITY_SCORE: 26
ADLS_ACUITY_SCORE: 26
ADLS_ACUITY_SCORE: 35
ADLS_ACUITY_SCORE: 31
ADLS_ACUITY_SCORE: 27
ADLS_ACUITY_SCORE: 27

## 2023-01-01 ASSESSMENT — VISUAL ACUITY
OD_CC: 20/40
OS_CC: J5
OS_CC: 20/50
OD_PH_SC: 20/50
OS_SC: 20/50
OS_PH_SC: 20/50
OD_SC: 20/100
METHOD: SNELLEN - LINEAR
CORRECTION_TYPE: GLASSES
OD_CC: J3
OS_SC+: -1

## 2023-01-01 ASSESSMENT — COPD QUESTIONNAIRES
COPD: 1
COPD: 1

## 2023-01-01 ASSESSMENT — REFRACTION_WEARINGRX
OS_AXIS: 001
OD_SPHERE: -1.00
OD_SPHERE: +1.25
OS_CYLINDER: +0.50
OS_SPHERE: +1.75
SPECS_TYPE: SVL;DIST
SPECS_TYPE: SVL;RDNG
OD_CYLINDER: +1.25
OS_SPHERE: -0.25
OD_AXIS: 001
OD_CYLINDER: +1.25
OS_CYLINDER: +0.75
OD_AXIS: 175
OS_AXIS: 001

## 2023-01-01 ASSESSMENT — CONF VISUAL FIELD
OD_INFERIOR_TEMPORAL_RESTRICTION: 0
OS_INFERIOR_NASAL_RESTRICTION: 0
OD_SUPERIOR_NASAL_RESTRICTION: 0
OD_NORMAL: 1
OD_SUPERIOR_TEMPORAL_RESTRICTION: 0
OS_SUPERIOR_TEMPORAL_RESTRICTION: 0
OS_INFERIOR_TEMPORAL_RESTRICTION: 0
OS_SUPERIOR_NASAL_RESTRICTION: 0
OD_INFERIOR_NASAL_RESTRICTION: 0
OS_NORMAL: 1

## 2023-01-01 ASSESSMENT — CUP TO DISC RATIO
OS_RATIO: 0.5
OD_RATIO: 0.45

## 2023-01-01 ASSESSMENT — PATIENT HEALTH QUESTIONNAIRE - PHQ9
5. POOR APPETITE OR OVEREATING: NOT AT ALL
SUM OF ALL RESPONSES TO PHQ QUESTIONS 1-9: 6
SUM OF ALL RESPONSES TO PHQ QUESTIONS 1-9: 6
SUM OF ALL RESPONSES TO PHQ QUESTIONS 1-9: 1
SUM OF ALL RESPONSES TO PHQ QUESTIONS 1-9: 0
10. IF YOU CHECKED OFF ANY PROBLEMS, HOW DIFFICULT HAVE THESE PROBLEMS MADE IT FOR YOU TO DO YOUR WORK, TAKE CARE OF THINGS AT HOME, OR GET ALONG WITH OTHER PEOPLE: NOT DIFFICULT AT ALL

## 2023-01-01 ASSESSMENT — ENCOUNTER SYMPTOMS
CONSTIPATION: 1
SHORTNESS OF BREATH: 1
HEADACHES: 0
WEAKNESS: 1
FREQUENCY: 1
DIARRHEA: 0
DYSURIA: 0
HEMATURIA: 0
FEVER: 0
HEARTBURN: 0
EYE PAIN: 0
ARTHRALGIAS: 0
HEMATOCHEZIA: 0
ABDOMINAL PAIN: 0
NAUSEA: 0
SORE THROAT: 0
PARESTHESIAS: 0
NERVOUS/ANXIOUS: 0
MYALGIAS: 0
DIZZINESS: 1
COUGH: 0
JOINT SWELLING: 0
BREAST MASS: 0
CHILLS: 0
PALPITATIONS: 0

## 2023-01-01 ASSESSMENT — REFRACTION_MANIFEST
OD_SPHERE: -1.25
OS_AXIS: 176
OS_CYLINDER: +0.75
OD_CYLINDER: +1.25
OS_SPHERE: -0.75
OD_ADD: +2.50
OD_AXIS: 001
OS_ADD: +2.50

## 2023-01-01 ASSESSMENT — PAIN SCALES - GENERAL
PAINLEVEL: NO PAIN (0)

## 2023-01-01 ASSESSMENT — TONOMETRY
OD_IOP_MMHG: 09
OS_IOP_MMHG: 13
IOP_METHOD: APPLANATION

## 2023-01-01 ASSESSMENT — EXTERNAL EXAM - LEFT EYE: OS_EXAM: NORMAL

## 2023-01-01 ASSESSMENT — SLIT LAMP EXAM - LIDS
COMMENTS: 1+ MEIBOMIAN GLAND DYSFUNCTION
COMMENTS: 1+ MEIBOMIAN GLAND DYSFUNCTION

## 2023-01-01 ASSESSMENT — EXTERNAL EXAM - RIGHT EYE: OD_EXAM: NORMAL

## 2023-01-01 ASSESSMENT — LIFESTYLE VARIABLES: TOBACCO_USE: 1

## 2023-01-02 NOTE — PROGRESS NOTES
Discharge Note    Progress reporting period is from initial evaluation date (please see noted date below) to Nov 9, 2022.  Linked Episodes   Type: Episode: Status: Noted: Resolved: Last update: Updated by:   PHYSICAL THERAPY mobility/gait/balance [id number] Active 9/23/2022 11/9/2022  9:20 AM Renee Humphrey, PT      Comments:       Ludy failed to follow up and current status is unknown.  Please see information below for last relevant information on current status.  Patient seen for 4 visits.    SUBJECTIVE  Subjective changes noted by patient:  Feels like her balance has not yet changed. Has a hard time pinpointing what specific activities are hard for her at home. Wondering if she needs a different shoe or should go to the neurologist. Doing exercises daily.  .  Current pain level is  .     Previous pain level was   .   Changes in function:  Yes (See Goal flowsheet attached for changes in current functional level)  Adverse reaction to treatment or activity: None    OBJECTIVE  Changes noted in objective findings: Able to step up to airex with no assist LLE lead. Unable to do so RLE lead.     ASSESSMENT/PLAN  Diagnosis: mobility deficits   Updated problem list and treatment plan:   Pain - HEP  Impaired gait - HEP  Impaired balance - HEP  STG/LTGs have been met or progress has been made towards goals:  Yes, please see goal flowsheet for most current information  Assessment of Progress: current status is unknown.    Last current status: Pt is progressing as expected   Self Management Plans:  HEP  I have re-evaluated this patient and find that the nature, scope, duration and intensity of the therapy is appropriate for the medical condition of the patient.  Ludy continues to require the following intervention to meet STG and LTG's:  HEP.    Recommendations:  Discharge with current home program.  Patient to follow up with MD as needed.    Please refer to the daily flowsheet for treatment today, total treatment time and time  spent performing 1:1 timed codes.

## 2023-01-06 NOTE — PATIENT INSTRUCTIONS
Stop clobetasol cream    Use loratadine  ( claritin ) one pill daily for a couple  weeks    Use  hydroxyzine  as needed for itching    Moisturizing lotion    Try not to scratch/ itch    Follow up as needed based on symptoms     Be seen promptly if symptoms acutely worsen

## 2023-01-06 NOTE — PROGRESS NOTES
"       Britni Boston is a 82 year old , presenting for the following health issues:  Derm Problem      HPI     Possible allergic reaction per patient  Itchy scalp and face       Review of Systems   3 days ago started    Itch all over head    Not elsewhere    Never had this before    Went to [email or web address] shop    About 5ish days    They cut hair and put stuff in hair    Became very itchy on scalp     Bothersome around eyes and on cheeks    No new breathing problems    No change in meds    No change in soaps, detergent, shampoo, etc     Had some clobetasol  Cream  At  Home left over , used that on ears            Objective    BP (!) 149/68 (BP Location: Left arm, Patient Position: Chair, Cuff Size: Adult Small)   Pulse 88   Temp 97  F (36.1  C) (Temporal)   Resp 20   Ht 1.651 m (5' 5\")   Wt 58.1 kg (128 lb)   SpO2 97%   BMI 21.30 kg/m    Body mass index is 21.3 kg/m .  Physical Exam  Constitutional:       Appearance: She is well-developed.   HENT:      Head: Normocephalic and atraumatic.      Right Ear: Tympanic membrane, ear canal and external ear normal.      Left Ear: Tympanic membrane, ear canal and external ear normal.      Nose: Nose normal.      Mouth/Throat:      Mouth: Mucous membranes are moist.      Pharynx: Oropharynx is clear.   Eyes:      Conjunctiva/sclera: Conjunctivae normal.   Neck:      Vascular: No carotid bruit.   Cardiovascular:      Rate and Rhythm: Normal rate and regular rhythm.      Heart sounds: Normal heart sounds.   Pulmonary:      Effort: Pulmonary effort is normal. No respiratory distress.      Breath sounds: Normal breath sounds.   Neurological:      Mental Status: She is alert and oriented to person, place, and time.      scalp is normal  Patient has bit of slightly rough skin on left cheek and lateral to left eye  No sinus/ submandib tenderness    No rashes seen                ASSESSMENT / PLAN:  (L29.9) Itching  (primary encounter diagnosis)  Comment: use this prn, warned of " sedation   Plan: hydrOXYzine (ATARAX) 25 MG tablet             (T78.40XA) Allergic reaction, initial encounter  Comment: use this daily for a couple weeks  Plan: loratadine (CLARITIN) 10 MG tablet             Symptoms were most likely from the treatment she got on hair at the new beauty shop. She will be sure to avoid that particular treatment.       I reviewed the patient's medications, allergies, medical history, family history, and social history.    Agapito Savage MD

## 2023-01-30 NOTE — TELEPHONE ENCOUNTER
[facility] Call Center    Phone Message    May a detailed message be left on voicemail: yes     Reason for Call: Medication Refill Request    Has the patient contacted the pharmacy for the refill? Yes   Name of medication being requested: Flonase  Provider who prescribed the medication: Dr. Gr  Pharmacy:      [id number] - [place], [place] - [address].      Date medication is needed: As soon as possible.         Action Taken: Message routed to:  Adult Clinics: Pulmonology p [id number]    Travel Screening: Not Applicable

## 2023-01-30 NOTE — TELEPHONE ENCOUNTER
Flonase is not active on patient's medication list. Rx for Flonase pended and routed to Dr. Gr for review/signautre.    Leeann Dye LPN  Pulmonary Medicine:  [facility]  Phone: [phone] Fax: [phone]

## 2023-02-02 NOTE — PROGRESS NOTES
"  Assessment & Plan     (N30.00) Acute cystitis without hematuria  (primary encounter diagnosis)  Comment: no signs of pyelo, she was unable to deposit specimen during visit  Plan: nitroFURantoin macrocrystal-monohydrate         (MACROBID) 100 MG capsule        Empiric treatment pending labs and reflex culture    (R30.9) Painful urination  Plan: UA with Microscopic reflex to Culture - Clinic         Collect                           No follow-ups on file.    CHRISTIE MARTINEZ MD  [facility] GRACY Boston is a 82 year old, presenting for the following health issues:  UTI      HPI     Genitourinary - Female  Onset/Duration: 1 week  Description:   Painful urination (Dysuria): YES           Frequency: YES  Blood in urine (Hematuria): No  Delay in urine (Hesitency): YES  Intensity: mild  Progression of Symptoms:  same  Accompanying Signs & Symptoms:  Fever/chills: No  Flank pain: YES  Nausea and vomiting: No  Vaginal symptoms: none  Abdominal/Pelvic Pain: No  History:   History of frequent UTI s: YES  History of kidney stones: No  Sexually Active: No  Possibility of pregnancy: No  Precipitating or alleviating factors: None  Therapies tried and outcome:  none         Review of Systems   See above      Objective    BP 97/65 (BP Location: Right arm, Patient Position: Chair, Cuff Size: Adult Small)   Pulse 104   Temp 98.2  F (36.8  C) (Oral)   Resp 18   Ht 1.651 m (5' 5\")   Wt 58.1 kg (128 lb)   SpO2 96%   BMI 21.30 kg/m    Body mass index is 21.3 kg/m .  Physical Exam  Constitutional:       General: She is not in acute distress.     Appearance: She is not ill-appearing.   Neurological:      Mental Status: She is alert.                            "

## 2023-02-24 NOTE — PROGRESS NOTES
Please let Ms. 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 Currently her disease isn't active. Now this doesn't necessarily mean it won't become active. Regardless, there is no need for her to go back on Cellcept right now.    She should see Dr. Manrique again in 6 months to follow up on the Bullous Pemphigoid.    Austin Garsia MD, MS    Department of Dermatology  [facility] [place]: Phone: [phone], Fax:[phone]  [facility]: Phone: [phone], Fax: [phone]   Fall with Harm Risk

## 2023-03-22 NOTE — PROGRESS NOTES
"Ludy Mckenzie's goals for this visit include: Return  She requests these members of her care team be copied on today's visit information: PCP    PCP: Aida Crowder    Referring Provider:  No referring provider defined for this encounter.    /78   Pulse 74   Resp 18   Ht 1.651 m (5' 5\")   Wt 57.2 kg (126 lb)   SpO2 98%   BMI 20.97 kg/m      Do you need any medication refills at today's visit? Y        Leeann Dye LPN  Pulmonary Medicine:  [facility]  Phone: [phone] Fax: [phone]      "
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
## 2023-03-23 NOTE — CONFIDENTIAL NOTE
Left Voicemail (1st Attempt) for the patient to call back and schedule the following:    Appointment type: return  Provider:   Return date: 03/22/2024  Specialty phone number: [phone]  Additional appointment(s) needed: none  Additonal Notes: follow up in one year (around 03/22/2024)      Fany guthrie Procedure   Cardiology, Nephrology, Rheumatology, GI, Pulmonology, Infectious Disease Specialties   [facility]

## 2023-04-04 NOTE — PROGRESS NOTES
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)  Labs pending   Will review  - Basic metabolic panel  (Ca, Cl, CO2, Creat, Gluc, K, Na, BUN); Future  - metFORMIN (GLUCOPHAGE XR) 500 MG 24 hr tablet; Take 1 tablet (500 mg) by mouth daily (with dinner)    Diabetic neuropathy with neurologic complication (H)  Wears Good shoes  - HEMOGLOBIN A1C; Future  - Lipid panel reflex to direct LDL Non-fasting; Future  - Basic metabolic panel  (Ca, Cl, CO2, Creat, Gluc, K, Na, BUN); Future    Chronic obstructive pulmonary disease, unspecified COPD type (H)  Stable   Says she is really dong well with Trelegy    Mixed hyperlipidemia  Pending   - rosuvastatin (CRESTOR) 5 MG tablet; Take 1 tablet (5 mg) by mouth daily    Major depressive disorder, recurrent episode, mild (H)  Stable   - sertraline (ZOLOFT) 50 MG tablet; Take 1 tablet (50 mg) by mouth daily    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         View : No data to display.              HPI     Diabetes Follow-up      How often are you checking your blood sugar? Not at all    What concerns do you have today about your diabetes? None     Do you have any of these symptoms? (Select all that apply)  Numbness in feet      Hyperlipidemia Follow-Up      Are you regularly taking any medication or supplement to lower your cholesterol?   Yes- crestor    Are you having muscle aches or other side effects that you think could be caused by your cholesterol lowering medication?  No    Hypertension Follow-up      Do you check your blood pressure regularly outside of the clinic? No     Are you  following a low salt diet? No    Are your blood pressures ever more than 140 on the top number (systolic) OR more   than 90 on the bottom number (diastolic), for example 140/90? Yes    BP Readings from Last 2 Encounters:   04/04/23 128/72   03/22/23 139/78     Hemoglobin A1C (%)   Date Value   08/01/2022 6.6 (H)   11/16/2021 8.7 (H)   05/10/2021 7.8 (H)   01/04/2021 7.5 (H)     LDL Cholesterol Calculated (mg/dL)   Date Value   01/04/2022 50   01/04/2021 72   09/13/2019 79         How many servings of fruits and vegetables do you eat daily?  0-1    On average, how many sweetened beverages do you drink each day (Examples: soda, juice, sweet tea, etc.  Do NOT count diet or artificially sweetened beverages)?   0    How many days per week do you exercise enough to make your heart beat faster? none    How many minutes a day do you exercise enough to make your heart beat faster?     How many days per week do you miss taking your medication? 0    COPD Follow-Up    Overall, how are your COPD symptoms since your last clinic visit?  Better    How much fatigue or shortness of breath do you have when you are walking?  Less than usual    How much shortness of breath do you have when you are resting?  None    How often do you cough? Never    Have you noticed any change in your sputum/phlegm?  No    Have you experienced a recent fever? No    Please describe how far you can walk without stopping to rest:  The length of 3-5 rooms    How many flights of stairs are you able to walk up without stopping?  1    Have you had any Emergency Room Visits, Urgent Care Visits, or Hospital Admissions because of your COPD since your last office visit?  No    History   Smoking Status     Former     Packs/day: 1.50     Years: 50.00     Types: Cigarettes     Quit date: 7/22/2009   Smokeless Tobacco     Never     No results found for: FEV1, [id number]      How many servings of fruits and vegetables do you eat daily?  0-1    On average, how many sweetened  beverages do you drink each day (Examples: soda, juice, sweet tea, etc.  Do NOT count diet or artificially sweetened beverages)?   0    How many days per week do you exercise enough to make your heart beat faster? 3 or less    How many minutes a day do you exercise enough to make your heart beat faster? 9 or less    How many days per week do you miss taking your medication? 0        Depression Followup    How are you doing with your depression since your last visit? Stable     Are you having other symptoms that might be associated with depression? No    Have you had a significant life event?  No     Are you feeling anxious or having panic attacks?   No    Do you have any concerns with your use of alcohol or other drugs? No    Social History     Tobacco Use     Smoking status: Former     Packs/day: 1.50     Years: 50.00     Pack years: 75.00     Types: Cigarettes     Quit date: 2009     Years since quittin.7     Smokeless tobacco: Never   Vaping Use     Vaping status: Never Used     Passive vaping exposure: Yes   Substance Use Topics     Alcohol use: No     Alcohol/week: 4.0 standard drinks of alcohol     Drug use: No         2022     7:35 AM 2023     3:18 PM 2023    11:45 AM   PHQ   PHQ-9 Total Score 0 0 1   Q9: Thoughts of better off dead/self-harm past 2 weeks Not at all Not at all Not at all         2017    10:42 AM 2019     1:46 PM 2023    11:45 AM   EMILY-7 SCORE   Total Score 0 5 0         2023    11:45 AM   Last PHQ-9   1.  Little interest or pleasure in doing things 0   2.  Feeling down, depressed, or hopeless 0   3.  Trouble falling or staying asleep, or sleeping too much 0   4.  Feeling tired or having little energy 0   5.  Poor appetite or overeating 1   6.  Feeling bad about yourself 0   7.  Trouble concentrating 0   8.  Moving slowly or restless 0   Q9: Thoughts of better off dead/self-harm past 2 weeks 0   PHQ-9 Total Score 1   Difficulty at work, home, or with  people Not difficult at all         4/4/2023    11:45 AM   EMILY-7    1. Feeling nervous, anxious, or on edge 0   2. Not being able to stop or control worrying 0   3. Worrying too much about different things 0   4. Trouble relaxing 0   5. Being so restless that it is hard to sit still 0   6. Becoming easily annoyed or irritable 0   7. Feeling afraid, as if something awful might happen 0   EMILY-7 Total Score 0             Review of Systems   CONSTITUTIONAL: NEGATIVE for fever, chills, change in weight  ENT/MOUTH: NEGATIVE for ear, mouth and throat problems  RESP: NEGATIVE for significant cough or SOB  CV: NEGATIVE for chest pain, palpitations or peripheral edema  GI: NEGATIVE for nausea, abdominal pain, heartburn, or change in bowel habits  MUSCULOSKELETAL: NEGATIVE for significant arthralgias or myalgia  ROS otherwise negative      Objective    /72   Pulse 75   Temp 98.6  F (37  C) (Temporal)   Wt 57.2 kg (126 lb)   SpO2 97%   BMI 20.97 kg/m    Body mass index is 20.97 kg/m .  Physical Exam   GENERAL: healthy, alert and no distress  EYES: Eyes grossly normal to inspection  NECK: no adenopathy, no asymmetry, masses, or scars and thyroid normal to palpation  RESP: lungs clear to auscultation - no rales, rhonchi or wheezes  CV: regular rate and rhythm, normal S1 S2, no S3 or S4, no murmur, click or rub, no peripheral edema and peripheral pulses strong  ABDOMEN: soft, nontender, no hepatosplenomegaly, no masses and bowel sounds normal  MS: no gross musculoskeletal defects noted, no edema  PSYCH: mentation appears normal  Diabetic foot exam: normal  PT pulses    Pending

## 2023-04-04 NOTE — LETTER
April 11, 2023    Ludy Mckenzie  [address]   [place] [facility] [place] [address]          Dear ,    We are writing to inform you of your test results.  All labs are stable     Resulted Orders   HEMOGLOBIN A1C   Result Value Ref Range    Hemoglobin A1C 7.2 (H) 0.0 - 5.6 %      Comment:      Normal <5.7%   Prediabetes 5.7-6.4%    Diabetes 6.5% or higher     Note: Adopted from ADA consensus guidelines.   Lipid panel reflex to direct LDL Non-fasting   Result Value Ref Range    Cholesterol 148 <200 mg/dL    Triglycerides 124 <150 mg/dL    Direct Measure HDL 42 (L) >=50 mg/dL    LDL Cholesterol Calculated 81 <=100 mg/dL    Non HDL Cholesterol 106 <130 mg/dL    Patient Fasting > 8hrs? Yes     Narrative    Cholesterol  Desirable:  <200 mg/dL    Triglycerides  Normal:  Less than 150 mg/dL  Borderline High:  150-199 mg/dL  High:  200-499 mg/dL  Very High:  Greater than or equal to 500 mg/dL    Direct Measure HDL  Female:  Greater than or equal to 50 mg/dL   Male:  Greater than or equal to 40 mg/dL    LDL Cholesterol  Desirable:  <100mg/dL  Above Desirable:  100-129 mg/dL   Borderline High:  130-159 mg/dL   High:  160-189 mg/dL   Very High:  >= 190 mg/dL    Non HDL Cholesterol  Desirable:  130 mg/dL  Above Desirable:  130-159 mg/dL  Borderline High:  160-189 mg/dL  High:  190-219 mg/dL  Very High:  Greater than or equal to 220 mg/dL   Basic metabolic panel  (Ca, Cl, CO2, Creat, Gluc, K, Na, BUN)   Result Value Ref Range    Sodium 136 133 - 144 mmol/L    Potassium 3.8 3.4 - 5.3 mmol/L    Chloride 106 94 - 109 mmol/L    Carbon Dioxide (CO2) 25 20 - 32 mmol/L    Anion Gap 5 3 - 14 mmol/L    Urea Nitrogen 17 7 - 30 mg/dL    Creatinine 0.60 0.52 - 1.04 mg/dL    Calcium 8.9 8.5 - 10.1 mg/dL    Glucose 133 (H) 70 - 99 mg/dL    GFR Estimate 89 >60 mL/min/1.73m2      Comment:      eGFR calculated using 2021 CKD-EPI equation.       If you have any questions or concerns, please call the clinic at the number listed above.        Sincerely,      Delisa Moffett MD

## 2023-04-05 NOTE — TELEPHONE ENCOUNTER
Called pt. Explained visit to pt and assisted with scheduling appt with RN.    Rhoda Bates RN  [facility]

## 2023-04-14 NOTE — PROGRESS NOTES
[unfilled]    SKIN: Incision resection of infected cyst    Date/Time: 4/14/2023 1:46 PM    Performed by: Varinder Sabillon DO  Authorized by: Varinder Sabillon DO      UNIVERSAL PROTOCOL   Site Marked: NA  Prior Images Obtained and Reviewed:  NA  Required items: Required blood products, implants, devices and special equipment available    Patient identity confirmed:  Verbally with patient  NA - No sedation, light sedation, or local anesthesia  Confirmation Checklist:  Patient's identity using two indicators and relevant allergies  Time out: Immediately prior to the procedure a time out was called    Universal Protocol: the Joint Commission Universal Protocol was followed    Preparation: Patient was prepped and draped in usual sterile fashion    ESBL (mL):  5    ANESTHESIA    Local anesthesia used: yes  Local Anesthetic: lidocaine 2% with epinephrine  Anesthetic total: 3 mL    SEDATION    Patient Sedated: No      Type: cyst  Body area: trunk  Location details: back  Scalpel size: 15  Incision type: elliptical  Incision depth: subcutaneous  Complexity: complex  Drainage: purulent  Drainage amount: scant      PROCEDURE  Describe Procedure: What seemed at first to be an abscess turned out to be a large solid mass requiring resection.  Complicating the procedure was small vessel venous bleeding.  Hemostasis achieved by 4-0 Vicryl after attempting silver nitrate and electrocautery.  4-0 Vicryl used for subcutaneous closure followed by 4-0 nylon for skin closure and a single running stitch.  3 cm long. Specimen sent to pathology.  Tolerated procedure without complication.  Length of time physician/provider present for 1:1 monitoring during sedation: 0        Assessment & Plan   Problem List Items Addressed This Visit    None  Visit Diagnoses     Skin neoplasm    -  Primary    Relevant Orders    Surgical Pathology Exam    2.1-3CM TRUNK,ARM,LEG (Completed)    2.5cm or less (Completed)    Need for diphtheria-tetanus-pertussis  "(Tdap) vaccine        Need for vaccine for DT (diphtheria-tetanus)        Relevant Orders    TD PRESERV FREE, IM (7+ YRS) (DECAVAC/TENIVAC) (Completed)                        DO BRYCE ELIZONDO [facility] GRACY Boston is a 82 year old, presenting for the following health issues:  Mass        4/14/2023    10:54 AM   Additional Questions   Roomed by GOMEZ Pacheco     Concern - lump on back  Onset: noticed it 5 days ago  Description: lump in middle of back where the bra sits  Intensity: moderate  Progression of Symptoms:  worsening  Accompanying Signs & Symptoms: Red, scaly   Previous history of similar problem: yes when she was a teenager  Precipitating factors:        Worsened by: bumping it or touching it  Alleviating factors:        Improved by: nothing  Therapies tried and outcome: None          Review of Systems         Objective    /75 (BP Location: Right arm, Patient Position: Chair, Cuff Size: Adult Small)   Pulse 82   Temp 98.1  F (36.7  C) (Oral)   Ht 1.651 m (5' 5\")   Wt 55.3 kg (122 lb)   SpO2 98%   BMI 20.30 kg/m    Body mass index is 20.3 kg/m .  Physical Exam   Gen NAD     Media Information      Document Information    Other:  Photograph   Abscess back   04/14/2023 11:21 AM   Attached To:   Office Visit on 4/14/23 with Varinder Sabillon DO     Source Information    Varinder Sabillon DO [facility]        Media Information      Document Information    Other:  Photograph   Suture    04/14/2023 12:20 PM   Attached To:   Office Visit on 4/14/23 with Varinder Sabillon DO     Source Information    Varinder Sabillon DO [facility]                       "

## 2023-04-19 NOTE — PROGRESS NOTES
"  Assessment & Plan   Problem List Items Addressed This Visit    None  Visit Diagnoses     Cellulitis of back except buttock    -  Primary    Relevant Medications    cephALEXin (KEFLEX) 500 MG capsule    Visit for suture removal               Sutures removed without complication  Some cellulitis in the medial border with small amount of purulent drainage.  Keflex 3 times daily for 10 days. F/U if not resolved in 10 days      DO BRYCE ELIZONDO [facility] GRACY Boston is a 82 year old, presenting for the following health issues:  Suture Removal        4/19/2023     7:46 AM   Additional Questions   Roomed by Ann LOCKWOOD CMA     HPI     Patient is here for suture removal after Cyst Removal on 04/14/2023.        Review of Systems   Itching at wound site      Objective    /68 (BP Location: Left arm, Patient Position: Chair, Cuff Size: Adult Small)   Pulse 86   Temp 98.1  F (36.7  C) (Oral)   Resp 18   Ht 1.676 m (5' 6\")   Wt 55.3 kg (122 lb)   SpO2 97%   BMI 19.69 kg/m    Body mass index is 19.69 kg/m .  Physical Exam   GENERAL: healthy, alert and no distress  SKIN: Wound healing in a normal fashion with incomplete closure at the medial border on some purulent drainage.  Slight erythema at that region, no fluctuance                    "

## 2023-04-24 NOTE — TELEPHONE ENCOUNTER
"Routing to provider to advise.     Per office visit 4/19/23: \"Sutures removed without complication  Some cellulitis in the medial border with small amount of purulent drainage.  Keflex 3 times daily for 10 days. F/U if not resolved in 10 days\"    Rhoda Bates RN  [facility]    "

## 2023-04-25 NOTE — LETTER
4/25/2023         RE: Ludy Mckenzie  [address]  [place] [address]        Dear Colleague,    Thank you for referring your patient, Ludy Mckenzie, to the [facility]. Please see a copy of my visit note below.     Current Eye Medications:  Latanoprost both eyes every evening.  Last drops:  8pm     Preservision     Subjective:  Patient is here for a Diabetic Eye Exam.  Patient complains of some difficulty seeing to read, with correction.  She never wears her distance glasses, only her reading glasses.   Lab Results   Component Value Date    A1C 7.2 04/04/2023    A1C 6.6 08/01/2022    A1C 8.7 11/16/2021    A1C 7.8 05/10/2021    A1C 7.5 01/04/2021    A1C 6.6 09/13/2019    A1C 7.0 03/08/2019    A1C 6.6 07/27/2018        Objective:  See Ophthalmology Exam.      Assessment:  Ludy Mckenzie is a 82 year old female who presents with:   Encounter Diagnoses   Name Primary?     Examination of eyes and vision Yes     Type 2 diabetes mellitus with other specified complication, without long-term current use of insulin (H) Negative diabetic retinopathy      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, mild stage Intraocular pressure 9/13 today. Continue same medication.       Early dry stage nonexudative age-related macular degeneration of both eyes Appears slightly worse both eyes. Says she is taking eye vitamins by mouth.     Pseudophakia with Yag Caps ou       Posterior vitreous detachment of both eyes      Myopia of both eyes with regular astigmatism and presbyopia        Plan:  Recommend taking an eye vitamin with AREDS2 on the packaging (like Preservision, iCaps, or generic). Follow dosing directions on the package.     Continue Latanoprost (green top) at bedtime both eyes     Keep blood sugars and blood pressure under good control.    Glasses prescription given - optional to update    Arjun Valenzuela MD  [phone]    Patient Education  Diabetes weakens the blood  vessels all over the body, including the eyes. Damage to the blood vessels in the eyes can cause swelling or bleeding into part of the eye (called the retina). This is called diabetic retinopathy (KEVEN-tin-AH-puh-thee). If not treated, this disease can cause vision loss or blindness.   Symptoms may include blurred or distorted vision, but many people have no symptoms. It's important to see your eye doctor regularly to check for problems.   Early treatment and good control can help protect your vision. Here are the things you can do to help prevent vision loss:      1. Keep your blood sugar levels under tight control.      2. Bring high blood pressure under control.      3. No smoking.      4. Have yearly dilated eye exams.             Again, thank you for allowing me to participate in the care of your patient.        Sincerely,        Arjun Valenzuela MD

## 2023-04-25 NOTE — PROGRESS NOTES
Current Eye Medications:  Latanoprost both eyes every evening.  Last drops:  8pm     Preservision     Subjective:  Patient is here for a Diabetic Eye Exam.  Patient complains of some difficulty seeing to read, with correction.  She never wears her distance glasses, only her reading glasses.   Lab Results   Component Value Date    A1C 7.2 04/04/2023    A1C 6.6 08/01/2022    A1C 8.7 11/16/2021    A1C 7.8 05/10/2021    A1C 7.5 01/04/2021    A1C 6.6 09/13/2019    A1C 7.0 03/08/2019    A1C 6.6 07/27/2018        Objective:  See Ophthalmology Exam.      Assessment:  Ludy Mckenzie is a 82 year old female who presents with:   Encounter Diagnoses   Name Primary?     Examination of eyes and vision Yes     Type 2 diabetes mellitus with other specified complication, without long-term current use of insulin (H) Negative diabetic retinopathy      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, mild stage Intraocular pressure 9/13 today. Continue same medication.       Early dry stage nonexudative age-related macular degeneration of both eyes Appears slightly worse both eyes. Says she is taking eye vitamins by mouth.     Pseudophakia with Yag Caps ou       Posterior vitreous detachment of both eyes      Myopia of both eyes with regular astigmatism and presbyopia        Plan:  Recommend taking an eye vitamin with AREDS2 on the packaging (like Preservision, iCaps, or generic). Follow dosing directions on the package.     Continue Latanoprost (green top) at bedtime both eyes     Keep blood sugars and blood pressure under good control.    Glasses prescription given - optional to update    Arjun Valenzuela MD  [phone]    Patient Education  Diabetes weakens the blood vessels all over the body, including the eyes. Damage to the blood vessels in the eyes can cause swelling or bleeding into part of the eye (called the retina). This is called diabetic retinopathy (KEVEN-tin-AH-puh-thee). If not treated, this disease can cause  vision loss or blindness.   Symptoms may include blurred or distorted vision, but many people have no symptoms. It's important to see your eye doctor regularly to check for problems.   Early treatment and good control can help protect your vision. Here are the things you can do to help prevent vision loss:      1. Keep your blood sugar levels under tight control.      2. Bring high blood pressure under control.      3. No smoking.      4. Have yearly dilated eye exams.

## 2023-04-25 NOTE — PATIENT INSTRUCTIONS
Recommend taking an eye vitamin with AREDS2 on the packaging (like Preservision, iCaps, or generic). Follow dosing directions on the package.     Continue Latanoprost (green top) at bedtime both eyes     Keep blood sugars and blood pressure under good control.    Glasses prescription given - optional to update    Arjun Valenzuela MD  [phone]    Patient Education   Diabetes weakens the blood vessels all over the body, including the eyes. Damage to the blood vessels in the eyes can cause swelling or bleeding into part of the eye (called the retina). This is called diabetic retinopathy (KEVEN-tin--[facility]-thee). If not treated, this disease can cause vision loss or blindness.   Symptoms may include blurred or distorted vision, but many people have no symptoms. It's important to see your eye doctor regularly to check for problems.   Early treatment and good control can help protect your vision. Here are the things you can do to help prevent vision loss:      1. Keep your blood sugar levels under tight control.      2. Bring high blood pressure under control.      3. No smoking.      4. Have yearly dilated eye exams.

## 2023-05-24 NOTE — PROGRESS NOTES
"SUBJECTIVE:   Ludy is a 82 year old who presents for Preventive Visit.      5/24/2023    11:15 AM   Additional Questions   Roomed by Ann LOCKWOOD CMA   Patient has been advised of split billing requirements and indicates understanding: Yes  Are you in the first 12 months of your Medicare coverage?  No    Healthy Habits:     In general, how would you rate your overall health?  Good    Frequency of exercise:  None    Do you usually eat at least 4 servings of fruit and vegetables a day, include whole grains    & fiber and avoid regularly eating high fat or \"junk\" foods?  No    Taking medications regularly:  Yes    Medication side effects:  None    Ability to successfully perform activities of daily living:  No assistance needed    Home Safety:  No safety concerns identified    Hearing Impairment:  Difficulty following a conversation in a noisy restaurant or crowded room, need to ask people to speak up or repeat themselves and difficulty understanding soft or whispered speech    In the past 6 months, have you been bothered by leaking of urine? Yes    In general, how would you rate your overall mental or emotional health?  Good      PHQ-2 Total Score: 0    Additional concerns today:  No        Have you ever done Advance Care Planning? (For example, a Health Directive, POLST, or a discussion with a medical provider or your loved ones about your wishes): Yes, advance care planning is on file.       Fall risk  Fallen 2 or more times in the past year?: No  Any fall with injury in the past year?: No    Cognitive Screening   1) Repeat 3 items (Leader, Season, Table)    2) Clock draw: NORMAL  3) 3 item recall: Recalls 3 objects  Results: NORMAL clock, 1-2 items recalled: COGNITIVE IMPAIRMENT LESS LIKELY    Mini-CogTM Copyright [employer]. Licensed by the author for use in [facility]; reprinted with permission (soco@.[facility]). All rights reserved.      Do you have sleep apnea, excessive snoring or daytime drowsiness?: " no    Reviewed and updated as needed this visit by clinical staff   Tobacco  Allergies  Meds  Problems             Reviewed and updated as needed this visit by Provider      Problems            Social History     Tobacco Use     Smoking status: Former     Packs/day: 1.50     Years: 50.00     Pack years: 75.00     Types: Cigarettes     Quit date: 2009     Years since quittin.8     Smokeless tobacco: Never   Vaping Use     Vaping status: Never Used     Passive vaping exposure: Yes   Substance Use Topics     Alcohol use: No     Alcohol/week: 4.0 standard drinks of alcohol             2023    11:14 AM   Alcohol Use   Prescreen: >3 drinks/day or >7 drinks/week? No     Do you have a current opioid prescription? No  Do you use any other controlled substances or medications that are not prescribed by a provider? None              Current providers sharing in care for this patient include:   Patient Care Team:  Beth Strickland MD as PCP - General (Family Medicine)  Kareen Roberts MD as MD (Dermatology)  Arjun Valenzuela MD as Assigned Surgical Provider  Kenny Gr MD as Assigned Pulmonology Provider  Ross Greenwood MD as MD (Urology)  Sadie Mandujano RD as Diabetes Educator (Dietitian, Registered)  Delisa Moffett MD as Assigned PCP    The following health maintenance items are reviewed in Epic and correct as of today:  Health Maintenance   Topic Date Due     COVID-19 Vaccine (6 - Pfizer series) 2023     DTAP/TDAP/TD IMMUNIZATION (1 - Tdap) 04/15/2023     A1C  2023     ANNUAL REVIEW OF HM ORDERS  2023     MICROALBUMIN  2023     PHQ-9  2023     BMP  2024     LIPID  2024     DIABETIC FOOT EXAM  2024     EYE EXAM  2024     MEDICARE ANNUAL WELLNESS VISIT  2024     FALL RISK ASSESSMENT  2024     DEXA  2024     ADVANCE CARE PLANNING  2028     SPIROMETRY  Completed     COPD ACTION PLAN  Completed      "DEPRESSION ACTION PLAN  Completed     INFLUENZA VACCINE  Completed     Pneumococcal Vaccine: 65+ Years  Completed     URINALYSIS  Completed     ZOSTER IMMUNIZATION  Completed     IPV IMMUNIZATION  Aged Out     MENINGITIS IMMUNIZATION  Aged Out     MAMMO SCREENING  Discontinued     COLORECTAL CANCER SCREENING  Discontinued             Pertinent mammograms are reviewed under the imaging tab.    Review of Systems   Constitutional: Negative for chills and fever.   HENT: Positive for hearing loss. Negative for congestion, ear pain and sore throat.    Eyes: Positive for visual disturbance. Negative for pain.   Respiratory: Positive for shortness of breath. Negative for cough.    Cardiovascular: Negative for chest pain, palpitations and peripheral edema.   Gastrointestinal: Positive for constipation. Negative for abdominal pain, diarrhea, heartburn, hematochezia and nausea.   Breasts:  Negative for tenderness, breast mass and discharge.   Genitourinary: Positive for frequency and urgency. Negative for dysuria, genital sores, hematuria, pelvic pain, vaginal bleeding and vaginal discharge.   Musculoskeletal: Negative for arthralgias, joint swelling and myalgias.   Skin: Negative for rash.   Neurological: Positive for dizziness and weakness. Negative for headaches and paresthesias.   Psychiatric/Behavioral: Negative for mood changes. The patient is not nervous/anxious.          OBJECTIVE:   /69 (BP Location: Left arm, Patient Position: Chair, Cuff Size: Adult Small)   Pulse 74   Temp 97.7  F (36.5  C) (Oral)   Resp 18   Ht 1.651 m (5' 5\")   Wt 54.4 kg (120 lb)   SpO2 100%   BMI 19.97 kg/m   Estimated body mass index is 19.97 kg/m  as calculated from the following:    Height as of this encounter: 1.651 m (5' 5\").    Weight as of this encounter: 54.4 kg (120 lb).  Physical Exam  GENERAL: healthy, alert and no distress  RESP: lungs clear to auscultation - no rales, rhonchi or wheezes  CV: regular rate and rhythm, " normal S1 S2, no S3 or S4, no murmur, click or rub, no peripheral edema and peripheral pulses strong  MS: no gross musculoskeletal defects noted, no edema  SKIN: no suspicious lesions or rashes  NEURO: Normal strength and tone, mentation intact and speech normal  PSYCH: mentation appears normal, affect normal/bright    Diagnostic Test Results:  Labs reviewed in Epic    ASSESSMENT / PLAN:     Problem List Items Addressed This Visit    None  Visit Diagnoses     Encounter for Medicare annual wellness exam    -  Primary    Relevant Orders    PRIMARY CARE FOLLOW-UP SCHEDULING    History of stroke        Relevant Orders    Adult Neurology  Referral    Unsteady gait when walking        Relevant Orders    Adult Neurology  Referral        She request neurology consultation for history of stroke and unsteady gait      COUNSELING:  Reviewed preventive health counseling, as reflected in patient instructions        She reports that she quit smoking about 13 years ago. Her smoking use included cigarettes. She has a 75.00 pack-year smoking history. She has never used smokeless tobacco.      Appropriate preventive services were discussed with this patient, including applicable screening as appropriate for cardiovascular disease, diabetes, osteopenia/osteoporosis, and glaucoma.  As appropriate for age/gender, discussed screening for colorectal cancer, prostate cancer, breast cancer, and cervical cancer. Checklist reviewing preventive services available has been given to the patient.    Reviewed patients plan of care and provided an AVS. The Basic Care Plan (routine screening as documented in Health Maintenance) for Ludy meets the Care Plan requirement. This Care Plan has been established and reviewed with the Patient.      DO BRYCE ELIZONDO [facility]    Identified Health Risks:    I have reviewed Opioid Use Disorder and Substance Use Disorder risk factors and made any needed referrals.      Answers for HPI/ROS submitted by the patient on 5/24/2023  If you checked off any problems, how difficult have these problems made it for you to do your work, take care of things at home, or get along with other people?: Not difficult at all  PHQ9 TOTAL SCORE: 6

## 2023-05-24 NOTE — PATIENT INSTRUCTIONS
Patient Education   Personalized Prevention Plan  You are due for the preventive services outlined below.  Your care team is available to assist you in scheduling these services.  If you have already completed any of these items, please share that information with your care team to update in your medical record.  Health Maintenance Due   Topic Date Due     COVID-19 Vaccine (6 - Pfizer series) 01/30/2023     Diptheria Tetanus Pertussis (DTAP/TDAP/TD) Vaccine (1 - Tdap) 04/15/2023       Exercise for a Healthier Heart  You may wonder how you can improve the health of your heart. If you re thinking about exercise, you re on the right track. You don t need to become an athlete. But you do need a certain amount of brisk exercise to help strengthen your heart. If you have been diagnosed with a heart condition, your healthcare provider may advise exercise to help your condition. To help make exercise a habit, choose safe, fun activities.      Exercise with a friend. When activity is fun, you're more likely to stick with it.     Before you start  Check with your healthcare provider before starting an exercise program. This is especially important if you haven't been active for a while. It's also important if you have a long-term (chronic) health problem such as heart disease, diabetes, or obesity. Also check with your provider if you're at high risk for having these problems.   Why exercise?  Exercising regularly offers many healthy rewards. It can help you do all of these:     Improve your blood cholesterol level to help prevent further heart trouble.    Lower your blood pressure to help prevent a stroke or heart attack.    Control diabetes or reduce your risk of getting this disease.    Improve your heart and lung function.    Reach and stay at a healthy weight.    Make your muscles stronger so you can stay active.    Prevent falls and fractures by slowing the loss of bone mass (osteoporosis).    Manage stress  better.    Improve your sense of self and your body image.  Exercise tips      Ease into your routine. Set small goals. Then build on them. Talk with your healthcare provider first before starting an exercise routine if you're not sure what your activity level should be.    Exercise on most days. Aim for a total of at least 150 minutes (2 hours and 30 minutes) or more of moderate-intensity aerobic activity each week. You could also do 75 minutes (1 hour and 15 minutes) or more of vigorous-intensity aerobic activity each week. Or try for a combination of both. Moderate activity means that you breathe heavier and your heart rate increases, but you can still talk. Think about doing at least 30 minutes of moderate exercise, 5 times a week. It's OK to work up to the 30-minute period over time. Examples of moderate-intensity activity are brisk walking, gardening, and water aerobics.    Step up your daily activity level.  Along with your exercise program, try being more active the whole day. Walk instead of drive. Or park further away so that you take more steps each day. Do more household tasks or yard work. You may not be able to meet the advised amount of physical activity. But doing some moderate- or vigorous-intensity aerobic activity can help reduce your risk for heart disease. Your healthcare provider can help you figure out what is best for you.    Choose 1 or more activities you enjoy.  Walking is one of the easiest things you can do. You can also try swimming, riding a bike, dancing, or taking an exercise class.    Call 911  Call 911 right away if any of these occur:     Chest pain that doesn't go away quickly with rest    New burning, tightness, pressure, or heaviness in your chest, neck, shoulders, back, or arms    Abnormal or severe shortness of breath    A very fast or irregular heartbeat (palpitations)    Fainting  When to call your healthcare provider  Call your healthcare provider if you have any of these:      Dizziness or lightheadedness    Mild shortness of breath or chest pain    Increased or new joint or muscle pain    [employer] last reviewed this educational content on 7/1/2022 2000-2022 [employer]. All rights reserved. This information is not intended as a substitute for professional medical care. Always follow your healthcare professional's instructions.          Understanding USDA MyPlate  The USDA has guidelines to help you make healthy food choices. These are called MyPlate. MyPlate shows the food groups that make up healthy meals using the image of a place setting. Before you eat, think about the healthiest choices for what to put on your plate or in your cup or bowl. To learn more about building a healthy plate, visit www.choosemyplate.gov.     The food groups    Fruits. Any fruit or 100% fruit juice counts as part of the Fruit Group. Fruits may be fresh, canned, frozen, or dried, and may be whole, cut-up, or pureed. Make 1/2 of your plate fruits and vegetables.    Vegetables. Any vegetable or 100% vegetable juice counts as a member of the Vegetable Group. Vegetables may be fresh, frozen, canned, or dried. They can be served raw or cooked and may be whole, cut-up, or mashed. Make 1/2 of your plate fruits and vegetables.    Grains. All foods made from grains are part of the Grains Group. These include wheat, rice, oats, cornmeal, and barley. Grains are often used to make foods such as bread, pasta, oatmeal, cereal, tortillas, and grits. Grains should be no more than 1/4 of your plate. At least half of your grains should be whole grains.    Protein. This group includes meat, poultry, seafood, beans and peas, eggs, processed soy products (such as tofu), nuts (including nut butters), and seeds. Make protein choices no more than 1/4 of your plate. Meat and poultry choices should be lean or low fat.    Dairy. The Dairy Group includes all fluid milk products and foods made from milk that contain  calcium, such as yogurt and cheese. (Foods that have little calcium, such as cream, butter, and cream cheese, are not part of this group.) Most dairy choices should be low-fat or fat-free.    Oils. Oils aren't a food group, but they do contain essential nutrients. However it's important to watch your intake of oils. These are fats that are liquid at room temperature. They include canola, corn, olive, soybean, vegetable, and sunflower oil. Foods that are mainly oil include mayonnaise, certain salad dressings, and soft margarines. You likely already get your daily oil allowance from the foods you eat.  Things to limit  Eating healthy also means limiting these things in your diet:    Salt (sodium). Many processed foods have a lot of sodium. To keep sodium intake down, eat fresh vegetables, meats, poultry, and seafood when possible. Purchase low-sodium, reduced-sodium, or no-salt-added food products at the store. And don't add salt to your meals at home. Instead, season them with herbs and spices such as dill, oregano, cumin, and paprika. Or try adding flavor with lemon or lime zest and juice.    Saturated fat. Saturated fats are most often found in animal products such as beef, pork, and chicken. They are often solid at room temperature, such as butter. To reduce your saturated fat intake, choose leaner cuts of meat and poultry. And try healthier cooking methods such as grilling, broiling, roasting, or baking. For a simple lower-fat swap, use plain nonfat yogurt instead of mayonnaise when making potato salad or macaroni salad.    Added sugars. These are sugars added to foods. They are in foods such as ice cream, candy, soda, fruit drinks, sports drinks, energy drinks, cookies, pastries, jams, and syrups. Cut down on added sugars by sharing sweet treats with a family member or friend. You can also choose fruit for dessert, and drink water or other unsweetened beverages.  [employer] last reviewed this educational content  on 6/1/2020 2000-2022 [employer]. All rights reserved. This information is not intended as a substitute for professional medical care. Always follow your healthcare professional's instructions.          Signs of Hearing Loss  Hearing loss is a problem shared by many people. In fact, it's one of the most common health problems, particularly as people age. Most people aged 65 and older have some hearing loss. By age 80, almost everyone does. Hearing loss often occurs slowly over the years. So, you may not realize your hearing has gotten worse.   When sudden hearing loss occurs, it's important to contact your healthcare provider right away. Your provider will do a medical exam and a hearing exam as soon as possible. This is to help find the cause and type of your sudden hearing loss. Based on your diagnosis, your healthcare provider will discuss possible treatments.      Hearing much better with one ear can be a sign of hearing loss.     Have your hearing checked  Call your healthcare provider if you:     Have to strain to hear normal conversation    Have to watch other people s faces very carefully to follow what they re saying    Need to ask people to repeat what they ve said    Often misunderstand what people are saying    Turn the volume of the television or radio up so high that others complain    Feel that people are mumbling when they re talking to you    Find that the effort to hear leaves you feeling tired and irritated    Notice, when using the phone, that you hear better with one ear than the other  [employer] last reviewed this educational content on 6/1/2022 2000-2022 [employer]. All rights reserved. This information is not intended as a substitute for professional medical care. Always follow your healthcare professional's instructions.          Urinary Incontinence, Female (Adult)   Urinary incontinence means loss of bladder control. This problem affects many women,  especially as they get older. If you have incontinence, you may be embarrassed to ask for help. But know that this problem can be treated.   Types of Incontinence  There are different types of incontinence. Two of the main types are described here. You can have more than one type.     Stress incontinence. With this type, urine leaks when pressure (stress) is put on the bladder. This may happen when you cough, sneeze, or laugh. Stress incontinence most often occurs because the pelvic floor muscles that support the bladder and urethra are weak. This can happen after pregnancy and vaginal childbirth or a hysterectomy. It can also be due to excess body weight or hormone changes.    Urge incontinence (also called overactive bladder). With this type, a sudden urge to urinate is felt often. This may happen even though there may not be much urine in the bladder. The need to urinate often during the night is common. Urge incontinence most often occurs because of bladder spasms. This may be due to bladder irritation or infection. Damage to bladder nerves or pelvic muscles, constipation, and certain medicines can also lead to urge incontinence.  Treatment depends on the cause. Further evaluation is needed to find the type you have. This will likely include an exam and certain tests. Based on the results, you and your healthcare provider can then plan treatment. Until a diagnosis is made, the home care tips below can help ease symptoms.   Home care    Do pelvic floor muscle exercises, if they are prescribed. The pelvic floor muscles help support the bladder and urethra. Many women find that their symptoms improve when doing special exercises that strengthen these muscles. To do the exercises, contract the muscles you would use to stop your stream of urine. But do this when you re not urinating. Hold for 10 seconds, then relax. Repeat 10 to 20 times in a row, at least 3 times a day. Your healthcare provider may give you other  instructions for how to do the exercises and how often.    Keep a bladder diary. This helps track how often and how much you urinate over a set period of time. Bring this diary with you to your next visit with the provider. The information can help your provider learn more about your bladder problem.    Lose weight, if advised to by your provider. Extra weight puts pressure on the bladder. Your provider can help you create a weight-loss plan that s right for you. This may include exercising more and making certain diet changes.    Don't have foods and drinks that may irritate the bladder. These can include alcohol and caffeinated drinks.    Quit smoking. Smoking and other tobacco use can lead to a long-term (chronic) cough that strains the pelvic floor muscles. Smoking may also damage the bladder and urethra. Talk with your provider about treatments or methods you can use to quit smoking.    If drinking large amounts of fluid makes you have symptoms, you may be advised to limit your fluid intake. You may also be advised to drink most of your fluids during the day and to limit fluids at night.    If you re worried about urine leakage or accidents, you may wear absorbent pads to catch urine. Change the pads often. This helps reduce discomfort. It may also reduce the risk of skin or bladder infections.    Follow-up care  Follow up with your healthcare provider, or as directed. It may take some to find the right treatment for your problem. But healthy lifestyle changes can be made right away. These include such things as exercising on a regular basis, eating a healthy diet, losing weight (if needed), and quitting smoking. Your treatment plan may include special therapies or medicines. Certain procedures or surgery may also be options. Talk about any questions you have with your provider.   When to seek medical advice  Call the healthcare provider right away if any of these occur:    Fever of 100.4 F (38 C) or higher, or  "as directed by your provider    Bladder pain or fullness    Belly swelling    Nausea or vomiting    Back pain    Weakness, dizziness, or fainting  [employer] last reviewed this educational content on 1/1/2020 2000-2022 [employer]. All rights reserved. This information is not intended as a substitute for professional medical care. Always follow your healthcare professional's instructions.          Depression and Suicide in Older Adults  Nearly 2 million older adults in the [place] have some type of depression. Some of them even take their own lives. Yet depression among older adults is often ignored. Learning about the warning signs of depression may help spare a loved one needless pain. You may also save a life.   What is depression?  Depression is a common and serious illness. It affects the way you think and feel. It is not a normal part of aging. It is not a sign of weakness, a character flaw, or something you can \"snap out of.\" Most people with depression need treatment to get better. The most common symptom is a feeling of deep sadness. People who are depressed also may seem tired and listless. And nothing seems to give them pleasure. It s normal to grieve or be sad sometimes. But sadness lessens or passes with time. Depression rarely goes away or improves on its own. Other symptoms of depression are:     Sleeping more or less than normal    Eating more or less than normal    Having headaches, stomachaches, or other pains that don t go away    Feeling nervous,  empty,  or worthless    Crying a lot    Thinking or talking about suicide or death    Loss of interest in activities previously enjoyed    Social isolation    Feeling confused or forgetful  What causes it?  The causes of depression aren t fully known. But it is thought to result from a complex blend of these factors:     Biochemistry. Certain chemicals in the brain play a role.    Genes. Depression does run in families.    Life stress. Life " stresses can also trigger depression in some people. Older adults often face many stressors. These may include isolation, the death of friends or a spouse, health problems, and financial concerns.    Chronic health conditions. This includes diabetes, heart disease, or cancer. These can cause symptoms of depression. Medicine side effects can cause changes in thoughts and behaviors.  Giving support    Depressed people often may not want to ask for help. When they do, they may be ignored. Or they may get the wrong treatment. You can help by showing parents and older friends love and support. If they seem depressed, don t lecture the person or ignore the symptoms. Don't discount the symptoms as a  normal  part of aging. They are not. Get involved, listen, and show interest and support.   Help them understand that depression is a treatable illness. Tell them you can help them find the right treatment. Offer to go to their healthcare provider's appointment with them for support when the symptoms are discussed. With their approval, contact a local mental health center, social service agency, or hospital about services.   Helping at healthcare visits  You can be an advocate for them at healthcare appointments. Many older adults have chronic illnesses. Many of these can cause symptoms of depression. Medicine side effects can change thoughts and behaviors.   You can help make sure that the healthcare provider looks at all of these factors. They should refer your family member or friend to a mental healthcare provider when needed. In some cases, untreated depression can lead to a misdiagnosis. A person may be diagnosed with a brain disorder such as dementia. If the healthcare provider does not take the issue of depression seriously, help your family member or friend find another provider.   Asking about self-harm thoughts  If you think an older person you care about could be suicidal, ask,  Have you thought about suicide?   Most people will tell you the truth. If they say yes, they may already have a plan for how and when they will attempt it. Find out as much as you can. The more detailed the plan, and the easier it is to carry out, the more danger the person is in right now. Tell the person you are there for them and you do not want them to get harmed. Don't wait to get help for the person. Call the person's healthcare provider, local hospital, or emergency services.   Call 988 in a crisis   Never leave the person alone. A person who is actively suicidal needs crisis care right away. They need constant supervision. Never leave the person out of sight. Call 988 Tell the crisis counselor you need help for a person who is thinking about suicide. The counselor will help you get the right level of crisis help. You may be advised to take the person to the nearest emergency room.   The National Suicide Prevention Lifeline is available at 988, or 341-807-JEPZ (689-770-8800), or www.suicidepreventionlifeline.org. When you call or text 988, you will be connected to trained counselors who are part of the National Suicide Prevention Lifeline network. An online chat option is also available. Lifeline is free and available 24/7.   To learn more    National Suicide Prevention Lifeline at www.suicidepreventionlifeline.org  or 023-361-KBON (251-003-5788)    National [place] on Mental Illness at www.melody.org  or 699-807-DYUR (207-384-9780)    Mental [facility] at www.nmha.org  or 712-169-6174    National [place] of Mental Health at www.nimh.nih.gov  or 443-987-6847    [employer] last reviewed this educational content on 7/1/2022 2000-2022 [employer]. All rights reserved. This information is not intended as a substitute for professional medical care. Always follow your healthcare professional's instructions.

## 2023-05-24 NOTE — PROGRESS NOTES
She is at risk for lack of exercise and has been provided with information to increase physical activity for the benefit of her well-being.  The patient was counseled and encouraged to consider modifying their diet and eating habits. She was provided with information on recommended healthy diet options.  The patient was provided with written information regarding signs of hearing loss.  Information on urinary incontinence and treatment options given to patient.  The patient's PHQ-9 score is consistent with mild depression. She was provided with information regarding depression and was advised to schedule a follow up appointment in *** weeks to further address this issue.

## 2023-09-14 NOTE — TELEPHONE ENCOUNTER
RECORDS RECEIVED FROM: Internal   REASON FOR VISIT: Unsteady gait when walking [R26.81]    Date of Appt: 11/21/2023   NOTES (FOR ALL VISITS) STATUS DETAILS   OFFICE NOTE from referring provider Internal 05/24/2023 Dr Sabillon [facility]    OFFICE NOTE from other specialist Internal 02/23/2016 Dr Yuan [facility]      DISCHARGE SUMMARY from hospital Internal 08/16/2009 [facility]    DISCHARGE REPORT from the ER N/A    OPERATIVE REPORT N/A    [facility] (MS ONLY) N/A    EMG N/A    EEG N/A    MEDICATION LIST N/A    IMAGING  (FOR ALL VISITS)     LUMBAR PUNCTURE N/A    JOSHUA SCAN (MOVEMENT) N/A    ULTRASOUND (CAROTID BILAT) *VASCULAR* N/A    MRI (HEAD, NECK, SPINE) Internal 12/15/2018 brain MRA brain   03/10/2016 brain   08/18/2013 brain, MRA head neck   CT (HEAD, NECK, SPINE) Internal       Images in PACS

## 2023-10-18 PROBLEM — S72.001A CLOSED FRACTURE OF NECK OF RIGHT FEMUR, INITIAL ENCOUNTER (H): Status: ACTIVE | Noted: 2023-01-01

## 2023-10-18 NOTE — ED TRIAGE NOTES
Pt tripped walking into a store. Here with right hip pain. Unable to bear weight.      Triage Assessment (Adult)       Row Name 10/18/23 1502          Triage Assessment    Airway WDL WDL        Respiratory WDL    Respiratory WDL WDL        Skin Circulation/Temperature WDL    Skin Circulation/Temperature WDL WDL        Cardiac WDL    Cardiac WDL WDL        Peripheral/Neurovascular WDL    Peripheral Neurovascular WDL WDL        Cognitive/Neuro/Behavioral WDL    Cognitive/Neuro/Behavioral WDL WDL

## 2023-10-18 NOTE — ANESTHESIA POSTPROCEDURE EVALUATION
Patient: Ludy Mckenzie    Procedure: * No procedures listed *       Anesthesia Type:  Peripheral Nerve Block    Note:  Disposition: Inpatient   Postop Pain Control: Uneventful            Sign Out: Well controlled pain   PONV: No   Neuro/Psych: Uneventful            Sign Out: Acceptable/Baseline neuro status   Airway/Respiratory: Uneventful            Sign Out: Acceptable/Baseline resp. status   CV/Hemodynamics: Uneventful            Sign Out: Acceptable CV status; No obvious hypovolemia; No obvious fluid overload   Other NRE: NONE   DID A NON-ROUTINE EVENT OCCUR? No       Last vitals:  Vitals:    10/18/23 1613 10/18/23 1800 10/18/23 1804   BP:  110/55    Pulse:  77 77   Resp:   15   Temp:      SpO2: 91% (!) 89% 95%       Electronically Signed By: OCTAVIO Gibbons CRNA  October 18, 2023  6:27 PM

## 2023-10-18 NOTE — ANESTHESIA PROCEDURE NOTES
PENG Procedure Note    Pre-Procedure   Staff -        CRNA: Charleen Riley APRN CRNA       Performed By: CRNA       Location: ED       Procedure Start/Stop Times: 10/18/2023 6:10 PM and 10/18/2023 6:24 PM       Pre-Anesthestic Checklist: patient identified, IV checked, site marked, risks and benefits discussed, informed consent, monitors and equipment checked, pre-op evaluation, at physician/surgeon's request and post-op pain management  Timeout:       Correct Patient: Yes        Correct Procedure: Yes        Correct Site: Yes        Correct Position: Yes        Correct Laterality: Yes        Site Marked: Yes  Procedure Documentation  Procedure: PENG       Diagnosis: FEMORAL NERVE BLOCK VIA THE PERICAPSULAR APPROACH       Laterality: right       Patient Position: supine       Patient Prep/Sterile Barriers: sterile gloves, mask, patient draped       Skin prep: Chloraprep       Needle Gauge: 21.        Needle Length (Inches): 4        Ultrasound guided       1. Ultrasound was used to identify targeted nerve, plexus, vascular marker, or fascial plane and place a needle adjacent to it in real-time.       2. Ultrasound was used to visualize the spread of anesthetic in close proximity to the above referenced structure.       3. A permanent image is entered into the patient's record.       4. The visualized anatomic structures appeared normal.       5. There were no apparent abnormal pathologic findings.    Assessment/Narrative         The placement was negative for: blood aspirated, painful injection and site bleeding       Paresthesias: No.       Test dose of 5 mL lidocaine 2% w/ 1:200,000 epinephrine at 18:20 CDT.        .       Bolus given via needle. no blood aspirated via catheter.        Secured via.        Insertion/Infusion Method: Single Shot    Medication(s) Administered   Bupivacaine 0.5% PF (Infiltration) - Infiltration   20 mL - 10/18/2023 6:24:00 PM  Medication Administration Time: 10/18/2023 6:10  "PM      FOR [facility] ([facility]) ONLY:   Pain Team Contact information: please page the Pain Team Via [employer]. Search \"Pain\". During daytime hours, please page the attending first. At night please page the resident first.      "

## 2023-10-18 NOTE — ED NOTES
"[facility]   Admission Handoff    The patient is Ludy Mckenzie, 83 year old who arrived in the ED by WHEELCHAIR from home with a complaint of Fall and Hip Pain  . The patient's current symptoms are new and during this time the symptoms have remained the same. In the ED, patient was diagnosed with   Final diagnoses:   Closed fracture of neck of right femur, initial encounter (H)         Needed?: No    Allergies:    Allergies   Allergen Reactions    Amoxicillin GI Disturbance and Nausea    Amoxicillin-Pot Clavulanate Nausea and Vomiting    Doxycycline GI Disturbance    Iodine Itching and Swelling     Pt is ok with ct contrast dye (isovue 370)    Mercury Unknown    Metronidazole GI Disturbance    Penicillins Unknown    Phenylmercuric Nitrate Unknown       Past Medical Hx:   Past Medical History:   Diagnosis Date    Acute ischemic left TREMAYNE stroke (H) 2009    Astigmatism, unspecified     Bullous pemphigoid     COPD (chronic obstructive pulmonary disease) (H)     CVA (cerebral infarction) 08/2009    Depressive disorder     Diabetes (H)     Glaucoma (increased eye pressure)     Goiter 03/12/2007    HTN (hypertension) 08/25/2009    Hypocalcemia     Hypokalemia     Hyponatremia     Mixed hyperlipidemia     Osteoporosis     Personal history of other diseases of circulatory system     Pneumonia 11/16/2010    Primary open-angle glaucoma(365.11)     Thyroid nodule 03/23/2012    Unspecified cerebral artery occlusion with cerebral infarction     Unspecified cerebral artery occlusion with cerebral infarction 1998       Initial vitals were: BP: 128/69  Pulse: 75  Temp: 98.2  F (36.8  C)  Resp: 18  Height: 167.6 cm (5' 6\")  Weight: 56.7 kg (125 lb)  SpO2: 91 %   Recent vital Signs: /55   Pulse 77   Temp 98.2  F (36.8  C) (Tympanic)   Resp 15   Ht 1.676 m (5' 6\")   Wt 56.7 kg (125 lb)   SpO2 95%   BMI 20.18 kg/m      Elimination Status: Continent: wears briefs     Activity Level: " Total assist/lift    Fall Status: Reason for falls risk:  Mobility, Reason for falls risk: Elimination, and Reason for falls risk: High Risk Medications  nonskid shoes/slippers when out of bed, patient and family education, assistive device/personal items within reach, and mobility aid in reach    Baseline Mental status: WDL  Current Mental Status changes: at basesline    Infection present or suspected this encounter: no  Sepsis suspected: No    Isolation type: none    Bariatric equipment needed?: No    In the ED these meds were given:   Medications   sodium chloride 0.9 % infusion (has no administration in time range)   HYDROmorphone (DILAUDID) injection 0.2 mg (0.2 mg Intravenous $Given 10/18/23 1612)       Drips running?  No    Home pump  No    Current LDAs: Peripheral IV: Site R AC; Gauge 20  normal saline     Results:   Labs/Imaging  Ordered and Resulted from Time of ED Arrival Up to the Time of Departure from the ED  Results for orders placed or performed during the hospital encounter of 10/18/23 (from the past 24 hour(s))   XR Femur Right 2 Views    Narrative    EXAM: XR FEMUR RIGHT 2 VIEWS, XR PELVIS 1/2 VIEWS  LOCATION: [facility]  DATE: 10/18/2023    INDICATION: Trauma, pain  COMPARISON: None.      Impression    IMPRESSION: Fracture through the right femoral neck without evidence for intertrochanteric extension. No dislocation. There is pre-existing degenerative change at the hip joint. The left hip is negative for fracture or dislocation. The pelvis is negative   for fracture. The remainder of the right femur is negative for fracture.   XR Pelvis 1/2 Views    Narrative    EXAM: XR FEMUR RIGHT 2 VIEWS, XR PELVIS 1/2 VIEWS  LOCATION: [facility]  DATE: 10/18/2023    INDICATION: Trauma, pain  COMPARISON: None.      Impression    IMPRESSION: Fracture through the right femoral neck without evidence for intertrochanteric extension. No dislocation. There is  pre-existing degenerative change at the hip joint. The left hip is negative for fracture or dislocation. The pelvis is negative   for fracture. The remainder of the right femur is negative for fracture.   CBC with platelets differential    Narrative    The following orders were created for panel order CBC with platelets differential.  Procedure                               Abnormality         Status                     ---------                               -----------         ------                     CBC with platelets and d...[[id number]]  Abnormal            Final result                 Please view results for these tests on the individual orders.   ABO/Rh type and screen    Narrative    The following orders were created for panel order ABO/Rh type and screen.  Procedure                               Abnormality         Status                     ---------                               -----------         ------                     Adult Type and Screen[[id number]]                            Final result                 Please view results for these tests on the individual orders.   INR   Result Value Ref Range    INR 1.03 0.85 - 1.15   CBC with platelets and differential   Result Value Ref Range    WBC Count 12.6 (H) 4.0 - 11.0 10e3/uL    RBC Count 3.63 (L) 3.80 - 5.20 10e6/uL    Hemoglobin 10.9 (L) 11.7 - 15.7 g/dL    Hematocrit 31.8 (L) 35.0 - 47.0 %    MCV 88 78 - 100 fL    MCH 30.0 26.5 - 33.0 pg    MCHC 34.3 31.5 - 36.5 g/dL    RDW 11.9 10.0 - 15.0 %    Platelet Count 221 150 - 450 10e3/uL    % Neutrophils 80 %    % Lymphocytes 13 %    % Monocytes 5 %    Mids % (Monos, Eos, Basos)      % Eosinophils 1 %    % Basophils 0 %    % Immature Granulocytes 1 %    NRBCs per 100 WBC 0 <1 /100    Absolute Neutrophils 10.1 (H) 1.6 - 8.3 10e3/uL    Absolute Lymphocytes 1.6 0.8 - 5.3 10e3/uL    Absolute Monocytes 0.7 0.0 - 1.3 10e3/uL    Mids Abs (Monos, Eos, Basos)      Absolute Eosinophils 0.1 0.0 - 0.7 10e3/uL     Absolute Basophils 0.0 0.0 - 0.2 10e3/uL    Absolute Immature Granulocytes 0.1 <=0.4 10e3/uL    Absolute NRBCs 0.0 10e3/uL   Adult Type and Screen   Result Value Ref Range    ABO/RH(D) A POS     Antibody Screen Negative Negative    SPECIMEN EXPIRATION DATE [id number]      *Note: Due to a large number of results and/or encounters for the requested time period, some results have not been displayed. A complete set of results can be found in Results Review.       For the majority of the shift this patient's behavior was Green     Cardiac Rhythm: N/A  Pt needs tele? No  Skin/wound Issues: None    Code Status: unknown    Pain control: good    Nausea control: pt had none    Abnormal labs/tests/findings requiring intervention: see chart    Patient tested for COVID 19 prior to admission: NO     OBS brochure/video discussed/provided to patient/family: N/A     Family present during ED course? Yes     Family Comments/Social Situation comments: none    Tasks needing completion:  see chart    Kristina Vasquez RN

## 2023-10-18 NOTE — ANESTHESIA PREPROCEDURE EVALUATION
Anesthesia Pre-Procedure Evaluation    Patient: Ludy Mckenzie   MRN: [id number] : 1940        Procedure :           Past Medical History:   Diagnosis Date     Acute ischemic left TREMAYNE stroke (H)      Astigmatism, unspecified      Bullous pemphigoid      COPD (chronic obstructive pulmonary disease) (H)      CVA (cerebral infarction) 2009     Depressive disorder      Diabetes (H)      Glaucoma (increased eye pressure)      Goiter 2007     HTN (hypertension) 2009     Hypocalcemia      Hypokalemia      Hyponatremia      Mixed hyperlipidemia      Osteoporosis      Personal history of other diseases of circulatory system      Pneumonia 2010     Primary open-angle glaucoma(365.11)      Thyroid nodule 2012     Unspecified cerebral artery occlusion with cerebral infarction      Unspecified cerebral artery occlusion with cerebral infarction       Past Surgical History:   Procedure Laterality Date     BIOPSY       BUNIONECTOMY      JACQUELINE     CATARACT IOL, RT/LT      right     COLONOSCOPY  2013    Procedure: COLONOSCOPY;  Colonoscopy ;  Surgeon: Hugo Minor MD;  Location: [facility]     COLONOSCOPY N/A 2019    Procedure: Colonoscopy, With Polypectomy And Biopsy;  Surgeon: Selena Escobedo MD;  Location: [facility]     COLONOSCOPY N/A 2021    Procedure: COLONOSCOPY, WITH POLYPECTOMY AND BIOPSY;  Surgeon: Shai Leal MD;  Location:  GI     COLONOSCOPY WITH CO2 INSUFFLATION N/A 2019    Procedure: COLONOSCOPY, WITH CO2 INSUFFLATION;  Surgeon: Selena Escobedo MD;  Location: [facility] OR      Allergies   Allergen Reactions     Amoxicillin GI Disturbance and Nausea     Amoxicillin-Pot Clavulanate Nausea and Vomiting     Doxycycline GI Disturbance     Iodine Itching and Swelling     Pt is ok with ct contrast dye (isovue 370)     Mercury Unknown     Metronidazole GI Disturbance     Penicillins Unknown     Phenylmercuric Nitrate Unknown      Social History      Tobacco Use     Smoking status: Former     Packs/day: 1.50     Years: 50.00     Additional pack years: 0.00     Total pack years: 75.00     Types: Cigarettes     Quit date: 2009     Years since quittin.2     Smokeless tobacco: Never   Substance Use Topics     Alcohol use: No     Alcohol/week: 4.0 standard drinks of alcohol      Wt Readings from Last 1 Encounters:   10/18/23 56.7 kg (125 lb)        Anesthesia Evaluation   Pt has had prior anesthetic. Type: General and MAC.        ROS/MED HX  ENT/Pulmonary:     (+)                         COPD,    recent URI,          Neurologic:     (+)          CVA,                      Cardiovascular:     (+) Dyslipidemia hypertension- -   -  - -                                      METS/Exercise Tolerance:     Hematologic:       Musculoskeletal:   (+)     fracture, Fracture location: RLE,         GI/Hepatic:  - neg GI/hepatic ROS     Renal/Genitourinary:     (+) renal disease, type: CRI,            Endo:     (+) type I DM,         thyroid problem, hypothyroidism,           Psychiatric/Substance Use:       Infectious Disease:       Malignancy:       Other:            Physical Exam    Airway        Mallampati: II   TM distance: > 3 FB   Neck ROM: full   Mouth opening: > 3 cm    Respiratory Devices and Support         Dental    unable to assess        Cardiovascular   cardiovascular exam normal          Pulmonary   pulmonary exam normal            OUTSIDE LABS:  CBC:   Lab Results   Component Value Date    WBC 7.7 2022    WBC 5.3 2021    HGB 11.7 2022    HGB 12.7 2021    HCT 35.1 2022    HCT 36.3 2021     2022     2021     BMP:   Lab Results   Component Value Date     2023     2022    POTASSIUM 3.8 2023    POTASSIUM 4.0 2022    CHLORIDE 106 2023    CHLORIDE 108 2022    CO2 25 2023    CO2 21 2022    BUN 17 2023    BUN 20 2022    CR 0.60  04/04/2023    CR 0.55 09/30/2022     (H) 04/04/2023     (H) 09/30/2022     COAGS:   Lab Results   Component Value Date    PTT 31 11/15/2010    INR 1.00 05/14/2021     POC:   Lab Results   Component Value Date     (H) 05/14/2021     HEPATIC:   Lab Results   Component Value Date    ALBUMIN 3.6 06/29/2021    PROTTOTAL 7.2 06/29/2021    ALT 29 06/29/2021    AST 40 06/29/2021    GGT 42 (H) 09/24/2013    ALKPHOS 61 06/29/2021    BILITOTAL 0.6 06/29/2021     OTHER:   Lab Results   Component Value Date    LACT 1.5 04/06/2014    A1C 7.2 (H) 04/04/2023    IAN 8.9 04/04/2023    PHOS 2.6 11/18/2015    MAG 2.1 09/13/2019    LIPASE 116 06/29/2021    AMYLASE 46 06/29/2021    TSH 3.44 03/27/2018    T4 0.91 04/01/2008    CRP 3.0 08/24/2015    SED 9 08/24/2015       Anesthesia Plan    ASA Status:  3       Anesthesia Type: Peripheral Nerve Block.              Consents    Anesthesia Plan(s) and associated risks, benefits, and realistic alternatives discussed. Questions answered and patient/representative(s) expressed understanding.     - Discussed: Risks, Benefits and Alternatives for the PROCEDURE were discussed     - Discussed with:  Patient            Postoperative Care    Pain management: Peripheral nerve block (Single Shot).        Comments:              OCTAVIO Gibbons CRNA

## 2023-10-18 NOTE — ANESTHESIA CARE TRANSFER NOTE
Patient: Ludy Mckenzie    Procedure: * No procedures listed *       Diagnosis: * No pre-op diagnosis entered *  Diagnosis Additional Information: No value filed.    Anesthesia Type:   Peripheral Nerve Block     Note:      Level of Consciousness: awake  Oxygen Supplementation: room air    Independent Airway: airway patency satisfactory and stable    Vital Signs Stable: post-procedure vital signs reviewed and stable  Report to RN Given: handoff report given  Patient transferred to: Emergency Department    Handoff Report: Identifed the Patient, Identified the Reponsible Provider, Reviewed the pertinent medical history, Discussed the surgical course, Reviewed Intra-OP anesthesia mangement and issues during anesthesia, Set expectations for post-procedure period and Allowed opportunity for questions and acknowledgement of understanding  Vitals:  Vitals Value Taken Time   /52 10/18/23 1820   Temp     Pulse 74 10/18/23 1825   Resp 13 10/18/23 1825   SpO2 95 % 10/18/23 1825   Vitals shown include unfiled device data.    Electronically Signed By: OCTAVIO Gibbons CRNA  October 18, 2023  6:27 PM

## 2023-10-19 NOTE — H&P
[facility]    History and Physical  Hospital Medicine       Date of Admission:  10/18/2023  Date of Service: 10/18/2023     Assessment & Plan   Ludy Mckenzie is a 83 year old female who presents on 10/18/2023 with pain following a fall. She is found to have a right hip fracture and is being admitted for further workup & treatment.     Closed fracture of neck of right femur, initial encounter   Mechanical fall, initial encounter  Mechanical fall from standing AM 10/18 onto right hip. No LOC, no other traumatic injuries. Unable to bear weight since falling. Xray shows fracture through right femoral neck without evidence for intertrochanteric extension, no dislocation. ER discussed with orthopedics, who will plan to see patient in AM for likely OR.   -Orthopedic surgery consult, appreciate recommendations  -Nonweightbearing until directed otherwise by orthopedics  -Pain control with acetaminophen, hydromorphone  -NPO at midnight for potential OR in AM  -Lactated ringer 100/hr continuous while NPO    Pre-operative exam and RCRI assessment  2019 RCRI 0. No known history cardiac disease. Takes asa 81mg daily, last dose 10/18AM. Not on any anticoagulation. Has COPD seems relatively progressed based on med regimen but on room air at time of admission without cough or dyspnea. Reports no personal history of previous major surgeries (colonoscopies, cataract surgeries unremarkable). Denies family history of adverse reactions to anesthesia. Patient is generally optimized for the planned procedure.     Hyponatremia  Mild, 131 on admission. Anticipate improvement with milton-operative IVF.   -BMP in AM    Hypocalcemia  Mild, 8.5 on admission. Repleting with Lr which contains calcium so may see modest improvement with IVF and with good PO post-operatively.   -BMP in AM    Leukocytosis  Mild, 12.6. Likely stress reaction from traumatic injury related to fall. No other signs of acute infection.   -CBC in  AM    Anemia  Mild, acute, normocytic. Baseline hemoglobin around 12.0, hemoglobin on presentation 10.9. Some bruising over hip but no other signs of acute bleeding. Monitor.   -CBC in AM    Chronic obstructive pulmonary disease, emphysematous type  Breathing is at baseline at time of admission. Patient does not use oxygen at home. Managed PTA with albuterol neb & inhaler, fluticasone nasal spray, Trelegy inhaler, azithromycin M,W,F.   -Continue PTA albuterol neb & azithromycin  -Oxygen available PRN, titrate to maintain SPO2 89-94%    Hypertension  Normotensive with borderline hypotension on admission. Asymptomatic. Managed PTA with losartan 12.5mg daily.   -Continue PTA losartan post-op with hold parameters    Hyperlipidemia LDL goal <100  Continue PTA rosuvastatin 5mg daily.     Bullous pemphigoid   Noted to have previously been on immunosuppression, but does not appear to be on any immunosuppressants currently. Has some bruising over arms (probably from falling) but no bullae. Monitor closely. No acute interventions.     Anxiety  Managed PTA with sertraline 50mg daily, continue.     Type 2 diabetes mellitus   Diabetic neuropathy with neurologic complication   Good control based on last A1c in April 2023 (7.2). Hyperglycemic on admission (181). Managed PTA with metformin 500mg with dinner.   -HOLD PTA metformin until tolerating diet post-op    Urge incontinence of urine  Noted in history. Does not appear to be on any outpatient pharmacologic management for this. Monitor milton-operatively.       Clinically Significant Risk Factors Present on Admission              # Hypoalbuminemia: Lowest albumin = 3.3 g/dL at 10/18/2023  6:55 PM, will monitor as appropriate   # Drug Induced Platelet Defect: home medication list includes an antiplatelet medication   # Hypertension: Home medication list includes antihypertensive(s)                    Diet: NPO per Anesthesia Guidelines for Procedure/Surgery Except for: Meds    DVT  Prophylaxis: Pneumatic Compression Devices  Cote Catheter: Not present  Code Status:  DNR, DNI  Lines: PIV    Disposition Plan      Expected Discharge Date: 10/20/2023             Entered: Kelsi Briggs PA-C 10/18/2023, 11:45 PM     Status: Patient is appropriate for inpatient  Kelsi Briggs PA-C        The patient's care was discussed with the Attending Physician, Dr. Jessica Roman, bedside RN, and the patient .    Primary Care Physician   Beth Strickland [phone]    History is obtained from the patient, who is an ok historian, handoff from ER provider, and review of old records via the EMR.    History of Present Illness   Ludy Mckenzie is a 83 year old female with past medical history of bullous pemphigoid on chronic immunosuppression, COPD, T2DM with neuropathy, COPD emphysematous type with pulmonary nodules, urge incontinence, HLD now presents on 10/18/2023 with pain following a fall.     Patient had a mechanical fall while climbing up the stairs to go to a store this morning.  Patient tripped on the step and lost her balance.  She fell to the ground from standing, did not hit her head, no loss of consciousness.  She landed on her right hip, and had immediate pain in her hip.  She was unable to get herself up, and has been unable to bear weight since the fall.  Pain is isolated to her right hip, does not radiate into her groin, or abdomen.  It is also present over her SI joint more posteriorly.  She denies any radiation of pain, numbness, tingling or shock sensation down her right leg.  She denies any other pain elsewhere on her body.  Denies headaches, vision changes.  Denies chest pain, lightheadedness, dizziness surrounding the time of the fall.  Patient ambulates with a cane, and was using this at the time of her fall.  Patient denies any previous falls.    Upon arrival to emergency department patient received lab and imaging work-up.  ER discussed with on-call orthopedic  surgeon, who will plan to take the patient to the OR in the morning for repair.    Review of Systems   Constitutional: denies weight loss, fever, chills  Eyes: denies changes in vision  HENT: denies changes in hearing  Respiratory: denies new or changing cough, dyspnea  Cardiovascular: denies chest pain, heart palpitations, lightheadedness, syncope, limb swelling  Gastroenterology: denies constipation, diarrhea, GERD symptoms  Genitourinary: denies dysuria  Integumentary: no new rashes or skin changes  Musculoskeletal: see HPI  Neuro: denies numbness, tingling, headaches, tremor  Psychiatric: denies significant changes to mood    Past Medical History     Mixed hyperlipidemia 03/22/2012     Priority: High    Cerebral infarction (H) 03/22/2012     Priority: High    Closed fracture of neck of right femur, initial encounter (H) 10/18/2023     Priority: Medium    Abnormal gait 09/23/2022     Priority: Medium    History of colonic polyps 01/05/2021     Priority: Medium    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) 07/30/2018     Priority: Medium    Urge incontinence of urine 06/02/2017     Priority: Medium    Cramp of limb 06/02/2017     Priority: Medium    Right sided sciatica 06/02/2017     Priority: Medium    Pulmonary emphysema, unspecified emphysema type (H) 06/02/2017     Priority: Medium    Age-related osteoporosis without current pathological fracture 06/02/2017     Priority: Medium    Pulmonary nodules 06/02/2017     Priority: Medium    Pseudophakia of both eyes 10/24/2016     Priority: Medium    Diabetic neuropathy with neurologic complication (H) 09/19/2016     Priority: Medium    Loss of balance 03/17/2016     Priority: Medium    Chronic obstructive pulmonary disease, unspecified COPD type (H) 03/02/2016     Priority: Medium    Chronic kidney disease (CKD) stage G1/A2, glomerular filtration rate (GFR) equal to or greater than 90 mL/min/1.73 square meter and albuminuria creatinine ratio between  mg/g 03/02/2016      "Priority: Medium    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) 10/21/2015     Priority: Medium    Primary open angle glaucoma (POAG) 10/21/2015     Priority: Medium    Bullous pemphigoid (H28) 10/01/2013     Priority: Medium    Thyroid nodule 03/23/2012     Priority: Medium    Seasonal allergic rhinitis 03/23/2012     Priority: Medium    Balance problems, \"feet do not work without looking at them\" 10/06/2011     Priority: Medium    Colon polyp 01/24/2009     Priority: Medium      Past Surgical History   Past Surgical History:   Procedure Laterality Date    BIOPSY      BUNIONECTOMY  1960    JACQUELINE    CATARACT IOL, RT/LT  2010    right    COLONOSCOPY  2/22/2013    Procedure: COLONOSCOPY;  Colonoscopy ;  Surgeon: Hugo Minor MD;  Location:  GI    COLONOSCOPY N/A 11/11/2019    Procedure: Colonoscopy, With Polypectomy And Biopsy;  Surgeon: Selena Escobedo MD;  Location: [facility] OR    COLONOSCOPY N/A 12/2/2021    Procedure: COLONOSCOPY, WITH POLYPECTOMY AND BIOPSY;  Surgeon: Shai Leal MD;  Location:  GI    COLONOSCOPY WITH CO2 INSUFFLATION N/A 11/11/2019    Procedure: COLONOSCOPY, WITH CO2 INSUFFLATION;  Surgeon: Selena Escobedo MD;  Location: [facility] OR      Prior to Admission Medications   Prior to Admission Medications   Prescriptions Last Dose Informant Patient Reported? Taking?   Calcium Carb-Cholecalciferol 500-5 MG-MCG TABS 10/17/2023 at hs  Yes Yes   Fluticasone-Umeclidin-Vilanterol (TRELEGY ELLIPTA) 200-62.5-25 MCG/INH oral inhaler 10/18/2023 at am  No Yes   Sig: Inhale 1 puff into the lungs daily   albuterol (ACCUNEB) 1.25 MG/3ML neb solution Past Week  No Yes   Sig: TAKE 1 VIAL (1.25 MG) BY NEBULIZATION EVERY 6 HOURS AS NEEDED FOR SHORTNESS OF BREATH / DYSPNEA OR WHEEZING   albuterol (PROAIR HFA/PROVENTIL HFA/VENTOLIN HFA) 108 (90 Base) MCG/ACT inhaler Past Week  No Yes   Sig: Inhale 2 puffs into the lungs every 4 hours as needed for shortness of breath or wheezing "   aspirin 81 MG tablet 10/18/2023 at am Self Yes Yes   Sig: Take 1 tablet by mouth daily.   azithromycin (ZITHROMAX) 250 MG tablet 10/16/2023 at hs  No No   Sig: TAKE 2 TABLETS (500 MG) BY MOUTH EVERY MON, WED, FRI MORNING   cholecalciferol (VITAMIN D3) 25 mcg (1000 units) capsule 10/18/2023 at am  Yes Yes   Sig: Take 1 capsule by mouth daily   fluticasone (FLONASE) 50 MCG/ACT nasal spray Past Week  No Yes   Sig: INSTILL 1 SPRAY INTO BOTH NOSTRILS DAILY   latanoprost (XALATAN) 0.005 % ophthalmic solution 10/17/2023 at hs  No Yes   Sig: Place 1 drop into both eyes At Bedtime   loratadine (CLARITIN) 10 MG tablet   No No   Sig: Take 1 tablet (10 mg) by mouth daily as needed for allergies   losartan (COZAAR) 25 MG tablet 10/18/2023 at am  No Yes   Sig: TAKE 1/2 TABLET BY MOUTH EVERY DAY   metFORMIN (GLUCOPHAGE XR) 500 MG 24 hr tablet 10/18/2023 at pm  No Yes   Sig: Take 1 tablet (500 mg) by mouth daily (with dinner)   potassium chloride ER (KLOR-CON) 20 MEQ CR tablet 10/18/2023 at am  No Yes   Sig: Take 1 tablet (20 mEq) by mouth daily   rosuvastatin (CRESTOR) 5 MG tablet 10/18/2023 at am  No Yes   Sig: Take 1 tablet (5 mg) by mouth daily   sertraline (ZOLOFT) 50 MG tablet 10/18/2023 at am  No Yes   Sig: Take 1 tablet (50 mg) by mouth daily   thin (NO BRAND SPECIFIED) lancets   No No   Sig: Use with lanceting device. To accompany: Blood Glucose Monitor Brands: per insurance.   Patient not taking: Reported on 5/24/2023      Facility-Administered Medications: None     Allergies   Allergies   Allergen Reactions    Amoxicillin GI Disturbance and Nausea    Amoxicillin-Pot Clavulanate Nausea and Vomiting    Doxycycline GI Disturbance    Iodine Itching and Swelling     Pt is ok with ct contrast dye (isovue 370)    Mercury Unknown    Metronidazole GI Disturbance    Penicillins Unknown    Phenylmercuric Nitrate Unknown     Family History    Family History   Problem Relation Age of Onset    Cancer Maternal Grandfather          "bone    Diabetes Mother 50        dx age 50, hip fracture    Glaucoma Mother     Macular Degeneration Mother     C.A.D. Father         pacemaker    Glaucoma Sister      Social History   Social History     Socioeconomic History    Marital status:      Physical Exam   BP 99/41 (BP Location: Left arm)   Pulse 78   Temp 99.3  F (37.4  C) (Oral)   Resp 18   Ht 1.676 m (5' 6\")   Wt 56.2 kg (123 lb 14.4 oz)   SpO2 93%   BMI 20.00 kg/m       Weight: 123 lbs 14.38 oz Body mass index is 20 kg/m .     Constitutional: Alert, oriented, cooperative, no apparent distress, appears nontoxic  Eyes: Eyes are clear  HENT: Oropharynx is clear and moist. No evidence of cranial trauma.  Cardiovascular: Regular rate and rhythm, normal S1 and S2, and no murmur noted.  Good peripheral pulses in wrists bilaterally. No pitting lower extremity edema.  Respiratory: Clear to auscultation bilaterally. Unlabored on room air.   GI: Soft, non-tender, normal bowel sounds, non-distended.   Genitourinary: Deferred  Musculoskeletal: Normal muscle bulk, bruising over right hip but no obvious deformity. Tenderness to palpation over posterior pelvis by SI joint with some radiation to lateral hip.   Skin: Warm and dry, no rashes. Some bruising over right lateral hip.   Neurologic: Neck supple.  is symmetric. Patient is slow to answer questioning but speech is intact without facial droop or slurring. Cutaneous sensation intact to bilateral LE.     Data   Data reviewed today:   Recent Labs   Lab 10/18/23  1855 10/18/23  1725   WBC  --  12.6*   HGB  --  10.9*   MCV  --  88   PLT  --  221   INR  --  1.03   *  --    POTASSIUM 4.0  --    CHLORIDE 99  --    CO2 19*  --    BUN 14.9  --    CR 0.53  --    ANIONGAP 13  --    IAN 8.5*  --    *  --    ALBUMIN 3.3*  --    PROTTOTAL 5.9*  --    BILITOTAL 0.5  --    ALKPHOS 77  --    ALT 19  --    AST 46*  --        Recent Results (from the past 24 hour(s))   XR Femur Right 2 Views    " Narrative    EXAM: XR FEMUR RIGHT 2 VIEWS, XR PELVIS 1/2 VIEWS  LOCATION: [facility]  DATE: 10/18/2023    INDICATION: Trauma, pain  COMPARISON: None.      Impression    IMPRESSION: Fracture through the right femoral neck without evidence for intertrochanteric extension. No dislocation. There is pre-existing degenerative change at the hip joint. The left hip is negative for fracture or dislocation. The pelvis is negative   for fracture. The remainder of the right femur is negative for fracture.   XR Pelvis 1/2 Views    Narrative    EXAM: XR FEMUR RIGHT 2 VIEWS, XR PELVIS 1/2 VIEWS  LOCATION: [facility]  DATE: 10/18/2023    INDICATION: Trauma, pain  COMPARISON: None.      Impression    IMPRESSION: Fracture through the right femoral neck without evidence for intertrochanteric extension. No dislocation. There is pre-existing degenerative change at the hip joint. The left hip is negative for fracture or dislocation. The pelvis is negative   for fracture. The remainder of the right femur is negative for fracture.

## 2023-10-19 NOTE — ANESTHESIA CARE TRANSFER NOTE
Patient: Ludy Mckenzie    Procedure: Procedure(s):  ARTHROPLASTY HIP TOTAL       Diagnosis: Closed fracture of neck of right femur, initial encounter (H) [S72.001A]  Diagnosis Additional Information: No value filed.    Anesthesia Type:   Spinal     Note:    Oropharynx: oropharynx clear of all foreign objects  Level of Consciousness: awake  Oxygen Supplementation: face mask  Level of Supplemental Oxygen (L/min / FiO2): 6  Independent Airway: airway patency satisfactory and stable  Dentition: dentition unchanged  Vital Signs Stable: post-procedure vital signs reviewed and stable  Report to RN Given: handoff report given  Patient transferred to: PACU    Handoff Report: Identifed the Patient, Identified the Reponsible Provider, Reviewed the pertinent medical history, Discussed the surgical course, Reviewed Intra-OP anesthesia mangement and issues during anesthesia, Set expectations for post-procedure period and Allowed opportunity for questions and acknowledgement of understanding    Vitals:  Vitals Value Taken Time   BP     Temp     Pulse 77 10/19/23 1221   Resp 15 10/19/23 1221   SpO2 100 % 10/19/23 1221   Vitals shown include unfiled device data.    Electronically Signed By: Stacy Bates  October 19, 2023  12:22 PM

## 2023-10-19 NOTE — PLAN OF CARE
"[facility] [facility] ADMISSION NOTE    Patient admitted to room 2308 at approximately 2044 via cart from emergency room. Patient was accompanied by transport tech.     Verbal SBAR report received from ED prior to patient arrival.     Patient trasferred to bed via air vanna. Patient alert and oriented X 3. Pain is not well controlled.  Medication(s) being used: narcotic analgesics including hydromorphone (Dilaudid).  . Admission vital signs: Blood pressure 99/41, pulse 78, temperature 99.3  F (37.4  C), temperature source Oral, resp. rate 18, height 1.676 m (5' 6\"), weight 56.2 kg (123 lb 14.4 oz), SpO2 93%, not currently breastfeeding. Patient was oriented to plan of care, call light, bed controls, tv, telephone, bathroom, and visiting hours.     Risk Assessment    The following safety risks were identified during admission: fall. Yellow risk band applied: YES.     Skin Initial Assessment    This writer admitted this patient and completed a full skin assessment and Gavin score in the Adult PCS flowsheet. Appropriate interventions initiated as needed.     Secondary skin check completed by NAHUM Melton.         Education    Patient has a [place] to Observation order: No  Observation education completed and documented: N/A      AMISH JENSEN RN      "

## 2023-10-19 NOTE — PROGRESS NOTES
[facility] Medicine Progress Note  Date of Service: 10/19/2023    Assessment & Plan   Ludy Mckenzie is a 83 year old female who presents on 10/18/2023 with pain following a fall. She is found to have a right hip fracture and is being admitted for further workup & treatment.     Closed fracture of neck of right femur, initial encounter   Mechanical fall, initial encounter  S/p arthoplasty total hip right    Mechanical fall from standing AM 10/18 onto right hip. No LOC, no other traumatic injuries. Unable to bear weight since falling. Xray shows fracture through right femoral neck without evidence for intertrochanteric extension, no dislocation. ER discussed with orthopedics, who will plan to see patient in AM for likely OR. Patient was brought to the OR 10/19 with uncomplicated surgery.   - DVT prophylaxis with  mg daily x 42 days  - Nonweightbearing until directed otherwise by orthopedics  - Pain control with oral oxycodone 2.5-5 mg q4hrs PRN and IV dilaudid 0.2 - 0.4 PRN  - NS running 100 mL/hr continuous.    Hyponatremia (resolved)    Mild, 131 on admission. Anticipate improvement with milton-operative IVF.  - BMP in AM    Hypocalcemia    Mild, 8.5 on admission. Repleting with LR which contains calcium so may see modest improvement with IVF and with good PO post-operatively. Stable.  - BMP in AM    Leukocytosis (resolved)    Mild, 12.6. Likely stress reaction from traumatic injury related to fall. No other signs of acute infection. Remains afebrile.  - CBC in AM    Anemia    Mild, acute, normocytic. Baseline hemoglobin around 12.0, hemoglobin on presentation 10.9. Some bruising over hip but no other signs of acute bleeding. Monitor daily.  - Hemoglobin in am x 2 occurrences.    Chronic obstructive pulmonary disease, emphysematous type  Post-operative hypoxia without respiratory distress    Breathing is at baseline at time of admission. Post-op she is requiring 2  liters via NC to keep saturations in the mid 90's. Lungs are clear to auscultation with good movement. Patient does not use oxygen at home. Managed PTA with albuterol neb & inhaler, fluticasone nasal spray, Trelegy inhaler, azithromycin M,W,F.   - Continue PTA albuterol neb & azithromycin  - Oxygen available PRN, titrate to maintain SPO2 in the mid 90's.    Hypertension    Normotensive with borderline hypotension on admission. Asymptomatic. Managed PTA with losartan 12.5mg daily. Blood pressures have remained soft and stable.   - Continue PTA losartan post-op with hold parameters  - Monitor with routine vital checks    Hyperlipidemia LDL goal <100    Managed PTA rosuvastatin 5mg daily.   - Continue statin daily.    Bullous pemphigoid     Noted to have previously been on immunosuppression, but does not appear to be on any immunosuppressants currently. Has some bruising over arms (probably from falling) but no bullae. Monitor closely. No acute interventions.     Anxiety    Managed PTA with sertraline 50mg daily.  - Continue PTA sertraline    Type 2 diabetes mellitus   Diabetic neuropathy with neurologic complication     Good control based on last A1c in April 2023 (7.2). Hyperglycemic on admission (181). Managed PTA with metformin 500mg with dinner. BGs ranging from 150-200.  - HOLD PTA metformin until tolerating diet post-op  - Adding low resistance sliding scale insulin  - Hyper/hypoglycemia monitoring and protocol in place.    Urge incontinence of urine    Noted in history. Does not appear to be on any outpatient pharmacologic management for this.  - Monitor milton-operatively.  - Bladder management protocol.     Diet: Advance Diet as Tolerated: Regular Diet Adult  Discharge Instruction - Regular Diet Adult    DVT Prophylaxis:  mg daily x 42 days  Cote Catheter: Not present  Lines: None     Code Status: Full Code       Discussion: Had orthopedic surgery today. Patient assessed once back on floor. Well  appearing. No SOB or respiratory distress but requiring 2 liters O2 via NC to keep O2 sats in the mid 90's. Denying pain.     Disposition: Anticipate discharge 10/20/23     Attestation:  I have reviewed today's vital signs, notes, medications, labs and imaging.    I have discussed, or will be discussing, the patient with hospitalist physician, Dr. Roman.    BAYRON VASQUEZ PA-C     Interval History   Patient reassess after orthopedic surgery    Physical Exam   Temp:  [97.6  F (36.4  C)-99.3  F (37.4  C)] 98  F (36.7  C)  Pulse:  [71-84] 78  Resp:  [8-31] 14  BP: ()/(35-70) 101/35  SpO2:  [89 %-100 %] 97 %    Weights:   Vitals:    10/18/23 1506 10/18/23 2053 10/19/23 0856   Weight: 56.7 kg (125 lb) 56.2 kg (123 lb 14.4 oz) 55.8 kg (123 lb)    Body mass index is 19.85 kg/m .    Constitutional: Elderly lady, laying in bed comfortably, wearing NC, smiling, pleasant, cooperative, no acute distress, non toxic appearing.  CV: Regular rate and rhythm. Normal S1 and S2 heart sounds.  Respiratory: CTA bilaterally. No wheezes, rhonchi, or crackles. Normal respiratory effort on 2 L NC with   GI: Soft, flat, nontender to palpation throughout.   Skin: Warm and dry  Musculoskeletal: Right hip with Aquacell bandage clean, dry, and intact. Dorsiflexion and plantarflexion intact bilaterally. Toes well perfused and pedal pulse present bilaterally. Decreased sensation in to right foot.    Data   Recent Labs   Lab 10/19/23  1353 10/19/23  1223 10/19/23  0813 10/19/23  0502 10/19/23  0154 10/18/23  1855 10/18/23  1725   0000   WBC  --   --   --  10.5  --   --  12.6*  --    HGB  --   --   --  10.8*  --   --  10.9*  --    MCV  --   --   --  90  --   --  88  --    PLT  --   --   --  188  --   --  221  --    INR  --   --   --   --   --   --  1.03  --    NA  --   --   --  134*  --  131*  --   --    POTASSIUM  --   --   --  4.3  --  4.0  --   --    CHLORIDE  --   --   --  101  --  99  --   --    CO2  --   --   --  22  --  19*  --   --     BUN  --   --   --  16.8  --  14.9  --   --    CR  --   --   --  0.60  --  0.53  --   --    ANIONGAP  --   --   --  11  --  13  --   --    IAN  --   --   --  8.5*  --  8.5*  --   --    * 152* 157* 147*   < > 181*  --    < >   ALBUMIN  --   --   --   --   --  3.3*  --   --    PROTTOTAL  --   --   --   --   --  5.9*  --   --    BILITOTAL  --   --   --   --   --  0.5  --   --    ALKPHOS  --   --   --   --   --  77  --   --    ALT  --   --   --   --   --  19  --   --    AST  --   --   --   --   --  46*  --   --     < > = values in this interval not displayed.     Recent Labs   Lab 10/19/23  1353 10/19/23  1223 10/19/23  0813 10/19/23  0502 10/19/23  0154 10/18/23  1855   * 152* 157* 147* 157* 181*      Unresulted Labs Ordered in the Past 30 Days of this Admission       No orders found for last 31 day(s).           Imaging  Recent Results (from the past 24 hour(s))   XR Femur Right 2 Views    Narrative    EXAM: XR FEMUR RIGHT 2 VIEWS, XR PELVIS 1/2 VIEWS  LOCATION: [facility]  DATE: 10/18/2023    INDICATION: Trauma, pain  COMPARISON: None.      Impression    IMPRESSION: Fracture through the right femoral neck without evidence for intertrochanteric extension. No dislocation. There is pre-existing degenerative change at the hip joint. The left hip is negative for fracture or dislocation. The pelvis is negative   for fracture. The remainder of the right femur is negative for fracture.   XR Pelvis 1/2 Views    Narrative    EXAM: XR FEMUR RIGHT 2 VIEWS, XR PELVIS 1/2 VIEWS  LOCATION: [facility]  DATE: 10/18/2023    INDICATION: Trauma, pain  COMPARISON: None.      Impression    IMPRESSION: Fracture through the right femoral neck without evidence for intertrochanteric extension. No dislocation. There is pre-existing degenerative change at the hip joint. The left hip is negative for fracture or dislocation. The pelvis is negative   for fracture. The remainder  of the right femur is negative for fracture.   XR Hip Port Right G/E 2 Views    Narrative    This exam was marked as non-reportable because it will not be read by a   radiologist or a [place] non-radiologist provider.         XR Pelvis Port 1/2 Views    Narrative    XR PELVIS PORT 1/2 VIEWS   10/19/2023 1:00 PM     HISTORY: Status post Hip surgery  COMPARISON: None.       Impression    IMPRESSION: AP views of the right hip and pelvis demonstrate immediate  postoperative changes from right hip arthroplasty. There is diffuse  osseous demineralization. There are degenerative changes in the lower  lumbar spine.    ESME MORENO MD         SYSTEM ID:  [id number]      I reviewed all new labs and imaging results over the last 24 hours. I personally reviewed no images or EKG's today.    Medications    sodium chloride Stopped (10/19/23 0044)      acetaminophen  975 mg Oral Q8H    aspirin  325 mg Oral At Bedtime    azithromycin  500 mg Oral Q Mon Wed Fri AM    ceFAZolin  1 g Intravenous Q8H    famotidine  20 mg Oral BID    Or    famotidine  20 mg Intravenous BID    [START ON 10/20/2023] losartan  12.5 mg Oral Daily    [Held by provider] metFORMIN  500 mg Oral Daily with supper    [START ON 10/20/2023] polyethylene glycol  17 g Oral Daily    [START ON 10/20/2023] rosuvastatin  5 mg Oral Daily    senna-docusate  1-2 tablet Oral BID    sertraline  50 mg Oral Daily    sodium chloride (PF)  3 mL Intracatheter Q8H     BAYRON VASQUEZ PA-C

## 2023-10-19 NOTE — ANESTHESIA PREPROCEDURE EVALUATION
Anesthesia Pre-Procedure Evaluation    Patient: Ludy Mckenzie   MRN: [id number] : 1940        Procedure : Procedure(s):  HEMIARTHROPLASTY HIP BIPOLAR          Past Medical History:   Diagnosis Date    Acute ischemic left TREMAYNE stroke (H)     Astigmatism, unspecified     Bullous pemphigoid     COPD (chronic obstructive pulmonary disease) (H)     CVA (cerebral infarction) 2009    Depressive disorder     Diabetes (H)     Glaucoma (increased eye pressure)     Goiter 2007    HTN (hypertension) 2009    Hypocalcemia     Hypokalemia     Hyponatremia     Mixed hyperlipidemia     Osteoporosis     Personal history of other diseases of circulatory system     Pneumonia 2010    Primary open-angle glaucoma(365.11)     Thyroid nodule 2012    Unspecified cerebral artery occlusion with cerebral infarction     Unspecified cerebral artery occlusion with cerebral infarction       Past Surgical History:   Procedure Laterality Date    BIOPSY      BUNIONECTOMY  1960    JACQUELINE    CATARACT IOL, RT/LT      right    COLONOSCOPY  2013    Procedure: COLONOSCOPY;  Colonoscopy ;  Surgeon: Hugo Minor MD;  Location: [facility]    COLONOSCOPY N/A 2019    Procedure: Colonoscopy, With Polypectomy And Biopsy;  Surgeon: Selena Escobedo MD;  Location: [facility] OR    COLONOSCOPY N/A 2021    Procedure: COLONOSCOPY, WITH POLYPECTOMY AND BIOPSY;  Surgeon: Shai Leal MD;  Location:  GI    COLONOSCOPY WITH CO2 INSUFFLATION N/A 2019    Procedure: COLONOSCOPY, WITH CO2 INSUFFLATION;  Surgeon: Selena Escobedo MD;  Location: [facility] OR      Allergies   Allergen Reactions    Amoxicillin GI Disturbance and Nausea    Amoxicillin-Pot Clavulanate Nausea and Vomiting    Doxycycline GI Disturbance    Iodine Itching and Swelling     Pt is ok with ct contrast dye (isovue 370)    Mercury Unknown    Metronidazole GI Disturbance    Penicillins Unknown    Phenylmercuric Nitrate Unknown      Social History      Tobacco Use    Smoking status: Former     Packs/day: 1.50     Years: 50.00     Additional pack years: 0.00     Total pack years: 75.00     Types: Cigarettes     Quit date: 2009     Years since quittin.2    Smokeless tobacco: Never   Substance Use Topics    Alcohol use: No     Alcohol/week: 4.0 standard drinks of alcohol      Wt Readings from Last 1 Encounters:   10/18/23 56.2 kg (123 lb 14.4 oz)        Anesthesia Evaluation   Pt has had prior anesthetic. Type: General and MAC.    No history of anesthetic complications       ROS/MED HX  ENT/Pulmonary: Comment: Pulmonary nodules    (+)                tobacco use, Past use,        COPD,              Neurologic: Comment: Acute ischemic left TREMAYNE stroke    (+)          CVA,                   TIA: .   Cardiovascular:     (+) Dyslipidemia hypertension- -   -  - -                                 Previous cardiac testing   Echo: Date: 2/10/20 Results:  Interpretation Summary     Global and regional left ventricular function is normal with an EF of 60-65%.  Right ventricular function, chamber size, wall motion, and thickness are  normal.  Pulmonary artery systolic pressure is normal.  The inferior vena cava is normal.  No pericardial effusion is present.  No cardiac cause for dyspnea identified.    Stress Test:  Date: Results:    ECG Reviewed:  Date: 21 Results:  Sinus  Rhythm   -  Nonspecific T-abnormality.    Low voltage -possible pulmonary disease.     ABNORMAL   Cath:  Date: Results:      METS/Exercise Tolerance: 3 - Able to walk 1-2 blocks without stopping    Hematologic: Comments: Immunosuppression      Musculoskeletal:   (+)     fracture, Fracture location: RLE,         GI/Hepatic:  - neg GI/hepatic ROS     Renal/Genitourinary:     (+) renal disease, type: CRI,            Endo:     (+)  type II DM,       Diabetic complications: neuropathy. thyroid problem, hx goiter,           Psychiatric/Substance Use:     (+) psychiatric history depression        Infectious Disease:  - neg infectious disease ROS     Malignancy:  - neg malignancy ROS     Other:  - neg other ROS          Physical Exam    Airway  airway exam normal      Mallampati: II   TM distance: > 3 FB   Neck ROM: full   Mouth opening: > 3 cm    Respiratory Devices and Support         Dental       (+) Minor Abnormalities - some fillings, tiny chips      Cardiovascular   cardiovascular exam normal       Rhythm and rate: regular and normal     Pulmonary   pulmonary exam normal        breath sounds clear to auscultation           OUTSIDE LABS:  CBC:   Lab Results   Component Value Date    WBC 10.5 10/19/2023    WBC 12.6 (H) 10/18/2023    HGB 10.8 (L) 10/19/2023    HGB 10.9 (L) 10/18/2023    HCT 32.1 (L) 10/19/2023    HCT 31.8 (L) 10/18/2023     10/19/2023     10/18/2023     BMP:   Lab Results   Component Value Date     (L) 10/19/2023     (L) 10/18/2023    POTASSIUM 4.3 10/19/2023    POTASSIUM 4.0 10/18/2023    CHLORIDE 101 10/19/2023    CHLORIDE 99 10/18/2023    CO2 22 10/19/2023    CO2 19 (L) 10/18/2023    BUN 16.8 10/19/2023    BUN 14.9 10/18/2023    CR 0.60 10/19/2023    CR 0.53 10/18/2023     (H) 10/19/2023     (H) 10/19/2023     COAGS:   Lab Results   Component Value Date    PTT 31 11/15/2010    INR 1.03 10/18/2023     POC:   Lab Results   Component Value Date     (H) 05/14/2021     HEPATIC:   Lab Results   Component Value Date    ALBUMIN 3.3 (L) 10/18/2023    PROTTOTAL 5.9 (L) 10/18/2023    ALT 19 10/18/2023    AST 46 (H) 10/18/2023    GGT 42 (H) 09/24/2013    ALKPHOS 77 10/18/2023    BILITOTAL 0.5 10/18/2023     OTHER:   Lab Results   Component Value Date    LACT 1.5 04/06/2014    A1C 7.2 (H) 04/04/2023    IAN 8.5 (L) 10/19/2023    PHOS 2.6 11/18/2015    MAG 2.1 09/13/2019    LIPASE 116 06/29/2021    AMYLASE 46 06/29/2021    TSH 3.44 03/27/2018    T4 0.91 04/01/2008    CRP 3.0 08/24/2015    SED 9 08/24/2015       Anesthesia Plan    ASA Status:  3        Anesthesia Type: Spinal.   Induction: Propofol.   Maintenance: TIVA.        Consents    Anesthesia Plan(s) and associated risks, benefits, and realistic alternatives discussed. Questions answered and patient/representative(s) expressed understanding.     - Discussed: Risks, Benefits and Alternatives for BOTH SEDATION and the PROCEDURE were discussed     - Discussed with:  Patient      - Extended Intubation/Ventilatory Support Discussed: No.      - Patient is DNR/DNI Status: No     Use of blood products discussed: No .     Postoperative Care    Pain management: IV analgesics, Oral pain medications.   PONV prophylaxis: Ondansetron (or other 5HT-3), Dexamethasone or Solumedrol, Background Propofol Infusion     Comments:                Eddie Kitchen CRNA, APRN CRNA

## 2023-10-19 NOTE — OP NOTE
Total Hip Arthoplasty Operative Note        PLAN:  Weight bearing status: Weight bearing as tolerated   Activity: Activity as tolerated  Patient may move about with assist as indicated or with supervision   Anticoagulation plan:                 ASA 325mg po qd for 42 days  Follow up plan                           Follow up in 2 week(s)        Name: Ludy Mckenzie    PCP: Beth Strickland    Procedure Date: 10/18/2023 - 10/19/2023    Pre-operative diagnosis: Closed fracture of neck of right femur, initial encounter (H) [S72.001A]   Post-operative diagnosis: Same   Procedure: Total hip arthoplasty (Right)   Surgeon: Duke Gomez MD     Assistant(s): Alfonso Young PA-C   Anesthesia: Spinal Anesthesia   Estimated blood loss: 150cc   Drains: None   Specimens: None       Findings: See full dictated operative note for details   Complications: None       Comments: See dictated operative report for full details     Indications:    The patient has experienced progressive right hip pain despite use of  Analgesics, NSAID's, Injection therapy and physical therapy. Because of the failure of these therapies to control symptoms and limited walking, night pain and altered activities the patient has decided to move ahead with joint replacement surgery.        Procedure and Findings:    After being informed of the risks, benefits, and alternatives to the procedure, the patient desired to proceed. Brought to the main operating suite where the patient was placed under spinal anesthetic. The patient was positioned in an Innomed hip mendoza. The patient s Right lower extremity was prepped and draped in a manner appropriate for the procedure after a timeout verification step was complete.    Patient received 2 grams of intravenous Ancef. Alfonso Young PA-C was present for the entire length of the case for the purposes of proper patient positioning, surgical exposure, and patient safety.    A minimally invasive posterior  approach to the hip was taken. IT band was divided. Gluteus fibers divided and the Charn[place] retractor was placed. Attention was directed towards the external rotators. The piriformis was identified and tagged. Minimus was elevated. The capsule was teed and tagged. Hip leg length and offset were then determined using a Smith and Nephew device with a pin in the innominate and the hip was then dislocated. The neck was resected using the [place] guide. Full thickness cartilage loss was demonstrated involving the femoral head and acetabulum.     Attention was directed toward the acetabulum. Retractors were placed. Soft tissues were resected. Sequential reaming from 45 to 51 mm was carried out. Good cancellous bleeding bed was demonstrated. The trail 50 mm cup was stable. The final 50 mm Trident 2 HA PSL cup was selected and secured with 2 supplemental fixation screws. A 10 degree liner was placed. Attention was directed towards the femur. Box chisel, canal finder, sequential broaching up to Cement 5 was carried out. Trial reductions were carried out and excellent range of motion and stability and restoration of leg length and offset was demonstrated with a +2.5,36 head. X-ray was obtained which demonstrated appropriate sizing and positioning of components. The trial components were then removed. The final 0 degree liner was secured. Inferior osteophytes were resected from the acetabulum. The femoral canal was prepared with pulse lavage, cement restrictor. Two batches of antibiotic containing cement were mixed under vacuum 60 seconds the introduced retrograde with gun and pressurized. The implant Cement 5, 127 degree offset stem was the secured. Trial reductions were carried out and a +2.5,36mm head was selected. The hip was reduced. The joint was copiously irrigated. The joint capsule and piriformis tendon was repaired back to the greater trochanter through drill holes in bone. Soft tissues were infiltrated with  anesthetic solution. Care was taken to avoid neurovascular structures.   The IT band was closed with running #1 running Stratafix stitch. Subcutaneous closure With layered 2-0 Vicryl, skin was closed with 3-0 Stratafix and Aquacell dressing. Sterile dressing was applied.  The patient returned [facility] in stable condition.       Duke Gomez MD    Date: 10/19/2023 Time: 12:08 PM      CONFIDENTIALITY NOTICE This message and any included attachments are from [facility] and are intended only for the addressee. The information contained in this message is confidential and may constitute inside or non-public information under international, federal, or state securities laws. Unauthorized forwarding, printing, copying, distribution, or use of such information is strictly prohibited and may be unlawful. If you are not the addressee, please promptly delete this message and notify the sender of the delivery error by e-mail.

## 2023-10-19 NOTE — PROGRESS NOTES
[facility]  [facility] - Med Surg    Referral Source: Patient request on admission    - Ludy went down for an early surgery before I had a chance to see her today.  I will make an attempt later today or tomorrow.    Plan:  will remain available for any ongoing support needs during LOS.      Kannan Zhang M.A., [facility]  Staff Chaplain [facility]  Office: [phone]

## 2023-10-19 NOTE — PLAN OF CARE
Time: Assumed care of patient at 0700 until transferred to OR, resumed care upon return to Med/Surg    Reason, date of admission: 10.18.23 right hip   Inpatient status    Code: Full    Isolation? None    History HTN, COPD, DM2, PE, CKD    Activity: Assist x 2 for first transfer, ambulation    Neuro: Alert and oriented    Cardiac: WNL    Telemetry: No    Resp: WNL O2: 2L    GI/:  Using Purewick       Last BM: Approximately 10.17.23    Skin: Scattered bruising, dry skin    Lines/Drains: PIV in left wrist      Fluids: NS at 100 mL/hr    B B       Pain: 2/10, waiting additional pharmacological intervention until known if acetaminophen effective or not    Other: Patient's sister hoping to speak to someone about possible TCU options. Aaron Ayala wrote TCU preferences on patient's whiteboard.     Plan: Probable discharge to TCU tomorrow.     Goal Outcome Evaluation:      Plan of Care Reviewed With: patient, other (see comments) (sisterAaron)    Overall Patient Progress: no changeOverall Patient Progress: no change

## 2023-10-19 NOTE — PROGRESS NOTES
Patient vital signs are at baseline: No,  Reason:  Patient does not use oxygen at baseline. Has been using oxygen pre and post-op.   Patient able to ambulate as they were prior to admission or with assist devices provided by therapies during their stay:  No,  Reason:  Patient yet to ambulate  Patient MUST void prior to discharge:  Pt currently utilizing a Purewick  Patient able to tolerate oral intake:  Yes  Pain has adequate pain control using Oral analgesics:  Yes  Does patient have an identified :  Yes  Has goal D/C date and time been discussed with patient:  Yes

## 2023-10-19 NOTE — ED PROVIDER NOTES
History     Chief Complaint   Patient presents with    Fall    Hip Pain     HPI  Ludy Mckenzie is a 83 year old female who  has a past medical history of Acute ischemic left TREMAYNE stroke (H) (2009), Astigmatism, unspecified, Bullous pemphigoid, COPD (chronic obstructive pulmonary disease) (H), CVA (cerebral infarction) (08/2009), Depressive disorder, Diabetes (H), Glaucoma (increased eye pressure), Goiter (03/12/2007), HTN (hypertension) (08/25/2009), Hypocalcemia, Hypokalemia, Hyponatremia, Mixed hyperlipidemia, Osteoporosis, Personal history of other diseases of circulatory system, Pneumonia (11/16/2010), Primary open-angle glaucoma(365.11), Thyroid nodule (03/23/2012), Unspecified cerebral artery occlusion with cerebral infarction, and Unspecified cerebral artery occlusion with cerebral infarction (1998).    She presents to the emergency department with family after tripping and suffering a ground-level fall and injuring her right hip.  She has been unable to bear weight on the right hip since the incident occurred earlier today.  She denies decreased sensation or weakness distally and has no other complaints at this time.    Allergies:  Allergies   Allergen Reactions    Amoxicillin GI Disturbance and Nausea    Amoxicillin-Pot Clavulanate Nausea and Vomiting    Doxycycline GI Disturbance    Iodine Itching and Swelling     Pt is ok with ct contrast dye (isovue 370)    Mercury Unknown    Metronidazole GI Disturbance    Penicillins Unknown    Phenylmercuric Nitrate Unknown       Problem List:    Patient Active Problem List    Diagnosis Date Noted    COPD (chronic obstructive pulmonary disease) (H) 03/22/2012     Priority: High     Stopped smoking in 2008      Mixed hyperlipidemia 03/22/2012     Priority: High    Cerebral infarction (H) 03/22/2012     Priority: High     No residual effects      Closed fracture of neck of right femur, initial encounter (H) 10/18/2023     Priority: Medium    Abnormal gait 09/23/2022  "    Priority: Medium    History of colonic polyps 01/05/2021     Priority: Medium     Added automatically from request for surgery [id number]      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) 07/30/2018     Priority: Medium    Urge incontinence of urine 06/02/2017     Priority: Medium    Immunosuppression (H24) 06/02/2017     Priority: Medium    Cramp of limb 06/02/2017     Priority: Medium    Right sided sciatica 06/02/2017     Priority: Medium    Pulmonary emphysema, unspecified emphysema type (H) 06/02/2017     Priority: Medium    Age-related osteoporosis without current pathological fracture 06/02/2017     Priority: Medium    Pulmonary nodules 06/02/2017     Priority: Medium    Pseudophakia of both eyes 10/24/2016     Priority: Medium    Diabetic neuropathy with neurologic complication (H) 09/19/2016     Priority: Medium    Loss of balance 03/17/2016     Priority: Medium    Chronic obstructive pulmonary disease, unspecified COPD type (H) 03/02/2016     Priority: Medium    Chronic kidney disease (CKD) stage G1/A2, glomerular filtration rate (GFR) equal to or greater than 90 mL/min/1.73 square meter and albuminuria creatinine ratio between  mg/g 03/02/2016     Priority: Medium    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) 10/21/2015     Priority: Medium    Primary open angle glaucoma (POAG) 10/21/2015     Priority: Medium    Bullous pemphigoid (H28) 10/01/2013     Priority: Medium     Went off [employer] in 2017 and has not had recurrence      Health Care Home 09/05/2012     Priority: Medium     Sondra Horta RN-PHN  FPA / [facility]   [phone]   DX V65.8 REPLACED WITH [id number] (04/08/2013)      Thyroid nodule 03/23/2012     Priority: Medium    Seasonal allergic rhinitis 03/23/2012     Priority: Medium    Balance problems, \"feet do not work without looking at them\" 10/06/2011     Priority: Medium    Hyperlipidemia LDL goal <100 10/31/2010     Priority: " Medium    HTN (hypertension) 08/25/2009     Priority: Medium     (Problem list name updated by automated process. Provider to review and confirm.)      Colon polyp 01/24/2009     Priority: Medium        Past Medical History:    Past Medical History:   Diagnosis Date    Acute ischemic left TREMAYNE stroke (H) 2009    Astigmatism, unspecified     Bullous pemphigoid     COPD (chronic obstructive pulmonary disease) (H)     CVA (cerebral infarction) 08/2009    Depressive disorder     Diabetes (H)     Glaucoma (increased eye pressure)     Goiter 03/12/2007    HTN (hypertension) 08/25/2009    Hypocalcemia     Hypokalemia     Hyponatremia     Mixed hyperlipidemia     Osteoporosis     Personal history of other diseases of circulatory system     Pneumonia 11/16/2010    Primary open-angle glaucoma(365.11)     Thyroid nodule 03/23/2012    Unspecified cerebral artery occlusion with cerebral infarction     Unspecified cerebral artery occlusion with cerebral infarction 1998       Past Surgical History:    Past Surgical History:   Procedure Laterality Date    BIOPSY      BUNIONECTOMY  1960    JACQUELINE    CATARACT IOL, RT/LT  2010    right    COLONOSCOPY  2/22/2013    Procedure: COLONOSCOPY;  Colonoscopy ;  Surgeon: Hugo Minor MD;  Location:  GI    COLONOSCOPY N/A 11/11/2019    Procedure: Colonoscopy, With Polypectomy And Biopsy;  Surgeon: Selena Escobedo MD;  Location: [facility] OR    COLONOSCOPY N/A 12/2/2021    Procedure: COLONOSCOPY, WITH POLYPECTOMY AND BIOPSY;  Surgeon: Shai Leal MD;  Location:  GI    COLONOSCOPY WITH CO2 INSUFFLATION N/A 11/11/2019    Procedure: COLONOSCOPY, WITH CO2 INSUFFLATION;  Surgeon: Selena Escobedo MD;  Location: [facility] OR       Family History:    Family History   Problem Relation Age of Onset    Cancer Maternal Grandfather         bone    Diabetes Mother 50        dx age 50, hip fracture    Glaucoma Mother     Macular Degeneration Mother     C.A.D. Father         pacemaker    Glaucoma Sister   "      Social History:  Marital Status:   [4]  Social History     Tobacco Use    Smoking status: Former     Packs/day: 1.50     Years: 50.00     Additional pack years: 0.00     Total pack years: 75.00     Types: Cigarettes     Quit date: 2009     Years since quittin.2    Smokeless tobacco: Never   Vaping Use    Vaping Use: Never used   Substance Use Topics    Alcohol use: No     Alcohol/week: 4.0 standard drinks of alcohol    Drug use: No        Medications:    No current outpatient medications on file.        Review of Systems   All other systems reviewed and are negative.      Physical Exam   BP: 128/69  Pulse: 75  Temp: 98.2  F (36.8  C)  Resp: 18  Height: 167.6 cm (5' 6\")  Weight: 56.7 kg (125 lb)  SpO2: 91 %      Physical Exam  Vitals and nursing note reviewed.   Constitutional:       General: She is not in acute distress.     Appearance: She is well-developed. She is not ill-appearing, toxic-appearing or diaphoretic.   HENT:      Head: Normocephalic and atraumatic.      Mouth/Throat:      Lips: Pink.      Mouth: Mucous membranes are moist.      Pharynx: Oropharynx is clear. No oropharyngeal exudate.   Eyes:      General: Lids are normal. No scleral icterus.     Extraocular Movements: Extraocular movements intact.      Right eye: No nystagmus.      Left eye: No nystagmus.      Conjunctiva/sclera: Conjunctivae normal.      Pupils: Pupils are equal, round, and reactive to light.   Neck:      Thyroid: No thyromegaly.      Vascular: No JVD.      Trachea: No tracheal deviation.   Cardiovascular:      Rate and Rhythm: Normal rate and regular rhythm.      Pulses: Normal pulses.      Heart sounds: Normal heart sounds. No murmur heard.     No friction rub. No gallop.   Pulmonary:      Effort: Pulmonary effort is normal. No respiratory distress.      Breath sounds: Normal breath sounds.   Abdominal:      General: Bowel sounds are normal. There is no distension.      Palpations: Abdomen is soft. There is " no mass.      Tenderness: There is no abdominal tenderness. There is no guarding or rebound.   Musculoskeletal:      Cervical back: Normal range of motion and neck supple. No erythema or rigidity.      Right hip: Tenderness and bony tenderness present. No deformity. Decreased range of motion.      Right lower leg: No edema.      Left lower leg: No edema.      Comments: Tenderness to palpation in right hip area, worsened with any attempted passive range of motion.  Neurovascular intact distally.  No obvious shortening or rotation of leg noted.   Lymphadenopathy:      Cervical: No cervical adenopathy.   Skin:     General: Skin is warm and dry.      Capillary Refill: Capillary refill takes less than 2 seconds.      Coloration: Skin is not pale.      Findings: No erythema or rash.   Neurological:      Mental Status: She is alert and oriented to person, place, and time.      Cranial Nerves: No cranial nerve deficit.      Sensory: No sensory deficit.      Motor: Motor function is intact.   Psychiatric:         Mood and Affect: Mood and affect normal.         Speech: Speech normal.         Behavior: Behavior normal.         ED Course                 Procedures             Results for orders placed or performed during the hospital encounter of 10/18/23 (from the past 24 hour(s))   XR Femur Right 2 Views    Narrative    EXAM: XR FEMUR RIGHT 2 VIEWS, XR PELVIS 1/2 VIEWS  LOCATION: [facility]  DATE: 10/18/2023    INDICATION: Trauma, pain  COMPARISON: None.      Impression    IMPRESSION: Fracture through the right femoral neck without evidence for intertrochanteric extension. No dislocation. There is pre-existing degenerative change at the hip joint. The left hip is negative for fracture or dislocation. The pelvis is negative   for fracture. The remainder of the right femur is negative for fracture.   XR Pelvis 1/2 Views    Narrative    EXAM: XR FEMUR RIGHT 2 VIEWS, XR PELVIS 1/2 VIEWS  LOCATION:   [facility]  DATE: 10/18/2023    INDICATION: Trauma, pain  COMPARISON: None.      Impression    IMPRESSION: Fracture through the right femoral neck without evidence for intertrochanteric extension. No dislocation. There is pre-existing degenerative change at the hip joint. The left hip is negative for fracture or dislocation. The pelvis is negative   for fracture. The remainder of the right femur is negative for fracture.   CBC with platelets differential    Narrative    The following orders were created for panel order CBC with platelets differential.  Procedure                               Abnormality         Status                     ---------                               -----------         ------                     CBC with platelets and d...[[id number]]  Abnormal            Final result                 Please view results for these tests on the individual orders.   ABO/Rh type and screen    Narrative    The following orders were created for panel order ABO/Rh type and screen.  Procedure                               Abnormality         Status                     ---------                               -----------         ------                     Adult Type and Screen[[id number]]                            Final result                 Please view results for these tests on the individual orders.   INR   Result Value Ref Range    INR 1.03 0.85 - 1.15   CBC with platelets and differential   Result Value Ref Range    WBC Count 12.6 (H) 4.0 - 11.0 10e3/uL    RBC Count 3.63 (L) 3.80 - 5.20 10e6/uL    Hemoglobin 10.9 (L) 11.7 - 15.7 g/dL    Hematocrit 31.8 (L) 35.0 - 47.0 %    MCV 88 78 - 100 fL    MCH 30.0 26.5 - 33.0 pg    MCHC 34.3 31.5 - 36.5 g/dL    RDW 11.9 10.0 - 15.0 %    Platelet Count 221 150 - 450 10e3/uL    % Neutrophils 80 %    % Lymphocytes 13 %    % Monocytes 5 %    Mids % (Monos, Eos, Basos)      % Eosinophils 1 %    % Basophils 0 %    % Immature Granulocytes 1 %    NRBCs  per 100 WBC 0 <1 /100    Absolute Neutrophils 10.1 (H) 1.6 - 8.3 10e3/uL    Absolute Lymphocytes 1.6 0.8 - 5.3 10e3/uL    Absolute Monocytes 0.7 0.0 - 1.3 10e3/uL    Mids Abs (Monos, Eos, Basos)      Absolute Eosinophils 0.1 0.0 - 0.7 10e3/uL    Absolute Basophils 0.0 0.0 - 0.2 10e3/uL    Absolute Immature Granulocytes 0.1 <=0.4 10e3/uL    Absolute NRBCs 0.0 10e3/uL   Adult Type and Screen   Result Value Ref Range    ABO/RH(D) A POS     Antibody Screen Negative Negative    SPECIMEN EXPIRATION DATE [id number]    Comprehensive metabolic panel   Result Value Ref Range    Sodium 131 (L) 135 - 145 mmol/L    Potassium 4.0 3.4 - 5.3 mmol/L    Carbon Dioxide (CO2) 19 (L) 22 - 29 mmol/L    Anion Gap 13 7 - 15 mmol/L    Urea Nitrogen 14.9 8.0 - 23.0 mg/dL    Creatinine 0.53 0.51 - 0.95 mg/dL    GFR Estimate >90 >60 mL/min/1.73m2    Calcium 8.5 (L) 8.8 - 10.2 mg/dL    Chloride 99 98 - 107 mmol/L    Glucose 181 (H) 70 - 99 mg/dL    Alkaline Phosphatase 77 35 - 104 U/L    AST 46 (H) 0 - 45 U/L    ALT 19 0 - 50 U/L    Protein Total 5.9 (L) 6.4 - 8.3 g/dL    Albumin 3.3 (L) 3.5 - 5.2 g/dL    Bilirubin Total 0.5 <=1.2 mg/dL     *Note: Due to a large number of results and/or encounters for the requested time period, some results have not been displayed. A complete set of results can be found in Results Review.       Medications   sodium chloride 0.9 % infusion ( Intravenous $New Bag 10/18/23 5713)   fentaNYL (PF) (SUBLIMAZE) injection  mcg (has no administration in time range)   midazolam (VERSED) injection 0.5-2 mg (has no administration in time range)   ceFAZolin (ANCEF) 2 g in 100 mL D5W intermittent infusion (has no administration in time range)   ceFAZolin (ANCEF) 2 g in 100 mL D5W intermittent infusion (has no administration in time range)   ROPivacaine (NAROPIN) 5 MG/ mg, ketorolac (TORADOL) 30 mg, EPINEPHrine (ADRENALIN) 0.6 mg in sodium chloride 0.9 % 100 mL (ORTHO YVETTE STANDARD DOSE) (has no administration  in time range)   tranexamic acid (LYSTEDA) tablet 1,950 mg (has no administration in time range)   HYDROmorphone (PF) (DILAUDID) injection 0.3 mg (has no administration in time range)   HYDROmorphone (PF) (DILAUDID) injection 0.5 mg (has no administration in time range)   naloxone (NARCAN) injection 0.2 mg (has no administration in time range)     Or   naloxone (NARCAN) injection 0.4 mg (has no administration in time range)     Or   naloxone (NARCAN) injection 0.2 mg (has no administration in time range)     Or   naloxone (NARCAN) injection 0.4 mg (has no administration in time range)   azithromycin (ZITHROMAX) tablet 500 mg (500 mg Oral $Given 10/18/23 2248)   metFORMIN (GLUCOPHAGE XR) 24 hr tablet 500 mg ( Oral Automatically Held 10/24/23 1700)   sertraline (ZOLOFT) tablet 50 mg (has no administration in time range)   rosuvastatin (CRESTOR) tablet 5 mg (has no administration in time range)   losartan (COZAAR) half-tab 12.5 mg (has no administration in time range)   lactated ringers infusion 1,000 mL (has no administration in time range)   acetaminophen (TYLENOL) tablet 650 mg (has no administration in time range)   polyethylene glycol (MIRALAX) Packet 17 g (has no administration in time range)   senna-docusate (SENOKOT-S/PERICOLACE) 8.6-50 MG per tablet 1-2 tablet (1 tablet Oral $Given 10/18/23 2248)   ondansetron (ZOFRAN ODT) ODT tab 4 mg (has no administration in time range)     Or   ondansetron (ZOFRAN) injection 4 mg (has no administration in time range)   prochlorperazine (COMPAZINE) injection 5 mg (has no administration in time range)     Or   prochlorperazine (COMPAZINE) tablet 5 mg (has no administration in time range)     Or   prochlorperazine (COMPAZINE) suppository 12.5 mg (has no administration in time range)   melatonin tablet 1 mg (1 mg Oral $Given 10/18/23 2248)   HYDROmorphone (DILAUDID) injection 0.2 mg (0.2 mg Intravenous $Given 10/18/23 1612)   acetaminophen (TYLENOL) tablet 975 mg (975 mg Oral  $Given 10/18/23 8920)       Assessments & Plan (with Medical Decision Making)     I have reviewed the nursing notes.    I have reviewed the findings, diagnosis, plan and need for follow up with the patient.  This patient presented the emergency department after suffering a right hip injury and a ground-level mechanical fall.  She is neurovascularly intact.  X-ray demonstrates a displaced femoral neck fracture.  These x-rays were independently reviewed by myself and I concur with radiology's read.  Patient did receive 0.2 mg of IV Dilaudid for pain and I discussed the case with orthopedics.  They will plan to take the patient to the operating room in the morning and she should be n.p.o. after midnight.  I did discuss the case also with anesthesia and requested a nerve block to help with pain control as patient does have COPD and is elderly and we would like to avoid large amounts of narcotic pain medication.  Case was discussed with the on-call hospitalist and patient will be admitted to their service for further treatment and evaluation.        Current Discharge Medication List          Final diagnoses:   Closed fracture of neck of right femur, initial encounter (H)       10/18/2023   [facility] MEDICAL SURGICAL       LoppGonzalo linton MD  10/19/23 0011

## 2023-10-19 NOTE — ANESTHESIA PROCEDURE NOTES
"Intrathecal injection Procedure Note    Pre-Procedure   Staff -        CRNA: Eddie Kitchen APRN CRNA       Other Anesthesia Staff: Stacy Bates       Performed By: CLINT and CRNA       Location: OR       Procedure Start/Stop Times: 10/19/2023 10:16 AM       Pre-Anesthestic Checklist: patient identified, IV checked, risks and benefits discussed, informed consent, monitors and equipment checked, pre-op evaluation, at physician/surgeon's request and post-op pain management  Timeout:       Correct Patient: Yes        Correct Procedure: Yes        Correct Site: Yes        Correct Position: Yes   Procedure Documentation  Procedure: intrathecal injection       Patient Position: sitting       Patient Prep/Sterile Barriers: sterile gloves, mask, patient draped       Skin prep: Betadine       Insertion Site: L2-3. (midline approach).       Needle Gauge: 25.        Needle Length (Inches): 3.5        Spinal Needle Type: Pencan       Introducer used       # of attempts: 1 and  # of redirects:  1    Assessment/Narrative         CSF fluid: clear.    Medication(s) Administered   Medication Administration Time: 10/19/2023 10:16 AM      FOR [facility] ([facility]) ONLY:   Pain Team Contact information: please page the Pain Team Via [employer]. Search \"Pain\". During daytime hours, please page the attending first. At night please page the resident first.      "

## 2023-10-19 NOTE — PROGRESS NOTES
[facility] TRANSPORT NOTE  Data:   Reason for Transport:  Surgery    Ludy Mckenzie was transported to surgery via bed at 0843.  Patient was accompanied by Nursing Assistant. Equipment used for transport: Oxygen  Nasal Cannula. Family was aware of reason for transport: yes    Action:  Report: given to Brielle    Response:  Patient's condition when transferred off unit was stable.    Keira Alvarado RN

## 2023-10-19 NOTE — ANESTHESIA POSTPROCEDURE EVALUATION
Patient: Ludy Mckenzie    Procedure: Procedure(s):  ARTHROPLASTY HIP TOTAL       Anesthesia Type:  Spinal    Note:  Disposition: Inpatient   Postop Pain Control: Uneventful            Sign Out: Well controlled pain   PONV: No   Neuro/Psych: Uneventful            Sign Out: Acceptable/Baseline neuro status   Airway/Respiratory: Uneventful            Sign Out: Acceptable/Baseline resp. status   CV/Hemodynamics: Uneventful            Sign Out: Acceptable CV status; No obvious hypovolemia; No obvious fluid overload   Other NRE: NONE   DID A NON-ROUTINE EVENT OCCUR? No           Last vitals:  Vitals Value Taken Time   /46 10/19/23 1230   Temp     Pulse 75 10/19/23 1234   Resp 14 10/19/23 1234   SpO2 100 % 10/19/23 1234   Vitals shown include unfiled device data.    Electronically Signed By: Stacy Bates  October 19, 2023  12:35 PM

## 2023-10-19 NOTE — PROGRESS NOTES
[facility] TRANSPORT NOTE  Data:   Reason for Transport:  from Surgery back to Med/Surg    Ludy Mckenzie was transported to Med/Surg via bed at 1309.  Patient was accompanied by Registered Nurse. Equipment used for transport: Oxygen  Nasal Cannula. Family was aware of reason for transport: yes    Action:  Report: received from Terri    Response:  Patient's condition upon return was stable, except soft blood pressure reading. Patient denied dizziness and light-headedness. Patient's provider alerted and provider saw patient in her room. Lactated Ringer's discontinued and normal saline initiated per order.     Keira Alvarado RN

## 2023-10-19 NOTE — PROGRESS NOTES
Skin affirmation note    Admitting nurse completed full skin assessment, Gavin score and Gavin interventions. This writer agrees with the initial skin assessment findings.

## 2023-10-19 NOTE — BRIEF OP NOTE
[facility]    Brief Operative Note    Pre-operative diagnosis: Closed fracture of neck of right femur, initial encounter (H) [S72.001A]  Post-operative diagnosis Same as pre-operative diagnosis    Procedure: ARTHROPLASTY HIP TOTAL, Right - Hip    Surgeon: Surgeon(s) and Role:     * Duke Gomez MD - Primary     * Alfonso Young PA-C - Assisting  Anesthesia: Choice   Estimated Blood Loss: 150cc    Drains: None  Specimens: * No specimens in log *  Findings:   None.  Complications: None.  Implants:   Implant Name Type Inv. Item Serial No.  Lot No. LRB No. Used Action   BONE CEMENT SIMPLEX W/TOBRAMYCIN 6197-9-001 - [id number] Cement, Bone BONE CEMENT SIMPLEX W/TOBRAMYCIN 6197-9-001  [employer] [id number] Right 2 Implanted   SHELL ACTB 50MM HIP CH HA TRDNT II PSL D STRL 742-11-50D - [id number] Total Joint Component/Insert SHELL ACTB 50MM HIP CH HA TRDNT II PSL D STRL 742-11-50D  [employer] [id number] Right 1 Implanted   IMP SCR STRK LOW PROFILE HEX 6.5X30MM 1330-6138 - [id number] Metallic Hardware/[place] IMP SCR STRK LOW PROFILE HEX 6.5X30MM 4389-4623  [employer] [id number] Right 1 Implanted   IMP SCR STRK LOW PROFILE HEX 6.5X25MM 6565-3214 - [id number] Metallic Hardware/[place] IMP SCR STRK LOW PROFILE HEX 6.5X25MM 3893-3434  [employer] [id number] Right 1 Implanted   6.5MM LOW PROFILE HEX SCR 15MM - [id number] Metallic Hardware/[place] 6.5MM LOW PROFILE HEX SCR 15MM  [employer] [id number] Right 1 Implanted   IMP SCR STRK LOW PROFILE HEX 6.5X30MM 0483-6468 - [id number] Metallic Hardware/[place] IMP SCR STRK LOW PROFILE HEX 6.5X30MM 5197-8551  [employer] [id number] Right 1 Implanted   IMP STEM FEMORAL WILLARD AYDEE OMNIFIT SZ 5 127DEG 6665-9891 - [id number] Total Joint Component/Insert IMP STEM FEMORAL WILLARD AYDEE OMNIFIT SZ 5 127DEG 7791-4821  [employer]  [id number] Right 1 Implanted   IMP SPACER OSTEONICS DISTAL CEMENT 10MM 2739-7131 - [id number] Metallic Hardware/[place] IMP SPACER OSTEONICS DISTAL CEMENT 10MM 2265-2744  [employer] [id number] Right 1 Implanted   INSERT ACE 36MM 0DEG X3 723-00-36D - [id number] Total Joint Component/Insert INSERT ACE 36MM 0DEG X3 723-00-36D  [id number] Right 1 Implanted   IMP HEAD FEM STRK BIOLOX DELTA C-TPR 36MM +2.5 OFFST  - [id number] Total Joint Component/Insert IMP HEAD FEM STRK BIOLOX DELTA C-TPR 36MM +2.5 OFFST   [id number] Right 1 Implanted

## 2023-10-20 NOTE — PROGRESS NOTES
"[facility] Progress Note      Post-operative Day: 1 Day Post-Op    Procedure(s):  ARTHROPLASTY RIGHT HIP TOTAL  Subjective:    Pt reports mild pain at rest in the right hip; she is concerned about overdoing it and making it worse. She feels good overall.     Chest pain, SOB:  No  Nausea, vomiting:  No  Lightheadedness, dizziness:  No  Neuro:  Patient denies new onset numbness or paresthesias      Objective:  Blood pressure 130/54, pulse 87, temperature 98.4  F (36.9  C), temperature source Oral, resp. rate 18, height 1.676 m (5' 6\"), weight 55.8 kg (123 lb), SpO2 98%, not currently breastfeeding.    Patient Vitals for the past 24 hrs:   BP Temp Temp src Pulse Resp SpO2 Height Weight   10/20/23 0741 130/54 98.4  F (36.9  C) Oral 87 18 98 % -- --   10/20/23 0202 109/49 98.4  F (36.9  C) Oral 74 18 94 % -- --   10/20/23 0109 107/54 98.2  F (36.8  C) Oral 83 16 96 % -- --   10/19/23 2153 107/51 98.4  F (36.9  C) Oral 72 16 95 % -- --   10/19/23 1959 113/52 98.8  F (37.1  C) Oral 82 16 93 % -- --   10/19/23 1900 100/47 -- -- 78 -- -- -- --   10/19/23 1655 110/47 98.6  F (37  C) Oral 79 16 95 % -- --   10/19/23 1400 107/44 97.5  F (36.4  C) Oral 76 -- 93 % -- --   10/19/23 1332 (!) 101/35 -- -- 78 -- 97 % -- --   10/19/23 1319 -- 98  F (36.7  C) Axillary -- 14 98 % -- --   10/19/23 1247 101/41 -- -- 80 20 95 % -- --   10/19/23 1245 -- 98.3  F (36.8  C) Temporal 84 15 (!) 89 % -- --   10/19/23 1230 103/46 -- -- 75 15 100 % -- --   10/19/23 1217 92/40 97.6  F (36.4  C) Temporal 75 16 100 % -- --   10/19/23 0856 105/47 98.5  F (36.9  C) Oral 81 -- 94 % 1.676 m (5' 6\") 55.8 kg (123 lb)       Wt Readings from Last 4 Encounters:   10/19/23 55.8 kg (123 lb)   05/24/23 54.4 kg (120 lb)   04/19/23 55.3 kg (122 lb)   04/14/23 55.3 kg (122 lb)       Gen: A&O x 3. NAD. Appears comfortable, up to the recliner.   Wound status: Covered.   Circulation, motion and sensation: Dorsiflexion/plantarflexion intact and equal " bilaterally; distal lower extremity sensation is intact and equal bilaterally. Foot and toes are warm and well perfused.    Swelling: Mild  Calf tenderness: calves are soft and non-tender bilaterally     Pertinent Labs   Lab Results: personally reviewed.     Recent Labs   Lab Test 10/20/23  0539 10/19/23  0502 10/18/23  1855 10/18/23  1725 06/29/21  1223 05/14/21  1215 08/25/15  0753 08/24/15  1850   INR  --   --   --  1.03  --  1.00  --   --    WBC 10.0 10.5  --  12.6*   < >  --    < > 6.1   HGB 9.1* 10.8*  --  10.9*   < >  --    < > 12.8   HCT 27.6* 32.1*  --  31.8*   < >  --    < > 36.8   MCV 90 90  --  88   < >  --    < > 89    188  --  221   < >  --    < > 284    134* 131*  --    < >  --    < > 129*   CRP  --   --   --   --   --   --   --  3.0    < > = values in this interval not displayed.       Plan:   Continue current cares and rehabilitation.  Anticoagulation protocol:  mg daily  x 42  days  Pain medications: oxycodone, tramadol, and tylenol  Weight bearing status:  WBAT  Disposition:  Discharge per medicine, likely will need TCU for additional cares. Will be orthopedically stable if her pain remains controlled, able to work with therapy.              Report completed by:  Daniel Thomas PA-C  Date: 10/20/2023  Time: 8:52 AM

## 2023-10-20 NOTE — PROGRESS NOTES
10/20/23 3353   Appointment Info   Signing Clinician's Name / Credentials (PT) Nupur Cnydiumang, PT   Living Environment   People in Home alone   Current Living Arrangements independent living facility   Home Accessibility no concerns   Self-Care   Equipment Currently Used at Home walker, rolling;cane, quad   Fall history within last six months yes   Number of times patient has fallen within last six months 1   Activity/Exercise/Self-Care Comment pt typically indep with mobility with 4WW, ADL's.   General Information   Onset of Illness/Injury or Date of Surgery 10/18/23   Referring Physician Duke Gomez MD   Patient/Family Therapy Goals Statement (PT) goal is TCU   Pertinent History of Current Problem (include personal factors and/or comorbidities that impact the POC) 83 y.o. female admitted due to fall with R hip fracture, now s/p R CORBIN with orders for WBAT and posterior hip precautions.   Existing Precautions/Restrictions no hip IR;no hip ADD past midline;90 degree hip flexion   Weight-Bearing Status - RLE weight-bearing as tolerated   Pain Assessment   Patient Currently in Pain Yes, see Vital Sign flowsheet  (R hip, well managed with transfer to chair)   Range of Motion (ROM)   Range of Motion ROM deficits secondary to surgical procedure;ROM deficits secondary to pain   Strength (Manual Muscle Testing)   Strength Comments not formally assessed due to recent surgery, anticipate weakness   Bed Mobility   Comment, (Bed Mobility) supine>sit with modA for RLE over side of bed, mild complaints of dizziness with EOB however anticpate due to first time being up, resolved with time   Transfers   Comment, (Transfers) sit>stand from EOB with Shawna and 2WW   Gait/Stairs (Locomotion)   Comment, (Gait/Stairs) amb 5 feet bed>chair with CGA and 2WW   Balance   Balance Comments needs use of walker for mobility   Clinical Impression   Criteria for Skilled Therapeutic Intervention Yes, treatment indicated   PT Diagnosis (PT)  impaired functional mobility s/p R CORBIN after fall   Influenced by the following impairments pain, reduced ROM, LE weakness   Functional limitations due to impairments impaired bed mobility, transfers, gait   Clinical Presentation (PT Evaluation Complexity) stable   Clinical Presentation Rationale clinical reasoning   Clinical Decision Making (Complexity) low complexity   Planned Therapy Interventions (PT) bed mobility training;cryotherapy;gait training;home exercise program;patient/family education;ROM (range of motion);strengthening;transfer training;progressive activity/exercise;risk factor education;home program guidelines   Risk & Benefits of therapy have been explained evaluation/treatment results reviewed;care plan/treatment goals reviewed;risks/benefits reviewed;current/potential barriers reviewed;participants voiced agreement with care plan;participants included;patient   PT Total Evaluation Time   PT Eval, Low Complexity Minutes (73915) 10   Physical Therapy Goals   PT Frequency 6x/week   PT Predicted Duration/Target Date for Goal Attainment 10/27/23   PT Goals Bed Mobility;Transfers;Gait   PT: Bed Mobility Supervision/stand-by assist;Supine to/from sit;Within precautions   PT: Transfers Supervision/stand-by assist;Bed to/from chair;Assistive device;Within precautions   PT: Gait Supervision/stand-by assist;Standard walker;25 feet;Within precautions   Interventions   Interventions Quick Adds Therapeutic Activity   Therapeutic Activity   Therapeutic Activities: dynamic activities to improve functional performance Minutes (16213) 10   Symptoms Noted During/After Treatment Increased pain   Treatment Detail/Skilled Intervention edu on role of PT during hospitalization to promote mobility and assist in safe discharge planning. reviewed R CORBIN hip precautions, pt verbalized understanding but will continue to benefit from educaiton and reminders. discussed recommendation for TCU given assist levels and pt is  typically indep with 4WW at home, pt in agreement with TCU rec. remains in chair at end of session, chair alarm on and call light in reach.   PT Discharge Planning   PT Plan Fri 1/6; progress transfers/amb with 2WW, R hip ex-- CORBIN with WBAT and posterior hip precautions   PT Discharge Recommendation (DC Rec) Transitional Care Facility   PT Rationale for DC Rec rec TCU as pt requires Ax1 for short distance mobility, typically is indep with mobility with 4WW and ADL's, pt in agreement with TCU rec   PT Brief overview of current status supine>sit with modA, sit>stand with Shawna and 2WW, pivots with CGA and 2WW   PT Equipment Needed at Discharge   (TBD pending progress, has 4WW)   Total Session Time   Timed Code Treatment Minutes 10   Total Session Time (sum of timed and untimed services) 20

## 2023-10-20 NOTE — PROGRESS NOTES
10/20/23 1330   Appointment Info   Signing Clinician's Name / Credentials (OT) Donte Huy OTR/L   Living Environment   People in Home alone   Current Living Arrangements independent living facility   Home Accessibility no concerns   Transportation Anticipated agency   Living Environment Comments reports no concerns   Self-Care   Usual Activity Tolerance good   Current Activity Tolerance poor   Equipment Currently Used at Home walker, rolling;cane, quad   Fall history within last six months yes   Number of times patient has fallen within last six months 1   Activity/Exercise/Self-Care Comment pt typically indep with mobility with 4WW, ADL's.   Instrumental Activities of Daily Living (IADL)   IADL Comments receives some assistance with IADL   General Information   Onset of Illness/Injury or Date of Surgery 10/19/23   Referring Physician Gonzalo Varghese   Additional Occupational Profile Info/Pertinent History of Current Problem Ludy Mckenzie is a 83 year old female who presents on 10/18/2023 with pain following a fall. She is found to have a right hip fracture and is being admitted for further workup & treatment.  s/p arthoplasty   Existing Precautions/Restrictions no hip IR;no hip ADD past midline;90 degree hip flexion;no pivoting or twisting;fall   General Observations and Info R hip precautions   Cognitive Status Examination   Orientation Status orientation to person, place and time   Visual Perception   Visual Impairment/Limitations WFL   Range of Motion Comprehensive   General Range of Motion no range of motion deficits identified;bilateral upper extremity ROM WFL   Strength Comprehensive (MMT)   General Manual Muscle Testing (MMT) Assessment no strength deficits identified   Coordination   Upper Extremity Coordination No deficits were identified   Bed Mobility   Comment (Bed Mobility) anticipate requiring SBA   Transfers   Transfer Comments up in chair, declining additional OOB at this time   Activities  of Daily Living   BADL Assessment/Intervention   (anticipate requiring increased support)   Clinical Impression   Criteria for Skilled Therapeutic Interventions Met (OT) Yes, treatment indicated   OT Diagnosis decreased ADL independence   OT Problem List-Impairments impacting ADL problems related to;activity tolerance impaired;post-surgical precautions;pain   Assessment of Occupational Performance 3-5 Performance Deficits   Planned Therapy Interventions (OT) ADL retraining;IADL retraining;home program guidelines;progressive activity/exercise;transfer training   Clinical Decision Making Complexity (OT) problem focused assessment/low complexity   Risk & Benefits of therapy have been explained evaluation/treatment results reviewed;care plan/treatment goals reviewed;patient   Clinical Impression Comments Pt presents with post surgical hip precautions and decreased activity tolerance resulting in decreased independence with ADL.  Pt would benefit from skilled OT services to increase safety and independence with ADL   OT Total Evaluation Time   OT Eval, Low Complexity Minutes (57776) 8   OT Goals   Therapy Frequency (OT) 5 times/week   OT Predicted Duration/Target Date for Goal Attainment 10/27/23   OT Goals Hygiene/Grooming;Lower Body Dressing;Lower Body Bathing;Toilet Transfer/Toileting   OT: Hygiene/Grooming supervision/stand-by assist;within precautions   OT: Lower Body Dressing Supervision/stand-by assist;using adaptive equipment;including set-up/clothing retrieval;within precautions   OT: Lower Body Bathing Supervision/stand-by assist;with precautions   OT: Toilet Transfer/Toileting Supervision/stand-by assist;cleaning and garment management;toilet transfer;within precautions   Self-Care/Home Management   Self-Care/Home Mgmt/ADL, Compensatory, Meal Prep Minutes (51875) 10   Symptoms Noted During/After Treatment (Meal Preparation/Planning Training) none   Treatment Detail/Skilled Intervention Therapist educated Pt on  role of OT during IP and post acute stay.  Therapsit reviewing use of reacher and sock aide for LBD and demonstrating.  pt decliing demonstration at this date due to fatigue.  Therapist then issuing handout with adaptive equipment and educated Pt on the contents of handout.  Educated Pt on equipment options and educated on ways equipment can be use to increase safety and independence with ADL.  Pt vebralized understanding of all educaiton provided during session.   OT Discharge Planning   OT Plan LBD with AE, standing ADL, toilet transfer   OT Discharge Recommendation (DC Rec) Transitional Care Facility   OT Rationale for DC Rec Pt below baseline with ADL, needing to be independent prior to discharge home, would benefit from TCU to build independence with ADL   Total Session Time   Timed Code Treatment Minutes 10   Total Session Time (sum of timed and untimed services) 18

## 2023-10-20 NOTE — CONSULTS
Care Management Initial Consult    General Information  Assessment completed with: Patient, Sister Cecelia  Type of CM/SW Visit: Initial Assessment    Primary Care Provider verified and updated as needed: Yes   Readmission within the last 30 days: no previous admission in last 30 days      Reason for Consult: discharge planning  Advance Care Planning: Advance Care Planning Reviewed: present on chart        Communication Assessment  Patient's communication style: spoken language (English or Bilingual)    Hearing Difficulty or Deaf: yes   Wear Glasses or Blind: yes    Cognitive  Cognitive/Neuro/Behavioral: WDL                      Living Environment:   People in home: alone     Current living Arrangements: independent living facility      Able to return to prior arrangements: yes     Family/Social Support:  Care provided by: self  Provides care for: no one  Marital Status:   Children, Sibling(s)          Description of Support System: Supportive, Involved    Support Assessment: Adequate family and caregiver support, Adequate social supports    Current Resources:   Patient receiving home care services: No     Community Resources: None  Equipment currently used at home: walker, rolling, cane, quad  Supplies currently used at home: None    Employment/Financial:  Employment Status: retired        Financial Concerns: none     Does the patient's insurance plan have a 3 day qualifying hospital stay waiver?  Yes     Which insurance plan 3 day waiver is available? Alternative insurance waiver    Will the waiver be used for post-acute placement? Yes    Lifestyle & Psychosocial Needs:  Social Determinants of Health     Food Insecurity: Not on file   Depression: Not at risk (5/24/2023)    PHQ-2     PHQ-2 Score: 0   Housing Stability: Not on file   Tobacco Use: Medium Risk (10/19/2023)    Patient History     Smoking Tobacco Use: Former     Smokeless Tobacco Use: Never     Passive Exposure: Not on file   Financial Resource  Strain: Not on file   Alcohol Use: Not on file   Transportation Needs: Not on file   Physical Activity: Not on file   Interpersonal Safety: Not on file   Stress: Not on file   Social Connections: Not on file     Functional Status:  Prior to admission patient needed assistance:   Dependent ADLs:: Ambulation-walker, Ambulation-cane  Dependent IADLs:: Independent     Mental Health Status:  Mental Health Status: No Current Concerns       Chemical Dependency Status:  Chemical Dependency Status: No Current Concerns           Values/Beliefs:  Spiritual, Cultural Beliefs, Congregation Practices, Values that affect care: no  Description of Beliefs that Will Affect Care: Bri        Additional Information:  Care Management met with patient and sister Cecelia at bedside. CM introduced self and explained role.     Patient resides in an independent senior apartment in [place] alone. At baseline, patient is independent with ADLs and IADLs using cane/4WW. Patient has no services/HHC at home.     Per PT evaluation, TCU is recommended. Patient and family are in agreement with TCU placement.   Referrals sent to the following and was accepted for cares at [facility]: (Phone: [phone]/ [phone]).    Destination    Coordination complete.  Service Provider Request Status Selected Services Address Phone Fax Patient Preferred   [facility] (SNF)   Selected Skilled Nursing [address] [place] [id number] [phone] --   [facility] (SNF)  Declined  Bed Not Available N/A [address] [facility] [address] [phone] --   [facility] (SNF)  Declined  Alternative agency selected N/A [address] [place] [id number] [phone] --   [facility] (TCU)  Declined  Alternative agency selected N/A [address] [facility] [id number] [phone] --     Per physician, patient  will likely be ready for discharge tomorrow.     Discharge arranged for tomorrow to [facility]: (Phone: [phone]/ [phone]).   Transportation arranged via [facility] wheelchair - 12:10-12:55 PM. Patient/family are aware of potential private pay costs associated with transportation.     Confirmed discharge plan with admissions Quin P: [phone] at [facility]: (Phone: [phone]/ [phone]).    Patient/sister Cecelia are in agreement with discharge plan and were made aware of transport time.       Plan: [facility] TCU  Transportation: [facility] Wheelchair 10/21/23 12:10-12:55 PM       MARIANELA La  Inpatient Care Coordinator   [facility] [phone]  [facility] [phone]

## 2023-10-20 NOTE — PROGRESS NOTES
[facility] Medicine Progress Note  Date of Service: 10/20/2023    Assessment & Plan   Ludy Mckenzie is a 83 year old female who presents on 10/18/2023 with pain following a fall. She is found to have a right hip fracture and is being admitted for further workup & treatment.      Closed fracture of neck of right femur, initial encounter   Mechanical fall, initial encounter  S/p arthoplasty total hip right    Mechanical fall from standing AM 10/18 onto right hip. No LOC, no other traumatic injuries. Unable to bear weight since falling. Xray shows fracture through right femoral neck without evidence for intertrochanteric extension, no dislocation. ER discussed with orthopedics, who will plan to see patient in AM for likely OR. Patient was brought to the OR 10/19 with uncomplicated surgery.   - DVT prophylaxis with  mg daily x 42 days  - Weight bearing as tolerated  - Pain control with oral oxycodone 2.5-5 mg q4hrs PRN and IV dilaudid 0.2 - 0.4 PRN  - IVF discontinued    Chronic obstructive pulmonary disease, emphysematous type  Post-operative hypoxia without respiratory distress    Breathing is at baseline at time of admission. Post-op she is requiring 2 liters via NC to keep saturations in the mid 90's. Lungs are clear to auscultation with good movement. Patient does not use oxygen at home. Managed PTA with albuterol neb & inhaler, fluticasone nasal spray, Trelegy inhaler, azithromycin M,W,F.   - Encourage inceptive spirometry  - Continue PTA albuterol neb & azithromycin  - Oxygen available PRN, titrate to maintain SPO2 in the mid 90's.    Hyponatremia (resolved)    Mild, 131 on admission. Anticipate improvement with milton-operative IVF.     Hypocalcemia    Mild, 8.5 on admission. Repleting with LR which contains calcium so may see modest improvement with IVF and with good PO post-operatively. Stable.  - BMP in AM    Leukocytosis (resolved)    Mild, 12.6. Likely stress  reaction from traumatic injury related to fall. No other signs of acute infection. Remains afebrile.  - CBC in AM    Anemia    Mild, acute, normocytic. Baseline hemoglobin around 12.0, hemoglobin on presentation 10.9. Some bruising over hip but no other signs of acute bleeding. Hgb 12/0 -> 10.9 -> 9.1.  - Hemoglobin in am x 2 occurrences.    Hypertension    Normotensive with borderline hypotension on admission. Asymptomatic. Managed PTA with losartan 12.5mg daily. Blood pressures have remained soft and stable.   - Continue PTA losartan post-op with hold parameters  - Monitor with routine vital checks    Hyperlipidemia LDL goal <100    Managed PTA rosuvastatin 5mg daily.   - Continue statin daily.    Bullous pemphigoid     Noted to have previously been on immunosuppression, but does not appear to be on any immunosuppressants currently. Has some bruising over arms (probably from falling) but no bullae. Monitor closely. No acute interventions.     Anxiety    Managed PTA with sertraline 50mg daily.  - Continue PTA sertraline    Type 2 diabetes mellitus   Diabetic neuropathy with neurologic complication     Good control based on last A1c in April 2023 (7.2). Hyperglycemic on admission (181). Managed PTA with metformin 500mg with dinner. BGs ranging from 150-200.  - Resumed PTA metformin.  - Continue low resistance sliding scale insulin  - Hyper/hypoglycemia monitoring and protocol in place.    Urge incontinence of urine    Noted in history. Does not appear to be on any outpatient pharmacologic management for this. No urinary retention.  - Monitor milton-operatively.  - Bladder management protocol.     Diet: Advance Diet as Tolerated: Regular Diet Adult  Discharge Instruction - Regular Diet Adult    DVT Prophylaxis:  mg daily x 42 days  Cote Catheter: Not present  Lines: None     Code Status: Full Code    Diet: Discharge Instruction - Regular Diet Adult  Regular Diet Adult    DVT Prophylaxis:  mg daily  Cote  Catheter: Not present  Lines: None     Code Status: Full Code      Discussion: Patient is not as baseline respiratory status. She failed trial off of oxygen and did not have any improvement after given Duoneb treatment. Saturations were in the upper 80's on RA. She does have mild SOB. Dicussed with patient importance of IS.    Disposition: Anticipate discharge 10/21/23     Attestation:  I have reviewed today's vital signs, notes, medications, labs and imaging.    I have discussed, or will be discussing, the patient with hospitalist physician, Dr. Varghese.    BAYRON VASQUEZ PA-C     Interval History   Patient feeling well today. No complains besides mild shortness of breath when asked. She has been on 2 liters of oxygen overnight. Trail off of oxygen that was not successful, saturations dropped to upper 80's. Placed back on oxygen. Encouraged IS. Likely will be able to discharge to TCU tomorrow, pending improvement in oxygen demands.    Physical Exam   Temp:  [98.1  F (36.7  C)-98.8  F (37.1  C)] 98.1  F (36.7  C)  Pulse:  [] 103  Resp:  [16-18] 18  BP: (100-130)/(46-54) 112/46  SpO2:  [86 %-98 %] 92 %    Weights:   Vitals:    10/18/23 1506 10/18/23 2053 10/19/23 0856   Weight: 56.7 kg (125 lb) 56.2 kg (123 lb 14.4 oz) 55.8 kg (123 lb)    Body mass index is 19.85 kg/m .    Constitutional: Alert, oriented x 3, well appearing, no distress, non toxic appearing.  CV: Regular rate and rhythm. Normal S1 and S2 heart sounds.  Respiratory: CTA bilaterally. No wheezes, rhonchi, or crackles.  GI: Soft, flat, nontender to palpation throughout.  Skin: Warm and dry  Musculoskeletal: Right hip with Aquacell bandage clean, dry, and intact. Dorsiflexion and plantarflexion intact bilaterally. Toes well perfused and pedal pulse present. Sensation intact. No calf pain, negative Lisa's sign.    Data   Recent Labs   Lab 10/20/23  1152 10/20/23  0806 10/20/23  0539 10/19/23  0813 10/19/23  0502 10/19/23  0154 10/18/23  7328  10/18/23  1725   0000   WBC  --   --  10.0  --  10.5  --   --  12.6*  --    HGB  --   --  9.1*  --  10.8*  --   --  10.9*  --    MCV  --   --  90  --  90  --   --  88  --    PLT  --   --  153  --  188  --   --  221  --    INR  --   --   --   --   --   --   --  1.03  --    NA  --   --  137  --  134*  --  131*  --   --    POTASSIUM  --   --  4.1  --  4.3  --  4.0  --   --    CHLORIDE  --   --  104  --  101  --  99  --   --    CO2  --   --  24  --  22  --  19*  --   --    BUN  --   --  19.4  --  16.8  --  14.9  --   --    CR  --   --  0.58  --  0.60  --  0.53  --   --    ANIONGAP  --   --  9  --  11  --  13  --   --    IAN  --   --  8.4*  --  8.5*  --  8.5*  --   --    * 125* 141*   < > 147*   < > 181*  --    < >   ALBUMIN  --   --   --   --   --   --  3.3*  --   --    PROTTOTAL  --   --   --   --   --   --  5.9*  --   --    BILITOTAL  --   --   --   --   --   --  0.5  --   --    ALKPHOS  --   --   --   --   --   --  77  --   --    ALT  --   --   --   --   --   --  19  --   --    AST  --   --   --   --   --   --  46*  --   --     < > = values in this interval not displayed.     Recent Labs   Lab 10/20/23  1152 10/20/23  0806 10/20/23  0539 10/20/23  0200 10/19/23  2152 10/19/23  1651   * 125* 141* 146* 191* 197*      Unresulted Labs Ordered in the Past 30 Days of this Admission       No orders found from 9/18/2023 to 10/19/2023.          Imaging  No results found for this or any previous visit (from the past 24 hour(s)).     I reviewed all new labs and imaging results over the last 24 hours. I personally reviewed no images or EKG's today.    Medications      acetaminophen  975 mg Oral Q8H    aspirin  325 mg Oral At Bedtime    azithromycin  500 mg Oral Q Mon Wed Fri AM    famotidine  20 mg Oral BID    Or    famotidine  20 mg Intravenous BID    insulin aspart  1-3 Units Subcutaneous TID AC    insulin aspart  1-3 Units Subcutaneous At Bedtime    losartan  12.5 mg Oral Daily    [Held by provider] metFORMIN   500 mg Oral Daily with supper    polyethylene glycol  17 g Oral Daily    rosuvastatin  5 mg Oral Daily    senna-docusate  1-2 tablet Oral BID    sertraline  50 mg Oral Daily    sodium chloride (PF)  3 mL Intracatheter Q8H     BAYRON VASQUEZ PA-C

## 2023-10-20 NOTE — PROGRESS NOTES
Alert and oriented x4. O2 sat 94% NC on 2.5L. Assist of 2 with transfer and pivot from bed to bedside commode with gait belt. Purewick in place. L wrist PIV, NS @ 100 ml/hr. , 146. Denies pain all shift. Appears comfortable.  Plan: discharge to TCU per request by patient's sister, patient on board with plan.    Kavita Santiago, RN

## 2023-10-20 NOTE — PLAN OF CARE
Goal Outcome Evaluation:      Plan of Care Reviewed With: patient    Pt reports minimal pain in right hip, managed with tylenol.  Ambulates short distance with A1 and walker.  O2 sats to upper 80s on RA.  Placed on 1L per NC to maintain sat >92%.  Encouraging IS, pt able to get to 500.  Family here at bedside, given update.

## 2023-10-20 NOTE — PROGRESS NOTES
SPIRITUAL HEALTH SERVICES  [facility] - Med Surg    Referral Source: Patient request on admission    - Ludy was being visited by a niece when I arrived but niece was just leaving.  Ludy stated that yesterday had been a hard day but she was doing much better today.  She told of coming up to this area with her sister and a 6 year old grandson to see the leaves.  She said she knew this area well.  She shared narrative of how she had fallen and needed to come to [facility].  It was disappointing that things worked out the way they did.    - She expressed appreciation for the visit and did not request any specific spiritual resources.    Plan:  will remain available for any ongoing support needs during LOS.      Kannan Zhang M.A., [facility]  Staff Chaplain [facility]  Office: [phone]

## 2023-10-21 NOTE — PLAN OF CARE
"Patient reported pain to a nursing assistant and requested pain medication. Patient was found in bed laying off center with right knee turned inward to touch left knee. Patient had removed her surgical dressing and steri strips. Her incision is intact. Patient was repositioned and abductor pillow was placed. Patient states pain is \"excruciating.\" Oxycodone 5 mg po given with pain rated a 10/10. Ortho on call paged.     Update-- Order received for hip xray. No fracture or dislocation noted.                         "

## 2023-10-21 NOTE — PROGRESS NOTES
Care Management Discharge Note    Discharge Date: 10/21/2023       Discharge Disposition: Transitional Care    Discharge Services: None    Discharge DME: None    Discharge Transportation: agency    Private pay costs discussed: transportation costs    Does the patient's insurance plan have a 3 day qualifying hospital stay waiver?  Yes     Which insurance plan 3 day waiver is available? [employer]    Will the waiver be used for post-acute placement? Yes    PAS Confirmation Code: [id number]  Patient/family educated on Medicare website which has current facility and service quality ratings: yes    Education Provided on the Discharge Plan: Yes  Persons Notified of Discharge Plans: Ludy  Patient/Family in Agreement with the Plan: yes    Handoff Referral Completed: Yes    Additional Information:  Per IDT rounds, MD states that pt is medically stable for discharge today.    PT/OT recommends TCU cares and pt is in agreement.  Pt is accepted for cares at [facility]: (Phone: [phone]/ [phone]).     Transportation discussed and [facility] wheelchair to transport.  Pt is are aware of potential costs associated with [facility] transportation.    Plan: [facility] TCU  Transportation: [facility] Wheelchair 10/21/23 12:10-12:55 PM        MARIANELA Dang

## 2023-10-21 NOTE — PLAN OF CARE
Alert to self. Confused about situation and place and time. Patient has slept well, shifting herself from side to side. Pillow placed between legs, but patient removes it. Steri strips and dressing reapplied to right hip, incision is intact. O2 at 1.5 LPM per nasal canula while asleep, but patient removes the nasal canula frequently. Purewick in place with incontinence due to patient moving around. Plan to discharge to TCU today.

## 2023-10-21 NOTE — PLAN OF CARE
Occupational Therapy Discharge Summary    Reason for therapy discharge:    Discharged to transitional care facility.    Progress towards therapy goal(s). See goals on Care Plan in [facility] electronic health record for goal details.  Goals partially met.  Barriers to achieving goals:   limited tolerance for therapy and discharge from facility.    Therapy recommendation(s):    Continued therapy is recommended.  Rationale/Recommendations:  increased overall strength and act georgina for ind with BADLs and safety with transfers.

## 2023-10-21 NOTE — DISCHARGE SUMMARY
[facility]  Hospitalist Discharge Summary      Date of Admission:  10/18/2023  Date of Discharge:  10/21/2023  Discharging Provider: Gonzalo Varghese MD  Discharge Service: Hospitalist Service    Discharge Diagnoses   Integrated into hospital course below    Clinically Significant Risk Factors          Follow-ups Needed After Discharge   Follow-up Appointments     Follow Up Care      Follow up with 's team for your right total hip arthroplasty in   14 days. Call [facility] scheduling at [phone] to make   an appointment. Call 's team at [phone] for questions.        Follow Up and recommended labs and tests      Follow up with Nursing home physician within 1 week of your discharge for   posthospitalization follow-up.            Unresulted Labs Ordered in the Past 30 Days of this Admission       No orders found from 9/18/2023 to 10/19/2023.        These results will be followed up by Gonzalo Varghese MD.      Discharge Disposition   Transferred to SNF  Condition at discharge: Stable    Hospital Course   Ludy Mckenzie is a 83 year old female who presents on 10/18/2023 with pain following a fall. She is found to have a right hip fracture. Did require some O2 post operatively but this was weaned off.       # Closed fracture of neck of right femur, initial encounter S/p arthoplasty total hip right  # Mechanical fall, initial encounter  Mechanical fall from standing AM 10/18 onto right hip. No LOC, no other traumatic injuries. Unable to bear weight since falling. Xray shows fracture through right femoral neck without evidence for intertrochanteric extension, no dislocation. ER discussed with orthopedics, who will plan to see patient in AM for likely OR. Patient was brought to the OR 10/19 with uncomplicated surgery.   - DVT prophylaxis with  mg daily x 42 days  - Weight bearing as tolerated  - Pain control with oral oxycodone 2.5-5 mg  q4hrs PRN and Tramadol PRN     # Chronic obstructive pulmonary disease, emphysematous type  # Post-operative hypoxia without respiratory distress  Breathing is at baseline at time of admission. Post-op she is required NC to keep saturations in the mid 90's. Lungs are clear to auscultation with good movement. Patient does not use oxygen at home. Managed PTA with albuterol neb & inhaler, fluticasone nasal spray, Trelegy inhaler, azithromycin M,W,F.   - Resumed PTA inhalers      # Hyponatremia (resolved)  Mild, 131 on admission. Anticipate improvement with milton-operative IVF.      # Hypocalcemia  Mild, 8.5 on admission. Repleting with LR which contains calcium so may see modest improvement with IVF and with good PO post-operatively. Stable.    # Leukocytosis (resolved)  Mild, 12.6. Likely stress reaction from traumatic injury related to fall. No other signs of acute infection. Remains afebrile.     # Anemia, stable    Mild, acute, normocytic. Baseline hemoglobin around 12.0, hemoglobin on presentation 10.9. Some bruising over hip but no other signs of acute bleeding.      # Hypertension  Normotensive with borderline hypotension on admission. Asymptomatic. Managed PTA with losartan 12.5mg daily.   - Continue PTA losartan      # Hyperlipidemia LDL goal <100  Managed PTA rosuvastatin 5mg daily.   - Continue statin daily.     # Hx Bullous pemphigoid   Noted to have previously been on immunosuppression, but does not appear to be on any immunosuppressants currently. Has some bruising over arms (probably from falling) but no bullae. Monitor closely. No acute interventions.      # Anxiety  Managed PTA with sertraline 50mg daily.  - Continue PTA sertraline     # Type 2 diabetes mellitus   # Diabetic neuropathy with neurologic complication   Good control based on last A1c in April 2023 (7.2). Hyperglycemic on admission (181). Managed PTA with metformin 500mg with dinner. BGs ranging from 150-200.  - Resumed PTA metformin.     #  Urge incontinence of urine  Noted in history. Does not appear to be on any outpatient pharmacologic management for this. No urinary retention.       Consultations This Hospital Stay   ORTHOPEDIC SURGERY IP CONSULT  SPIRITUAL HEALTH SERVICES IP CONSULT  HOSPITALIST IP CONSULT  PHYSICAL THERAPY ADULT IP CONSULT  OCCUPATIONAL THERAPY ADULT IP CONSULT  CARE MANAGEMENT / SOCIAL WORK IP CONSULT  PHYSICAL THERAPY ADULT IP CONSULT  OCCUPATIONAL THERAPY ADULT IP CONSULT    Code Status   Full Code    Time Spent on this Encounter   I, Gonzalo Varghese MD, personally saw the patient today and spent greater than 30 minutes discharging this patient.       Gonzalo Varghese MD  [facility]  [address] [address]  Phone: [phone]  Fax: [phone]  ______________________________________________________________________    Physical Exam   Vital Signs: Temp: 98.2  F (36.8  C) Temp src: Oral BP: 98/52 Pulse: 86   Resp: 16 SpO2: 94 % O2 Device: None (Room air) Oxygen Delivery: 2 LPM  Weight: 123 lbs 0 oz  General Appearance: Awake and alert, laying in bed, no acute distress  Respiratory: Breathing easily on room air  Cardiovascular: Regular rate and rhythm, extremities warm and well-perfused, no edema in lower extremities  GI: Abdomen soft, nontender, nondistended  Skin: No rashes or lesions, bandage incisions over right hip  Other: Appropriate mood and affect, some tenderness over surgical site on right hip.       Primary Care Physician   Beth Strickland    Discharge Orders      Reason for your hospital stay    R CORBIN s/p femoral neck fracture     When to call - Reasons to Call 911    Call 911 immediately if you experience sudden-onset chest pain, arm weakness/numbness, slurred speech, or shortness of breath     Discharge Instruction - Breathing exercises    Perform breathing exercises using your Incentive Spirometer 10 times per hour while awake for 2 weeks.     Symptoms -  Fever Management    A low grade fever can be expected after surgery.  Use acetaminophen (TYLENOL) as needed for fever management.  Contact your Surgeon Team if you have a fever greater than 101.5 F, chills, and/or night sweats.     Symptoms - Constipation management    Constipation (hard, dry bowel movements) is expected after surgery due to the combination of being less active, the anesthetic, and the opioid pain medication.  You can do the following to help reduce constipation:  ~  FLUIDS:  Drink clear liquids (water or Gatorade), or juice (apple/prune).  ~  DIET:  Eat a fiber rich diet.    ~  ACTIVITY:  Get up and move around several times a day.  Increase your activity as you are able.  MEDICATIONS:  Reduce the risk of constipation by starting medications before you are constipated.  You can take Miralax   (1 packet as directed) and/or a stool softener (Senokot 1-2 tablets 1-2 times a day).  If you already have constipation and these medications are not working, you can get magnesium citrate and use as directed.  If you continue to have constipation you can try an over the counter suppository or enema.  Call your Surgeon Team if it has been greater than 3 days since your last bowel movement.     Symptoms - Reduced Urine Output    Changes in the amount of fluids you drank before and after surgery may result in problems urinating.  It is important to stay well-hydrated after surgery and drink plenty of water. If it has been greater than 8 hours since you have urinated despite drinking plenty of water, call your Surgeon Team.     Activity - Exercises to prevent blood clots    Unless otherwise directed by your Surgeon team, perform the following exercises at least three times per day for the first four weeks after surgery to prevent blood clots in your legs: 1) Point and flex your feet (Ankle Pumps), 2) Move your ankle around in big circles, 3) Wiggle your toes, 4) Walk, even for short distances, several times a  day, will help decrease the risk of blood clots.     Comfort and Pain Management - Pain after Surgery    Pain after surgery is normal and expected.  You will have some amount of pain for several weeks after surgery.  Your pain will improve with time.  There are several things you can do to help reduce your pain including: rest, ice, elevation, and using pain medications as needed. Contact your Surgeon Team if you have pain that persists or worsens after surgery despite rest, ice, elevation, and taking your medication(s) as prescribed. Contact your Surgeon Team if you have new numbness, tingling, or weakness in your operative extremity.     Comfort and Pain Management - Swelling after Surgery    Swelling and/or bruising of the surgical extremity is common and may persist for several months after surgery. In addition to frequent icing and elevation, gentle compressive support with an ACE wrap or tubigrip may help with swelling. Apply compression regularly, removing at least twice daily to perform skin checks. Contact your Surgeon Team if your swelling increases and is NOT associated with an increase in your activity level, or if your swelling increases and is associated with redness and pain.     Comfort and Pain Management - Cold therapy    Ice can be used to control swelling and discomfort after surgery. Place a thin towel over your operative site and apply the ice pack overtop. Leave ice pack in place for 20 minutes, then remove for 20 minutes. Repeat this 20 minutes on/20 minutes off routine as often as tolerated.     Medication Instructions - Acetaminophen (TYLENOL) Instructions    You were discharged with acetaminophen (TYLENOL) for pain management after surgery. Acetaminophen most effectively manages pain symptoms when it is taken on a schedule without missing doses (every four, six, or eight hours). Your Provider will prescribe a safe daily dose between 3000 - 4000 mg.  Do NOT exceed this daily dose. Most  "patients use acetaminophen for pain control for the first four weeks after surgery.  You can wean from this medication as your pain decreases.     Activity - Total Hip Arthroplasty    Refer to the [facility] \"Your Guide to Total Joint Replacement\" for recommendations on activities and Exercises.     Return to Driving    Return to driving - Driving is NOT permitted until directed by your provider. Under no circumstance are you permitted to drive while using narcotic pain medications.     No flexion past 90 degrees    No bending at waist past 90 degrees.     No internal rotation    No pivoting on your operative leg.     Dressing / Wound Care - Wound    You have a clean dressing on your surgical wound. Dressing change instructions as follows: dressing will be removed at your follow-up appointment. Contact your Surgeon Team if you have increased redness, warmth around the surgical wound, and/or drainage from the surgical wound.     Dressing Wound Care - Shower with wound/dressing NOT covered    You do not need to cover your dressing or incision in the shower, you may allow water and soap to run over top of the surgical dressing or incision. You may shower 1 days after surgery.  You are strictly prohibited from soaking or submerging the surgical wound underwater.     Dressing / Wound Care - NO Tub Bathing    Tub bathing, swimming, or any other activities that will cause your incision to be submerged in water should be avoided until allowed by your Surgeon.     Comfort and Pain Management - LOWER Extremity Elevation    Swelling is expected for several months after surgery. This type of swelling is usually associated with gravity and activity, and can be improved with elevation.   The best way to do this is to get your \"toes above your nose\" by laying down and placing several pillows lengthwise under your calf and heel. When elevating your leg keep your knee completely straight. Perform this elevation as often as " possible especially for the first two weeks after surgery.     Weight bearing as tolerated    Weight bearing as tolerated on your operative extremity.     Follow Up Care    Follow up with 's team for your right total hip arthroplasty in 14 days. Call [facility] scheduling at [phone] to make an appointment. Call 's team at [phone] for questions.     Medication instructions -  Anticoagulation - aspirin    Take the aspirin as prescribed for a total of 6 weeks after surgery.  This is given to help minimize your risk of blood clot.     General info for SNF    Length of Stay Estimate: Short Term Care: Estimated # of Days <30  Condition at Discharge: Improving  Level of care:skilled   Rehabilitation Potential: Excellent  Admission H&P remains valid and up-to-date: Yes  Recent Chemotherapy: N/A  Use Nursing Home Standing Orders: Yes     Follow Up and recommended labs and tests    Follow up with Nursing home physician within 1 week of your discharge for posthospitalization follow-up.     Reason for your hospital stay    You were hospitalized after a fall and fracture which was surgically repaired.     Physical Therapy Adult Consult    Evaluate and treat as clinically indicated.    Reason: Hip fracture     Occupational Therapy Adult Consult    Evaluate and treat as clinically indicated.    Reason: Hip fracture     [facility]    DME Documentation: Describe the reason for need to support medical necessity: Impaired gait status post hip surgery. I, the undersigned, certify that the above prescribed supplies are medically necessary for this patient and is both reasonable and necessary in reference to accepted standards of medical practice in the treatment of this patient's condition and is not prescribed as a convenience.     [facility]    : DME Documentation: Describe the reason for need to support medical necessity: Impaired gait status post hip surgery. I, the undersigned, certify that  the above prescribed supplies are medically necessary for this patient and is both reasonable and necessary in reference to accepted standards of medical practice in the treatment of this patient's condition and is not prescribed as a convenience.     Discharge Instruction - Regular Diet Adult    Return to your pre-surgery diet unless instructed otherwise     Diet    Follow this diet upon discharge: Orders Placed This Encounter      Discharge Instruction - Regular Diet Adult      Regular Diet Adult       Significant Results and Procedures   Results for orders placed or performed during the hospital encounter of 10/18/23   XR Femur Right 2 Views    Narrative    EXAM: XR FEMUR RIGHT 2 VIEWS, XR PELVIS 1/2 VIEWS  LOCATION: [facility]  DATE: 10/18/2023    INDICATION: Trauma, pain  COMPARISON: None.      Impression    IMPRESSION: Fracture through the right femoral neck without evidence for intertrochanteric extension. No dislocation. There is pre-existing degenerative change at the hip joint. The left hip is negative for fracture or dislocation. The pelvis is negative   for fracture. The remainder of the right femur is negative for fracture.   XR Pelvis 1/2 Views    Narrative    EXAM: XR FEMUR RIGHT 2 VIEWS, XR PELVIS 1/2 VIEWS  LOCATION: [facility]  DATE: 10/18/2023    INDICATION: Trauma, pain  COMPARISON: None.      Impression    IMPRESSION: Fracture through the right femoral neck without evidence for intertrochanteric extension. No dislocation. There is pre-existing degenerative change at the hip joint. The left hip is negative for fracture or dislocation. The pelvis is negative   for fracture. The remainder of the right femur is negative for fracture.   XR Hip Port Right G/E 2 Views    Narrative    This exam was marked as non-reportable because it will not be read by a   radiologist or a [place] non-radiologist provider.         XR Pelvis Port 1/2 Views     Narrative    XR PELVIS PORT 1/2 VIEWS   10/19/2023 1:00 PM     HISTORY: Status post Hip surgery  COMPARISON: None.       Impression    IMPRESSION: AP views of the right hip and pelvis demonstrate immediate  postoperative changes from right hip arthroplasty. There is diffuse  osseous demineralization. There are degenerative changes in the lower  lumbar spine.    ESME MORENO MD         SYSTEM ID:  [id number]   XR Pelvis w Hip Right 1 View    Narrative    EXAM: XR PELVIS AND HIP RIGHT 1 VIEW  LOCATION: [facility]  DATE: 10/20/2023    INDICATION: rule out dislocation  COMPARISON: 10/19/2023.      Impression    IMPRESSION: Postoperative changes right total hip replacement. There is soft tissue swelling and gas about the implant, related to recent surgery. There is no dislocation or acute displaced fracture. Bones are demineralized.     *Note: Due to a large number of results and/or encounters for the requested time period, some results have not been displayed. A complete set of results can be found in Results Review.       Discharge Medications   Current Discharge Medication List        START taking these medications    Details   acetaminophen (TYLENOL) 325 MG tablet Take 2 tablets (650 mg) by mouth every 4 hours as needed for other (mild pain)    Associated Diagnoses: History of total hip replacement, right      aspirin (ASA) 325 MG EC tablet Take 1 tablet (325 mg) by mouth daily    Comments: For 42 days s/p surgery to prevent DVTs.  Associated Diagnoses: History of total hip replacement, right      oxyCODONE (ROXICODONE) 5 MG tablet Take 0.5-1 tablets (2.5-5 mg) by mouth every 4 hours as needed for moderate pain  Qty: 10 tablet, Refills: 0    Associated Diagnoses: History of total hip replacement, right      senna-docusate (SENOKOT-S/PERICOLACE) 8.6-50 MG tablet Take 1-2 tablets by mouth 2 times daily Take while on oral narcotics to prevent or treat constipation.    Comments: While  taking narcotics  Associated Diagnoses: History of total hip replacement, right      traMADol (ULTRAM) 50 MG tablet Take 1 tablet (50 mg) by mouth every 6 hours as needed for moderate pain  Qty: 10 tablet, Refills: 0    Associated Diagnoses: History of total hip replacement, right           CONTINUE these medications which have NOT CHANGED    Details   albuterol (ACCUNEB) 1.25 MG/3ML neb solution TAKE 1 VIAL (1.25 MG) BY NEBULIZATION EVERY 6 HOURS AS NEEDED FOR SHORTNESS OF BREATH / DYSPNEA OR WHEEZING  Qty: 75 mL, Refills: 11    Comments: Dx code is J43.2 for centrilobular emphysema  Associated Diagnoses: Chronic obstructive pulmonary disease, unspecified COPD type (H)      albuterol (PROAIR HFA/PROVENTIL HFA/VENTOLIN HFA) 108 (90 Base) MCG/ACT inhaler Inhale 2 puffs into the lungs every 4 hours as needed for shortness of breath or wheezing  Qty: 25.5 g, Refills: 3    Comments: Three 8.5g inhalers with 3 refills. Pharmacy may dispense brand covered by insurance (Proair, or proventil or ventolin or generic albuterol inhaler)  Associated Diagnoses: Chronic obstructive pulmonary disease, unspecified COPD type (H)      aspirin 81 MG tablet Take 1 tablet by mouth daily.      azithromycin (ZITHROMAX) 250 MG tablet TAKE 2 TABLETS (500 MG) BY MOUTH EVERY MON, WED, FRI MORNING  Qty: 36 tablet, Refills: 3    Comments: DX Code Needed. Dx code is Centrilobular emphysema J43.2  Associated Diagnoses: Chronic obstructive pulmonary disease, unspecified COPD type (H)      Calcium Carb-Cholecalciferol 500-5 MG-MCG TABS       cholecalciferol (VITAMIN D3) 25 mcg (1000 units) capsule Take 1 capsule by mouth daily      fluticasone (FLONASE) 50 MCG/ACT nasal spray INSTILL 1 SPRAY INTO BOTH NOSTRILS DAILY  Qty: 16 mL, Refills: 11    Associated Diagnoses: COPD exacerbation (H)      Fluticasone-Umeclidin-Vilanterol (TRELEGY ELLIPTA) 200-62.5-25 MCG/INH oral inhaler Inhale 1 puff into the lungs daily  Qty: 3 each, Refills: 3    Comments: 90  day supply with 3 refills  Associated Diagnoses: Centrilobular emphysema (H)      latanoprost (XALATAN) 0.005 % ophthalmic solution Place 1 drop into both eyes At Bedtime  Qty: 7.5 mL, Refills: 3    Associated Diagnoses: 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, mild stage      losartan (COZAAR) 25 MG tablet TAKE 1/2 TABLET BY MOUTH EVERY DAY  Qty: 45 tablet, Refills: 3    Associated Diagnoses: Microalbuminuria      metFORMIN (GLUCOPHAGE XR) 500 MG 24 hr tablet Take 1 tablet (500 mg) by mouth daily (with dinner)  Qty: 90 tablet, Refills: 1    Associated Diagnoses: 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)      potassium chloride ER (KLOR-CON) 20 MEQ CR tablet Take 1 tablet (20 mEq) by mouth daily  Qty: 90 tablet, Refills: 4    Associated Diagnoses: Hypopotassemia      rosuvastatin (CRESTOR) 5 MG tablet Take 1 tablet (5 mg) by mouth daily  Qty: 90 tablet, Refills: 3    Associated Diagnoses: Mixed hyperlipidemia      sertraline (ZOLOFT) 50 MG tablet Take 1 tablet (50 mg) by mouth daily  Qty: 90 tablet, Refills: 1      loratadine (CLARITIN) 10 MG tablet Take 1 tablet (10 mg) by mouth daily as needed for allergies  Qty: 30 tablet, Refills: 0    Associated Diagnoses: Allergic reaction, initial encounter      thin (NO BRAND SPECIFIED) lancets Use with lanceting device. To accompany: Blood Glucose Monitor Brands: per insurance.  Qty: 100 each, Refills: 6    Associated Diagnoses: Cerebral infarction, unspecified mechanism (H); Mixed hyperlipidemia; 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); Hypopotassemia; Microalbuminuria           Allergies   Allergies   Allergen Reactions    Amoxicillin GI Disturbance and Nausea    Amoxicillin-Pot Clavulanate Nausea and Vomiting    Doxycycline GI Disturbance    Iodine Itching and Swelling     Pt is ok with ct contrast dye (isovue 370)    Mercury Unknown    Metronidazole GI Disturbance    Penicillins Unknown     Phenylmercuric Nitrate Unknown

## 2023-10-21 NOTE — PROGRESS NOTES
[facility] Progress Note         Assessment and Plan:    Assessment:   Admission date:   10/18/2023 for No admission procedures for hospital encounter.      Pain well-controlled.  Had an episode of confusion last light and there was concern about her hip dislocating, but xrays appeared fine.  She is ready for discharge       Plan:   Weight bearing as tolerated  Deep venous thrombosis prophylaxis -  for 42 days  Discharge plan: Skilled Nursing Facility today             Interval History:     Some confusion last night.  Nurse was concerned about appearance of her leg.  Pain better controlled today.            Significant Problems:   (Refer to attending physician notes regarding additional medical problems and issues)          Review of Systems:   The patient denies any chest pain, shortness of breath, excessive pain, fever, chills, purulent drainage from the wound, nausea or vomiting.          Medications:     Current Facility-Administered Medications   Medication    [START ON 10/22/2023] acetaminophen (TYLENOL) tablet 650 mg    acetaminophen (TYLENOL) tablet 975 mg    aspirin (ASA) EC tablet 325 mg    azithromycin (ZITHROMAX) tablet 500 mg    benzocaine-menthol (CHLORASEPTIC) 6-10 MG lozenge 1 lozenge    bisacodyl (DULCOLAX) suppository 10 mg    glucose gel 15-30 g    Or    dextrose 50 % injection 25-50 mL    Or    glucagon injection 1 mg    diphenhydrAMINE (BENADRYL) liquid 12.5 mg    famotidine (PEPCID) tablet 20 mg    Or    famotidine (PEPCID) injection 20 mg    HYDROmorphone (DILAUDID) injection 0.2 mg    Or    HYDROmorphone (DILAUDID) injection 0.4 mg    insulin aspart (NovoLOG) injection (RAPID ACTING)    insulin aspart (NovoLOG) injection (RAPID ACTING)    ipratropium - albuterol 0.5 mg/2.5 mg/3 mL (DUONEB) neb solution 3 mL    lidocaine (LMX4) kit    lidocaine 1 % 0.1-1 mL    losartan (COZAAR) half-tab 12.5 mg    magnesium hydroxide (MILK OF MAGNESIA) suspension 30 mL     "melatonin tablet 1 mg    metFORMIN (GLUCOPHAGE XR) 24 hr tablet 500 mg    methocarbamol (ROBAXIN) tablet 500 mg    naloxone (NARCAN) injection 0.2 mg    Or    naloxone (NARCAN) injection 0.4 mg    Or    naloxone (NARCAN) injection 0.2 mg    Or    naloxone (NARCAN) injection 0.4 mg    ondansetron (ZOFRAN ODT) ODT tab 4 mg    Or    ondansetron (ZOFRAN) injection 4 mg    oxyCODONE IR (ROXICODONE) half-tab 2.5-5 mg    polyethylene glycol (MIRALAX) Packet 17 g    polyethylene glycol (MIRALAX) Packet 17 g    prochlorperazine (COMPAZINE) injection 5 mg    Or    prochlorperazine (COMPAZINE) tablet 5 mg    Or    prochlorperazine (COMPAZINE) suppository 12.5 mg    rosuvastatin (CRESTOR) tablet 5 mg    senna-docusate (SENOKOT-S/PERICOLACE) 8.6-50 MG per tablet 1-2 tablet    sertraline (ZOLOFT) tablet 50 mg    sodium chloride (PF) 0.9% PF flush 3 mL    sodium chloride (PF) 0.9% PF flush 3 mL    traMADol (ULTRAM) tablet 50 mg             Physical Exam:   Blood pressure 98/52, pulse 86, temperature 98.2  F (36.8  C), temperature source Oral, resp. rate 16, height 1.676 m (5' 6\"), weight 55.8 kg (123 lb), SpO2 94%, not currently breastfeeding.      Right lower extremity    Incision:  dressing in place, clean, dry, and intact    Lower Extremity Motor :   intact      Pulses: intact     Homans:  Negative bilaterally  Calf tenderness:  None    Abnormal Exam findings:  none          Data:     Hemoglobin   Date Value Ref Range Status   10/21/2023 8.1 (L) 11.7 - 15.7 g/dL Final   10/20/2023 9.1 (L) 11.7 - 15.7 g/dL Final   10/19/2023 10.8 (L) 11.7 - 15.7 g/dL Final   10/18/2023 10.9 (L) 11.7 - 15.7 g/dL Final   09/30/2022 11.7 11.7 - 15.7 g/dL Final   06/29/2021 12.0 11.7 - 15.7 g/dL Final   09/13/2019 12.6 11.7 - 15.7 g/dL Final   03/08/2019 13.4 11.7 - 15.7 g/dL Final   03/27/2018 12.0 11.7 - 15.7 g/dL Final   11/30/2017 12.9 11.7 - 15.7 g/dL Final     INR   Date Value Ref Range Status   10/18/2023 1.03 0.85 - 1.15 Final   05/14/2021 " 1.00 0.86 - 1.14 Final   11/15/2010 0.92 0.86 - 1.14 Final   08/16/2009 0.98 0.86 - 1.14 Final   08/16/2009 0.99 0.86 - 1.14 Final      I reviewed the xrays of the right hip taken yesterday after her confusion episode.  No dislocation.  Components are well seated.       Stew Wylie MD

## 2023-10-21 NOTE — PLAN OF CARE
Pt discharged to TCU at 1310 via transport.  Report given to nurse Marilia at TCU.  Pt hip dressing CDI.  Pain has been managed on oral medications.  O2 sats 94% on RA. Pt self administering IS.  Message left for pt son Param at time of discharge.  All belongings sent with pt.

## 2023-11-13 NOTE — LETTER
[facility] CARE COORDINATION  [address]  [place] [address]    November 14, 2023    Ludy Mckenzie  [address]  [facility] [place] [address]      Dear Ludy,    I am a clinic care coordinator who works with Beth Strickland MD with the [facility]. I wanted to introduce myself and provide you with my contact information for you to be able to call me with any questions or concerns. I wanted to thank you for spending the time to talk with me.  Below is a description of clinic care coordination and how I can further assist you.       The clinic care coordination team is made up of a registered nurse, , financial resource worker and community health worker who understand the health care system. The goal of clinic care coordination is to help you manage your health and improve access to the health care system. Our team works alongside your provider to assist you in determining your health and social needs. We can help you obtain health care and community resources, providing you with necessary information and education. We can work with you through any barriers and develop a care plan that helps coordinate and strengthen the communication between you and your care team.  Our services are voluntary and are offered without charge to you personally.    Please feel free to contact me with any questions or concerns regarding care coordination and what we can offer.      We are focused on providing you with the highest-quality healthcare experience possible.    Sincerely,     Nabeel Tucker MSN, RN, PHN, [facility]   Primary Care Clinical RN Care Coordinator  [facility]  11/14/2023   10:24 AM  [email or web address]  Office: [phone]      Enclosed: I have enclosed a copy of the Patient Centered Plan of Care. This has helpful information and goals that we have talked about. Please keep this in an easy to access place to use as needed.

## 2023-11-13 NOTE — PROGRESS NOTES
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      Transition to Facility:     [facility]: (Phone: [phone]/ [phone])     11/13/23  Call made to see if the patient was still a resident in the TCU waiting for an answer.    11/14/23  Called again to the TCU and the patient discharged to home.     Plan: RN/SW Care Coordinator will await notification from facility staff informing RN/SW Care Coordinator of patient's discharge plans/needs. RN/SW Care Coordinator will review chart and outreach to facility staff every 4 weeks and as needed.         Nabeel Tucker MSN, RN, PHN, CCM   Primary Care Clinical RN Care Coordinator  [facility]  11/13/2023   10:28 AM  [email or web address]  Office: [phone]

## 2023-11-13 NOTE — LETTER
[facility]  Patient Centered Plan of Care  About Me:        Patient Name:  Ludy Mckenzie    YOB: 1940  Age:         83 year old   [facility] MRN:    [id number] Telephone Information:  Home Phone [phone]   Mobile [phone]       Address:  [address]  [facility] [address] Email address:  [email or web address]      Emergency Contact(s)    Name Relationship Lgl Grd Work Phone Home Phone Mobile Phone   1. CRISTIANE MAGANA Son   [phone]    2. RUDOLPH MEJIA Sister No  [phone]   3. IZZY,LALI Relative   [phone]           Primary language:  English     needed? No   [facility] [employer]:  [phone] op. 1  Other communication barriers:Glasses    Preferred Method of Communication:  Mail  Current living arrangement: I live alone    Mobility Status/ Medical Equipment: Independent w/Device        Health Maintenance  Health Maintenance Reviewed: Due/Overdue   Health Maintenance Due   Topic Date Due    RSV VACCINE (Pregnancy & 60+) (1 - 1-dose 60+ series) Never done    DTAP/TDAP/TD IMMUNIZATION (1 - Tdap) 04/15/2023    COVID-19 Vaccine (6 - 2023-24 season) 09/01/2023    MICROALBUMIN  09/30/2023    PHQ-9  11/24/2023           My Access Plan  Medical Emergency 911   Primary Clinic Line [facility] [phone]   24 Hour Appointment Line [phone] or  [phone]) (toll-free)   24 Hour Nurse Line [phone] (toll-free)   Preferred Urgent Care [facility], [phone]     Preferred Hospital [place], [place]  [phone]     Preferred Pharmacy [facility]     Behavioral Health Crisis Line The National Suicide Prevention Lifeline at 1-605.881.3200 or Text/Call 428           My Care Team Members  Patient Care Team         Relationship Specialty Notifications Start End    Beth Strickland MD PCP - General Family Medicine  4/25/23      Phone: [phone] Fax: [phone] [address] [facility] [facility] [address]    Kareen Roberts MD MD Dermatology  3/23/15     Phone: [phone] Fax: [phone]         [address] [facility] [address] [facility] [address]    Arjun Valenzuela MD Assigned Surgical Provider   10/23/20     Phone: [phone] Fax: [phone] [address] [facility] [address]    Kneny Gr MD Assigned Pulmonology Provider   6/27/21     Phone: [phone] Fax: [phone]         [address] [facility] [address]    Ross Greenwood MD MD Urology  1/4/22     Phone: [phone] Fax: [phone] [address] [facility] [address]    Sadie Mandujano RD Diabetes Educator Dietitian, Registered  1/6/22     Phone: [phone] Fax: [phone]         [address] [place] [address]    Lester Centeno MD MD Neurology  5/26/23     Phone: [phone] Fax: [phone]         [address] [place] [address]    Varinder Sabillon,  Assigned PCP   6/17/23     Phone: [phone] Fax: [phone] [address] [facility] [address]    Varinder Sabillon DO Assigned Pain Medication Provider   10/21/23     Phone: [phone] Fax: [phone] [address] [facility] [address]    Nabeel Bauer, RN Lead Care Coordinator Primary Care - [facility] Admissions 10/23/23     Phone: [phone] Fax: [phone]        Dileep Winchester MD MD Ophthalmology  11/10/23     Phone: [phone] Fax: [phone] [address] [facility] [place] [address]                My Care Plans  Self Management and Treatment Plan    Care Plan  Care Plan: General - increase strength and stamina       Problem: HP GENERAL PROBLEM       Goal: General Goal -increase strength and stamina       Start Date: 11/14/2023    Note:     Barriers: Recent surgery for hip arthroplasty  Strengths: Engaged in care coordination  Patient expressed understanding of goal: Yes  Action  "steps to achieve this goal:  1. I will practice the exercises from PT between sessions.  2. I will use my walker at all times to prevent falls  3. I will practice walking around my apartment.                              Care Plan: General -monitor my diabetes       Problem: HP GENERAL PROBLEM       Goal: General Goal -monitor my diabetes       Start Date: 11/14/2023    Note:     Barriers: Recent hospitalization and stay in a TCU.  Strengths: Engaged in care coordination  Patient expressed understanding of goal: Yes  Action steps to achieve this goal:  1. I will make an appointment with my primary care physician for another way of testing my blood sugar then the meter.  2. I will monitor my diet and make sure I am not eating too many sugars or carbs.  3. I will be aware of symptoms of hypoglycemia or hyperglycemia and treat them                                Action Plans on File:         COPD  Depression          Advance Care Plans/Directives:   Advanced Care Plan/Directives on file:   Yes    Status of Document(s):   On File and Validated    Advanced Care Plan/Directives Type: No data recorded         My Medical and Care Information  Problem List   Patient Active Problem List   Diagnosis    Colon polyp    HTN (hypertension)    Hyperlipidemia LDL goal <100    Balance problems, \"feet do not work without looking at them\"    COPD (chronic obstructive pulmonary disease) (H)    Mixed hyperlipidemia    Cerebral infarction (H)    Thyroid nodule    Seasonal allergic rhinitis    Health Care Home    Bullous pemphigoid (H28)    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)    Primary open angle glaucoma (POAG)    Chronic obstructive pulmonary disease, unspecified COPD type (H)    Chronic kidney disease (CKD) stage G1/A2, glomerular filtration rate (GFR) equal to or greater than 90 mL/min/1.73 square meter and albuminuria creatinine ratio between  mg/g    Loss of balance    Diabetic neuropathy " with neurologic complication (H)    Pseudophakia of both eyes    Urge incontinence of urine    Immunosuppression (H24)    Cramp of limb    Right sided sciatica    Pulmonary emphysema, unspecified emphysema type (H)    Age-related osteoporosis without current pathological fracture    Pulmonary nodules    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    Care Coordination Start Date: 10/23/2023   Frequency of Care Coordination: monthly, more frequently as needed     Form Last Updated: 11/14/2023

## 2023-11-14 NOTE — PROGRESS NOTES
"Clinic Care Coordination Contact  Clinic Care Coordination Contact  OUTREACH with Post Discharge Assessment    Referral Information:  Referral Source: IP Handoff    Primary Diagnosis: Orthopedic    Chief Complaint   Patient presents with    Clinic Care Coordination - Homecare/TCU        [place] Utilization: The patient uses the [facility] system and the [facility].  Clinic Utilization  Difficulty keeping appointments:: No  Compliance Concerns: No  No-Show Concerns: No  No PCP office visit in Past Year: No  Utilization      No Show Count (past year)  1             ED Visits  1             Hospital Admissions  1                    Current as of: 11/13/2023  1:45 PM                Clinical Concerns:  Current Medical Concerns: The patient recently had a mechanical fall fracturing her right hip.  A right hip arthroplasty was performed.  The patient was then discharged to a TCU where she discharged on 11/9/2023.  Patient states that she is doing very well at home.  The patient denies pain, shortness of breath, or chest pain.  The patient is walking with a walker and practicing the exercises given to her by physical therapy.  The patient states that the home health nurse set up her pills for her.  The concern that the patient has currently is that she could not use the meter for testing her blood sugar as her hands shake too much.  The RN CC suggested that at her post hospital appointment with her primary care physician that she discuss this and see if there are other options.  The RN CC did review the signs and symptoms of hypoglycemia as well as hyperglycemia.  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)    Primary open angle glaucoma (POAG)    Chronic obstructive pulmonary disease, unspecified COPD type (H)    Chronic kidney disease (CKD) stage G1/A2, glomerular filtration rate (GFR) equal to or greater than 90 mL/min/1.73 square meter and albuminuria creatinine ratio between  mg/g    Loss of balance    Diabetic neuropathy with neurologic complication (H)    Pseudophakia of both eyes    Urge incontinence of urine    Immunosuppression (H24)    Cramp of limb    Right sided sciatica    Pulmonary emphysema, unspecified emphysema type (H)    Age-related osteoporosis without current pathological fracture    Pulmonary nodules    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  Education Provided to patient: Options for care coordination, and assistance in setting goals.  Pain  Pain (GOAL):: No  Health Maintenance Reviewed: Due/Overdue   Health Maintenance Due   Topic Date Due    RSV VACCINE (Pregnancy & 60+) (1 - 1-dose 60+ series) Never done    DTAP/TDAP/TD IMMUNIZATION (1 - Tdap) 04/15/2023    COVID-19 Vaccine (6 - 2023-24 season) 09/01/2023    MICROALBUMIN  09/30/2023    PHQ-9  11/24/2023       Clinical Pathway: None    Admission:    Admission Date: 10/18/23   Reason for Admission: mechanical fall resulting in hip pain  Discharge:   Discharge Date: 11/09/23  Discharge Diagnosis: hip fracture right hip arthroplasty    Enrollment  Outreach Frequency: monthly, more frequently as needed    Discharge Assessment  How are you doing now that you are home?: Feeling much better now that I am home.  The patient is using a walker at all times to ensure that she does not fall.  Home care is coming in to assist the patient and PT is part of that.  The patient is very concerned  about her diabetes as she is shaking too much to be able to use the meter that she currently has.  The RN CC suggested this be part of the conversation with her primary care provider at her follow-up appointment.  How are your symptoms? (Red Flag symptoms escalate to triage hotline per guidelines): Improved  Do you feel your condition is stable enough to be safe at home until your provider visit?: Yes  Does the patient have their discharge instructions? : Yes  Does the patient have questions regarding their discharge instructions? : No  Were you started on any new medications or were there changes to any of your previous medications? : No  Does the patient have all of their medications?: Yes  Do you have questions regarding any of your medications? : No  Do you have all of your needed medical supplies or equipment (DME)?  (i.e. oxygen tank, CPAP, cane, etc.): Yes  Discharge follow-up appointment scheduled within 14 calendar days? : No  Is patient agreeable to assistance with scheduling? : Yes         Post-op (Clinicians Only)  Did the patient have surgery or a procedure: Yes  Incision: healing  Drainage: No  Bleeding: none  Fever: No  Chills: No  Redness: No  Warmth: No  Swelling: No  Incision site pain: No  Closure: suture  Eating & Drinking: eating and drinking without complaints/concerns  PO Intake: other  Bowel Function: normal  Urinary Status: voiding without complaint/concerns    Medication Management:  Medication review status: Medications reviewed and no changes reported per patient.        Patient is knowledgeable on medications and is adherent.  No financial concerns reported at this time.  Medication review was completed with the patient and there are no questions or concerns at this time.       Functional Status:  Dependent ADLs:: Ambulation-walker, Ambulation-cane  Dependent IADLs:: Independent  Bed or wheelchair confined:: No  Mobility Status: Independent w/Device  Fallen 2 or more times in the past  year?: Yes  Any fall with injury in the past year?: Yes    Living Situation:  Current living arrangement:: I live alone  Type of residence:: Independent Senior Living    Lifestyle & Psychosocial Needs:    Social Determinants of Health     Food Insecurity: Not on file   Depression: Not at risk (5/24/2023)    PHQ-2     PHQ-2 Score: 0   Housing Stability: Not on file   Tobacco Use: Medium Risk (10/19/2023)    Patient History     Smoking Tobacco Use: Former     Smokeless Tobacco Use: Never     Passive Exposure: Not on file   Financial Resource Strain: Not on file   Alcohol Use: Not on file   Transportation Needs: Not on file   Physical Activity: Not on file   Interpersonal Safety: Not on file   Stress: Not on file   Social Connections: Not on file     Diet:: Diabetic diet  Inadequate nutrition (GOAL):: No  Tube Feeding: No  Inadequate activity/exercise (GOAL):: No  Significant changes in sleep pattern (GOAL): No  Transportation means:: [place] mobility     Sabianism or spiritual beliefs that impact treatment:: No  Mental health DX:: No  Mental health management concern (GOAL):: No  Chemical Dependency Status: No Current Concerns  Informal Support system:: Children, Friends             Resources and Interventions:  Current Resources:   Skilled Home Care Services: Skilled Nursing, Physical Therapy, Occupational Therapy  Community Resources: Home Care  Supplies Currently Used at Home: Diabetic Supplies  Equipment Currently Used at Home: walker, rolling, cane, quad  Employment Status: retired         Advance Care Plan/Directive  Advanced Care Plans/Directives on file:: Yes  Status of record:: On File and Validated    Referrals Placed: None     Care Plan:   Care Plan: General - increase strength and stamina       Problem: HP GENERAL PROBLEM       Goal: General Goal -increase strength and stamina       Start Date: 11/14/2023    Note:     Barriers: Recent surgery for hip arthroplasty  Strengths: Engaged in care  coordination  Patient expressed understanding of goal: Yes  Action steps to achieve this goal:  1. I will practice the exercises from PT between sessions.  2. I will use my walker at all times to prevent falls  3. I will practice walking around my apartment.                              Care Plan: General -monitor my diabetes       Problem: HP GENERAL PROBLEM       Goal: General Goal -monitor my diabetes       Start Date: 11/14/2023    Note:     Barriers: Recent hospitalization and stay in a TCU.  Strengths: Engaged in care coordination  Patient expressed understanding of goal: Yes  Action steps to achieve this goal:  1. I will make an appointment with my primary care physician for another way of testing my blood sugar then the meter.  2. I will monitor my diet and make sure I am not eating too many sugars or carbs.  3. I will be aware of symptoms of hypoglycemia or hyperglycemia and treat them                                Patient/Caregiver understanding: The patient has a good understanding of the disease process, and the importance of using her walker at all times.    Outreach Frequency: monthly, more frequently as needed  Future Appointments                In 3 days FZ MA/LPN [facility]    In 1 week Lester Centeno MD [facility] Neurology [facility]    In 2 weeks Dileep Winchester MD [facility]    In 3 months Kenny Gr MD [facility]            Plan: 1.  The patient will monitor her diet watching the sugars as well as the amount of carbs that she takes in.   2.  The patient will work on increasing her strength and stamina by walking around her apartment and doing the exercises from physical therapy.  3.  The patient will make and attend a hospital follow-up appointment with her primary care physician.    The patient accepts care coordination at this time and the RN CC will  contact her again in a month.          Nabeel Tucker MSN, RN, PHN, CCM   Primary Care Clinical RN Care Coordinator  [facility]  11/14/2023   10:32 AM  [email or web address]  Office: [phone]

## 2023-11-20 NOTE — PROGRESS NOTES
"[facility] Neurology Consultation    Ludy Mckenzie MRN# [id number]   Age: 83 year old YOB: 1940     Requesting physician: Beth Cervantes     Reason for Consultation: imbalance, numbness      History of Presenting Symptoms:   Ludy Mckenzie is a 83 year old female who presents today for evaluation of imbalance, numbness.     Patient had two strokes (18 years ago and 9 years ago). She had problems with balance after the strokes, which improved and then has worsened since then then. Patient saw Dr Yuan in 2016 for evaluation of imbalance. She has had numbness in the feet for the last few years. She denies any numbness in the hands.     With the first stroke she woke up and felt that \"something was wrong\" and she went to the ER. She had a lot of therapy afterwards. The second one she didn't have as much therapy. She doesn't remember if there was weakness on one half of the body.     She had been using a cane for about a year before she broke her right hip with a fall last month. She was using a walker seldom before the broken hip and know using it all the time.       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)    Primary open angle glaucoma (POAG)    Chronic obstructive pulmonary disease, unspecified COPD type (H)    Chronic kidney disease (CKD) stage G1/A2, glomerular filtration rate (GFR) equal to or greater than 90 mL/min/1.73 square meter and albuminuria creatinine ratio between  mg/g    Loss of balance    Diabetic neuropathy with neurologic " complication (H)    Pseudophakia of both eyes    Urge incontinence of urine    Immunosuppression (H24)    Cramp of limb    Right sided sciatica    Pulmonary emphysema, unspecified emphysema type (H)    Age-related osteoporosis without current pathological fracture    Pulmonary nodules    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)    History of colonic polyps    Abnormal gait    Closed fracture of neck of right femur, initial encounter (H)     Past Medical History:   Diagnosis Date    Acute ischemic left TREMAYNE stroke (H) 2009    Astigmatism, unspecified     Bullous pemphigoid (H28)     COPD (chronic obstructive pulmonary disease) (H)     CVA (cerebral infarction) 08/2009    Depressive disorder     Diabetes (H)     Glaucoma (increased eye pressure)     Goiter 03/12/2007    HTN (hypertension) 08/25/2009    Hypocalcemia     Hypokalemia     Hyponatremia     Mixed hyperlipidemia     Osteoporosis     Personal history of other diseases of circulatory system     Pneumonia 11/16/2010    Primary open-angle glaucoma(365.11)     Thyroid nodule 03/23/2012    Unspecified cerebral artery occlusion with cerebral infarction     Unspecified cerebral artery occlusion with cerebral infarction 1998        Past Surgical History:     Past Surgical History:   Procedure Laterality Date    ARTHROPLASTY HIP Right 10/19/2023    Procedure: ARTHROPLASTY RIGHT HIP TOTAL;  Surgeon: Duke Gomez MD;  Location: [facility] OR    BIOPSY      BUNIONECTOMY  1960    JACQUELINE    CATARACT IOL, RT/LT  2010    right    COLONOSCOPY  2/22/2013    Procedure: COLONOSCOPY;  Colonoscopy ;  Surgeon: Hugo Minor MD;  Location:  GI    COLONOSCOPY N/A 11/11/2019    Procedure: Colonoscopy, With Polypectomy And Biopsy;  Surgeon: Selena Escobedo MD;  Location: [facility] OR    COLONOSCOPY N/A 12/2/2021    Procedure: COLONOSCOPY, WITH POLYPECTOMY AND BIOPSY;  Surgeon: Shai Leal MD;  Location:  GI    COLONOSCOPY WITH CO2 INSUFFLATION N/A 11/11/2019    Procedure: COLONOSCOPY, WITH CO2  INSUFFLATION;  Surgeon: Selena Escobedo MD;  Location: [facility] OR        Social History:     Social History     Tobacco Use    Smoking status: Former     Packs/day: 1.50     Years: 50.00     Additional pack years: 0.00     Total pack years: 75.00     Types: Cigarettes     Quit date: 2009     Years since quittin.3    Smokeless tobacco: Never   Vaping Use    Vaping Use: Never used   Substance Use Topics    Alcohol use: No     Alcohol/week: 4.0 standard drinks of alcohol    Drug use: No        Family History:     Family History   Problem Relation Age of Onset    Cancer Maternal Grandfather         bone    Diabetes Mother 50        dx age 50, hip fracture    Glaucoma Mother     Macular Degeneration Mother     C.A.D. Father         pacemaker    Glaucoma Sister         Medications:     Current Outpatient Medications   Medication Sig    acetaminophen (TYLENOL) 325 MG tablet Take 2 tablets (650 mg) by mouth every 4 hours as needed for other (mild pain)    albuterol (ACCUNEB) 1.25 MG/3ML neb solution TAKE 1 VIAL (1.25 MG) BY NEBULIZATION EVERY 6 HOURS AS NEEDED FOR SHORTNESS OF BREATH / DYSPNEA OR WHEEZING    albuterol (PROAIR HFA/PROVENTIL HFA/VENTOLIN HFA) 108 (90 Base) MCG/ACT inhaler Inhale 2 puffs into the lungs every 4 hours as needed for shortness of breath or wheezing    aspirin (ASA) 325 MG EC tablet Take 1 tablet (325 mg) by mouth daily    aspirin 81 MG tablet Take 1 tablet by mouth daily.    azithromycin (ZITHROMAX) 250 MG tablet TAKE 2 TABLETS (500 MG) BY MOUTH EVERY MON, WED, FRI MORNING    Calcium Carb-Cholecalciferol 500-5 MG-MCG TABS     cholecalciferol (VITAMIN D3) 25 mcg (1000 units) capsule Take 1 capsule by mouth daily    fluticasone (FLONASE) 50 MCG/ACT nasal spray INSTILL 1 SPRAY INTO BOTH NOSTRILS DAILY    Fluticasone-Umeclidin-Vilanterol (TRELEGY ELLIPTA) 200-62.5-25 MCG/INH oral inhaler Inhale 1 puff into the lungs daily    latanoprost (XALATAN) 0.005 % ophthalmic solution Place 1 drop into  both eyes At Bedtime    loratadine (CLARITIN) 10 MG tablet Take 1 tablet (10 mg) by mouth daily as needed for allergies    losartan (COZAAR) 25 MG tablet TAKE 1/2 TABLET BY MOUTH EVERY DAY    metFORMIN (GLUCOPHAGE XR) 500 MG 24 hr tablet Take 1 tablet (500 mg) by mouth daily (with dinner)    oxyCODONE (ROXICODONE) 5 MG tablet Take 0.5-1 tablets (2.5-5 mg) by mouth every 4 hours as needed for moderate pain    potassium chloride ER (KLOR-CON) 20 MEQ CR tablet Take 1 tablet (20 mEq) by mouth daily    rosuvastatin (CRESTOR) 5 MG tablet Take 1 tablet (5 mg) by mouth daily    senna-docusate (SENOKOT-S/PERICOLACE) 8.6-50 MG tablet Take 1-2 tablets by mouth 2 times daily Take while on oral narcotics to prevent or treat constipation.    sertraline (ZOLOFT) 50 MG tablet Take 1 tablet (50 mg) by mouth daily    thin (NO BRAND SPECIFIED) lancets Use with lanceting device. To accompany: Blood Glucose Monitor Brands: per insurance. (Patient not taking: Reported on 5/24/2023)    traMADol (ULTRAM) 50 MG tablet Take 1 tablet (50 mg) by mouth every 6 hours as needed for moderate pain     No current facility-administered medications for this visit.        Allergies:     Allergies   Allergen Reactions    Amoxicillin GI Disturbance and Nausea    Amoxicillin-Pot Clavulanate Nausea and Vomiting    Doxycycline GI Disturbance    Iodine Itching and Swelling     Pt is ok with ct contrast dye (isovue 370)    Mercury Unknown    Metronidazole GI Disturbance    Penicillins Unknown    Phenylmercuric Nitrate Unknown        Review of Systems:   As above     Physical Exam:   Vitals: /62 (BP Location: Right arm, Patient Position: Sitting, Cuff Size: Adult Regular)   Pulse 87   Wt 51.7 kg (114 lb)   BMI 18.40 kg/m     General: Seated comfortably in no acute distress.  HEENT: Optic discs sharp on funduscopic exam.   Lungs: breathing comfortably  Neurologic:     Mental Status: Fully alert, attentive.  Language normal, speech clear and fluent, no  paraphasic errors.      Cranial Nerves: Visual fields intact. PERRL. EOMI with normal smooth pursuit. Facial sensation intact/symmetric. Facial movements symmetric. Hearing not formally tested but intact to conversation. Palate elevation symmetric, uvula midline. No dysarthria. Shoulder shrug strong bilaterally. Tongue protrusion midline.     Motor: Very mild action tremor in bilateral upper extremities. No resting tremor, but does seem to fidget right fingers. Muscle tone with mild rigidity versus paratonia in bilateral upper extremities. Mild decrement in left > right rapid finger tapping. Strength 5/5 throughout upper and lower extremities. Not able to stand from seated position without hands, possible mild generalized deconditioning in strength testing.      Right Left   Shoulder abduction        5 5   Elbow extension 5 5   Elbow flexion 5 5   Wrist extension         5 5   Finger extension 5 5   ADM 5 5   FDI 5 5   APB 5 5   Hip flexion 5 5   Knee flexion 5 5   Knee extension 5 5   Dorsiflexion 5 5   Plantar flexion 5 5        Deep Tendon Reflexes: 2+/symmetric throughout upper and lower extremities with exception of 1+ ankle jerks. No clonus. Toes downgoing bilaterally.     Sensory: Decreased sensation to light touch, temperature in toes, tops of feet bilaterally. Vibration is absent in the left great toe, ~4 seconds in the right great toe, normal in the hands. Negative Romberg.      Coordination: Finger-nose-finger and heel-shin intact without dysmetria (not tested in right leg due to hip fracture).      Gait: Steady gait with use of walker, normal stride length. Able to walk on toes and heels without significant difficulty. Tandem was deferred.          Data: Pertinent prior to visit   Imaging:  MRI brain, MRA head/neck 2016  Impression:  1. No evidence of acute infarction or intracranial hemorrhage.  2. Unchanged generalized parenchymal loss and white matter changes  consistent with moderate chronic small  vessel ischemic disease.   3. Head MRA demonstrates no definite aneurysm or stenosis of the major  intracranial arteries.  4. Neck MRA demonstrates moderate stenosis in the origin of the right  common carotid artery and moderate to severe stenosis in bilateral  vertebral artery origins.    MRI brain 2009  IMPRESSION:   1. Early subacute stroke in the posterior limb of internal capsule on   the left.   2. No evidence for hemorrhage.     MRI cervical 2016  Impression:   1. Multilevel degenerative changes, most pronounced at C6-7 where  there is a disc osteophyte complex that flattens the ventral cord and  contributes to moderate spinal canal narrowing. Also moderate left  neuroforaminal narrowing bilaterally at C3-4 and on the left at C6-7.   2. No cord signal abnormality.    MRI thoracic 2016  Impression:   Straightening of the normal thoracic kyphosis could be positional  otherwise no abnormality is identified within the marrow, within the  cord or elsewhere in the spinal canal.    Procedures:  None    Laboratory:  A1c 6.4, CBC unremarkable, CMP with low sodium(10/2023)  2016 - normal CK, copper, B12         Assessment and Plan:   Assessment:  Ludy Mckenzie is a 83 year old female who presents today for evaluation of imbalance and numbness in the feet. Symptoms have progressed over the last several years. Based on examination, I suspect that symptoms are largely related to cerebrovascular changes and polyneuropathy, most likely related to diabetes. Additional neuropathy labs were ordered as below. She recently broke her right hip last month. She recently started home PT for walking and I encouraged her to continue this.       Plan:  - Continue PT  - B1, B6, B12, ELP, immunofixation, CK    Follow up in Neurology clinic as needed should new concerns arise.    Lester Centeno MD   of Neurology  [facility]      The total time of this encounter today amounted to 49 minutes. This time  included time spent with the patient, prep work, ordering tests, and performing post visit documentation.

## 2023-11-21 PROBLEM — Z86.0100 HISTORY OF COLONIC POLYPS: Status: RESOLVED | Noted: 2021-01-05 | Resolved: 2023-01-01

## 2023-11-21 PROBLEM — R25.2 CRAMP OF LIMB: Status: RESOLVED | Noted: 2017-06-02 | Resolved: 2023-01-01

## 2023-11-21 NOTE — LETTER
"    11/21/2023         RE: Ludy Mckenzie  [address]  [place] [address]        Dear Colleague,    Thank you for referring your patient, Ludy Mckenzie, to the [facility] [place]. Please see a copy of my visit note below.    [facility] Neurology Consultation    Ludy Mckenzie MRN# [id number]   Age: 83 year old YOB: 1940     Requesting physician: Beth Cervantes     Reason for Consultation: imbalance, numbness      History of Presenting Symptoms:   Ludy Mckenzie is a 83 year old female who presents today for evaluation of imbalance, numbness.     Patient had two strokes (18 years ago and 9 years ago). She had problems with balance after the strokes, which improved and then has worsened since then then. Patient saw Dr Yuan in 2016 for evaluation of imbalance. She has had numbness in the feet for the last few years. She denies any numbness in the hands.     With the first stroke she woke up and felt that \"something was wrong\" and she went to the ER. She had a lot of therapy afterwards. The second one she didn't have as much therapy. She doesn't remember if there was weakness on one half of the body.     She had been using a cane for about a year before she broke her right hip with a fall last month. She was using a walker seldom before the broken hip and know using it all the time.       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)     Primary open angle glaucoma (POAG)     Chronic obstructive " pulmonary disease, unspecified COPD type (H)     Chronic kidney disease (CKD) stage G1/A2, glomerular filtration rate (GFR) equal to or greater than 90 mL/min/1.73 square meter and albuminuria creatinine ratio between  mg/g     Loss of balance     Diabetic neuropathy with neurologic complication (H)     Pseudophakia of both eyes     Urge incontinence of urine     Immunosuppression (H24)     Cramp of limb     Right sided sciatica     Pulmonary emphysema, unspecified emphysema type (H)     Age-related osteoporosis without current pathological fracture     Pulmonary nodules     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)     History of colonic polyps     Abnormal gait     Closed fracture of neck of right femur, initial encounter (H)     Past Medical History:   Diagnosis Date     Acute ischemic left TREMAYNE stroke (H) 2009     Astigmatism, unspecified      Bullous pemphigoid (H28)      COPD (chronic obstructive pulmonary disease) (H)      CVA (cerebral infarction) 08/2009     Depressive disorder      Diabetes (H)      Glaucoma (increased eye pressure)      Goiter 03/12/2007     HTN (hypertension) 08/25/2009     Hypocalcemia      Hypokalemia      Hyponatremia      Mixed hyperlipidemia      Osteoporosis      Personal history of other diseases of circulatory system      Pneumonia 11/16/2010     Primary open-angle glaucoma(365.11)      Thyroid nodule 03/23/2012     Unspecified cerebral artery occlusion with cerebral infarction      Unspecified cerebral artery occlusion with cerebral infarction 1998        Past Surgical History:     Past Surgical History:   Procedure Laterality Date     ARTHROPLASTY HIP Right 10/19/2023    Procedure: ARTHROPLASTY RIGHT HIP TOTAL;  Surgeon: Duke Gomez MD;  Location: [facility]     BIOPSY       BUNIONECTOMY  1960    JACQUELINE     CATARACT IOL, RT/LT  2010    right     COLONOSCOPY  2/22/2013    Procedure: COLONOSCOPY;  Colonoscopy ;  Surgeon: Hugo Minor MD;  Location:  GI     COLONOSCOPY N/A  2019    Procedure: Colonoscopy, With Polypectomy And Biopsy;  Surgeon: Selena Escobedo MD;  Location: [facility] OR     COLONOSCOPY N/A 2021    Procedure: COLONOSCOPY, WITH POLYPECTOMY AND BIOPSY;  Surgeon: Shai Leal MD;  Location: [facility] GI     COLONOSCOPY WITH CO2 INSUFFLATION N/A 2019    Procedure: COLONOSCOPY, WITH CO2 INSUFFLATION;  Surgeon: Selena Escobedo MD;  Location: [facility] OR        Social History:     Social History     Tobacco Use     Smoking status: Former     Packs/day: 1.50     Years: 50.00     Additional pack years: 0.00     Total pack years: 75.00     Types: Cigarettes     Quit date: 2009     Years since quittin.3     Smokeless tobacco: Never   Vaping Use     Vaping Use: Never used   Substance Use Topics     Alcohol use: No     Alcohol/week: 4.0 standard drinks of alcohol     Drug use: No        Family History:     Family History   Problem Relation Age of Onset     Cancer Maternal Grandfather         bone     Diabetes Mother 50        dx age 50, hip fracture     Glaucoma Mother      Macular Degeneration Mother      C.A.D. Father         pacemaker     Glaucoma Sister         Medications:     Current Outpatient Medications   Medication Sig     acetaminophen (TYLENOL) 325 MG tablet Take 2 tablets (650 mg) by mouth every 4 hours as needed for other (mild pain)     albuterol (ACCUNEB) 1.25 MG/3ML neb solution TAKE 1 VIAL (1.25 MG) BY NEBULIZATION EVERY 6 HOURS AS NEEDED FOR SHORTNESS OF BREATH / DYSPNEA OR WHEEZING     albuterol (PROAIR HFA/PROVENTIL HFA/VENTOLIN HFA) 108 (90 Base) MCG/ACT inhaler Inhale 2 puffs into the lungs every 4 hours as needed for shortness of breath or wheezing     aspirin (ASA) 325 MG EC tablet Take 1 tablet (325 mg) by mouth daily     aspirin 81 MG tablet Take 1 tablet by mouth daily.     azithromycin (ZITHROMAX) 250 MG tablet TAKE 2 TABLETS (500 MG) BY MOUTH EVERY MON, WED, FRI MORNING     Calcium Carb-Cholecalciferol 500-5 MG-MCG TABS       cholecalciferol (VITAMIN D3) 25 mcg (1000 units) capsule Take 1 capsule by mouth daily     fluticasone (FLONASE) 50 MCG/ACT nasal spray INSTILL 1 SPRAY INTO BOTH NOSTRILS DAILY     Fluticasone-Umeclidin-Vilanterol (TRELEGY ELLIPTA) 200-62.5-25 MCG/INH oral inhaler Inhale 1 puff into the lungs daily     latanoprost (XALATAN) 0.005 % ophthalmic solution Place 1 drop into both eyes At Bedtime     loratadine (CLARITIN) 10 MG tablet Take 1 tablet (10 mg) by mouth daily as needed for allergies     losartan (COZAAR) 25 MG tablet TAKE 1/2 TABLET BY MOUTH EVERY DAY     metFORMIN (GLUCOPHAGE XR) 500 MG 24 hr tablet Take 1 tablet (500 mg) by mouth daily (with dinner)     oxyCODONE (ROXICODONE) 5 MG tablet Take 0.5-1 tablets (2.5-5 mg) by mouth every 4 hours as needed for moderate pain     potassium chloride ER (KLOR-CON) 20 MEQ CR tablet Take 1 tablet (20 mEq) by mouth daily     rosuvastatin (CRESTOR) 5 MG tablet Take 1 tablet (5 mg) by mouth daily     senna-docusate (SENOKOT-S/PERICOLACE) 8.6-50 MG tablet Take 1-2 tablets by mouth 2 times daily Take while on oral narcotics to prevent or treat constipation.     sertraline (ZOLOFT) 50 MG tablet Take 1 tablet (50 mg) by mouth daily     thin (NO BRAND SPECIFIED) lancets Use with lanceting device. To accompany: Blood Glucose Monitor Brands: per insurance. (Patient not taking: Reported on 5/24/2023)     traMADol (ULTRAM) 50 MG tablet Take 1 tablet (50 mg) by mouth every 6 hours as needed for moderate pain     No current facility-administered medications for this visit.        Allergies:     Allergies   Allergen Reactions     Amoxicillin GI Disturbance and Nausea     Amoxicillin-Pot Clavulanate Nausea and Vomiting     Doxycycline GI Disturbance     Iodine Itching and Swelling     Pt is ok with ct contrast dye (isovue 370)     Mercury Unknown     Metronidazole GI Disturbance     Penicillins Unknown     Phenylmercuric Nitrate Unknown        Review of Systems:   As above     Physical  Exam:   Vitals: /62 (BP Location: Right arm, Patient Position: Sitting, Cuff Size: Adult Regular)   Pulse 87   Wt 51.7 kg (114 lb)   BMI 18.40 kg/m     General: Seated comfortably in no acute distress.  HEENT: Optic discs sharp on funduscopic exam.   Lungs: breathing comfortably  Neurologic:     Mental Status: Fully alert, attentive.  Language normal, speech clear and fluent, no paraphasic errors.      Cranial Nerves: Visual fields intact. PERRL. EOMI with normal smooth pursuit. Facial sensation intact/symmetric. Facial movements symmetric. Hearing not formally tested but intact to conversation. Palate elevation symmetric, uvula midline. No dysarthria. Shoulder shrug strong bilaterally. Tongue protrusion midline.     Motor: Very mild action tremor in bilateral upper extremities. No resting tremor, but does seem to fidget right fingers. Muscle tone with mild rigidity versus paratonia in bilateral upper extremities. Mild decrement in left > right rapid finger tapping. Strength 5/5 throughout upper and lower extremities. Not able to stand from seated position without hands, possible mild generalized deconditioning in strength testing.      Right Left   Shoulder abduction        5 5   Elbow extension 5 5   Elbow flexion 5 5   Wrist extension         5 5   Finger extension 5 5   ADM 5 5   FDI 5 5   APB 5 5   Hip flexion 5 5   Knee flexion 5 5   Knee extension 5 5   Dorsiflexion 5 5   Plantar flexion 5 5        Deep Tendon Reflexes: 2+/symmetric throughout upper and lower extremities with exception of 1+ ankle jerks. No clonus. Toes downgoing bilaterally.     Sensory: Decreased sensation to light touch, temperature in toes, tops of feet bilaterally. Vibration is absent in the left great toe, ~4 seconds in the right great toe, normal in the hands. Negative Romberg.      Coordination: Finger-nose-finger and heel-shin intact without dysmetria (not tested in right leg due to hip fracture).      Gait: Steady gait with  use of walker, normal stride length. Able to walk on toes and heels without significant difficulty. Tandem was deferred.          Data: Pertinent prior to visit   Imaging:  MRI brain, MRA head/neck 2016  Impression:  1. No evidence of acute infarction or intracranial hemorrhage.  2. Unchanged generalized parenchymal loss and white matter changes  consistent with moderate chronic small vessel ischemic disease.   3. Head MRA demonstrates no definite aneurysm or stenosis of the major  intracranial arteries.  4. Neck MRA demonstrates moderate stenosis in the origin of the right  common carotid artery and moderate to severe stenosis in bilateral  vertebral artery origins.    MRI brain 2009  IMPRESSION:   1. Early subacute stroke in the posterior limb of internal capsule on   the left.   2. No evidence for hemorrhage.     MRI cervical 2016  Impression:   1. Multilevel degenerative changes, most pronounced at C6-7 where  there is a disc osteophyte complex that flattens the ventral cord and  contributes to moderate spinal canal narrowing. Also moderate left  neuroforaminal narrowing bilaterally at C3-4 and on the left at C6-7.   2. No cord signal abnormality.    MRI thoracic 2016  Impression:   Straightening of the normal thoracic kyphosis could be positional  otherwise no abnormality is identified within the marrow, within the  cord or elsewhere in the spinal canal.    Procedures:  None    Laboratory:  A1c 6.4, CBC unremarkable, CMP with low sodium(10/2023)  2016 - normal CK, copper, B12         Assessment and Plan:   Assessment:  Ludy Mckenzie is a 83 year old female who presents today for evaluation of imbalance and numbness in the feet. Symptoms have progressed over the last several years. Based on examination, I suspect that symptoms are largely related to cerebrovascular changes and polyneuropathy, most likely related to diabetes. Additional neuropathy labs were ordered as below. She recently broke her right hip  last month. She recently started home PT for walking and I encouraged her to continue this.       Plan:  - Continue PT  - B1, B6, B12, ELP, immunofixation, CK    Follow up in Neurology clinic as needed should new concerns arise.    Lester Centeno MD   of Neurology  [facility]      The total time of this encounter today amounted to 49 minutes. This time included time spent with the patient, prep work, ordering tests, and performing post visit documentation.      Again, thank you for allowing me to participate in the care of your patient.        Sincerely,        Lester Centeno MD

## 2023-11-22 NOTE — TELEPHONE ENCOUNTER
[facility] Call Center    Phone Message    May a detailed message be left on voicemail: yes     Reason for Call: Other: Pt is experiencing incontinence and would like to get medication prescribed to help with this. Please advise. Thank you.      Action Taken: Message routed to:  Other: [facility]    Travel Screening: Not Applicable

## 2023-11-22 NOTE — TELEPHONE ENCOUNTER
azithromycin (ZITHROMAX) 250 MG tablet 36 tablet 3 1/25/2023  No   Sig: TAKE 2 TABLETS (500 MG) BY MOUTH EVERY MON, WED, FRI MORNING           Last Office Visit: 3/22/23  Future Office visit:   3/11/24    Routing refill request to provider for review/approval because:  Drug not on the [facility], [facility] or [facility] refill protocol or controlled substance     X Size Of Lesion In Cm (Optional): 1.1

## 2023-11-28 NOTE — TELEPHONE ENCOUNTER
Reason for Call:  Form, our goal is to have forms completed with 72 hours, however, some forms may require a visit or additional information.    Type of letter, form or note:  Home Health Certification    Who is the form from?: Home care    Where did the form come from: form was faxed in    What clinic location was the form placed at?: [facility]    Where the form was placed: Given to physician    What number is listed as a contact on the form?: [phone] Fax - [phone]         Call taken on 11/28/2023 at 1:21 PM by Zaria Varghese   [facility] Team

## 2023-11-29 ENCOUNTER — PATIENT OUTREACH (OUTPATIENT)
Dept: CARE COORDINATION | Facility: CLINIC | Age: 83
End: 2023-11-29
Payer: COMMERCIAL

## 2023-11-29 NOTE — PROGRESS NOTES
Clinic Care Coordination Contact  Care Coordination Clinician Chart Review    Situation: Patient chart reviewed by care coordinator.    Background: Clinic Care Coordination Referral received from inpatient care team for transition handoff communication following hospital admission.    Assessment: Upon chart review, patient is not a candidate for Primary Care Clinic Care Coordination enrollment due to reason stated below:  Patient has passed away.    Plan/Recommendations: Clinic Care Coordination Referral/order cancelled. RN/SW CC will perform no further monitoring/outreaches at this time and will remain available as needed. If new needs arise, a new Care Coordination Referral may be placed.    Nabeel Tucker MSN, RN, PHN, CCM   Primary Care Clinical RN Care Coordinator  [facility]  11/29/2023   9:44 AM  [email or web address]  Office: [phone]

## 2023-12-04 ENCOUNTER — MEDICAL CORRESPONDENCE (OUTPATIENT)
Dept: HEALTH INFORMATION MANAGEMENT | Facility: CLINIC | Age: 83
End: 2023-12-04

## 2023-12-08 ENCOUNTER — MEDICAL CORRESPONDENCE (OUTPATIENT)
Dept: HEALTH INFORMATION MANAGEMENT | Facility: CLINIC | Age: 83
End: 2023-12-08
Payer: COMMERCIAL

## 2023-12-12 ENCOUNTER — MEDICAL CORRESPONDENCE (OUTPATIENT)
Dept: HEALTH INFORMATION MANAGEMENT | Facility: CLINIC | Age: 83
End: 2023-12-12
Payer: COMMERCIAL

## 2024-06-18 NOTE — Clinical Note
Sandrita Thurman RN/Staff Member:   Ishmael    No new altered skin integrity noted during today's assessment.           One more question ! Can you see my progress note ? Have you heard about bamlanivimab ? And does the vaccine for Coronavirus (COVID-19)  need to be postponed 3 months then ?

## 2025-01-06 NOTE — MR AVS SNAPSHOT
After Visit Summary   5/23/2017    Ludy Mckenzie    MRN: [id number]           Patient Information     Date Of Birth          1940        Visit Information        Provider Department      5/23/2017 12:15 PM Whitley Manrique MD [facility]        Today's Diagnoses     Medication monitoring encounter    -  1    Bullous pemphigoid           Follow-ups after your visit        Your next 10 appointments already scheduled     Jun 02, 2017  9:00 AM CDT   Office Visit with Ce Cano MD   [facility] ([place] [id number] [facility] [address] [phone]           Bring a current list of meds and any records pertaining to this visit.  For Physicals, please bring immunization records and any forms needing to be filled out.  Please arrive 10 minutes early to complete paperwork.              Future tests that were ordered for you today     Open Future Orders        Priority Expected Expires Ordered    CBC with platelets differential Routine 7/23/2017 9/23/2017 5/23/2017    ALT Routine 7/23/2017 9/23/2017 5/23/2017    AST Routine 7/23/2017 9/23/2017 5/23/2017            Who to contact     If you have questions or need follow up information about today's clinic visit or your schedule please contact [facility] directly at [phone].  Normal or non-critical lab and imaging results will be communicated to you by MyChart, letter or phone within 4 business days after the clinic has received the results. If you do not hear from us within 7 days, please contact the clinic through MyChart or phone. If you have a critical or abnormal lab result, we will notify you by phone as soon as possible.  Submit refill requests through [employer] or call your pharmacy and they will forward the refill request to us. Please allow 3 business days for your refill to be completed.          Additional Information About Your Visit        [facility]    Mario Felder Patient Age: 81 year old  MESSAGE:   To Dr Phil Gold PT Advocate calling states     #1 Alla PT calling admitted yesterday  Recommended to add SS to discuss advanced direction and community resources   She states this order has been sent via [employer]    On Medication review:    #2 AVS 12-30-24 w/Grant Maciel CNP  AVS states \"Take over the counter Magnesium Glycinate 200-400 mg 30-60 minutes prior to sleep. \"  Pt taking Magnesium 400 mg 1 tablet in afternoon daily. Pt to change this at night time or keep as he has been taking?     #3 AVS states Folic acid 1 mg 1 tab daily.   Patient taking Folate 1334 mcg DFE folate 800 mcg 1 tab daily. Is this ok?     #4 on med list although pt not taking glucosamine chondroitin - ok to remove from med list?         WEIGHT AND HEIGHT:   Wt Readings from Last 1 Encounters:   01/05/25 72.6 kg (160 lb)     Ht Readings from Last 1 Encounters:   09/12/24 6' (1.829 m)     BMI Readings from Last 1 Encounters:   01/05/25 21.70 kg/m²       ALLERGIES:  Patient has no known allergies.  Current Outpatient Medications   Medication Sig Dispense Refill    vitamin B-12 (CYANOCOBALAMIN) 1000 MCG tablet Take 1,000 mcg by mouth daily.      Glucosamine-Chondroit-Vit C-Mn (GLUCOSAMINE CHONDR 1500 COMPLX PO) Take 1 tablet by mouth daily. Begin taking on January 5, 2025. Patient not takine medication      folic acid (FOLATE) 1 MG tablet Take 1 mg by mouth daily. Begin taking on January 5, 2025. Patient taking folate 1334 mcg DFE, folic acid 800 mcg      escitalopram (LEXAPRO) 10 MG tablet Take half tablet by mouth daily for one week.  May increase to full tablet daily if needed.  Follow up in 6-8 weeks 30 tablet 1    ALPRAZolam (XANAX) 0.25 MG tablet TAKE 1 TABLET BY MOUTH DAILY AS NEEDED FOR ANXIETY 30 tablet 1    valGANciclovir (VALCYTE) 450 MG tablet Take 1 tablet by mouth daily. 30 tablet 3    losartan (COZAAR) 50 MG tablet Take 1 tablet by mouth daily. 90 tablet 3    Multiple  Information     [employer] gives you secure access to your electronic health record. If you see a primary care provider, you can also send messages to your care team and make appointments. If you have questions, please call your primary care clinic.  If you do not have a primary care provider, please call [phone] and they will assist you.      [employer] is an electronic gateway that provides easy, online access to your medical records. With [employer], you can request a clinic appointment, read your test results, renew a prescription or communicate with your care team.     To access your existing account, please contact your [facility] or call [phone] for assistance.        Care EveryWhere ID     This is your Care EveryWhere ID. This could be used by other organizations to access your [facility] medical records  [id number]         Blood Pressure from Last 3 Encounters:   05/09/17 110/64   12/22/16 118/64   09/19/16 110/58    Weight from Last 3 Encounters:   05/09/17 60.8 kg (134 lb)   12/22/16 61.2 kg (135 lb)   09/19/16 60.5 kg (133 lb 6.4 oz)                 Today's Medication Changes          These changes are accurate as of: 5/23/17 12:46 PM.  If you have any questions, ask your nurse or doctor.               Start taking these medicines.        Dose/Directions    * mycophenolate 500 MG tablet   Commonly known as:  CELLCEPT - GENERIC EQUIVALENT   Used for:  Bullous pemphigoid   Started by:  Whitley Manrique MD        Take 3 tabs in the am and 2 tabs in the evening   Quantity:  300 tablet   Refills:  1       * mycophenolate 500 MG tablet   Commonly known as:  CELLCEPT - GENERIC EQUIVALENT   Used for:  Bullous pemphigoid   Started by:  Whitley Manrique MD        Take 3 tabs in the am and 2 tabs in the evening   Quantity:  300 tablet   Refills:  1       * Notice:  This list has 2 medication(s) that are the same as other medications prescribed for you. Read the directions carefully, and  Vitamins-Minerals (CENTRUM SILVER PO) Take  by mouth daily. - Oral      Misc Natural Products (OSTEO BI-FLEX JOINT SHIELD PO) Take  by mouth daily. - Oral       No current facility-administered medications for this visit.     [facility] [place] [facility]          Pharmacy preference(s) on file:   [id number] - [place], [place] - [address] [facility] [address]  [address]  [facility] [address]  Phone: [phone] Fax: [phone]      CALL BACK INFO: Ok to leave response (including medical information) on answering machine  ROUTING: Patient's physician/staff        PCP: Saad Villarreal MD         INS: Payor: [facility] MEDICARE ADVANTAGE / Plan: MEDICARE ADVANTAGE PPO / Product Type: PFFS   PATIENT ADDRESS:  [address] [place] [id number]     ask your doctor or other care provider to review them with you.         Where to get your medicines      Some of these will need a paper prescription and others can be bought over the counter.  Ask your nurse if you have questions.     Bring a paper prescription for each of these medications     mycophenolate 500 MG tablet    mycophenolate 500 MG tablet                Primary Care Provider Office Phone # Fax #    Ce Cano [phone]       [address] [address]        Thank you!     Thank you for choosing [facility]  for your care. Our goal is always to provide you with excellent care. Hearing back from our patients is one way we can continue to improve our services. Please take a few minutes to complete the written survey that you may receive in the mail after your visit with us. Thank you!             Your Updated Medication List - Protect others around you: Learn how to safely use, store and throw away your medicines at www.disposemymeds.org.          This list is accurate as of: 5/23/17 12:46 PM.  Always use your most recent med list.                   Brand Name Dispense Instructions for use    ACE NOT PRESCRIBED (INTENTIONAL)     0 each    1 each continuous prn. ACE Inhibitor not prescribed due to Symptomatic hypotension not due to excessive diuresis       albuterol 108 (90 BASE) MCG/ACT Inhaler   Generic drug:  albuterol     8.5 Inhaler    INHALE TWO PUFFS BY MOUTH EVERY FOUR HOURS AS NEEDED SHORTNESS OF BREATH       aspirin 81 MG tablet      Take 1 tablet by mouth daily.       calcium 600 MG tablet     180 tablet    Take 1 tablet by mouth 2 times daily       cholecalciferol 1000 UNITS capsule    vitamin  -D     Take 1 capsule by mouth daily.       clobetasol 0.05 % cream    TEMOVATE    120 g    Apply topically 2 times daily To active blisters until resolved.       cyclobenzaprine 5 MG tablet    FLEXERIL    20 tablet    Take  1 tablet (5 mg) by mouth 3 times daily as needed for muscle spasms       fluticasone-salmeterol 500-50 MCG/DOSE diskus inhaler    ADVAIR    1 Inhaler    Inhale 1 puff into the lungs 2 times daily       ipratropium - albuterol 0.5 mg/2.5 mg/3 mL 0.5-2.5 (3) MG/3ML neb solution    DUONEB    1 Box    Take 1 vial (3 mLs) by nebulization every 6 hours as needed for shortness of breath / dyspnea       latanoprost 0.005 % ophthalmic solution    XALATAN    3 Bottle    Place 1 drop into both eyes At Bedtime       losartan 25 MG tablet    COZAAR    45 tablet    TAKE 0.5 TABLETS (12.5 MG) BY MOUTH DAILY       multivitamin, therapeutic with minerals Tabs tablet      Take 1 tablet by mouth daily       * mycophenolate 500 MG tablet    CELLCEPT - GENERIC EQUIVALENT    300 tablet    Take 3 tabs in the am and 2 tabs in the evening       * mycophenolate 500 MG tablet    CELLCEPT - GENERIC EQUIVALENT    300 tablet    Take 3 tabs in the am and 2 tabs in the evening       potassium chloride SA 20 MEQ CR tablet    potassium chloride    90 tablet    Take 1 tablet (20 mEq) by mouth daily       sertraline 100 MG tablet    ZOLOFT    135 tablet    Take 1.5 tablets (150 mg) by mouth daily       SPIRIVA HANDIHALER 18 MCG capsule   Generic drug:  tiotropium     90 capsule    INHALE 1 CAPSULE INTO THE LUNGS DAILY.       VITAMIN B 12 PO      Take by mouth daily       vitamin B complex with vitamin C Tabs tablet      Take 1 tablet by mouth daily       * Notice:  This list has 2 medication(s) that are the same as other medications prescribed for you. Read the directions carefully, and ask your doctor or other care provider to review them with you.

## (undated) DEVICE — SOL NACL 0.9% IRRIG 3000ML BAG 07972-08

## (undated) DEVICE — ESU ELEC BLADE 4" COATED

## (undated) DEVICE — ENDO CAP AND TUBING STERILE FOR ENDOGATOR  100130

## (undated) DEVICE — SU VICRYL 2-0 CT-1 36" UND J945H

## (undated) DEVICE — PACK ENDOSCOPY GI CUSTOM UMMC

## (undated) DEVICE — INSTINCT PLUS ENDOSCOPIC CLIPPING DEVICE

## (undated) DEVICE — SUCTION TIP FLEXI CLEAR TIP DISP K62

## (undated) DEVICE — GLOVE BIOGEL PI MICRO INDICATOR UNDERGLOVE SZ 8.5 48985

## (undated) DEVICE — DEVICE RETRIEVER HEWSON 71111579

## (undated) DEVICE — SU STRATAFIX PDS PLUS 1 CT-1 18" SXPP1A404

## (undated) DEVICE — PACK HIP LAKES WITH POUCH

## (undated) DEVICE — DECANTER VIAL 2006S

## (undated) DEVICE — SUCTION IRR SYSTEM W/O TIP INTERPULSE HANDPIECE 0210-100-000

## (undated) DEVICE — SYR ORISE GEL 10ML M00519201

## (undated) DEVICE — Device

## (undated) DEVICE — BLADE KNIFE SURG 15 371115

## (undated) DEVICE — BLADE SAW SAGITTAL STRK 18X90X1.27MM HD SYS 6 6118-127-090

## (undated) DEVICE — PAD CHUX UNDERPAD 23X24" 7136

## (undated) DEVICE — DRAPE POUCH INSTRUMENT 3 POCKET 1018L

## (undated) DEVICE — ESU HOLSTER PLASTIC DISP E2400

## (undated) DEVICE — PREP CHLORAPREP 26ML TINTED ORANGE  260815

## (undated) DEVICE — GLOVE BIOGEL PI MICRO SZ 8.0 48580

## (undated) DEVICE — WIPE PREMOIST CLEANSING WASHCLOTHS 7988

## (undated) DEVICE — DRAPE MAYO STAND 23X54 8337

## (undated) DEVICE — ESU PENCIL SMOKE EVAC W/ROCKER SWITCH 0703-047-000

## (undated) DEVICE — GLOVE BIOGEL PI MICRO INDICATOR UNDERGLOVE SZ 7.5 48975

## (undated) DEVICE — DRSG STERI STRIP 1/2X4" R1547

## (undated) DEVICE — NDL SCLEROTHERAPY 25GA CARR-LOCK  00711811

## (undated) DEVICE — SOL WATER IRRIG 1000ML BOTTLE 2F7114

## (undated) DEVICE — DRSG AQUACEL AG 3.5X9.75" HYDROFIBER 412011

## (undated) DEVICE — KIT CONNECTOR FOR OLYMPUS ENDOSCOPES DEFENDO 100310

## (undated) DEVICE — SOL WATER IRRIG 1000ML BOTTLE 07139-09

## (undated) DEVICE — SOL NACL 0.9% IRRIG 1000ML BOTTLE 07138-09

## (undated) DEVICE — ENDO TUBING CO2 SMARTCAP STERILE DISP 100145CO2EXT

## (undated) DEVICE — GOWN IMPERVIOUS SPECIALTY XLG/XLONG 32474

## (undated) DEVICE — SYR 50ML LL W/O NDL 309653

## (undated) DEVICE — SU FIBERWIRE 2 38" T-8 NDL  AR-7206

## (undated) DEVICE — ENDO SNARE EXACTO COLD 9MM LOOP 2.4MMX230CM 00711115

## (undated) DEVICE — SU ETHIBOND 1 CT-1 30" X425H

## (undated) DEVICE — SNARE CAPIVATOR II POLYPECTOMY 20X240MM M00561240

## (undated) DEVICE — NDL 18GA 1.5" 305196

## (undated) DEVICE — SPECIMEN TRAP POLYP 4 CHAMBER EZ710202 EZ710202

## (undated) DEVICE — DRAPE IOBAN LG .375X23.5" 6648EZ

## (undated) DEVICE — SUCTION MANIFOLD NEPTUNE 2 SYS 4 PORT 0702-020-000

## (undated) DEVICE — IMM PILLOW ABDUCT HIP MED 0814-8033

## (undated) DEVICE — GLOVE BIOGEL PI MICRO SZ 7.0 48570

## (undated) DEVICE — DRSG TEGADERM 2 3/8X2 3/4" 1624W

## (undated) DEVICE — BONE CLEANING TIP INTERPULSE FEMORAL CANAL 0210-008-000

## (undated) DEVICE — DRAPE SHEET REV FOLD 3/4 9349

## (undated) DEVICE — DEVICE RETRIEVAL ROTH NET PLATINUM UNIV 2.5MMX230CM 00715050

## (undated) DEVICE — BONE CLEANING TIP INTERPULSE  0210-010-000

## (undated) DEVICE — BLANKET BAIR HUGGER UPPER BODY 42268

## (undated) DEVICE — SU STRATAFIX MONOCRYL 3-0 SPIRAL PS-2 30CM SXMP1B106

## (undated) DEVICE — KIT ENDO FIRST STEP DISINFECTANT 200ML W/POUCH EP-4

## (undated) DEVICE — HOOD T4 PROTECTIVE STERI FACE SHIELD 400-800

## (undated) RX ORDER — ALBUTEROL SULFATE 0.83 MG/ML
SOLUTION RESPIRATORY (INHALATION)
Status: DISPENSED
Start: 2019-12-23

## (undated) RX ORDER — ONDANSETRON 2 MG/ML
INJECTION INTRAMUSCULAR; INTRAVENOUS
Status: DISPENSED
Start: 2021-05-14

## (undated) RX ORDER — EPHEDRINE SULFATE 50 MG/ML
INJECTION, SOLUTION INTRAMUSCULAR; INTRAVENOUS; SUBCUTANEOUS
Status: DISPENSED
Start: 2021-05-14

## (undated) RX ORDER — FENTANYL CITRATE 50 UG/ML
INJECTION, SOLUTION INTRAMUSCULAR; INTRAVENOUS
Status: DISPENSED
Start: 2019-11-11

## (undated) RX ORDER — DEXAMETHASONE SODIUM PHOSPHATE 4 MG/ML
INJECTION, SOLUTION INTRA-ARTICULAR; INTRALESIONAL; INTRAMUSCULAR; INTRAVENOUS; SOFT TISSUE
Status: DISPENSED
Start: 2021-05-14

## (undated) RX ORDER — LIDOCAINE HYDROCHLORIDE 20 MG/ML
INJECTION, SOLUTION EPIDURAL; INFILTRATION; INTRACAUDAL; PERINEURAL
Status: DISPENSED
Start: 2021-05-14

## (undated) RX ORDER — ACETAMINOPHEN 325 MG/1
TABLET ORAL
Status: DISPENSED
Start: 2023-01-01

## (undated) RX ORDER — FENTANYL CITRATE 50 UG/ML
INJECTION, SOLUTION INTRAMUSCULAR; INTRAVENOUS
Status: DISPENSED
Start: 2021-05-14

## (undated) RX ORDER — WATER 10 ML/10ML
INJECTION INTRAMUSCULAR; INTRAVENOUS; SUBCUTANEOUS
Status: DISPENSED
Start: 2021-05-14

## (undated) RX ORDER — FENTANYL CITRATE-0.9 % NACL/PF 10 MCG/ML
PLASTIC BAG, INJECTION (ML) INTRAVENOUS
Status: DISPENSED
Start: 2021-05-14

## (undated) RX ORDER — PROPOFOL 10 MG/ML
INJECTION, EMULSION INTRAVENOUS
Status: DISPENSED
Start: 2021-05-14